# Patient Record
Sex: MALE | Race: WHITE | Employment: OTHER | URBAN - NONMETROPOLITAN AREA
[De-identification: names, ages, dates, MRNs, and addresses within clinical notes are randomized per-mention and may not be internally consistent; named-entity substitution may affect disease eponyms.]

---

## 2017-01-03 LAB
ANION GAP SERPL CALCULATED.3IONS-SCNC: 8 MMOL/L (ref 4–12)
BUN BLDV-MCNC: 29 MG/DL (ref 7–20)
CALCIUM SERPL-MCNC: 9.3 MG/DL (ref 8.8–10.5)
CHLORIDE BLD-SCNC: 101 MEQ/L (ref 101–111)
CO2: 30 MEQ/L (ref 21–32)
CREAT SERPL-MCNC: 1.56 MG/DL (ref 0.7–1.3)
CREATININE CLEARANCE: 43
CREATININE, RANDOM URINE: 35.9 MG/DL
GLUCOSE: 83 MG/DL (ref 70–110)
MICROALBUMIN UR-MCNC: < 2 MG/L
MICROALBUMIN/CREAT UR-RTO: < 5.6 MG/GM (ref 0–30)
POTASSIUM SERPL-SCNC: 3.7 MEQ/L (ref 3.6–5)
SODIUM BLD-SCNC: 139 MEQ/L (ref 135–145)

## 2017-01-10 ENCOUNTER — OFFICE VISIT (OUTPATIENT)
Dept: NEPHROLOGY | Age: 82
End: 2017-01-10

## 2017-01-10 VITALS
OXYGEN SATURATION: 93 % | DIASTOLIC BLOOD PRESSURE: 75 MMHG | BODY MASS INDEX: 23.81 KG/M2 | SYSTOLIC BLOOD PRESSURE: 160 MMHG | HEART RATE: 56 BPM | WEIGHT: 147.5 LBS

## 2017-01-10 DIAGNOSIS — N18.30 CHRONIC KIDNEY DISEASE, STAGE III (MODERATE) (HCC): Primary | ICD-10-CM

## 2017-01-10 LAB
BACTERIA URINE, POC: NORMAL
BILIRUBIN URINE: NORMAL MG/DL
BLOOD, URINE: NEGATIVE
CASTS URINE, POC: NORMAL
CLARITY: CLEAR
COLOR: YELLOW
CRYSTALS URINE, POC: NORMAL
EPI CELLS URINE, POC: NORMAL
GLUCOSE URINE: NEGATIVE
KETONES, URINE: NEGATIVE
LEUKOCYTE EST, POC: NEGATIVE
NITRITE, URINE: NEGATIVE
PH UA: 5 (ref 4.5–8)
PROTEIN UA: NEGATIVE
RBC URINE, POC: NORMAL
SPECIFIC GRAVITY UA: NORMAL (ref 1–1.03)
UROBILINOGEN, URINE: NORMAL
WBC URINE, POC: NORMAL
YEAST URINE, POC: NORMAL

## 2017-01-10 PROCEDURE — 99213 OFFICE O/P EST LOW 20 MIN: CPT | Performed by: INTERNAL MEDICINE

## 2017-01-10 PROCEDURE — 81002 URINALYSIS NONAUTO W/O SCOPE: CPT | Performed by: INTERNAL MEDICINE

## 2017-01-10 RX ORDER — LISINOPRIL 10 MG/1
5 TABLET ORAL DAILY
Qty: 30 TABLET | Refills: 3 | Status: ON HOLD | OUTPATIENT
Start: 2017-01-10 | End: 2017-12-02 | Stop reason: HOSPADM

## 2017-01-10 ASSESSMENT — ENCOUNTER SYMPTOMS
VOMITING: 0
NAUSEA: 0
COUGH: 0
BLOOD IN STOOL: 0
RESPIRATORY NEGATIVE: 1
GASTROINTESTINAL NEGATIVE: 1
BACK PAIN: 0
WHEEZING: 0
SHORTNESS OF BREATH: 0
CHEST TIGHTNESS: 0
ABDOMINAL DISTENTION: 0
FACIAL SWELLING: 0
DIARRHEA: 0
ABDOMINAL PAIN: 0
CONSTIPATION: 0

## 2017-03-07 ENCOUNTER — OFFICE VISIT (OUTPATIENT)
Dept: PULMONOLOGY | Age: 82
End: 2017-03-07

## 2017-03-07 VITALS
OXYGEN SATURATION: 96 % | TEMPERATURE: 98.4 F | HEART RATE: 64 BPM | SYSTOLIC BLOOD PRESSURE: 118 MMHG | BODY MASS INDEX: 23.46 KG/M2 | DIASTOLIC BLOOD PRESSURE: 64 MMHG | WEIGHT: 146 LBS | HEIGHT: 66 IN | RESPIRATION RATE: 16 BRPM

## 2017-03-07 DIAGNOSIS — R09.02 HYPOXEMIA: ICD-10-CM

## 2017-03-07 DIAGNOSIS — J43.2 CENTRILOBULAR EMPHYSEMA (HCC): Primary | ICD-10-CM

## 2017-03-07 PROCEDURE — 1123F ACP DISCUSS/DSCN MKR DOCD: CPT | Performed by: PHYSICIAN ASSISTANT

## 2017-03-07 PROCEDURE — G8484 FLU IMMUNIZE NO ADMIN: HCPCS | Performed by: PHYSICIAN ASSISTANT

## 2017-03-07 PROCEDURE — G8420 CALC BMI NORM PARAMETERS: HCPCS | Performed by: PHYSICIAN ASSISTANT

## 2017-03-07 PROCEDURE — 99213 OFFICE O/P EST LOW 20 MIN: CPT | Performed by: PHYSICIAN ASSISTANT

## 2017-03-07 PROCEDURE — G8926 SPIRO NO PERF OR DOC: HCPCS | Performed by: PHYSICIAN ASSISTANT

## 2017-03-07 PROCEDURE — 4040F PNEUMOC VAC/ADMIN/RCVD: CPT | Performed by: PHYSICIAN ASSISTANT

## 2017-03-07 PROCEDURE — 3023F SPIROM DOC REV: CPT | Performed by: PHYSICIAN ASSISTANT

## 2017-03-07 PROCEDURE — G8427 DOCREV CUR MEDS BY ELIG CLIN: HCPCS | Performed by: PHYSICIAN ASSISTANT

## 2017-03-07 PROCEDURE — 1036F TOBACCO NON-USER: CPT | Performed by: PHYSICIAN ASSISTANT

## 2017-03-07 ASSESSMENT — ENCOUNTER SYMPTOMS
WHEEZING: 0
HEARTBURN: 0
NAUSEA: 0
GASTROINTESTINAL NEGATIVE: 1
SORE THROAT: 0
COUGH: 0
HEMOPTYSIS: 0
EYES NEGATIVE: 1
VOMITING: 0
SPUTUM PRODUCTION: 0
BACK PAIN: 0
SHORTNESS OF BREATH: 0
RESPIRATORY NEGATIVE: 1

## 2017-04-11 ENCOUNTER — OFFICE VISIT (OUTPATIENT)
Dept: PULMONOLOGY | Age: 82
End: 2017-04-11

## 2017-04-11 VITALS
OXYGEN SATURATION: 96 % | DIASTOLIC BLOOD PRESSURE: 62 MMHG | HEIGHT: 66 IN | TEMPERATURE: 97.7 F | HEART RATE: 65 BPM | SYSTOLIC BLOOD PRESSURE: 120 MMHG | BODY MASS INDEX: 24.43 KG/M2 | WEIGHT: 152 LBS

## 2017-04-11 DIAGNOSIS — J43.2 CENTRILOBULAR EMPHYSEMA (HCC): Primary | ICD-10-CM

## 2017-04-11 PROCEDURE — 3023F SPIROM DOC REV: CPT | Performed by: PHYSICIAN ASSISTANT

## 2017-04-11 PROCEDURE — 4040F PNEUMOC VAC/ADMIN/RCVD: CPT | Performed by: PHYSICIAN ASSISTANT

## 2017-04-11 PROCEDURE — 1036F TOBACCO NON-USER: CPT | Performed by: PHYSICIAN ASSISTANT

## 2017-04-11 PROCEDURE — 99213 OFFICE O/P EST LOW 20 MIN: CPT | Performed by: PHYSICIAN ASSISTANT

## 2017-04-11 PROCEDURE — G8427 DOCREV CUR MEDS BY ELIG CLIN: HCPCS | Performed by: PHYSICIAN ASSISTANT

## 2017-04-11 PROCEDURE — G8926 SPIRO NO PERF OR DOC: HCPCS | Performed by: PHYSICIAN ASSISTANT

## 2017-04-11 PROCEDURE — 1123F ACP DISCUSS/DSCN MKR DOCD: CPT | Performed by: PHYSICIAN ASSISTANT

## 2017-04-11 PROCEDURE — G8420 CALC BMI NORM PARAMETERS: HCPCS | Performed by: PHYSICIAN ASSISTANT

## 2017-04-11 ASSESSMENT — ENCOUNTER SYMPTOMS
NAUSEA: 0
EYES NEGATIVE: 1
GASTROINTESTINAL NEGATIVE: 1
HEMOPTYSIS: 0
SORE THROAT: 0
VOMITING: 0
WHEEZING: 0
HEARTBURN: 0
COUGH: 0
SHORTNESS OF BREATH: 1
BACK PAIN: 0
SPUTUM PRODUCTION: 0

## 2017-05-11 ENCOUNTER — TELEPHONE (OUTPATIENT)
Dept: PULMONOLOGY | Age: 82
End: 2017-05-11

## 2017-07-10 ENCOUNTER — TELEPHONE (OUTPATIENT)
Dept: PULMONOLOGY | Age: 82
End: 2017-07-10

## 2017-10-11 ENCOUNTER — OFFICE VISIT (OUTPATIENT)
Dept: PULMONOLOGY | Age: 82
End: 2017-10-11
Payer: MEDICARE

## 2017-10-11 VITALS
BODY MASS INDEX: 24.43 KG/M2 | HEIGHT: 66 IN | HEART RATE: 59 BPM | DIASTOLIC BLOOD PRESSURE: 62 MMHG | SYSTOLIC BLOOD PRESSURE: 115 MMHG | WEIGHT: 152 LBS | OXYGEN SATURATION: 96 %

## 2017-10-11 DIAGNOSIS — J42 CHRONIC BRONCHITIS, UNSPECIFIED CHRONIC BRONCHITIS TYPE (HCC): ICD-10-CM

## 2017-10-11 DIAGNOSIS — J96.11 HYPOXEMIC RESPIRATORY FAILURE, CHRONIC (HCC): ICD-10-CM

## 2017-10-11 PROCEDURE — 1036F TOBACCO NON-USER: CPT | Performed by: PHYSICIAN ASSISTANT

## 2017-10-11 PROCEDURE — 3023F SPIROM DOC REV: CPT | Performed by: PHYSICIAN ASSISTANT

## 2017-10-11 PROCEDURE — 99213 OFFICE O/P EST LOW 20 MIN: CPT | Performed by: PHYSICIAN ASSISTANT

## 2017-10-11 PROCEDURE — G8484 FLU IMMUNIZE NO ADMIN: HCPCS | Performed by: PHYSICIAN ASSISTANT

## 2017-10-11 PROCEDURE — G8420 CALC BMI NORM PARAMETERS: HCPCS | Performed by: PHYSICIAN ASSISTANT

## 2017-10-11 PROCEDURE — G8427 DOCREV CUR MEDS BY ELIG CLIN: HCPCS | Performed by: PHYSICIAN ASSISTANT

## 2017-10-11 PROCEDURE — G8926 SPIRO NO PERF OR DOC: HCPCS | Performed by: PHYSICIAN ASSISTANT

## 2017-10-11 PROCEDURE — 1123F ACP DISCUSS/DSCN MKR DOCD: CPT | Performed by: PHYSICIAN ASSISTANT

## 2017-10-11 PROCEDURE — 4040F PNEUMOC VAC/ADMIN/RCVD: CPT | Performed by: PHYSICIAN ASSISTANT

## 2017-10-11 RX ORDER — ALBUTEROL SULFATE 90 UG/1
2 AEROSOL, METERED RESPIRATORY (INHALATION) 4 TIMES DAILY
Qty: 3 INHALER | Refills: 3 | Status: ON HOLD | OUTPATIENT
Start: 2017-10-11 | End: 2017-11-29 | Stop reason: SDUPTHER

## 2017-10-11 RX ORDER — PREDNISONE 10 MG/1
TABLET ORAL
Qty: 30 TABLET | Refills: 0 | Status: SHIPPED | OUTPATIENT
Start: 2017-10-11 | End: 2017-10-21

## 2017-10-11 RX ORDER — PREDNISONE 1 MG/1
5 TABLET ORAL DAILY
Qty: 90 TABLET | Refills: 3 | Status: SHIPPED | OUTPATIENT
Start: 2017-10-11 | End: 2017-11-28 | Stop reason: SDUPTHER

## 2017-10-11 ASSESSMENT — ENCOUNTER SYMPTOMS
SPUTUM PRODUCTION: 1
NAUSEA: 0
BACK PAIN: 0
HEARTBURN: 0
GASTROINTESTINAL NEGATIVE: 1
WHEEZING: 1
VOMITING: 0
SORE THROAT: 0
EYES NEGATIVE: 1
HEMOPTYSIS: 0
SHORTNESS OF BREATH: 1
COUGH: 1

## 2017-10-11 NOTE — PROGRESS NOTES
[facility]    Patient: Jaylon Plaza, 80 y.o.   : 1931  10/11/2017    Pt of Dr. Julio Gilbert   Patient presents with    COPD     Centrilobular Emphysema - 6 mth f/u with no testing    Medication Refill     Needs refills on Stiloto,  Pro-Air,  Prednisone, Prednisolone        HPI  Silver Rj is here for follow up for COPD. He has been on Stiolto for a few months but feels he did better on Advair and Spiriva but likes the respimat inhaler design. He has been doing pretty well. He did have a flare last month and required a pred taper. Otherwise he is on prednisone 5 mg a day with good benefit. He is SOB with activity but not worse. He uses 2 lnc at night. Progress History:   Since last visit any new medical issues? No  New ER or hospitlal visits? No  Any new or changes in medicines? No  Using inhalers? Yes Stiolto  Are they helpful?  Somewhat  Past Medical hx   PMH:  Past Medical History:   Diagnosis Date    AAA (abdominal aortic aneurysm) ([facility]     followed by Dr. Freddy Arceo COPD (chronic obstructive pulmonary disease) ([facility]     Hypercholesteremia     Hypertension     Prostate cancer ([facility] [facility]     with radiation in     SOB (shortness of breath)     Type II or unspecified type diabetes mellitus without mention of complication, not stated as uncontrolled      SURGICAL HISTORY:  Past Surgical History:   Procedure Laterality Date    ABDOMINAL AORTIC ANEURYSM REPAIR  14    ABDOMINAL AORTIC ANEURYSM REPAIR, ENDOVASCULAR Bilateral 2014    WITH LT FEMORAL ENDARTERECTOMY     COLONOSCOPY  unsure    INGUINAL HERNIA REPAIR Bilateral 2015    Dr Kris Chicas HISTORY:  Social History   Substance Use Topics    Smoking status: Former Smoker     Packs/day: 2.00     Years: 40.00     Types: Cigarettes     Quit date: 10/5/1989    Smokeless tobacco: Never Used    Alcohol use 0.6 oz/week     1 Cans of beer per week      Comment: now and then     ALLERGIES:  Allergies   Allergen Reactions    Sulfa Antibiotics Hives    Percocet [Oxycodone-Acetaminophen] Nausea And Vomiting     FAMILY HISTORY:  Family History   Problem Relation Age of Onset    Rheum Arthritis Mother       due to complications related to    Heart Disease Mother     Coronary Art Dis Father     Heart Disease Father     Coronary Art Dis Brother     Heart Disease Brother      CURRENT MEDICATIONS:  Current Outpatient Prescriptions   Medication Sig Dispense Refill    fluticasone-salmeterol (ADVAIR) 500-50 MCG/DOSE diskus inhaler Inhale 1 puff into the lungs every 12 hours 3 Inhaler 3    predniSONE (DELTASONE) 5 MG tablet Take 1 tablet by mouth daily 90 tablet 3    tiotropium (SPIRIVA RESPIMAT) 2.5 MCG/ACT AERS inhaler Inhale 2 puffs into the lungs daily 3 Inhaler 3    albuterol sulfate HFA (VENTOLIN HFA) 108 (90 Base) MCG/ACT inhaler Inhale 2 puffs into the lungs 4 times daily 3 Inhaler 3    predniSONE (DELTASONE) 10 MG tablet 4 tablets for 3 days, then 3 tablets for 3 days, then 2 tablets for 3 days, then 1 tablet for 3 days 30 tablet 0    prednisoLONE 5 MG TABS Take 5 mg by mouth daily      Pseudoephedrine-Guaifenesin (MUCINEX D PO) Take by mouth 2 times daily      Misc. Devices (ACAPELLA) MISC 1 Device by Does not apply route every 4 hours 1 each 0    lisinopril (PRINIVIL;ZESTRIL) 10 MG tablet Take 0.5 tablets by mouth daily 30 tablet 3    albuterol sulfate HFA (PROAIR HFA) 108 (90 BASE) MCG/ACT inhaler Inhale 2 puffs into the lungs every 6 hours as needed for Wheezing 3 Inhaler 3    bisoprolol-hydrochlorothiazide (ZIAC) 5-6.25 MG per tablet Take 1 tablet by mouth daily 30 tablet 3    Multiple Vitamins-Minerals (PRESERVISION AREDS 2) CAPS Take 1 capsule by mouth 2 times daily       pravastatin (PRAVACHOL) 80 MG tablet Take 80 mg by mouth nightly.       albuterol (PROVENTIL) (2.5 MG/3ML) 0.083% nebulizer solution Take 2.5 mg by nebulization 2 times daily as needed for Wheezing.  OXYGEN Inhale 2 L into the lungs nightly       glipiZIDE (GLUCOTROL) 5 MG tablet Take 5 mg by mouth daily.  gabapentin (NEURONTIN) 100 MG capsule Take 100 mg by mouth 2 times daily.  fish oil-omega-3 fatty acids 1000 MG capsule Take 1 g by mouth 2 times daily.  niacin (SLO-NIACIN) 500 MG tablet Take 500 mg by mouth nightly.  tamsulosin (FLOMAX) 0.4 MG capsule Take 0.4 mg by mouth daily. No current facility-administered medications for this visit. [place] Setting ROS   Review of Systems   Constitutional: Negative. Negative for chills, fever and weight loss. HENT: Negative. Negative for congestion and sore throat. Eyes: Negative. Respiratory: Positive for cough, sputum production, shortness of breath and wheezing. Negative for hemoptysis. Cardiovascular: Negative. Negative for chest pain and leg swelling. Gastrointestinal: Negative. Negative for heartburn, nausea and vomiting. Genitourinary: Negative. Musculoskeletal: Negative. Negative for back pain and myalgias. Skin: Negative. Neurological: Negative. Negative for dizziness, tremors, weakness and headaches. Psychiatric/Behavioral: Negative. All other systems reviewed and are negative. Physical exam   /62   Pulse 59   Ht 5' 6\" (1.676 m)   Wt 152 lb (68.9 kg)   SpO2 96% Comment: R/A at rest  BMI 24.53 kg/m²    Neck Circumference -   15  Mallampati - 3    Physical Exam   Constitutional: He is oriented to person, place, and time and well-developed, well-nourished, and in no distress. HENT:   Head: Normocephalic and atraumatic. Mouth/Throat: No oropharyngeal exudate. Eyes: Conjunctivae and EOM are normal. Pupils are equal, round, and reactive to light. Neck: Normal range of motion. Neck supple. Cardiovascular: Normal rate, regular rhythm and normal heart sounds. Exam reveals no friction rub. No murmur heard.   Pulmonary/Chest: Effort normal. No

## 2017-11-28 ENCOUNTER — CARE COORDINATOR VISIT (OUTPATIENT)
Dept: CASE MANAGEMENT | Age: 82
End: 2017-11-28

## 2017-11-28 ENCOUNTER — APPOINTMENT (OUTPATIENT)
Dept: GENERAL RADIOLOGY | Age: 82
DRG: 191 | End: 2017-11-28
Payer: MEDICARE

## 2017-11-28 ENCOUNTER — HOSPITAL ENCOUNTER (INPATIENT)
Age: 82
LOS: 4 days | Discharge: HOME HEALTH | DRG: 191 | End: 2017-12-02
Attending: INTERNAL MEDICINE | Admitting: INTERNAL MEDICINE
Payer: MEDICARE

## 2017-11-28 DIAGNOSIS — J44.1 COPD WITH ACUTE EXACERBATION (HCC): ICD-10-CM

## 2017-11-28 DIAGNOSIS — J44.1 COPD EXACERBATION (HCC): Primary | ICD-10-CM

## 2017-11-28 DIAGNOSIS — R09.02 HYPOXIA: ICD-10-CM

## 2017-11-28 DIAGNOSIS — J96.11 CHRONIC RESPIRATORY FAILURE WITH HYPOXIA (HCC): ICD-10-CM

## 2017-11-28 DIAGNOSIS — J43.2 CENTRILOBULAR EMPHYSEMA (HCC): ICD-10-CM

## 2017-11-28 PROBLEM — I10 HTN (HYPERTENSION), BENIGN: Status: ACTIVE | Noted: 2017-11-28

## 2017-11-28 PROBLEM — N18.30 STAGE 3 CHRONIC KIDNEY DISEASE (HCC): Status: ACTIVE | Noted: 2017-11-28

## 2017-11-28 LAB
ALBUMIN SERPL-MCNC: 4 G/DL (ref 3.5–5.1)
ALLEN TEST: POSITIVE
ALP BLD-CCNC: 58 U/L (ref 38–126)
ALT SERPL-CCNC: 15 U/L (ref 11–66)
ANION GAP SERPL CALCULATED.3IONS-SCNC: 13 MEQ/L (ref 8–16)
AST SERPL-CCNC: 20 U/L (ref 5–40)
BASE EXCESS (CALCULATED): -3.5 MMOL/L (ref -2.5–2.5)
BASOPHILS # BLD: 0.5 %
BASOPHILS ABSOLUTE: 0.1 THOU/MM3 (ref 0–0.1)
BILIRUB SERPL-MCNC: 0.3 MG/DL (ref 0.3–1.2)
BUN BLDV-MCNC: 30 MG/DL (ref 7–22)
CALCIUM SERPL-MCNC: 8.6 MG/DL (ref 8.5–10.5)
CHLORIDE BLD-SCNC: 99 MEQ/L (ref 98–111)
CO2: 23 MEQ/L (ref 23–33)
COLLECTED BY:: ABNORMAL
CREAT SERPL-MCNC: 1.4 MG/DL (ref 0.4–1.2)
DEVICE: ABNORMAL
EKG ATRIAL RATE: 79 BPM
EKG P AXIS: 81 DEGREES
EKG P-R INTERVAL: 192 MS
EKG Q-T INTERVAL: 396 MS
EKG QRS DURATION: 106 MS
EKG QTC CALCULATION (BAZETT): 454 MS
EKG R AXIS: 90 DEGREES
EKG T AXIS: 76 DEGREES
EKG VENTRICULAR RATE: 79 BPM
EOSINOPHIL # BLD: 3.9 %
EOSINOPHILS ABSOLUTE: 0.5 THOU/MM3 (ref 0–0.4)
FLU A ANTIGEN: NEGATIVE
FLU B ANTIGEN: NEGATIVE
GFR SERPL CREATININE-BSD FRML MDRD: 48 ML/MIN/1.73M2
GLUCOSE BLD-MCNC: 124 MG/DL (ref 70–108)
HCO3: 23 MMOL/L (ref 23–28)
HCT VFR BLD CALC: 46.9 % (ref 42–52)
HEMOGLOBIN: 15.6 GM/DL (ref 14–18)
IFIO2: 2
LYMPHOCYTES # BLD: 14.4 %
LYMPHOCYTES ABSOLUTE: 2 THOU/MM3 (ref 1–4.8)
MCH RBC QN AUTO: 30.4 PG (ref 27–31)
MCHC RBC AUTO-ENTMCNC: 33.2 GM/DL (ref 33–37)
MCV RBC AUTO: 91.8 FL (ref 80–94)
MONOCYTES # BLD: 7.4 %
MONOCYTES ABSOLUTE: 1 THOU/MM3 (ref 0.4–1.3)
NUCLEATED RED BLOOD CELLS: 0 /100 WBC
O2 SATURATION: 96 %
OSMOLALITY CALCULATION: 277.7 MOSMOL/KG (ref 275–300)
PCO2: 43 MMHG (ref 35–45)
PDW BLD-RTO: 13.7 % (ref 11.5–14.5)
PH BLOOD GAS: 7.33 (ref 7.35–7.45)
PLATELET # BLD: 202 THOU/MM3 (ref 130–400)
PMV BLD AUTO: 7.6 MCM (ref 7.4–10.4)
PO2: 85 MMHG (ref 71–104)
POTASSIUM SERPL-SCNC: 5.1 MEQ/L (ref 3.5–5.2)
RBC # BLD: 5.11 MILL/MM3 (ref 4.7–6.1)
SEG NEUTROPHILS: 73.8 %
SEGMENTED NEUTROPHILS ABSOLUTE COUNT: 10.1 THOU/MM3 (ref 1.8–7.7)
SODIUM BLD-SCNC: 135 MEQ/L (ref 135–145)
SOURCE, BLOOD GAS: ABNORMAL
TOTAL PROTEIN: 6.5 G/DL (ref 6.1–8)
TROPONIN T: < 0.01 NG/ML
WBC # BLD: 13.7 THOU/MM3 (ref 4.8–10.8)

## 2017-11-28 PROCEDURE — 84484 ASSAY OF TROPONIN QUANT: CPT

## 2017-11-28 PROCEDURE — 94660 CPAP INITIATION&MGMT: CPT

## 2017-11-28 PROCEDURE — 99285 EMERGENCY DEPT VISIT HI MDM: CPT

## 2017-11-28 PROCEDURE — 6360000002 HC RX W HCPCS: Performed by: PHYSICIAN ASSISTANT

## 2017-11-28 PROCEDURE — 93005 ELECTROCARDIOGRAM TRACING: CPT

## 2017-11-28 PROCEDURE — 87040 BLOOD CULTURE FOR BACTERIA: CPT

## 2017-11-28 PROCEDURE — 82803 BLOOD GASES ANY COMBINATION: CPT

## 2017-11-28 PROCEDURE — 6370000000 HC RX 637 (ALT 250 FOR IP): Performed by: INTERNAL MEDICINE

## 2017-11-28 PROCEDURE — 96374 THER/PROPH/DIAG INJ IV PUSH: CPT

## 2017-11-28 PROCEDURE — 87804 INFLUENZA ASSAY W/OPTIC: CPT

## 2017-11-28 PROCEDURE — 94640 AIRWAY INHALATION TREATMENT: CPT

## 2017-11-28 PROCEDURE — 36415 COLL VENOUS BLD VENIPUNCTURE: CPT

## 2017-11-28 PROCEDURE — 6360000002 HC RX W HCPCS: Performed by: INTERNAL MEDICINE

## 2017-11-28 PROCEDURE — 80053 COMPREHEN METABOLIC PANEL: CPT

## 2017-11-28 PROCEDURE — 6370000000 HC RX 637 (ALT 250 FOR IP): Performed by: PHYSICIAN ASSISTANT

## 2017-11-28 PROCEDURE — 94644 CONT INHLJ TX 1ST HOUR: CPT

## 2017-11-28 PROCEDURE — 36600 WITHDRAWAL OF ARTERIAL BLOOD: CPT

## 2017-11-28 PROCEDURE — 85025 COMPLETE CBC W/AUTO DIFF WBC: CPT

## 2017-11-28 PROCEDURE — 2580000003 HC RX 258: Performed by: INTERNAL MEDICINE

## 2017-11-28 PROCEDURE — 99223 1ST HOSP IP/OBS HIGH 75: CPT | Performed by: INTERNAL MEDICINE

## 2017-11-28 PROCEDURE — 2140000000 HC CCU INTERMEDIATE R&B

## 2017-11-28 PROCEDURE — 2700000000 HC OXYGEN THERAPY PER DAY

## 2017-11-28 PROCEDURE — 71010 XR CHEST PORTABLE: CPT

## 2017-11-28 RX ORDER — ACETAMINOPHEN 325 MG/1
650 TABLET ORAL EVERY 4 HOURS PRN
Status: DISCONTINUED | OUTPATIENT
Start: 2017-11-28 | End: 2017-12-02 | Stop reason: HOSPADM

## 2017-11-28 RX ORDER — IPRATROPIUM BROMIDE AND ALBUTEROL SULFATE 2.5; .5 MG/3ML; MG/3ML
1 SOLUTION RESPIRATORY (INHALATION) ONCE
Status: COMPLETED | OUTPATIENT
Start: 2017-11-28 | End: 2017-11-28

## 2017-11-28 RX ORDER — METHYLPREDNISOLONE SODIUM SUCCINATE 125 MG/2ML
125 INJECTION, POWDER, LYOPHILIZED, FOR SOLUTION INTRAMUSCULAR; INTRAVENOUS ONCE
Status: COMPLETED | OUTPATIENT
Start: 2017-11-28 | End: 2017-11-28

## 2017-11-28 RX ORDER — MORPHINE SULFATE 2 MG/ML
1 INJECTION, SOLUTION INTRAMUSCULAR; INTRAVENOUS EVERY 6 HOURS PRN
Status: DISCONTINUED | OUTPATIENT
Start: 2017-11-28 | End: 2017-12-02 | Stop reason: HOSPADM

## 2017-11-28 RX ORDER — IPRATROPIUM BROMIDE AND ALBUTEROL SULFATE 2.5; .5 MG/3ML; MG/3ML
1 SOLUTION RESPIRATORY (INHALATION)
Status: DISCONTINUED | OUTPATIENT
Start: 2017-11-28 | End: 2017-12-02 | Stop reason: HOSPADM

## 2017-11-28 RX ORDER — SODIUM CHLORIDE 0.9 % (FLUSH) 0.9 %
10 SYRINGE (ML) INJECTION PRN
Status: DISCONTINUED | OUTPATIENT
Start: 2017-11-28 | End: 2017-12-02 | Stop reason: HOSPADM

## 2017-11-28 RX ORDER — PRAVASTATIN SODIUM 80 MG/1
80 TABLET ORAL NIGHTLY
Status: DISCONTINUED | OUTPATIENT
Start: 2017-11-28 | End: 2017-12-02 | Stop reason: HOSPADM

## 2017-11-28 RX ORDER — MORPHINE SULFATE 2 MG/ML
1 INJECTION, SOLUTION INTRAMUSCULAR; INTRAVENOUS EVERY 6 HOURS
Status: DISCONTINUED | OUTPATIENT
Start: 2017-11-28 | End: 2017-11-28

## 2017-11-28 RX ORDER — ONDANSETRON 2 MG/ML
4 INJECTION INTRAMUSCULAR; INTRAVENOUS EVERY 6 HOURS PRN
Status: DISCONTINUED | OUTPATIENT
Start: 2017-11-28 | End: 2017-12-02 | Stop reason: HOSPADM

## 2017-11-28 RX ORDER — DOXYCYCLINE HYCLATE 100 MG
100 TABLET ORAL EVERY 12 HOURS
Status: DISCONTINUED | OUTPATIENT
Start: 2017-11-28 | End: 2017-12-02 | Stop reason: HOSPADM

## 2017-11-28 RX ORDER — METHYLPREDNISOLONE SODIUM SUCCINATE 40 MG/ML
40 INJECTION, POWDER, LYOPHILIZED, FOR SOLUTION INTRAMUSCULAR; INTRAVENOUS EVERY 8 HOURS
Status: DISCONTINUED | OUTPATIENT
Start: 2017-11-28 | End: 2017-11-29

## 2017-11-28 RX ORDER — GABAPENTIN 300 MG/1
300 CAPSULE ORAL 2 TIMES DAILY
Status: DISCONTINUED | OUTPATIENT
Start: 2017-11-28 | End: 2017-12-02 | Stop reason: HOSPADM

## 2017-11-28 RX ORDER — BISOPROLOL FUMARATE AND HYDROCHLOROTHIAZIDE 5; 6.25 MG/1; MG/1
1 TABLET ORAL DAILY
Status: DISCONTINUED | OUTPATIENT
Start: 2017-11-28 | End: 2017-11-28 | Stop reason: CLARIF

## 2017-11-28 RX ORDER — GLIPIZIDE 5 MG/1
5 TABLET ORAL DAILY
Status: DISCONTINUED | OUTPATIENT
Start: 2017-11-28 | End: 2017-12-02 | Stop reason: HOSPADM

## 2017-11-28 RX ORDER — MORPHINE SULFATE 2 MG/ML
1 INJECTION, SOLUTION INTRAMUSCULAR; INTRAVENOUS ONCE
Status: COMPLETED | OUTPATIENT
Start: 2017-11-28 | End: 2017-11-28

## 2017-11-28 RX ORDER — SODIUM CHLORIDE 0.9 % (FLUSH) 0.9 %
10 SYRINGE (ML) INJECTION EVERY 12 HOURS SCHEDULED
Status: DISCONTINUED | OUTPATIENT
Start: 2017-11-28 | End: 2017-12-02 | Stop reason: HOSPADM

## 2017-11-28 RX ORDER — TAMSULOSIN HYDROCHLORIDE 0.4 MG/1
0.4 CAPSULE ORAL DAILY
Status: DISCONTINUED | OUTPATIENT
Start: 2017-11-28 | End: 2017-12-02 | Stop reason: HOSPADM

## 2017-11-28 RX ORDER — LANOLIN ALCOHOL/MO/W.PET/CERES
500 CREAM (GRAM) TOPICAL NIGHTLY
Status: DISCONTINUED | OUTPATIENT
Start: 2017-11-28 | End: 2017-11-28 | Stop reason: RX

## 2017-11-28 RX ORDER — OMEGA-3/DHA/EPA/FISH OIL 300-1000MG
1 CAPSULE ORAL 2 TIMES DAILY
Status: DISCONTINUED | OUTPATIENT
Start: 2017-11-28 | End: 2017-11-28 | Stop reason: RX

## 2017-11-28 RX ADMIN — MORPHINE SULFATE 1 MG: 2 INJECTION, SOLUTION INTRAMUSCULAR; INTRAVENOUS at 18:23

## 2017-11-28 RX ADMIN — MORPHINE SULFATE 1 MG: 2 INJECTION, SOLUTION INTRAMUSCULAR; INTRAVENOUS at 22:54

## 2017-11-28 RX ADMIN — GLIPIZIDE 5 MG: 5 TABLET ORAL at 18:23

## 2017-11-28 RX ADMIN — METOPROLOL TARTRATE 25 MG: 25 TABLET ORAL at 22:54

## 2017-11-28 RX ADMIN — METHYLPREDNISOLONE SODIUM SUCCINATE 40 MG: 40 INJECTION, POWDER, FOR SOLUTION INTRAMUSCULAR; INTRAVENOUS at 14:12

## 2017-11-28 RX ADMIN — IPRATROPIUM BROMIDE AND ALBUTEROL SULFATE 1 AMPULE: .5; 3 SOLUTION RESPIRATORY (INHALATION) at 07:28

## 2017-11-28 RX ADMIN — METHYLPREDNISOLONE SODIUM SUCCINATE 40 MG: 40 INJECTION, POWDER, FOR SOLUTION INTRAMUSCULAR; INTRAVENOUS at 22:54

## 2017-11-28 RX ADMIN — DOXYCYCLINE HYCLATE 100 MG: 100 TABLET, COATED ORAL at 14:12

## 2017-11-28 RX ADMIN — TAMSULOSIN HYDROCHLORIDE 0.4 MG: 0.4 CAPSULE ORAL at 14:12

## 2017-11-28 RX ADMIN — Medication 10 ML: at 21:19

## 2017-11-28 RX ADMIN — IPRATROPIUM BROMIDE AND ALBUTEROL SULFATE 1 AMPULE: .5; 3 SOLUTION RESPIRATORY (INHALATION) at 20:19

## 2017-11-28 RX ADMIN — PRAVASTATIN SODIUM 80 MG: 80 TABLET ORAL at 21:19

## 2017-11-28 RX ADMIN — METHYLPREDNISOLONE SODIUM SUCCINATE 125 MG: 125 INJECTION, POWDER, FOR SOLUTION INTRAMUSCULAR; INTRAVENOUS at 07:32

## 2017-11-28 RX ADMIN — GABAPENTIN 300 MG: 300 CAPSULE ORAL at 21:19

## 2017-11-28 RX ADMIN — ALBUTEROL SULFATE 15 MG/HR: 2.5 SOLUTION RESPIRATORY (INHALATION) at 10:01

## 2017-11-28 RX ADMIN — IPRATROPIUM BROMIDE AND ALBUTEROL SULFATE 1 AMPULE: .5; 3 SOLUTION RESPIRATORY (INHALATION) at 15:52

## 2017-11-28 RX ADMIN — ALBUTEROL SULFATE 15 MG/HR: 2.5 SOLUTION RESPIRATORY (INHALATION) at 11:15

## 2017-11-28 RX ADMIN — ENOXAPARIN SODIUM 40 MG: 40 INJECTION SUBCUTANEOUS at 14:14

## 2017-11-28 ASSESSMENT — ENCOUNTER SYMPTOMS
COUGH: 1
FACIAL SWELLING: 0
NAUSEA: 0
RHINORRHEA: 1
EYE DISCHARGE: 0
STRIDOR: 0
DIARRHEA: 0
COLOR CHANGE: 0
EYE REDNESS: 0
VOMITING: 0
SHORTNESS OF BREATH: 1
PHOTOPHOBIA: 0
ABDOMINAL DISTENTION: 0

## 2017-11-28 ASSESSMENT — PAIN SCALES - GENERAL
PAINLEVEL_OUTOF10: 0

## 2017-11-28 NOTE — PROGRESS NOTES
PHARMACY NOTE  Rebekah Marley was ordered fish oil and niacin. Per the [address], this medication is non-formulary and not stocked by pharmacy. The medication can be reordered at discharge.      Shante Leary, PharmD, BCPS   11/28/2017  4:21 PM

## 2017-11-28 NOTE — ED NOTES
Pt states that feels is breathing easier. Pt [address] laid down slightly. Pt voices no c/o, states breathing easier, talking better. Pt voices no other c/jessika needs. Update given. Will continue to monitor.      Mo Escalera, SUSAN  11/28/17 5045

## 2017-11-28 NOTE — ED NOTES
2 hour breathing treatment started per RT>    IV changed to INT. Will continue to monitor. Update given.      Randa Lauren RN  11/28/17 8273

## 2017-11-28 NOTE — FLOWSHEET NOTE
11/28/17 1622   Provider Notification   Reason for Communication Review case   Provider Name Dr. Nancy Jennings   Provider Notification Physician   Method of Communication Secure Message   Response Other (Comment)   Notification Time [phone] messaged back stating to see orders and transfer patient.

## 2017-11-28 NOTE — ED PROVIDER NOTES
solution Take 2.5 mg by nebulization 2 times daily as needed for Wheezing. OXYGEN Inhale 2 L into the lungs nightly       glipiZIDE (GLUCOTROL) 5 MG tablet Take 5 mg by mouth daily. gabapentin (NEURONTIN) 100 MG capsule Take 300 mg by mouth 2 times daily       fish oil-omega-3 fatty acids 1000 MG capsule Take 1 g by mouth 2 times daily. niacin (SLO-NIACIN) 500 MG tablet Take 500 mg by mouth nightly. tamsulosin (FLOMAX) 0.4 MG capsule Take 0.4 mg by mouth daily. fluticasone-salmeterol (ADVAIR) 500-50 MCG/DOSE diskus inhaler Inhale 1 puff into the lungs every 12 hours  Qty: 3 Inhaler, Refills: 3      !! albuterol sulfate HFA (VENTOLIN HFA) 108 (90 Base) MCG/ACT inhaler Inhale 2 puffs into the lungs 4 times daily  Qty: 3 Inhaler, Refills: 3       !! - Potential duplicate medications found. Please discuss with provider. ALLERGIES     is allergic to sulfa antibiotics and percocet [oxycodone-acetaminophen]. FAMILY HISTORY     indicated that his mother is . He indicated that his father is . He indicated that his brother is . family history includes Coronary Art Dis in his brother and father; Heart Disease in his brother, father, and mother; Rheum Arthritis in his mother. SOCIAL HISTORY      reports that he quit smoking about 28 years ago. His smoking use included Cigarettes. He has a 80.00 pack-year smoking history. He has never used smokeless tobacco. He reports that he drinks about 0.6 oz of alcohol per week . He reports that he does not use drugs. PHYSICAL EXAM     INITIAL VITALS:  height is 5' 6\" (1.676 m) and weight is 155 lb (70.3 kg). His oral temperature is 98.7 °F (37.1 °C). His blood pressure is 149/87 (abnormal) and his pulse is 107. His respiration is 22 and oxygen saturation is 97%. Physical Exam   Constitutional: He is oriented to person, place, and time. Vital signs are normal. He appears well-developed and well-nourished. Non-toxic appearance. No distress. HENT:   Head: Normocephalic and atraumatic. Right Ear: Hearing normal.   Left Ear: Hearing normal.   Nose: Nose normal. No rhinorrhea. Mouth/Throat: Uvula is midline, oropharynx is clear and moist and mucous membranes are normal. No oropharyngeal exudate. Eyes: Conjunctivae, EOM and lids are normal. Pupils are equal, round, and reactive to light. No scleral icterus. Neck: Normal range of motion. Neck supple. No neck rigidity. No tracheal deviation present. Cardiovascular: Normal rate, regular rhythm and normal heart sounds. No murmur heard. Pulmonary/Chest: Effort normal. No stridor. Tachypnea noted. No respiratory distress. He has decreased breath sounds. He has wheezes (faint, expiratory). Retractions appreciated   Abdominal: Soft. He exhibits no distension. There is no tenderness. There is no rigidity and no guarding. Musculoskeletal: Normal range of motion. He exhibits no edema. No signs of DVT   Lymphadenopathy:     He has no cervical adenopathy. Neurological: He is alert and oriented to person, place, and time. He has normal strength. Gait normal. GCS eye subscore is 4. GCS verbal subscore is 5. GCS motor subscore is 6. No gross deficits observed   Skin: Skin is warm, dry and intact. No rash noted. He is not diaphoretic. No pallor. Psychiatric: He has a normal mood and affect. His speech is normal and behavior is normal. Thought content normal.   Nursing note and vitals reviewed. DIFFERENTIAL DIAGNOSIS:   Including but not limited to: COPD exacerbation, pneumonia, bronchitis, URI, viral syndrome    DIAGNOSTIC RESULTS     EKG: All EKG's are interpreted by the Emergency Department Physician who either signs or Co-signs this chart in the absence of a cardiologist.  Beth Sosa.  Rate: 79 bpm  ID interval: 192 ms  QRS duration: 106 ms  QTc: 454 ms  P-R-T axes: 81, 90, 76  Normal sinus rhythm  No STEMI    RADIOLOGY: non-plain film images(s) such as CT, Ultrasound and MRI are read by the radiologist.  Plain radiographic images are visualized and preliminarily interpreted by the emergency physician unless otherwise stated below. XR Chest Portable   Final Result      Stable radiographic appearance of the chest. No evidence of an acute process. **This report has been created using voice recognition software. It may contain minor errors which are inherent in voice recognition technology. **      Final report electronically signed by Dr. Vince Raya on 11/28/2017 8:05 AM          LABS:   Labs Reviewed   BLOOD GAS, ARTERIAL - Abnormal; Notable for the following:        Result Value    pH, Blood Gas 7.33 (*)     Base Excess (Calculated) -3.5 (*)     All other components within normal limits   CBC WITH AUTO DIFFERENTIAL - Abnormal; Notable for the following:     WBC 13.7 (*)     Segs Absolute 10.1 (*)     Eosinophils # 0.5 (*)     All other components within normal limits   COMPREHENSIVE METABOLIC PANEL - Abnormal; Notable for the following:     Glucose 124 (*)     CREATININE 1.4 (*)     BUN 30 (*)     All other components within normal limits   GLOMERULAR FILTRATION RATE, ESTIMATED - Abnormal; Notable for the following:     Est, Glom Filt Rate 48 (*)     All other components within normal limits   CULTURE BLOOD #1   CULTURE BLOOD #2   RAPID INFLUENZA A/B ANTIGENS   TROPONIN   ANION GAP   OSMOLALITY       EMERGENCY DEPARTMENT COURSE:   Vitals:    Vitals:    11/28/17 1001 11/28/17 1002 11/28/17 1115 11/28/17 1155   BP:  (!) 106/55  (!) 149/87   Pulse: 79   107   Resp: 24   22   Temp:       TempSrc:       SpO2: 95%  97% 97%   Weight:       Height:    5' 6\" (1.676 m)     7:59AM: After receiving 2 breathing treatments the patient is moving air a little better, but is still diminished. The patient presents to the ED today for evaluation of shortness of breath. The patient has a history of COPD.  He is tachypneac and retracting on exam, and has diminished breath sounds. The patient's vitals are within acceptable range. He was treated with two duoneb treatments and an albuterol treatment as well as solumedrol. The patient did have some relief but he is still significantly diminished. Chest Xray shows no acute findings. Lab work shows mild leukocytosis, creatinine is elevated but at baseline. The patient was last admitted to the hospital by Dr. Thompson, so I consulted Dr. Sathish Neves for admission, but he states the patient was actually admitted to the hospitalist service last time. I then consulted Dr. Minnie Lares, who graciously accepted the patient for admission. The patient and his family were amenable with this plan. All results were discussed with the patient and family as well as desire for admission and he is agreeable. He was admitted to the hospital in fair condition. CRITICAL CARE:   None    CONSULTS:  Dr. Sathish Neves, who states the patient was last admitted to the hospitalist and asked us to consult with the hospitalist on call today  Dr. Minnie Lares, who graciously accepts the patient for admission    PROCEDURES:  None    FINAL IMPRESSION      1. COPD exacerbation ([address]    2. Hypoxia          DISPOSITION/PLAN     1. COPD exacerbation ([address]    2. Hypoxia          (Please note that portions of this note were completed with a voice recognition program.  Efforts were made to edit the dictations but occasionally words are mis-transcribed.)    Scribe: Violetta Rendon 11/28/17 7:45 AM Scribing for and in the presence of Samara Lund PA-C. Signed by: Shahid Lewis, 11/28/17 12:35 PM    Provider:  I personally performed the services described in the documentation, reviewed and edited the documentation which was dictated to the scribe in my presence, and it accurately records my words and actions.     Samara Lund PA-C 11/28/17 12:35 PM    NICHOLAS Bell PA-C  11/28/17 [id number]

## 2017-11-28 NOTE — H&P
History & Physical    Patient:  Conchis Bradley  YOB: 1931  Date of Service: 11/28/2017  MRN: [id number]   Acct:  [de-identified]   Primary Care Physician: Monisha Mireles CNP    Chief Complaint:sob    History of Present Illness:   History obtained from chart review and the patient. Conchis Bradley is a 80 y.o. male who presents to the ED for evaluation of shortness of breath. The patient has a history of COPD  Per wife, this has been ongoing for the last couple of days. . She states he usually just wears it at night, but he is now wearing it throughout the day anytime he is sitting down. The patient just finished a medrol dose pack 2 days ago and is on 0.5mg every morning chronically. He has a cough and chronic runny nose, but no other cold symptoms. The patient denies chest pain, fever, dizziness, presyncope or hemoptysis. Patient was brought in by EMS. Pulse ox was in the upper 70s and DuoNeb was given in route. Pulse ox improved to mid 80s. Still tripoding, cant lay flat      Past Medical History:        Diagnosis Date    AAA (abdominal aortic aneurysm) ([facility] [address]     followed by Dr. Negron Lav COPD (chronic obstructive pulmonary disease) ([facility] [address]     Hypercholesteremia     Hypertension     Prostate cancer ([facility] [address]     with radiation in 2006    SOB (shortness of breath)     Type II or unspecified type diabetes mellitus without mention of complication, not stated as uncontrolled        Past Surgical History:        Procedure Laterality Date    ABDOMINAL AORTIC ANEURYSM REPAIR  8/21/14    ABDOMINAL AORTIC ANEURYSM REPAIR, ENDOVASCULAR Bilateral 08/21/2014    WITH LT FEMORAL ENDARTERECTOMY     COLONOSCOPY  unsure    INGUINAL HERNIA REPAIR Bilateral 07/28/2015    Dr Krystal Crawley Medications:   No current facility-administered medications on file prior to encounter.       Current Outpatient Prescriptions on File Prior to Encounter   Medication Sig Dispense Refill    tiotropium ([facility] RESPIMAT) 2.5 MCG/ACT AERS inhaler Inhale 2 puffs into the lungs daily 3 Inhaler 3    prednisoLONE 5 MG TABS Take 5 mg by mouth daily      Pseudoephedrine-Guaifenesin (MUCINEX D PO) Take by mouth 2 times daily      lisinopril (PRINIVIL;ZESTRIL) 10 MG tablet Take 0.5 tablets by mouth daily (Patient taking differently: Take 10 mg by mouth daily ) 30 tablet 3    albuterol sulfate HFA (PROAIR HFA) 108 (90 BASE) MCG/ACT inhaler Inhale 2 puffs into the lungs every 6 hours as needed for Wheezing 3 Inhaler 3    bisoprolol-hydrochlorothiazide (ZIAC) 5-6.25 MG per tablet Take 1 tablet by mouth daily 30 tablet 3    Multiple Vitamins-Minerals (PRESERVISION AREDS 2) CAPS Take 1 capsule by mouth 2 times daily       pravastatin (PRAVACHOL) 80 MG tablet Take 80 mg by mouth nightly.  albuterol (PROVENTIL) (2.5 MG/3ML) 0.083% nebulizer solution Take 2.5 mg by nebulization 2 times daily as needed for Wheezing.  OXYGEN Inhale 2 L into the lungs nightly       glipiZIDE (GLUCOTROL) 5 MG tablet Take 5 mg by mouth daily.  gabapentin (NEURONTIN) 100 MG capsule Take 300 mg by mouth 2 times daily       fish oil-omega-3 fatty acids 1000 MG capsule Take 1 g by mouth 2 times daily.  niacin (SLO-NIACIN) 500 MG tablet Take 500 mg by mouth nightly.  tamsulosin (FLOMAX) 0.4 MG capsule Take 0.4 mg by mouth daily.  fluticasone-salmeterol (ADVAIR) 500-50 MCG/DOSE diskus inhaler Inhale 1 puff into the lungs every 12 hours 3 Inhaler 3    albuterol sulfate HFA (VENTOLIN HFA) 108 (90 Base) MCG/ACT inhaler Inhale 2 puffs into the lungs 4 times daily 3 Inhaler 3       Allergies:  Sulfa antibiotics and Percocet [oxycodone-acetaminophen]    Social History:    reports that he quit smoking about 28 years ago. His smoking use included Cigarettes. He has a 80.00 pack-year smoking history. He has never used smokeless tobacco. He reports that he drinks about 0.6 oz of alcohol per week .  He reports that he does not use drugs. Family History:       Problem Relation Age of Onset    Rheum Arthritis Mother       due to complications related to    Heart Disease Mother     Coronary Art Dis Father     Heart Disease Father     Coronary Art Dis Brother     Heart Disease Brother        Review of systems: sob, chills  Constitutional: no fever, no night sweats, no fatigue  Head: no headache, no head injury, no migranes. Eye: no blurring of vision, no double vision. Ears: no hearing difficulty, no tinnitus  Mouth/throat: no ulceration, dental caries, dysphagia  Lungs: no cough, no shortness of breath, no wheeze  CVS: no palpitation, no chest pain, no shortness of breath  GI: no abdominal pain, no nausea , no vomiting, no constipation  JONY: no dysuria, frequency and urgency, no hematuria, no kidney stones  Musculoskeletal: no joint pain, swelling , stiffness  Endocrine: no polyuria, polydypsia, no cold or heat intolerence  Hematology: no anemia, no easy brusing or bleeding, no hx of clotting disorder  Dermatology: no skin rash, no eczema, no prurities,  Psychiatry: no depression, no anxiety,no panic attacks, no suicide ideation  Neurology: no syncope, no seizures, no numbness or tingling of hands, no numbness or tingling of feet, no paresis    10 point review of systems completed, all other than noted above are negative. Vitals:   Vitals:    17 1002   BP: (!) 106/55   Pulse:    Resp:    Temp:    SpO2:       BMI: Body mass index is 25.02 kg/m².                 Exam:  Physical Examination: General appearance - alert, in mod distress  Mental status - alert, oriented to person, place, and time  Neck - supple, no significant adenopathy, no JVD, or carotid bruits  Chest - diminished all over, wheezing  Heart - normal rate, regular rhythm, normal S1, S2, no murmurs, rubs, clicks or gallops  Abdomen - soft, nontender, nondistended, no masses or organomegaly  Neurological - alert, oriented, normal speech, no focal findings or movement disorder noted  Musculoskeletal - no joint tenderness, deformity or swelling  Extremities - peripheral pulses normal, no pedal edema, no clubbing or cyanosis  Skin - normal coloration and turgor, no rashes, no suspicious skin lesions noted      Review of Labs and Diagnostic Testing:    Recent Results (from the past 24 hour(s))   Blood gas, arterial    Collection Time: 11/28/17  7:25 AM   Result Value Ref Range    pH, Blood Gas 7.33 (L) 7.35 - 7.45    PCO2 43 35 - 45 mmhg    PO2 85 71 - 104 mmhg    HCO3 23 23 - 28 mmol/l    Base Excess (Calculated) -3.5 (L) -2.5 - 2.5 mmol/l    O2 Sat 96 %    IFIO2 2     DEVICE Cannula     Manuel Test Positive     Source: R Radial     COLLECTED BY: [id number]    CBC auto differential    Collection Time: 11/28/17  7:43 AM   Result Value Ref Range    WBC 13.7 (H) 4.8 - 10.8 thou/mm3    RBC 5.11 4.70 - 6.10 mill/mm3    Hemoglobin 15.6 14.0 - 18.0 gm/dl    Hematocrit 46.9 42.0 - 52.0 %    MCV 91.8 80.0 - 94.0 fL    MCH 30.4 27.0 - 31.0 pg    MCHC 33.2 33.0 - 37.0 gm/dl    RDW 13.7 11.5 - 14.5 %    Platelets 728 269 - 555 thou/mm3    MPV 7.6 7.4 - 10.4 mcm    Seg Neutrophils 73.8 %    Lymphocytes 14.4 %    Monocytes 7.4 %    Eosinophils 3.9 %    Basophils 0.5 %    nRBC 0 /100 wbc    Segs Absolute 10.1 (H) 1.8 - 7.7 thou/mm3    Lymphocytes # 2.0 1.0 - 4.8 thou/mm3    Monocytes # 1.0 0.4 - 1.3 thou/mm3    Eosinophils # 0.5 (H) 0.0 - 0.4 thou/mm3    Basophils # 0.1 0.0 - 0.1 thou/mm3   Comprehensive Metabolic Panel    Collection Time: 11/28/17  7:43 AM   Result Value Ref Range    Glucose 124 (H) 70 - 108 mg/dL    CREATININE 1.4 (H) 0.4 - 1.2 mg/dL    BUN 30 (H) 7 - 22 mg/dL    Sodium 135 135 - 145 meq/L    Potassium 5.1 3.5 - 5.2 meq/L    Chloride 99 98 - 111 meq/L    CO2 23 23 - 33 meq/L    Calcium 8.6 8.5 - 10.5 mg/dL    AST 20 5 - 40 U/L    Alkaline Phosphatase 58 38 - 126 U/L    Total Protein 6.5 6.1 - 8.0 g/dL    Alb 4.0 3.5 - 5.1 g/dL    Total Bilirubin 0.3 0.3 - 1.2 mg/dL    ALT 15 11 - 66 U/L   Troponin    Collection Time: 11/28/17  7:43 AM   Result Value Ref Range    Troponin T < 0.010 ng/ml   Anion Gap    Collection Time: 11/28/17  7:43 AM   Result Value Ref Range    Anion Gap 13.0 8.0 - 16.0 meq/L   Osmolality    Collection Time: 11/28/17  7:43 AM   Result Value Ref Range    Osmolality Calc 277.7 275.0 - 300 mOsmol/kg   Glomerular Filtration Rate, Estimated    Collection Time: 11/28/17  7:43 AM   Result Value Ref Range    Est, Glom Filt Rate 48 (A) ml/min/1.73m2       Radiology:     Xr Chest Portable    Result Date: 11/28/2017  PROCEDURE: XR CHEST PORTABLE CLINICAL INFORMATION: Shortness of breath, . COMPARISON: Radiographs of the chest dated September 13, 2016 TECHNIQUE: AP upright view of the chest. FINDINGS: The heart size is normal.  The mediastinum is not widened. There are no pulmonary infiltrates or effusions. The pulmonary vascularity is normal. No suspicious osseous lesions are present. Degenerative changes are again noted in the bilateral shoulders and thoracic spine. Stable radiographic appearance of the chest. No evidence of an acute process. **This report has been created using voice recognition software. It may contain minor errors which are inherent in voice recognition technology. ** Final report electronically signed by Dr. Komal Norwood on 11/28/2017 8:05 AM        EKG: rbbb    Assessment:    Principal Problem:    COPD with acute exacerbation ([facility] [address]  Active Problems:    Chronic respiratory failure ([facility] [address]    Stage 3 chronic kidney disease    HTN (hypertension), benign      Plan:  1.  Iv solumedrol, duoneb, doxycycline tabs, bipap prn, check flu swab, blood cultures, cons pulm  2. ckd -3 at baseline      DVT prophylaxis: [x] Lovenox                                 [] SCDs                                 [] SQ Heparin                                 [] Encourage ambulation, low risk for DVT, no chemical or mechanical prophylaxis necessary              []

## 2017-11-28 NOTE — CARE COORDINATION
ED Care Transition    2017    Patient Name: Conchis Bradley   : 1931  MRN: [id number]      DEVENDRA Score: 2  PCP: Monisha Mireles CNP   Specialist: yes - Sandra Mullins PA  Active ACC/CTC: no    Utilization Review:  ED visits:  2  Admissions: 1    Problem List:  Patient Active Problem List   Diagnosis    COPD (chronic obstructive pulmonary disease) ([facility] [address]    AAA (abdominal aortic aneurysm) ([facility] [address]    Bilateral inguinal hernia    S/P bilateral inguinal hernia repair    Hypoxemia    Atelectasis    COPD with acute exacerbation ([facility] [address]    ASHLI (acute kidney injury) ([facility] [address]    Chronic respiratory failure ([facility] [address]     Housing Review:  Current Housin-story house/ trailer  Who do you live with?   with family:  spouse       Are you an active caregiver in your home?   no    Medication Review:  Are you able to afford your meds? Yes  Do you take your medications as prescribed? Yes   Do you manage your medications? Yes    Social Support/Self Care:  Who helps you at home?  a good social support network  [facility] Support:  None       Durable Medical Equipment:  Do you have any DME? Yes    scooter   Patient Home Respiratory Equipment yes - Home oxygen 2L HS - [employer]      Summary:  Spoke with patient and spouse, introduced self/role. Educated on early symptom recognition and contacting pulmonary or PCP as soon as symptoms start. COPD zone management tool given. Verbalized understanding. Spouse stated they are both very independent. Denies needs at this time.        Follow up appointments:  Future Appointments  Date Time Provider Kathryn Sterling   2018 10:00 AM DO O[facility]   2018 10:15 AM Patrice Sadler PA-C Pulm Med [facility]       Review Due Health Maintenance:  Health Maintenance Due   Topic Date Due    DTaP/Tdap/Td vaccine (1 - Tdap) 1950    Zostavax vaccine  1991    Flu vaccine (1) 2017    Pneumococcal low/med risk (2 of 2 - PPSV23) 11/08/2017

## 2017-11-28 NOTE — ED TRIAGE NOTES
Pt states that progressively worsening of COPD for past week or so. Pt audible expiratory wheezes heard. EKG done. Pt with Jimmy Romero at bedside to evaluate pt. Pt orders received and carried out. Pt RT at bedside to draw ABG's. Pt to get breathing txs as well. IV started per squad. Family at bedside. Update to POC given. All verbalize understanding.

## 2017-11-28 NOTE — ED NOTES
Bed: 006A  Expected date: 11/28/17  Expected time: 6:58 AM  Means of arrival: [facility]  Comments:     [place] Form  11/28/17 6933

## 2017-11-28 NOTE — FLOWSHEET NOTE
11/28/17 1858   Provider Notification   Reason for Communication Review case   Provider Name [facility]   Provider Notification Nurse Practitioner   Method of Communication Secure Message   Response Waiting for response   Notification Time [phone]     Being consulted on patient for COPD exacerbation. Patient normally sees Federico PEMBERTON

## 2017-11-29 LAB
ANION GAP SERPL CALCULATED.3IONS-SCNC: 15 MEQ/L (ref 8–16)
BASOPHILS # BLD: 0.1 %
BASOPHILS ABSOLUTE: 0 THOU/MM3 (ref 0–0.1)
BUN BLDV-MCNC: 37 MG/DL (ref 7–22)
CALCIUM SERPL-MCNC: 9 MG/DL (ref 8.5–10.5)
CHLORIDE BLD-SCNC: 96 MEQ/L (ref 98–111)
CO2: 21 MEQ/L (ref 23–33)
CREAT SERPL-MCNC: 1.6 MG/DL (ref 0.4–1.2)
EOSINOPHIL # BLD: 0 %
EOSINOPHILS ABSOLUTE: 0 THOU/MM3 (ref 0–0.4)
GFR SERPL CREATININE-BSD FRML MDRD: 41 ML/MIN/1.73M2
GLUCOSE BLD-MCNC: 101 MG/DL (ref 70–108)
GLUCOSE BLD-MCNC: 118 MG/DL (ref 70–108)
GLUCOSE BLD-MCNC: 134 MG/DL (ref 70–108)
HCT VFR BLD CALC: 46.5 % (ref 42–52)
HEMOGLOBIN: 15.4 GM/DL (ref 14–18)
LACTIC ACID: 2.3 MMOL/L (ref 0.5–2.2)
LACTIC ACID: 2.9 MMOL/L (ref 0.5–2.2)
LYMPHOCYTES # BLD: 2.7 %
LYMPHOCYTES ABSOLUTE: 0.4 THOU/MM3 (ref 1–4.8)
MCH RBC QN AUTO: 30.4 PG (ref 27–31)
MCHC RBC AUTO-ENTMCNC: 33.1 GM/DL (ref 33–37)
MCV RBC AUTO: 91.9 FL (ref 80–94)
MONOCYTES # BLD: 1.5 %
MONOCYTES ABSOLUTE: 0.2 THOU/MM3 (ref 0.4–1.3)
NUCLEATED RED BLOOD CELLS: 0 /100 WBC
PDW BLD-RTO: 13.7 % (ref 11.5–14.5)
PLATELET # BLD: 206 THOU/MM3 (ref 130–400)
PMV BLD AUTO: 8.3 MCM (ref 7.4–10.4)
POTASSIUM SERPL-SCNC: 5.1 MEQ/L (ref 3.5–5.2)
POTASSIUM SERPL-SCNC: 5.9 MEQ/L (ref 3.5–5.2)
RBC # BLD: 5.06 MILL/MM3 (ref 4.7–6.1)
SEG NEUTROPHILS: 95.7 %
SEGMENTED NEUTROPHILS ABSOLUTE COUNT: 15.3 THOU/MM3 (ref 1.8–7.7)
SODIUM BLD-SCNC: 132 MEQ/L (ref 135–145)
WBC # BLD: 16 THOU/MM3 (ref 4.8–10.8)

## 2017-11-29 PROCEDURE — 6370000000 HC RX 637 (ALT 250 FOR IP): Performed by: INTERNAL MEDICINE

## 2017-11-29 PROCEDURE — 6360000002 HC RX W HCPCS: Performed by: INTERNAL MEDICINE

## 2017-11-29 PROCEDURE — 83605 ASSAY OF LACTIC ACID: CPT

## 2017-11-29 PROCEDURE — 2700000000 HC OXYGEN THERAPY PER DAY

## 2017-11-29 PROCEDURE — 80048 BASIC METABOLIC PNL TOTAL CA: CPT

## 2017-11-29 PROCEDURE — 2140000000 HC CCU INTERMEDIATE R&B

## 2017-11-29 PROCEDURE — 94640 AIRWAY INHALATION TREATMENT: CPT

## 2017-11-29 PROCEDURE — 84132 ASSAY OF SERUM POTASSIUM: CPT

## 2017-11-29 PROCEDURE — 6370000000 HC RX 637 (ALT 250 FOR IP): Performed by: NURSE PRACTITIONER

## 2017-11-29 PROCEDURE — 94669 MECHANICAL CHEST WALL OSCILL: CPT

## 2017-11-29 PROCEDURE — 99233 SBSQ HOSP IP/OBS HIGH 50: CPT | Performed by: INTERNAL MEDICINE

## 2017-11-29 PROCEDURE — 2580000003 HC RX 258: Performed by: INTERNAL MEDICINE

## 2017-11-29 PROCEDURE — 36415 COLL VENOUS BLD VENIPUNCTURE: CPT

## 2017-11-29 PROCEDURE — 99223 1ST HOSP IP/OBS HIGH 75: CPT | Performed by: INTERNAL MEDICINE

## 2017-11-29 PROCEDURE — 82948 REAGENT STRIP/BLOOD GLUCOSE: CPT

## 2017-11-29 PROCEDURE — 94660 CPAP INITIATION&MGMT: CPT

## 2017-11-29 PROCEDURE — 85025 COMPLETE CBC W/AUTO DIFF WBC: CPT

## 2017-11-29 PROCEDURE — 6360000002 HC RX W HCPCS: Performed by: NURSE PRACTITIONER

## 2017-11-29 RX ORDER — METHYLPREDNISOLONE SODIUM SUCCINATE 40 MG/ML
40 INJECTION, POWDER, LYOPHILIZED, FOR SOLUTION INTRAMUSCULAR; INTRAVENOUS EVERY 12 HOURS
Status: DISCONTINUED | OUTPATIENT
Start: 2017-11-29 | End: 2017-11-30

## 2017-11-29 RX ORDER — GUAIFENESIN 600 MG/1
600 TABLET, EXTENDED RELEASE ORAL 2 TIMES DAILY
Status: DISCONTINUED | OUTPATIENT
Start: 2017-11-29 | End: 2017-12-02 | Stop reason: HOSPADM

## 2017-11-29 RX ORDER — DEXTROSE MONOHYDRATE 50 MG/ML
100 INJECTION, SOLUTION INTRAVENOUS PRN
Status: DISCONTINUED | OUTPATIENT
Start: 2017-11-29 | End: 2017-12-02 | Stop reason: HOSPADM

## 2017-11-29 RX ORDER — SODIUM CHLORIDE 9 MG/ML
INJECTION, SOLUTION INTRAVENOUS CONTINUOUS
Status: DISCONTINUED | OUTPATIENT
Start: 2017-11-29 | End: 2017-12-01 | Stop reason: ALTCHOICE

## 2017-11-29 RX ORDER — DEXTROSE MONOHYDRATE 25 G/50ML
25 INJECTION, SOLUTION INTRAVENOUS PRN
Status: DISCONTINUED | OUTPATIENT
Start: 2017-11-29 | End: 2017-12-02 | Stop reason: HOSPADM

## 2017-11-29 RX ORDER — SODIUM POLYSTYRENE SULFONATE 15 G/60ML
30 SUSPENSION ORAL; RECTAL ONCE
Status: COMPLETED | OUTPATIENT
Start: 2017-11-29 | End: 2017-11-29

## 2017-11-29 RX ORDER — NICOTINE POLACRILEX 4 MG
15 LOZENGE BUCCAL PRN
Status: DISCONTINUED | OUTPATIENT
Start: 2017-11-29 | End: 2017-12-02 | Stop reason: HOSPADM

## 2017-11-29 RX ORDER — DEXTROSE MONOHYDRATE 25 G/50ML
12.5 INJECTION, SOLUTION INTRAVENOUS PRN
Status: DISCONTINUED | OUTPATIENT
Start: 2017-11-29 | End: 2017-12-02 | Stop reason: HOSPADM

## 2017-11-29 RX ADMIN — GABAPENTIN 300 MG: 300 CAPSULE ORAL at 09:00

## 2017-11-29 RX ADMIN — DOXYCYCLINE HYCLATE 100 MG: 100 TABLET, COATED ORAL at 17:00

## 2017-11-29 RX ADMIN — METOPROLOL TARTRATE 25 MG: 25 TABLET ORAL at 09:00

## 2017-11-29 RX ADMIN — IPRATROPIUM BROMIDE AND ALBUTEROL SULFATE 1 AMPULE: .5; 3 SOLUTION RESPIRATORY (INHALATION) at 17:50

## 2017-11-29 RX ADMIN — DOXYCYCLINE HYCLATE 100 MG: 100 TABLET, COATED ORAL at 04:42

## 2017-11-29 RX ADMIN — PRAVASTATIN SODIUM 80 MG: 80 TABLET ORAL at 21:41

## 2017-11-29 RX ADMIN — TAMSULOSIN HYDROCHLORIDE 0.4 MG: 0.4 CAPSULE ORAL at 09:00

## 2017-11-29 RX ADMIN — Medication 10 ML: at 21:42

## 2017-11-29 RX ADMIN — Medication 10 ML: at 09:00

## 2017-11-29 RX ADMIN — GLIPIZIDE 5 MG: 5 TABLET ORAL at 09:00

## 2017-11-29 RX ADMIN — IPRATROPIUM BROMIDE AND ALBUTEROL SULFATE 1 AMPULE: .5; 3 SOLUTION RESPIRATORY (INHALATION) at 13:56

## 2017-11-29 RX ADMIN — METHYLPREDNISOLONE SODIUM SUCCINATE 40 MG: 40 INJECTION, POWDER, FOR SOLUTION INTRAMUSCULAR; INTRAVENOUS at 05:31

## 2017-11-29 RX ADMIN — GUAIFENESIN 600 MG: 600 TABLET, EXTENDED RELEASE ORAL at 21:42

## 2017-11-29 RX ADMIN — MAGNESIUM HYDROXIDE 30 ML: 400 SUSPENSION ORAL at 11:35

## 2017-11-29 RX ADMIN — GABAPENTIN 300 MG: 300 CAPSULE ORAL at 21:41

## 2017-11-29 RX ADMIN — METOPROLOL TARTRATE 25 MG: 25 TABLET ORAL at 21:41

## 2017-11-29 RX ADMIN — Medication 2 PUFF: at 21:47

## 2017-11-29 RX ADMIN — IPRATROPIUM BROMIDE AND ALBUTEROL SULFATE 1 AMPULE: .5; 3 SOLUTION RESPIRATORY (INHALATION) at 04:02

## 2017-11-29 RX ADMIN — IPRATROPIUM BROMIDE AND ALBUTEROL SULFATE 1 AMPULE: .5; 3 SOLUTION RESPIRATORY (INHALATION) at 08:37

## 2017-11-29 RX ADMIN — SODIUM POLYSTYRENE SULFONATE 30 G: 15 SUSPENSION ORAL; RECTAL at 13:38

## 2017-11-29 RX ADMIN — IPRATROPIUM BROMIDE AND ALBUTEROL SULFATE 1 AMPULE: .5; 3 SOLUTION RESPIRATORY (INHALATION) at 21:39

## 2017-11-29 RX ADMIN — GUAIFENESIN 600 MG: 600 TABLET, EXTENDED RELEASE ORAL at 11:36

## 2017-11-29 RX ADMIN — METHYLPREDNISOLONE SODIUM SUCCINATE 40 MG: 40 INJECTION, POWDER, FOR SOLUTION INTRAMUSCULAR; INTRAVENOUS at 17:00

## 2017-11-29 RX ADMIN — IPRATROPIUM BROMIDE AND ALBUTEROL SULFATE 1 AMPULE: .5; 3 SOLUTION RESPIRATORY (INHALATION) at 11:56

## 2017-11-29 ASSESSMENT — PAIN SCALES - GENERAL
PAINLEVEL_OUTOF10: 0

## 2017-11-29 NOTE — FLOWSHEET NOTE
11/28/17 2050   Encounter Summary   Services provided to: Patient and family together   Referral/Consult From: [address]; Children   Continue Visiting Yes  (11/28)   Complexity of Encounter Moderate   Length of Encounter 15 minutes   Spiritual/Sikh   Type Spiritual support   Grief and Life Adjustment   Type Adjustment to illness   Assessment Approachable; Hopeful   Intervention Prayer;Nurtured hope   Outcome Expressed gratitude;Engaged in conversation   11/28/17-Patient was laying in his bed and his family was sitting in chairs around the room. His wife was sitting next to him in the bed, holding his hand. - Family explained that he needed to have the bipap device for the rest of the evening but they were just hanging around to keep him company a bit longer. His wife pointed out they celebrated their 58 anniversary yesterday and she can't wait to get him home so they can have a nicer event. - Patient will be having some sort of surgery they think but nothing has been set as of yet. They asked for prayers for health and healing.   - Continued support would be helpful.

## 2017-11-29 NOTE — CONSULTS
[facility]    Patient - Cesar Fay   MRN -  [id number]   Jaime # - [de-identified]   - 1931      Date of Admission -  2017  7:05 AM  Date of evaluation -  2017  Room - 3B--A   [facility] MD Angela Primary Care Physician - Jenae Nassar CNP   Chief Complaint   Shortness of Breath  Reason for Consult: Copd exacerbation  Active Hospital Problem List      C[address] Problems    Diagnosis Date Noted    Stage 3 chronic kidney disease [N18.3] 2017    HTN (hypertension), benign [I10] 2017    Chronic respiratory failure ([facility] [address] [J96.10] 2016    COPD with acute exacerbation ([facility] [address] [J44.1] 2016     HPI   Cesar Fay is a 80 y.o. male admitted for worsening shortness of breath. Has h/o severe COPD, used to follow with Dr Wilner Hernández MD.  Per wife and daughters, his SOB has been worsening for about 3 days. He uses O2 at night, is supposed to be using it during day as well but is non compliant. He just finished a medrol dose pack 2-3 days ago and is on 5 mg prednisone daily at home chronically. Has had a non productive cough and rhinorrhea. Denies fever, chills, malaise, weight loss, or hemoptysis. Pulse ox on admission was in the upper 70s on room air. ABG on nasal cannula pH 7.33, PCO2 43, PO2 85, HCO3 23, O2 sat 96%. He was placed on BiPAP through the night, which has been continued this morning.   Past Medical History         Diagnosis Date    AAA (abdominal aortic aneurysm) ([facility] [address]     followed by Dr. Randy Cevallos Chronic kidney disease     COPD (chronic obstructive pulmonary disease) ([facility] [address]     Hypercholesteremia     Hypertension     Other disorders of kidney and ureter in diseases classified elsewhere     Pneumonia     Prostate cancer ([facility] [address]     with radiation in     SOB (shortness of breath)     Type II or unspecified type diabetes mellitus without mention of complication, not stated as uncontrolled       Past Surgical History           Procedure Laterality Date    ABDOMEN SURGERY      ABDOMINAL AORTIC ANEURYSM REPAIR  14    ABDOMINAL AORTIC ANEURYSM REPAIR, ENDOVASCULAR Bilateral 2014    WITH LT FEMORAL ENDARTERECTOMY     COLONOSCOPY  unsure    INGUINAL HERNIA REPAIR Bilateral 2015    Dr Vicenta Valencia; Allergies    Sulfa antibiotics and Percocet [oxycodone-acetaminophen]  Social History     Social History     Social History    Marital status:      Spouse name: N/A    Number of children: N/A    Years of education: N/A     Occupational History    Not on file. Social History Main Topics    Smoking status: Former Smoker     Packs/day: 2.00     Years: 40.00     Types: Cigarettes     Quit date: 10/5/1989    Smokeless tobacco: Never Used    Alcohol use 0.6 oz/week     1 Cans of beer per week      Comment: now and then    Drug use: No    Sexual activity: Not on file     Other Topics Concern    Not on file     Social History Narrative    No narrative on file     Family History          Problem Relation Age of Onset    Rheum Arthritis Mother       due to complications related to    Heart Disease Mother     Coronary Art Dis Father     Heart Disease Father     Coronary Art Dis Brother     Heart Disease Brother      Sleep History    n/a  ROS    General/Constitutional: No recent loss of weight or appetite changes. No fever or chills. HENT: Negative. Eyes: Negative. Upper respiratory tract: No nasal stuffiness or post nasal drip. Lower respiratory tract/ lungs: No cough or sputum production. No hemoptysis. Cardiovascular: No palpitations, chest pain or edema. Gastrointestinal: No nausea or vomiting. Neurological: No focal neurological weakness. Extremities: No tenderness. Musculoskeletal: Chronic arthritis  Genitourinary: No complaints. Hematological: Negative. Denies easy buising  Skin: No itching.   Meds    Current ABG  Lab Results   Component Value Date    PH 7.33 11/28/2017    PO2 85 11/28/2017    PCO2 43 11/28/2017    HCO3 23 11/28/2017    O2SAT 96 11/28/2017     Lab Results   Component Value Date    IFIO2 2 11/28/2017     CBC  Recent Labs      11/28/17   0743  11/29/17   0438   WBC  13.7*  16.0*   RBC  5.11  5.06   HGB  15.6  15.4   HCT  46.9  46.5   MCV  91.8  91.9   MCH  30.4  30.4   MCHC  33.2  33.1   RDW  13.7  13.7   PLT  202  206   MPV  7.6  8.3      BMP  Recent Labs      11/28/17 0743  11/29/17 0438   NA  135  132*   K  5.1  5.9*   CL  99  96*   CO2  23  21*   BUN  30*  37*   CREATININE  1.4*  1.6*   GLUCOSE  124*  134*   CALCIUM  8.6  9.0     LFT  Recent Labs      11/28/17 0743   AST  20   ALT  15   BILITOT  0.3   ALKPHOS  58     TROP  Lab Results   Component Value Date    TROPONINT < 0.010 11/28/2017    TROPONINT < 0.010 09/13/2016    TROPONINT < 0.010 04/08/2016     BNP  Lab Results   Component Value Date    PROBNP 220.4 09/13/2016    PROBNP 329.7 04/08/2016     D-Dimer  No results found for: DDIMER  Lactic Acid  No results for input(s): LACTA in the last 72 hours. INR  No results for input(s): INR, PROTIME in the last 72 hours. PTT  No results for input(s): APTT in the last 72 hours. Glucose  No results for input(s): POCGLU in the last 72 hours. UA No results for input(s): SPECGRAV, PHUR, COLORU, CLARITYU, MUCUS, PROTEINU, BLOODU, RBCUA, WBCUA, BACTERIA, NITRU, GLUCOSEU, BILIRUBINUR, UROBILINOGEN, KETUA, LABCAST, LABCASTTY, AMORPHOS in the last 72 hours. Invalid input(s): CRYSTALS. PFTs             Echo    None in epic or care everywhere  Cultures    Procalcitonin  No results found for: PROCAL    BC x 2 (-) prelim  Flu A & B- negative   Radiology    CXR 11/28/2017  Stable radiographic appearance of the chest. No evidence of an acute process.     CT Scans- none     (See actual reports for details)    Assessment   Severe COPD with exacerbation  Chronic hypoxic respiratory failure on home

## 2017-11-30 ENCOUNTER — APPOINTMENT (OUTPATIENT)
Dept: ULTRASOUND IMAGING | Age: 82
DRG: 191 | End: 2017-11-30
Payer: MEDICARE

## 2017-11-30 LAB
ANION GAP SERPL CALCULATED.3IONS-SCNC: 16 MEQ/L (ref 8–16)
BUN BLDV-MCNC: 43 MG/DL (ref 7–22)
CALCIUM SERPL-MCNC: 9 MG/DL (ref 8.5–10.5)
CHLORIDE BLD-SCNC: 96 MEQ/L (ref 98–111)
CO2: 22 MEQ/L (ref 23–33)
CREAT SERPL-MCNC: 1.3 MG/DL (ref 0.4–1.2)
GFR SERPL CREATININE-BSD FRML MDRD: 52 ML/MIN/1.73M2
GLUCOSE BLD-MCNC: 101 MG/DL (ref 70–108)
GLUCOSE BLD-MCNC: 139 MG/DL (ref 70–108)
GLUCOSE BLD-MCNC: 70 MG/DL (ref 70–108)
GLUCOSE BLD-MCNC: 77 MG/DL (ref 70–108)
GLUCOSE BLD-MCNC: 92 MG/DL (ref 70–108)
LACTIC ACID: 1.7 MMOL/L (ref 0.5–2.2)
POTASSIUM SERPL-SCNC: 4.5 MEQ/L (ref 3.5–5.2)
POTASSIUM SERPL-SCNC: 4.9 MEQ/L (ref 3.5–5.2)
SODIUM BLD-SCNC: 134 MEQ/L (ref 135–145)

## 2017-11-30 PROCEDURE — 36415 COLL VENOUS BLD VENIPUNCTURE: CPT

## 2017-11-30 PROCEDURE — 94660 CPAP INITIATION&MGMT: CPT

## 2017-11-30 PROCEDURE — 6370000000 HC RX 637 (ALT 250 FOR IP): Performed by: INTERNAL MEDICINE

## 2017-11-30 PROCEDURE — 84132 ASSAY OF SERUM POTASSIUM: CPT

## 2017-11-30 PROCEDURE — 2700000000 HC OXYGEN THERAPY PER DAY

## 2017-11-30 PROCEDURE — 99232 SBSQ HOSP IP/OBS MODERATE 35: CPT | Performed by: INTERNAL MEDICINE

## 2017-11-30 PROCEDURE — 94640 AIRWAY INHALATION TREATMENT: CPT

## 2017-11-30 PROCEDURE — 82948 REAGENT STRIP/BLOOD GLUCOSE: CPT

## 2017-11-30 PROCEDURE — 2580000003 HC RX 258: Performed by: INTERNAL MEDICINE

## 2017-11-30 PROCEDURE — 76770 US EXAM ABDO BACK WALL COMP: CPT

## 2017-11-30 PROCEDURE — 2140000000 HC CCU INTERMEDIATE R&B

## 2017-11-30 PROCEDURE — 80048 BASIC METABOLIC PNL TOTAL CA: CPT

## 2017-11-30 PROCEDURE — 6370000000 HC RX 637 (ALT 250 FOR IP): Performed by: NURSE PRACTITIONER

## 2017-11-30 PROCEDURE — 6360000002 HC RX W HCPCS: Performed by: NURSE PRACTITIONER

## 2017-11-30 PROCEDURE — 83605 ASSAY OF LACTIC ACID: CPT

## 2017-11-30 RX ORDER — PREDNISONE 20 MG/1
40 TABLET ORAL DAILY
Status: DISCONTINUED | OUTPATIENT
Start: 2017-12-01 | End: 2017-12-02 | Stop reason: HOSPADM

## 2017-11-30 RX ORDER — SODIUM BICARBONATE 650 MG/1
650 TABLET ORAL 2 TIMES DAILY
Status: DISCONTINUED | OUTPATIENT
Start: 2017-11-30 | End: 2017-12-02 | Stop reason: HOSPADM

## 2017-11-30 RX ADMIN — GABAPENTIN 300 MG: 300 CAPSULE ORAL at 20:58

## 2017-11-30 RX ADMIN — GUAIFENESIN 600 MG: 600 TABLET, EXTENDED RELEASE ORAL at 09:18

## 2017-11-30 RX ADMIN — TAMSULOSIN HYDROCHLORIDE 0.4 MG: 0.4 CAPSULE ORAL at 09:18

## 2017-11-30 RX ADMIN — GABAPENTIN 300 MG: 300 CAPSULE ORAL at 09:18

## 2017-11-30 RX ADMIN — SODIUM BICARBONATE 650 MG: 650 TABLET ORAL at 13:21

## 2017-11-30 RX ADMIN — GUAIFENESIN 600 MG: 600 TABLET, EXTENDED RELEASE ORAL at 20:58

## 2017-11-30 RX ADMIN — Medication 10 ML: at 09:30

## 2017-11-30 RX ADMIN — IPRATROPIUM BROMIDE AND ALBUTEROL SULFATE 1 AMPULE: .5; 3 SOLUTION RESPIRATORY (INHALATION) at 21:39

## 2017-11-30 RX ADMIN — METOPROLOL TARTRATE 25 MG: 25 TABLET ORAL at 09:18

## 2017-11-30 RX ADMIN — Medication 10 ML: at 20:58

## 2017-11-30 RX ADMIN — METOPROLOL TARTRATE 25 MG: 25 TABLET ORAL at 20:58

## 2017-11-30 RX ADMIN — PRAVASTATIN SODIUM 80 MG: 80 TABLET ORAL at 20:58

## 2017-11-30 RX ADMIN — METHYLPREDNISOLONE SODIUM SUCCINATE 40 MG: 40 INJECTION, POWDER, FOR SOLUTION INTRAMUSCULAR; INTRAVENOUS at 04:27

## 2017-11-30 RX ADMIN — IPRATROPIUM BROMIDE AND ALBUTEROL SULFATE 1 AMPULE: .5; 3 SOLUTION RESPIRATORY (INHALATION) at 10:40

## 2017-11-30 RX ADMIN — IPRATROPIUM BROMIDE AND ALBUTEROL SULFATE 1 AMPULE: .5; 3 SOLUTION RESPIRATORY (INHALATION) at 13:49

## 2017-11-30 RX ADMIN — Medication 2 PUFF: at 10:50

## 2017-11-30 RX ADMIN — SODIUM BICARBONATE 650 MG: 650 TABLET ORAL at 20:59

## 2017-11-30 RX ADMIN — Medication 2 PUFF: at 21:43

## 2017-11-30 RX ADMIN — GLIPIZIDE 5 MG: 5 TABLET ORAL at 09:18

## 2017-11-30 RX ADMIN — IPRATROPIUM BROMIDE AND ALBUTEROL SULFATE 1 AMPULE: .5; 3 SOLUTION RESPIRATORY (INHALATION) at 17:56

## 2017-11-30 RX ADMIN — DOXYCYCLINE HYCLATE 100 MG: 100 TABLET, COATED ORAL at 04:26

## 2017-11-30 RX ADMIN — METHYLPREDNISOLONE SODIUM SUCCINATE 40 MG: 40 INJECTION, POWDER, FOR SOLUTION INTRAMUSCULAR; INTRAVENOUS at 17:00

## 2017-11-30 ASSESSMENT — PAIN SCALES - GENERAL
PAINLEVEL_OUTOF10: 0

## 2017-11-30 NOTE — CONSULTS
13.7  13.7   PLT  202  206   MPV  7.6  8.3      BMP  Recent Labs      11/28/17   0743  11/29/17   0438  11/29/17 2033  11/30/17   0030  11/30/17   0440   NA  135  132*   --    --   134*   K  5.1  5.9*  5.1  4.5  4.9   CL  99  96*   --    --   96*   CO2  23  21*   --    --   22*   BUN  30*  37*   --    --   43*   CREATININE  1.4*  1.6*   --    --   1.3*   GLUCOSE  124*  134*   --    --   92   CALCIUM  8.6  9.0   --    --   9.0     LFT  Recent Labs      11/28/17   0743   AST  20   ALT  15   BILITOT  0.3   ALKPHOS  58     TROP  Lab Results   Component Value Date    TROPONINT < 0.010 11/28/2017    TROPONINT < 0.010 09/13/2016    TROPONINT < 0.010 04/08/2016     BNP  Lab Results   Component Value Date    PROBNP 220.4 09/13/2016    PROBNP 329.7 04/08/2016     D-Dimer  No results found for: DDIMER  Lactic Acid  Recent Labs      11/29/17   1245  11/29/17 2033 11/30/17   1022   LACTA  2.9*  2.3*  1.7     INR  No results for input(s): INR, PROTIME in the last 72 hours. PTT  No results for input(s): APTT in the last 72 hours. Glucose  Recent Labs      11/30/17   0626  11/30/17   1052  11/30/17   1614   POCGLU  101  70  77     UA No results for input(s): SPECGRAV, PHUR, COLORU, CLARITYU, MUCUS, PROTEINU, BLOODU, RBCUA, WBCUA, BACTERIA, NITRU, GLUCOSEU, BILIRUBINUR, UROBILINOGEN, KETUA, LABCAST, LABCASTTY, AMORPHOS in the last 72 hours. Invalid input(s): CRYSTALS. PFTs             Echo    None in epic or care everywhere  Cultures    Procalcitonin  No results found for: PROCAL    BC x 2 (-) prelim  Flu A & B- negative   Radiology    CXR today--no new    11/28/2017  Stable radiographic appearance of the chest. No evidence of an acute process.     CT Scans- none     (See actual reports for details)    Assessment   Severe COPD with exacerbation--improving  Did not need PAP yesterday  Chronic hypoxic respiratory failure on home O2    Recommendations   Stop Solumedrol   Start Prednisone in am  Change BiPAP to prn

## 2017-11-30 NOTE — PROGRESS NOTES
11/29/17 1650   BP: (!) 145/65   Pulse: 75   Resp: 16   Temp: 97.4 °F (36.3 °C)   SpO2: 97%      BMI: Body mass index is 25.6 kg/m².                 Exam:  Physical Examination: General appearance - alert, in mod distress  Mental status - alert, oriented to person, place, and time  Neck - supple, no significant adenopathy, no JVD, or carotid bruits  Chest - diminished all over, wheezing  Heart - normal rate, regular rhythm, normal S1, S2, no murmurs, rubs, clicks or gallops  Abdomen - soft, nontender, nondistended, no masses or organomegaly  Neurological - alert, oriented, normal speech, no focal findings or movement disorder noted  Musculoskeletal - no joint tenderness, deformity or swelling  Extremities - peripheral pulses normal, no pedal edema, no clubbing or cyanosis  Skin - normal coloration and turgor, no rashes, no suspicious skin lesions noted      Review of Labs and Diagnostic Testing:    Recent Results (from the past 24 hour(s))   Basic metabolic panel    Collection Time: 11/29/17  4:38 AM   Result Value Ref Range    Sodium 132 (L) 135 - 145 meq/L    Potassium 5.9 (H) 3.5 - 5.2 meq/L    Chloride 96 (L) 98 - 111 meq/L    CO2 21 (L) 23 - 33 meq/L    Glucose 134 (H) 70 - 108 mg/dL    BUN 37 (H) 7 - 22 mg/dL    CREATININE 1.6 (H) 0.4 - 1.2 mg/dL    Calcium 9.0 8.5 - 10.5 mg/dL   CBC auto differential    Collection Time: 11/29/17  4:38 AM   Result Value Ref Range    WBC 16.0 (H) 4.8 - 10.8 thou/mm3    RBC 5.06 4.70 - 6.10 mill/mm3    Hemoglobin 15.4 14.0 - 18.0 gm/dl    Hematocrit 46.5 42.0 - 52.0 %    MCV 91.9 80.0 - 94.0 fL    MCH 30.4 27.0 - 31.0 pg    MCHC 33.1 33.0 - 37.0 gm/dl    RDW 13.7 11.5 - 14.5 %    Platelets 638 798 - 875 thou/mm3    MPV 8.3 7.4 - 10.4 mcm    Seg Neutrophils 95.7 %    Lymphocytes 2.7 %    Monocytes 1.5 %    Eosinophils 0.0 %    Basophils 0.1 %    nRBC 0 /100 wbc    Segs Absolute 15.3 (H) 1.8 - 7.7 thou/mm3    Lymphocytes # 0.4 (L) 1.0 - 4.8 thou/mm3    Monocytes # 0.2 (L) 0.4 - 1.3 thou/mm3    Eosinophils # 0.0 0.0 - 0.4 thou/mm3    Basophils # 0.0 0.0 - 0.1 thou/mm3   Anion Gap    Collection Time: 11/29/17  4:38 AM   Result Value Ref Range    Anion Gap 15.0 8.0 - 16.0 meq/L   Glomerular Filtration Rate, Estimated    Collection Time: 11/29/17  4:38 AM   Result Value Ref Range    Est, Glom Filt Rate 41 (A) ml/min/1.73m2   Lactic Acid, Plasma    Collection Time: 11/29/17 12:45 PM   Result Value Ref Range    Lactic Acid 2.9 (H) 0.5 - 2.2 mmol/L   POCT glucose    Collection Time: 11/29/17  4:22 PM   Result Value Ref Range    POC Glucose 101 70 - 108 mg/dl       Radiology:     Xr Chest Portable    Result Date: 11/28/2017  PROCEDURE: XR CHEST PORTABLE CLINICAL INFORMATION: Shortness of breath, . COMPARISON: Radiographs of the chest dated September 13, 2016 TECHNIQUE: AP upright view of the chest. FINDINGS: The heart size is normal.  The mediastinum is not widened. There are no pulmonary infiltrates or effusions. The pulmonary vascularity is normal. No suspicious osseous lesions are present. Degenerative changes are again noted in the bilateral shoulders and thoracic spine. Stable radiographic appearance of the chest. No evidence of an acute process. **This report has been created using voice recognition software. It may contain minor errors which are inherent in voice recognition technology. ** Final report electronically signed by Dr. Fernando Martinez on 11/28/2017 8:05 AM        EKG: rbbb    Assessment:    Principal Problem:    COPD with acute exacerbation ([address]  Active Problems:    Chronic respiratory failure ([address]    Stage 3 chronic kidney disease    HTN (hypertension), benign      Plan:  1. Continue Iv solumedrol, duoneb, doxycycline tabs, bipap prn, - pulm seeing  2. renay on ckd - 3- bmp am, check kidney u/s to r/o obstruction  3.  Hyperkalemia - add kayexalate insulin and dextrose- recheck k in 6 hours      DVT prophylaxis: [x] Lovenox                                 [] SCDs [] SQ Heparin                                 [] Encourage ambulation, low risk for DVT, no chemical or mechanical prophylaxis necessary              [] Already on Anticoagulation                Anticipated Disposition upon discharge: [] Home                                                                         [x] Home with Home Health                                                                         [] [facility]les                                                                         [] [address] [place] [address],[address]          Electronically signed by Kathi Velasco MD on 11/29/2017 at 7:15 PM

## 2017-12-01 LAB
GLUCOSE BLD-MCNC: 105 MG/DL (ref 70–108)
GLUCOSE BLD-MCNC: 110 MG/DL (ref 70–108)
GLUCOSE BLD-MCNC: 220 MG/DL (ref 70–108)
GLUCOSE BLD-MCNC: 75 MG/DL (ref 70–108)

## 2017-12-01 PROCEDURE — 97530 THERAPEUTIC ACTIVITIES: CPT

## 2017-12-01 PROCEDURE — 99233 SBSQ HOSP IP/OBS HIGH 50: CPT | Performed by: INTERNAL MEDICINE

## 2017-12-01 PROCEDURE — 99232 SBSQ HOSP IP/OBS MODERATE 35: CPT | Performed by: INTERNAL MEDICINE

## 2017-12-01 PROCEDURE — G8978 MOBILITY CURRENT STATUS: HCPCS

## 2017-12-01 PROCEDURE — 6370000000 HC RX 637 (ALT 250 FOR IP): Performed by: INTERNAL MEDICINE

## 2017-12-01 PROCEDURE — 94660 CPAP INITIATION&MGMT: CPT

## 2017-12-01 PROCEDURE — 2140000000 HC CCU INTERMEDIATE R&B

## 2017-12-01 PROCEDURE — 97110 THERAPEUTIC EXERCISES: CPT

## 2017-12-01 PROCEDURE — 82948 REAGENT STRIP/BLOOD GLUCOSE: CPT

## 2017-12-01 PROCEDURE — 6370000000 HC RX 637 (ALT 250 FOR IP): Performed by: NURSE PRACTITIONER

## 2017-12-01 PROCEDURE — 2700000000 HC OXYGEN THERAPY PER DAY

## 2017-12-01 PROCEDURE — G8987 SELF CARE CURRENT STATUS: HCPCS

## 2017-12-01 PROCEDURE — 2580000003 HC RX 258: Performed by: INTERNAL MEDICINE

## 2017-12-01 PROCEDURE — 94640 AIRWAY INHALATION TREATMENT: CPT

## 2017-12-01 PROCEDURE — 97162 PT EVAL MOD COMPLEX 30 MIN: CPT

## 2017-12-01 PROCEDURE — 97166 OT EVAL MOD COMPLEX 45 MIN: CPT

## 2017-12-01 PROCEDURE — G8979 MOBILITY GOAL STATUS: HCPCS

## 2017-12-01 PROCEDURE — G8988 SELF CARE GOAL STATUS: HCPCS

## 2017-12-01 RX ADMIN — GABAPENTIN 300 MG: 300 CAPSULE ORAL at 20:39

## 2017-12-01 RX ADMIN — GLIPIZIDE 5 MG: 5 TABLET ORAL at 09:23

## 2017-12-01 RX ADMIN — Medication 2 PUFF: at 20:43

## 2017-12-01 RX ADMIN — Medication 2 PUFF: at 08:29

## 2017-12-01 RX ADMIN — GUAIFENESIN 600 MG: 600 TABLET, EXTENDED RELEASE ORAL at 20:39

## 2017-12-01 RX ADMIN — PRAVASTATIN SODIUM 80 MG: 80 TABLET ORAL at 20:39

## 2017-12-01 RX ADMIN — IPRATROPIUM BROMIDE AND ALBUTEROL SULFATE 1 AMPULE: .5; 3 SOLUTION RESPIRATORY (INHALATION) at 13:00

## 2017-12-01 RX ADMIN — SODIUM BICARBONATE 650 MG: 650 TABLET ORAL at 09:23

## 2017-12-01 RX ADMIN — METOPROLOL TARTRATE 25 MG: 25 TABLET ORAL at 09:23

## 2017-12-01 RX ADMIN — PREDNISONE 40 MG: 20 TABLET ORAL at 09:23

## 2017-12-01 RX ADMIN — IPRATROPIUM BROMIDE AND ALBUTEROL SULFATE 1 AMPULE: .5; 3 SOLUTION RESPIRATORY (INHALATION) at 18:21

## 2017-12-01 RX ADMIN — Medication 10 ML: at 20:39

## 2017-12-01 RX ADMIN — GABAPENTIN 300 MG: 300 CAPSULE ORAL at 09:23

## 2017-12-01 RX ADMIN — METOPROLOL TARTRATE 25 MG: 25 TABLET ORAL at 20:39

## 2017-12-01 RX ADMIN — GUAIFENESIN 600 MG: 600 TABLET, EXTENDED RELEASE ORAL at 09:23

## 2017-12-01 RX ADMIN — TAMSULOSIN HYDROCHLORIDE 0.4 MG: 0.4 CAPSULE ORAL at 09:23

## 2017-12-01 RX ADMIN — SODIUM BICARBONATE 650 MG: 650 TABLET ORAL at 20:39

## 2017-12-01 RX ADMIN — Medication 10 ML: at 09:23

## 2017-12-01 RX ADMIN — DOXYCYCLINE HYCLATE 100 MG: 100 TABLET, COATED ORAL at 05:24

## 2017-12-01 RX ADMIN — IPRATROPIUM BROMIDE AND ALBUTEROL SULFATE 1 AMPULE: .5; 3 SOLUTION RESPIRATORY (INHALATION) at 08:28

## 2017-12-01 RX ADMIN — DOXYCYCLINE HYCLATE 100 MG: 100 TABLET, COATED ORAL at 18:30

## 2017-12-01 RX ADMIN — IPRATROPIUM BROMIDE AND ALBUTEROL SULFATE 1 AMPULE: .5; 3 SOLUTION RESPIRATORY (INHALATION) at 20:42

## 2017-12-01 ASSESSMENT — PAIN SCALES - GENERAL
PAINLEVEL_OUTOF10: 0

## 2017-12-01 NOTE — PROGRESS NOTES
[facility]    Patient - Jeronimo Wilson   MRN -  [id number]   [facility] # - [de-identified]   - 1931      Date of Admission -  2017  7:05 AM  Date of evaluation -  2017  Room - 3B--A   Vee Cowart MD Primary Care Physician - Jackelyn Iglesias CNP   Chief Complaint   Following for COPD exacerbation   Active Hospital Problem List      [address] Problems    Diagnosis Date Noted    Stage 3 chronic kidney disease [N18.3] 2017    HTN (hypertension), benign [I10] 2017    Chronic respiratory failure ([facility] [J96.10] 2016    COPD with acute exacerbation ([facility] [J44.1] 2016     HPI   Jeronimo Wilson is a 80 y.o. male admitted for worsening shortness of breath. Has h/o severe COPD, used to follow with Dr Carley Carrero MD.  Per wife and daughters, his SOB has been worsening for about 3 days. He uses O2 at night, is supposed to be using it during day as well but is non compliant. He just finished a medrol dose pack 2-3 days ago and is on 5 mg prednisone daily at home chronically. Has had a non productive cough and rhinorrhea. Denies fever, chills, malaise, weight loss, or hemoptysis. Pulse ox on admission was in the upper 70s on room air. ABG on nasal cannula pH 7.33, PCO2 43, PO2 85, HCO3 23, O2 sat 96%. He was placed on BiPAP through the night, which has been continued through the morning.   17- using BiPAP nightly, NC during day   Past 24 Hours   Wore BiPAP through the night with no issues  On 3L/min NC during day  sats drop with ambulation, had to increase to 4L/min after therapy  Afebrile  Lungs still wheezy  Meds    Current Medications    sodium bicarbonate  650 mg Oral BID    predniSONE  40 mg Oral Daily    mometasone-formoterol  2 puff Inhalation BID    guaiFENesin  600 mg Oral BID    insulin regular  10 Units Intravenous Once    insulin lispro  0-12 Units Subcutaneous TID WC    insulin

## 2017-12-01 NOTE — PROGRESS NOTES
[facility]  INPATIENT PHYSICAL THERAPY  EVALUATION  STRZ CCU-STEPDOWN 3B    Time In: 830  Time Out:   Timed Code Treatment Minutes: 8 Minutes  Minutes: 23          Date: 2017  Patient Name: Naomi Adam,  Gender:  male        MRN: [id number]  : 1931  (80 y.o.)      Referring Practitioner: Dr Kyle iDckey  Diagnosis: COPD with acute exacerbation  Additional Pertinent Hx: Pt admitted  with cough,SOB and low O2 sats. Past Medical History:   Diagnosis Date    AAA (abdominal aortic aneurysm) ([facility]     followed by Dr. Manish Lindsey Chronic kidney disease     COPD (chronic obstructive pulmonary disease) ([facility]     Hypercholesteremia     Hypertension     Other disorders of kidney and ureter in diseases classified elsewhere     Pneumonia     Prostate cancer ([facility]     with radiation in     SOB (shortness of breath)     Type II or unspecified type diabetes mellitus without mention of complication, not stated as uncontrolled      Past Surgical History:   Procedure Laterality Date    ABDOMEN SURGERY      ABDOMINAL AORTIC ANEURYSM REPAIR  14    ABDOMINAL AORTIC ANEURYSM REPAIR, ENDOVASCULAR Bilateral 2014    WITH LT FEMORAL ENDARTERECTOMY     COLONOSCOPY  unsure    INGUINAL HERNIA REPAIR Bilateral 2015    Dr Jose Hernandez       Restrictions/Precautions:  Restrictions/Precautions: Fall Risk                      Subjective:  Chart Reviewed: Yes  Patient assessed for rehabilitation services?: Yes  Family / Caregiver Present: No  Subjective: Pt up in chair, agreeable to PT  and ambulation as able with O2    General:  Overall Orientation Status: Within Functional Limits  Follows Commands: Within Functional Limits    Vision: Within Functional Limits    Hearing: Within functional limits         Pain:   .           Social/Functional History:    Lives With: Spouse  Type of Home: House  Home Layout: One level  Home Access: Stairs to enter with rails  Entrance Stairs - Number of Steps: 4 zara   Entrance Stairs - Rails: Both             ADL Assistance: Independent  Homemaking Assistance: Independent  Ambulation Assistance: Independent  Transfer Assistance: Independent    Active : Yes  Additional Comments: no AD prior to admission    Objective:       RLE AROM: WFL         LLE AROM : WFL          Strength RLE: Exception  Comment: 4-/5    Strength LLE: Exception  Comment: 4-/5          Balance  Sitting - Static: Good  Sitting - Dynamic: Good  Standing - Static: Fair, +  Standing - Dynamic: Fair    Supine to Sit: Stand by assistance (anticpated )  Scooting: Stand by assistance (in chair)    Transfers  Sit to Stand: Stand by assistance (from chair)  Stand to sit: Stand by assistance (cues on hand placement )       Ambulation 1  Surface: level tile  Device: No Device  Other Apparatus: O2  Assistance: Contact guard assistance, Stand by assistance  Quality of Gait: slow ,mild unsteadiness , cues to take deep breaths through nose,on 3L O2 NC, O2 sats monitored during ambualtion. O2 had to be increased on last half of gait  due to O2 sats of 86 . Distance: 80 feet     Exercises:  Comments: 10 reps marches, long arc quads, reclined hip abd/add, quad and glut sets to imporve strength for function          Activity Tolerance:  Activity Tolerance: Patient limited by endurance  Activity Tolerance: On 3L O2 at rest, requires more with gait  due to SOB and O2 sats drop    Treatment Initiated:see there ex ,pt ambulated 60 feet without AD CGA with mild unsteadiness,on 3LO2 which had to be increased due to O2 sats of 86 toward end of gait. Assessment: Body structures, Functions, Activity limitations: Decreased functional mobility , Decreased strength, Decreased endurance, Decreased balance  Assessment: Pt with generalized weakness and decreased endurance due to decreased respiratory status . This causes mild unsteadiness so requires PT for improving gait safety  Prognosis: Good    Clinical Presentation: Moderate - Evolving with Changing Characteristics: Pt with generalized weakness and decreased endurance due to decreased respiratory status . This causes mild unsteadiness so requires PT for improving gait safety    Decision Making: Moderate Complexitybased on patient assessment and decision making process of determining plan of care and establishing reasonable expectations for measurable functional outcomes    REQUIRES PT FOLLOW UP: Yes  Discharge Recommendations: Continue to assess pending progress, Patient would benefit from continued therapy after discharge    Patient Education:  Patient Education: POC    Equipment Recommendations:  Equipment Needed: No    Safety:  Type of devices: All fall risk precautions in place, Gait belt, Left in chair, Chair alarm in place, Call light within reach, Nurse notified  Restraints  Initially in place: No    Plan:  Times per week: 3-5 X GM  Times per day: Daily  Specific instructions for Next Treatment: ther ex, mobility, gait, steps, balance , endurance   Current Treatment Recommendations: Strengthening, Transfer Training, Endurance Training, Balance Training, Functional Mobility Training, Gait Training    Goals:  Patient goals : Go home   Short term goals  Time Frame for Short term goals: 2 weeks   Short term goal 1: supine to sit and return with Mod i to get in and out of bed   Short term goal 2: sit to stand with Mod I to get on and off various surfaces  Short term goal 3: ambulate with  feet with Mod I to walk safely in the home  Short term goal 4: ascend/descend 4 steps  with 1-2 HRs and S to enter home  Long term goals  Time Frame for Long term goals : NA due to short ELOS    Evaluation Complexity: Based on the findings of patient history, examination, clinical presentation, and decision making during this evaluation, the evaluation of Kristin Levin  is of medium complexity.     PT G-Codes  Functional Limitation: Mobility: Walking and moving around  Mobility: Walking and Moving Around Current Status (): At least 40 percent but less than 60 percent impaired, limited or restricted  Mobility: Walking and Moving Around Goal Status ():  At least 20 percent but less than 40 percent impaired, limited or restricted    AM-PAC Inpatient Mobility without Stair Climbing Raw Score : 16  AM-PAC Inpatient without Stair Climbing T-Scale Score : 45.54  Mobility Inpatient CMS 0-100% Score: 40.64  Mobility Inpatient without Stair CMS G-Code Modifier : CK

## 2017-12-01 NOTE — PROGRESS NOTES
Rate, Estimated    Collection Time: 11/30/17  4:40 AM   Result Value Ref Range    Est, Glom Filt Rate 52 (A) ml/min/1.73m2   POCT glucose    Collection Time: 11/30/17  6:26 AM   Result Value Ref Range    POC Glucose 101 70 - 108 mg/dl   Lactic Acid, Plasma    Collection Time: 11/30/17 10:22 AM   Result Value Ref Range    Lactic Acid 1.7 0.5 - 2.2 mmol/L   POCT glucose    Collection Time: 11/30/17 10:52 AM   Result Value Ref Range    POC Glucose 70 70 - 108 mg/dl   POCT glucose    Collection Time: 11/30/17  4:14 PM   Result Value Ref Range    POC Glucose 77 70 - 108 mg/dl       Radiology:     Xr Chest Portable    Result Date: 11/28/2017  PROCEDURE: XR CHEST PORTABLE CLINICAL INFORMATION: Shortness of breath, . COMPARISON: Radiographs of the chest dated September 13, 2016 TECHNIQUE: AP upright view of the chest. FINDINGS: The heart size is normal.  The mediastinum is not widened. There are no pulmonary infiltrates or effusions. The pulmonary vascularity is normal. No suspicious osseous lesions are present. Degenerative changes are again noted in the bilateral shoulders and thoracic spine. Stable radiographic appearance of the chest. No evidence of an acute process. **This report has been created using voice recognition software. It may contain minor errors which are inherent in voice recognition technology. ** Final report electronically signed by Dr. Elida Lucas on 11/28/2017 8:05 AM        EKG: rbbb    Assessment:    Principal Problem:    COPD with acute exacerbation ([facility]  Active Problems:    Chronic respiratory failure ([address]    Stage 3 chronic kidney disease    HTN (hypertension), benign      Plan:  1. Improving, predisone jules dulera, duoneb, home oxygen eeval  2. renay on ckd - 3- bmp am, check kidney u/s to r/o obstruction  3.  Hyperkalemia - add kayexalate insulin and dextrose- recheck k in 6 hours    Pt ot  Discharge plans jules        DVT prophylaxis: [x] Lovenox [] SCDs                                 [] SQ Heparin                                 [] Encourage ambulation, low risk for DVT, no chemical or mechanical prophylaxis necessary              [] Already on Anticoagulation                Anticipated Disposition upon discharge: [] Home                                                                         [x] Home with Home Health                                                                         [] Rob Styles                                                                         [] [address] [place] [address],[address]          Electronically signed by Elbridge Ormond, MD on 11/30/2017 at 7:29 PM

## 2017-12-01 NOTE — PLAN OF CARE
Problem: Discharge Planning:  Goal: Discharged to appropriate level of care  Discharged to appropriate level of care   Outcome: Ongoing  Patient plans home with wife and [facility]. See SW note for details.

## 2017-12-01 NOTE — PROGRESS NOTES
A home oxygen evaluation has been completed. [x]Patient is an inpatient. It is expected that the patient will be discharged within the next 48 hours. Qualified provider to write order for home prescription if patient qualifies. Social service/care managers will arrange for home oxygen. If patient is active, arrange for Home Medical supplier to assess for Oxygen Conserving Device per pulse oximetry. []Patient is an outpatient. Results will be faxed to the ordering provider. Qualified provider to write order for home prescription if patient qualifies and arranges for home oxygen. Patient was placed on room air for 10 minutes. SpO2 was 94 % on room air at rest. Patients SpO2 was above 88% and did not qualify for home oxygen. Patient was walked for 6 minutes. SpO2 was 86 % during walking. Patients SpO2 was below 89% and qualified for home oxygen. Oxygen was applied at 4 lpm via nasal cannula to maintain a SpO2 between 90-92% while walking. Actual SpO2 was 92-94 %.      However during his walk a coughing spell required 6 lpm (SPo2 fell to 86% while on 4 lpm nasal cannula during coughing spell) Pt stopped coughing and rested then completed walk on 4 lpm.

## 2017-12-01 NOTE — CARE COORDINATION
DISCHARGE BARRIERS  12/1/17, 1:13 PM    Reason for Referral: \"ECF ([facility]) vs home health\"  Mental Status: Alert and oriented  Decision Making: Makes own decisions with support from wife and daughters  Family/Social/Home Environment: Assessment completed with patient, wife and daughter-  Patient is from home with his wife. Wife assists with all cooking and housekeeping. Wife assists with personal care lately due to becoming short of breath. Patient is current with [facility] for home oxygen. Current Services: [facility] for Home oxygen  Current Equipment: shower chair, walker, wheelchair  Payment Source: Medicare and BCBS  Concerns or Barriers to Discharge: None  Collabrative List of ECF/HH were provided: Offered, provided list of [facility]    Teach Back Method used with patient, wife, and daughter regarding care plan and discharge planning  Patient and wife and daughter verbalize understanding of the plan of care and contribute to goal setting. Anticipated Needs/Discharge Plan: Patient is from home with wife. Spoke to patient, wife, and daughter who plan discharge home. Explained HH to patient and family. Patient is agreeable to [facility] through [facility]. Family states they do not believe patient needs skilled rehab at this time. Made Keyur Velasco RN aware. Left message with [facility] to make referral.    Electronically signed by ANTONIO Novak on 12/1/2017 at 1:13 PM       Patient is an anticipated weekend discharge home with [facility] for skilled nursing and aide services. Patient is agreeable to discharge plan. 12/1/17, 1:27 PM    Discharge plan discussed by  and . Discharge plan reviewed with patient/ family. Patient/ family verbalize understanding of discharge plan and are in agreement with plan. Understanding was demonstrated using the teach back method.        IMM Letter  IMM Letter date given[de-identified] 11/28/17  IMM Letter time given[de-identified] 5032

## 2017-12-01 NOTE — PROGRESS NOTES
MD Posada Subjective  Chart Reviewed: Yes (orders, progress notes)  Patient assessed for rehabilitation services?: Yes  Family / Caregiver Present: Yes    Subjective: cooperative    General:       Vision: Within Functional Limits    Hearing: Within functional limits         Pain:  Pain Assessment  Patient Currently in Pain: Yes       Social/Functional:  Lives With: Spouse  Type of Home: House  Home Layout: One level  Home Access: Stairs to enter with rails  Entrance Stairs - Number of Steps: 4 zara   Entrance Stairs - Rails: Both     Bathroom Shower/Tub: Tub/Shower unit, Shower chair with back  [employer]: Handicap height  Bathroom Equipment:  (grab bar on tub, close to toilet (can use from toilet))       ADL Assistance: [address] [place] [address] Avenue: Independent       Ambulation Assistance: Independent  Transfer Assistance: Independent    Active : Yes  Additional Comments: Pt reports his wife was assisting x 3 months d/t increasing SOB-for BADL (washing back). No IADLs PLOF. No AD PLOF. O2 PRN    Objective        Overall Cognitive Status: WNL         Sensation  Overall Sensation Status: WNL                           LUE AROM (degrees)  LUE AROM : WNL          RUE AROM (degrees)  RUE AROM : WNL       LUE Strength  Gross LUE Strength: WFL                RUE Strength  Gross RUE Strength: WFL      ADL  LE Dressing: Minimal assistance (able to cross BLE up to adjust slipper socks)          Transfers  Sit to stand: Contact guard assistance  Stand to sit: Contact guard assistance    Balance  Standing Balance: Contact guard assistance           Functional Mobility  Functional - Mobility Device: No device  Activity: Other (5ft)  Assist Level: Contact guard assistance            Activity Tolerance:  Activity Tolerance: Patient limited by fatigue (& SOB)    Treatment Initiated:  Pt ambulated additional 50ft while on 3L O2 (Pt was on 2.5L when therapist entered the room).  Pt's O2 saturation stayed above 90% during awareness of energy conservation & work simplification as evidenced by id 2-3 without vcs for increased tolerance of ADLs at home. Long term goals  Time Frame for Long term goals : No LTG set d/t short ELOS    Evaluation Complexity: Based on the findings of patient history, examination, clinical presentation, and decision making during this evaluation, this patient is of medium complexity. OT G-codes  Functional Limitation: Self care  Self Care Current Status (): At least 40 percent but less than 60 percent impaired, limited or restricted  Self Care Goal Status ():  At least 40 percent but less than 60 percent impaired, limited or restricted    AM-PAC Inpatient Daily Activity Raw Score: 18  AM-PAC Inpatient ADL T-Scale Score : 38.66  ADL Inpatient CMS 0-100% Score: 46.65  ADL Inpatient CMS G-Code Modifier : CK

## 2017-12-02 VITALS
RESPIRATION RATE: 16 BRPM | HEART RATE: 60 BPM | WEIGHT: 151.1 LBS | HEIGHT: 66 IN | BODY MASS INDEX: 24.28 KG/M2 | SYSTOLIC BLOOD PRESSURE: 147 MMHG | DIASTOLIC BLOOD PRESSURE: 67 MMHG | OXYGEN SATURATION: 97 % | TEMPERATURE: 97.9 F

## 2017-12-02 LAB
ANION GAP SERPL CALCULATED.3IONS-SCNC: 11 MEQ/L (ref 8–16)
BUN BLDV-MCNC: 40 MG/DL (ref 7–22)
CALCIUM SERPL-MCNC: 8.6 MG/DL (ref 8.5–10.5)
CHLORIDE BLD-SCNC: 99 MEQ/L (ref 98–111)
CO2: 25 MEQ/L (ref 23–33)
CREAT SERPL-MCNC: 1.4 MG/DL (ref 0.4–1.2)
GFR SERPL CREATININE-BSD FRML MDRD: 48 ML/MIN/1.73M2
GLUCOSE BLD-MCNC: 60 MG/DL (ref 70–108)
GLUCOSE BLD-MCNC: 94 MG/DL (ref 70–108)
GLUCOSE BLD-MCNC: 96 MG/DL (ref 70–108)
POTASSIUM SERPL-SCNC: 4.4 MEQ/L (ref 3.5–5.2)
SODIUM BLD-SCNC: 135 MEQ/L (ref 135–145)

## 2017-12-02 PROCEDURE — 2700000000 HC OXYGEN THERAPY PER DAY

## 2017-12-02 PROCEDURE — 6370000000 HC RX 637 (ALT 250 FOR IP): Performed by: INTERNAL MEDICINE

## 2017-12-02 PROCEDURE — 82948 REAGENT STRIP/BLOOD GLUCOSE: CPT

## 2017-12-02 PROCEDURE — 80048 BASIC METABOLIC PNL TOTAL CA: CPT

## 2017-12-02 PROCEDURE — 99232 SBSQ HOSP IP/OBS MODERATE 35: CPT | Performed by: INTERNAL MEDICINE

## 2017-12-02 PROCEDURE — 2580000003 HC RX 258: Performed by: INTERNAL MEDICINE

## 2017-12-02 PROCEDURE — 94640 AIRWAY INHALATION TREATMENT: CPT

## 2017-12-02 PROCEDURE — 99239 HOSP IP/OBS DSCHRG MGMT >30: CPT | Performed by: INTERNAL MEDICINE

## 2017-12-02 PROCEDURE — 36415 COLL VENOUS BLD VENIPUNCTURE: CPT

## 2017-12-02 PROCEDURE — 6370000000 HC RX 637 (ALT 250 FOR IP): Performed by: NURSE PRACTITIONER

## 2017-12-02 RX ORDER — PREDNISONE 20 MG/1
40 TABLET ORAL DAILY
Qty: 8 TABLET | Refills: 0 | Status: SHIPPED | OUTPATIENT
Start: 2017-12-03 | End: 2017-12-11

## 2017-12-02 RX ORDER — SODIUM BICARBONATE 650 MG/1
650 TABLET ORAL 2 TIMES DAILY
Qty: 30 TABLET | Refills: 1 | Status: SHIPPED | OUTPATIENT
Start: 2017-12-02 | End: 2018-03-12 | Stop reason: ALTCHOICE

## 2017-12-02 RX ORDER — GUAIFENESIN/DEXTROMETHORPHAN 100-10MG/5
5 SYRUP ORAL 3 TIMES DAILY PRN
Qty: 120 ML | Refills: 2 | Status: SHIPPED | OUTPATIENT
Start: 2017-12-02 | End: 2017-12-12

## 2017-12-02 RX ADMIN — Medication 10 ML: at 08:10

## 2017-12-02 RX ADMIN — GUAIFENESIN 600 MG: 600 TABLET, EXTENDED RELEASE ORAL at 08:10

## 2017-12-02 RX ADMIN — IPRATROPIUM BROMIDE AND ALBUTEROL SULFATE 1 AMPULE: .5; 3 SOLUTION RESPIRATORY (INHALATION) at 01:57

## 2017-12-02 RX ADMIN — GABAPENTIN 300 MG: 300 CAPSULE ORAL at 08:10

## 2017-12-02 RX ADMIN — IPRATROPIUM BROMIDE AND ALBUTEROL SULFATE 1 AMPULE: .5; 3 SOLUTION RESPIRATORY (INHALATION) at 07:28

## 2017-12-02 RX ADMIN — GLIPIZIDE 5 MG: 5 TABLET ORAL at 08:10

## 2017-12-02 RX ADMIN — METOPROLOL TARTRATE 25 MG: 25 TABLET ORAL at 08:10

## 2017-12-02 RX ADMIN — DOXYCYCLINE HYCLATE 100 MG: 100 TABLET, COATED ORAL at 04:09

## 2017-12-02 RX ADMIN — SODIUM BICARBONATE 650 MG: 650 TABLET ORAL at 08:09

## 2017-12-02 RX ADMIN — Medication 2 PUFF: at 07:31

## 2017-12-02 RX ADMIN — TAMSULOSIN HYDROCHLORIDE 0.4 MG: 0.4 CAPSULE ORAL at 08:09

## 2017-12-02 RX ADMIN — IPRATROPIUM BROMIDE AND ALBUTEROL SULFATE 1 AMPULE: .5; 3 SOLUTION RESPIRATORY (INHALATION) at 12:35

## 2017-12-02 RX ADMIN — PREDNISONE 40 MG: 20 TABLET ORAL at 08:10

## 2017-12-02 ASSESSMENT — PAIN SCALES - GENERAL: PAINLEVEL_OUTOF10: 0

## 2017-12-02 NOTE — PLAN OF CARE
Problem: RESPIRATORY  Goal: Lung sounds clear or within normal limits for patient  Outcome: Ongoing  Treatment will be continued as ordered. Goal: Normal spontaneous ventilation  Outcome: Ongoing  Patient tolerating 2 lpm nasal cannula well at this time.

## 2017-12-02 NOTE — PROGRESS NOTES
DME Order for Home Oxygen as OP    You must complete the order parameters below and add the medical necessity documentation for this DME in a separate note. Stationary Oxygen Concentrator at 4 lpm via Nasal Cannula    Stationary Prescribed at:  Non Continuous while walking and exercise    Portable Gaseous O2 System and contents at 4 lpm via Nasal Cannula    3-4hrs/day    Diagnosis: COPD  Length of need: 12 Months    A home oxygen evaluation has been completed.      Patient is an inpatient. It is expected that the patient will be discharged within the next 48 hours. Qualified provider to write order for home prescription if patient qualifies. Social service/care managers will arrange for home oxygen.  If patient is active, arrange for Home Medical supplier to assess for Oxygen Conserving Device per pulse oximetry. Patient is an outpatient. Results will be faxed to the ordering provider. Qualified provider to write order for home prescription if patient qualifies and arranges for home oxygen.     Patient was placed on room air for 10 minutes. SpO2 was 94 % on room air at rest. Patients SpO2 was above 88% and did not qualify for home oxygen. Patient was walked for 6 minutes. SpO2 was 86 % during walking. Patients SpO2 was below 89% and qualified for home oxygen. Oxygen was applied at 4 lpm via nasal cannula to maintain a SpO2 between 90-92% while walking.  Actual SpO2 was 92-94 %.      However during his walk a coughing spell required 6 lpm (SPo2 fell to 86% while on 4 lpm nasal cannula during coughing spell) Pt stopped coughing and rested then completed walk on 4 lpm.     PT IS MOBILE

## 2017-12-02 NOTE — PROGRESS NOTES
These medications were sent to [address] [place] [address] [address], [address]    Phone:  [phone]   · guaiFENesin-dextromethorphan 100-10 MG/5ML syrup  · metoprolol tartrate 25 MG tablet  · mometasone-formoterol 200-5 MCG/ACT inhaler  · predniSONE 20 MG tablet  · sodium bicarbonate 650 MG tablet     Information about where to get these medications is not yet available    Ask your nurse or doctor about these medications  · OXYGEN  · OXYGEN          Vitals:   Vitals:    12/02/17 1117   BP: (!) 147/67   Pulse: 60   Resp: 16   Temp: 97.9 °F (36.6 °C)   SpO2: 97%      BMI: Body mass index is 24.39 kg/m².                 Exam:  Physical Examination: General appearance - alert, in mod distress  Mental status - alert, oriented to person, place, and time  Neck - supple, no significant adenopathy, no JVD, or carotid bruits  Chest - diminished all over, wheezing  Heart - normal rate, regular rhythm, normal S1, S2, no murmurs, rubs, clicks or gallops  Abdomen - soft, nontender, nondistended, no masses or organomegaly  Neurological - alert, oriented, normal speech, no focal findings or movement disorder noted  Musculoskeletal - no joint tenderness, deformity or swelling  Extremities - peripheral pulses normal, no pedal edema, no clubbing or cyanosis  Skin - normal coloration and turgor, no rashes, no suspicious skin lesions noted      Review of Labs and Diagnostic Testing:    Recent Results (from the past 24 hour(s))   POCT glucose    Collection Time: 12/01/17  4:25 PM   Result Value Ref Range    POC Glucose 105 70 - 108 mg/dl   POCT Glucose    Collection Time: 12/01/17  7:56 PM   Result Value Ref Range    POC Glucose 220 (H) 70 - 108 mg/dl   Basic Metabolic Panel    Collection Time: 12/02/17  3:46 AM   Result Value Ref Range    Sodium 135 135 - 145 meq/L    Potassium 4.4 3.5 - 5.2 meq/L    Chloride 99 98 - 111 meq/L CO2 25 23 - 33 meq/L    Glucose 94 70 - 108 mg/dL    BUN 40 (H) 7 - 22 mg/dL    CREATININE 1.4 (H) 0.4 - 1.2 mg/dL    Calcium 8.6 8.5 - 10.5 mg/dL   Anion Gap    Collection Time: 12/02/17  3:46 AM   Result Value Ref Range    Anion Gap 11.0 8.0 - 16.0 meq/L   Glomerular Filtration Rate, Estimated    Collection Time: 12/02/17  3:46 AM   Result Value Ref Range    Est, Glom Filt Rate 48 (A) ml/min/1.73m2   POCT glucose    Collection Time: 12/02/17  6:14 AM   Result Value Ref Range    POC Glucose 96 70 - 108 mg/dl   POCT glucose    Collection Time: 12/02/17 11:15 AM   Result Value Ref Range    POC Glucose 60 (L) 70 - 108 mg/dl       Radiology:     Xr Chest Portable    Result Date: 11/28/2017  PROCEDURE: XR CHEST PORTABLE CLINICAL INFORMATION: Shortness of breath, . COMPARISON: Radiographs of the chest dated September 13, 2016 TECHNIQUE: AP upright view of the chest. FINDINGS: The heart size is normal.  The mediastinum is not widened. There are no pulmonary infiltrates or effusions. The pulmonary vascularity is normal. No suspicious osseous lesions are present. Degenerative changes are again noted in the bilateral shoulders and thoracic spine. Stable radiographic appearance of the chest. No evidence of an acute process. **This report has been created using voice recognition software. It may contain minor errors which are inherent in voice recognition technology. ** Final report electronically signed by Dr. Franklin Ramos on 11/28/2017 8:05 AM        EKG: rbbb    Assessment:    Principal Problem:    COPD with acute exacerbation ([address]  Active Problems:    Chronic respiratory failure ([address]    Stage 3 chronic kidney disease    HTN (hypertension), benign      Plan:  1. Improving, predisone taper, dulera, duoneb, home oxygen eeval-- will need 4 l at exertion, none at rest  2. renay on ckd - 3-  improving, bph hx  3.  Hyperkalemia - resolved    Pt ot  Discharge plans today  ckd - at baseline  See medication list above  Stable for discharge      discharge time:- 35 mins  pcp in one week    pulm in 3-4 weeks        Anticipated Disposition upon discharge: [] Home                                                                         [x] Home with [address]                                                                         [] [facility]                                                                         [] [address] [place] [address],[address]          Electronically signed by Amarjit Godwin MD on 12/2/2017 at 1:25 PM

## 2017-12-02 NOTE — PROGRESS NOTES
IV and cardiac monitor removed from patient. Order for PFT study faxed to central scheduling. Contacted [facility] home oxygen services to update them on patient's change in oxygen needs left message with on call services who said she would pass along the message to call back. AVS faxed to [facility]. Discharge paperwork reviewed with patient, wife and daughter via teach back method. They deny questions. Explained to patient the importance to wear 4L oxygen with ambulation and 6L with coughing spells. Patient assessed and denies needs. Attempted to call [address] oxygen again. Spoke with on call specialist and updated him on patient. Faxed paperwork to [address] [facility]. Also sending a copy of order home with patient. Specialist stated patient's concentrator goes up to 5L so he wouldn't need to change settings. He would be in touch with the patient. Patient with all belongings and brought down to discharge lobby via wheelchair and family to bring him home.

## 2017-12-02 NOTE — PLAN OF CARE
Problem: RESPIRATORY  Goal: Lung sounds clear or within normal limits for patient  Outcome: Ongoing  Improve aeration of lungs, decrease WOB.

## 2017-12-04 LAB
BLOOD CULTURE, ROUTINE: NORMAL
BLOOD CULTURE, ROUTINE: NORMAL

## 2017-12-12 ENCOUNTER — TELEPHONE (OUTPATIENT)
Dept: NEPHROLOGY | Age: 82
End: 2017-12-12

## 2017-12-12 DIAGNOSIS — N18.30 STAGE 3 CHRONIC KIDNEY DISEASE (HCC): Primary | ICD-10-CM

## 2017-12-12 NOTE — DISCHARGE SUMMARY
See last note
daily for 8 days  Ask about: Should I take this medication? Where to Get Your Medications      These medications were sent to [address] [place] [address]  [address], [address]    Phone:  [phone]   · guaiFENesin-dextromethorphan 100-10 MG/5ML syrup  · metoprolol tartrate 25 MG tablet  · mometasone-formoterol 200-5 MCG/ACT inhaler  · predniSONE 20 MG tablet  · sodium bicarbonate 650 MG tablet     Information about where to get these medications is not yet available    Ask your nurse or doctor about these medications  · OXYGEN  · OXYGEN          Vitals:   Vitals:    12/02/17 1117   BP: (!) 147/67   Pulse: 60   Resp: 16   Temp: 97.9 °F (36.6 °C)   SpO2: 97%      BMI: Body mass index is 24.39 kg/m². Exam:  Physical Examination: General appearance - alert, in mod distress  Mental status - alert, oriented to person, place, and time  Neck - supple, no significant adenopathy, no JVD, or carotid bruits  Chest - diminished all over, wheezing  Heart - normal rate, regular rhythm, normal S1, S2, no murmurs, rubs, clicks or gallops  Abdomen - soft, nontender, nondistended, no masses or organomegaly  Neurological - alert, oriented, normal speech, no focal findings or movement disorder noted  Musculoskeletal - no joint tenderness, deformity or swelling  Extremities - peripheral pulses normal, no pedal edema, no clubbing or cyanosis  Skin - normal coloration and turgor, no rashes, no suspicious skin lesions noted      Review of Labs and Diagnostic Testing:    No results found for this or any previous visit (from the past 24 hour(s)). Radiology:     Xr Chest Portable    Result Date: 11/28/2017  PROCEDURE: XR CHEST PORTABLE CLINICAL INFORMATION: Shortness of breath, .  COMPARISON: Radiographs of the chest dated September 13, 2016 TECHNIQUE: AP upright view of the chest. FINDINGS: The heart size is

## 2017-12-12 NOTE — TELEPHONE ENCOUNTER
Patients daughter Aby Cadena called. They were speaking with Dr. Ana Venegas to see if patient could stop the sodium bicarbonate due to it upsetting his stomach. Dr. Ana Venegas felt it would be best to ask you this question. Please advise, thank you.   Aby Clicktree phone # [phone]

## 2017-12-13 ENCOUNTER — TELEPHONE (OUTPATIENT)
Dept: PULMONOLOGY | Age: 82
End: 2017-12-13

## 2017-12-14 NOTE — TELEPHONE ENCOUNTER
Left message for Mara Apple notifying her of your directions below. Asked her to call the office back to let Torrie know what his plan will be.

## 2018-01-10 ENCOUNTER — OFFICE VISIT (OUTPATIENT)
Dept: PULMONOLOGY | Age: 83
End: 2018-01-10
Payer: MEDICARE

## 2018-01-10 VITALS
TEMPERATURE: 97.6 F | BODY MASS INDEX: 24.65 KG/M2 | DIASTOLIC BLOOD PRESSURE: 64 MMHG | SYSTOLIC BLOOD PRESSURE: 110 MMHG | HEART RATE: 67 BPM | HEIGHT: 66 IN | WEIGHT: 153.4 LBS | OXYGEN SATURATION: 96 %

## 2018-01-10 DIAGNOSIS — J96.11 HYPOXEMIC RESPIRATORY FAILURE, CHRONIC (HCC): ICD-10-CM

## 2018-01-10 DIAGNOSIS — J44.9 CHRONIC OBSTRUCTIVE PULMONARY DISEASE, UNSPECIFIED COPD TYPE (HCC): ICD-10-CM

## 2018-01-10 DIAGNOSIS — J43.2 CENTRILOBULAR EMPHYSEMA (HCC): Primary | ICD-10-CM

## 2018-01-10 PROCEDURE — 1123F ACP DISCUSS/DSCN MKR DOCD: CPT | Performed by: NURSE PRACTITIONER

## 2018-01-10 PROCEDURE — G8926 SPIRO NO PERF OR DOC: HCPCS | Performed by: NURSE PRACTITIONER

## 2018-01-10 PROCEDURE — G8427 DOCREV CUR MEDS BY ELIG CLIN: HCPCS | Performed by: NURSE PRACTITIONER

## 2018-01-10 PROCEDURE — G8420 CALC BMI NORM PARAMETERS: HCPCS | Performed by: NURSE PRACTITIONER

## 2018-01-10 PROCEDURE — G8484 FLU IMMUNIZE NO ADMIN: HCPCS | Performed by: NURSE PRACTITIONER

## 2018-01-10 PROCEDURE — 94618 PULMONARY STRESS TESTING: CPT | Performed by: NURSE PRACTITIONER

## 2018-01-10 PROCEDURE — 4040F PNEUMOC VAC/ADMIN/RCVD: CPT | Performed by: NURSE PRACTITIONER

## 2018-01-10 PROCEDURE — 1036F TOBACCO NON-USER: CPT | Performed by: NURSE PRACTITIONER

## 2018-01-10 PROCEDURE — 99214 OFFICE O/P EST MOD 30 MIN: CPT | Performed by: NURSE PRACTITIONER

## 2018-01-10 PROCEDURE — 3023F SPIROM DOC REV: CPT | Performed by: NURSE PRACTITIONER

## 2018-01-10 RX ORDER — PREDNISONE 1 MG/1
5 TABLET ORAL DAILY
Qty: 30 TABLET | Refills: 11 | Status: SHIPPED | OUTPATIENT
Start: 2018-01-10 | End: 2018-12-05 | Stop reason: SDUPTHER

## 2018-01-10 ASSESSMENT — COPD QUESTIONNAIRES: COPD: 1

## 2018-01-10 ASSESSMENT — ENCOUNTER SYMPTOMS
DIARRHEA: 0
VOMITING: 0
SHORTNESS OF BREATH: 1
DOUBLE VISION: 0
COUGH: 1
HEMOPTYSIS: 0
RHINORRHEA: 1
BLURRED VISION: 0
SPUTUM PRODUCTION: 1
NAUSEA: 0
WHEEZING: 0
CHEST TIGHTNESS: 0

## 2018-01-10 NOTE — PROGRESS NOTES
[facility]    Patient: Wing Hernández, 80 y.o.   : 1931  1/10/2018    Pt of Dr. Magen Sauer   Patient presents with    COPD     4 wk f/u hosp f/u from COPD exacerbation N 15 M3, 4L while walking and sleeping, states his breathing is okay when he is sitting, he does get SOB while walking, needs a face to face for O2 concentrator     Medication Refill     proair, spiriva respimat, and dulera or advair 500, prednisone 5mg and tapering dose(14day),         COPD   He complains of cough, shortness of breath and sputum production. There is no chest tightness, hemoptysis or wheezing. This is a chronic problem. The current episode started more than 1 year ago. The problem occurs daily. The problem has been unchanged. Cough characteristics: white and frothy. Associated symptoms include dyspnea on exertion and rhinorrhea. Pertinent negatives include no chest pain, fever or weight loss. His symptoms are aggravated by change in weather, strenuous activity and climbing stairs. His symptoms are alleviated by beta-agonist and rest. He reports significant improvement on treatment. Risk factors for lung disease include smoking/tobacco exposure. His past medical history is significant for COPD and pneumonia. Cynthia Gutiérrez is here for follow up for COPD. Was treated for COPD exacerbation at [facility]. Seen by Aurea Winston CNP in hospital. Patient reports his breathing is back to baseline but that his oxygen was increased prior to being discharged from the hospital.    Progress History:   Since last visit any new medical issues? No  New ER or hospital visits? Yes COPD exacerbation  Any new or changes in medicines? Yes advair changed to Shayy Figueroa in the hospital wants to go back on the advair  Using inhalers? Yes spiriva respimat, dulera, albuterol. Are they helpful? Yes   Flu vaccine? Yes   Pneumonia vaccine?  Yes   Past Medical hx   PMH:  Past Medical History:   Diagnosis supplimentation was started with 2LPM via nasal cannula and titrated to 6 LPM via nasal cannula. At the end of the test Torsten Serrano's oxygen saturation remained at 95% on 6 LPM with exertion. He is mobile in the home and requires oxygen as outlined above. Nasal Oxygen order:  6 lpm to be used with:  Walking Yes   Sleep Yes Continuous No    DME Medical Necessity Documentation    Mitchell Lanza was seen in the office on 1/10/2018 for the diagnosis COPD. I am prescribing oxygen because the diagnosis and testing requires the patient to have oxygen in the home. his condition will improve or be benefited by oxygen use. The patient is able to perform good mobility in a home setting and therefore does require the use of a portable oxygen system. Assessment     1. Centrilobular emphysema (HCC)  tiotropium (SPIRIVA RESPIMAT) 2.5 MCG/ACT AERS inhaler    fluticasone-salmeterol (ADVAIR DISKUS) 500-50 MCG/DOSE diskus inhaler    albuterol sulfate (PROAIR RESPICLICK) 173 (90 Base) MCG/ACT aerosol powder inhalation    6 MIN WALK TEST    predniSONE (DELTASONE) 5 MG tablet    DME Order for Home Oxygen as OP    Pulse oximetry, overnight    CANCELED: DME Order for Home Oxygen as OP   2.  Hypoxemic respiratory failure, chronic (HCC)  tiotropium (SPIRIVA RESPIMAT) 2.5 MCG/ACT AERS inhaler    fluticasone-salmeterol (ADVAIR DISKUS) 500-50 MCG/DOSE diskus inhaler    albuterol sulfate (PROAIR RESPICLICK) 305 (90 Base) MCG/ACT aerosol powder inhalation    6 MIN WALK TEST    predniSONE (DELTASONE) 5 MG tablet    DME Order for Home Oxygen as OP    Pulse oximetry, overnight    CANCELED: DME Order for Home Oxygen as OP         Plan   -Completed 6 minute walk increased O2 requirement to 6 LPM with exertion  -Patient had supply of advair  Will restart on 500-50 mcg 1 puff Q12 hrs  -Spiriva respimat 2.5 mcg 2 puff Daily  -Changed albuterol to respi-click patient did not like Proair MDI  -Refilled prednisone 5 mg Daily  -Will have patient follow back in 3 months to reevaluate medication changes  -Order placed for overnight pulse oximetry until that time Balbina Hawk is advised to use 6 LPM overnight      Will see Erika Bruno back in: 3 months     Mauricio Torres CNP  1/10/2018

## 2018-03-06 LAB
ANION GAP SERPL CALCULATED.3IONS-SCNC: 10 MMOL/L (ref 4–12)
BUN BLDV-MCNC: 25 MG/DL (ref 7–20)
CALCIUM SERPL-MCNC: 9.2 MG/DL (ref 8.8–10.5)
CHLORIDE BLD-SCNC: 102 MEQ/L (ref 101–111)
CO2: 23 MEQ/L (ref 21–32)
CREAT SERPL-MCNC: 1.68 MG/DL (ref 0.7–1.3)
CREATININE CLEARANCE: 39
GLUCOSE: 97 MG/DL (ref 70–110)
POTASSIUM SERPL-SCNC: 4.4 MEQ/L (ref 3.6–5)
SODIUM BLD-SCNC: 135 MEQ/L (ref 135–145)

## 2018-03-12 ENCOUNTER — OFFICE VISIT (OUTPATIENT)
Dept: NEPHROLOGY | Age: 83
End: 2018-03-12
Payer: MEDICARE

## 2018-03-12 VITALS
BODY MASS INDEX: 25.44 KG/M2 | DIASTOLIC BLOOD PRESSURE: 62 MMHG | SYSTOLIC BLOOD PRESSURE: 138 MMHG | HEART RATE: 61 BPM | OXYGEN SATURATION: 94 % | WEIGHT: 157.6 LBS

## 2018-03-12 DIAGNOSIS — N18.30 STAGE 3 CHRONIC KIDNEY DISEASE (HCC): Primary | ICD-10-CM

## 2018-03-12 LAB
BACTERIA URINE, POC: NORMAL
BILIRUBIN URINE: NORMAL MG/DL
BLOOD, URINE: NEGATIVE
CASTS URINE, POC: NORMAL
CLARITY: CLEAR
COLOR: NORMAL
CRYSTALS URINE, POC: NORMAL
EPI CELLS URINE, POC: NORMAL
GLUCOSE URINE: NEGATIVE
KETONES, URINE: NEGATIVE
LEUKOCYTE EST, POC: NEGATIVE
NITRITE, URINE: NEGATIVE
PH UA: 6 (ref 4.5–8)
PROTEIN UA: NEGATIVE
RBC URINE, POC: NORMAL
SPECIFIC GRAVITY UA: NORMAL (ref 1–1.03)
UROBILINOGEN, URINE: NORMAL
WBC URINE, POC: NORMAL
YEAST URINE, POC: NORMAL

## 2018-03-12 PROCEDURE — 1123F ACP DISCUSS/DSCN MKR DOCD: CPT | Performed by: INTERNAL MEDICINE

## 2018-03-12 PROCEDURE — 1036F TOBACCO NON-USER: CPT | Performed by: INTERNAL MEDICINE

## 2018-03-12 PROCEDURE — 99213 OFFICE O/P EST LOW 20 MIN: CPT | Performed by: INTERNAL MEDICINE

## 2018-03-12 PROCEDURE — G8484 FLU IMMUNIZE NO ADMIN: HCPCS | Performed by: INTERNAL MEDICINE

## 2018-03-12 PROCEDURE — G8427 DOCREV CUR MEDS BY ELIG CLIN: HCPCS | Performed by: INTERNAL MEDICINE

## 2018-03-12 PROCEDURE — 4040F PNEUMOC VAC/ADMIN/RCVD: CPT | Performed by: INTERNAL MEDICINE

## 2018-03-12 PROCEDURE — 81002 URINALYSIS NONAUTO W/O SCOPE: CPT | Performed by: INTERNAL MEDICINE

## 2018-03-12 PROCEDURE — G8419 CALC BMI OUT NRM PARAM NOF/U: HCPCS | Performed by: INTERNAL MEDICINE

## 2018-03-12 RX ORDER — LISINOPRIL 10 MG/1
10 TABLET ORAL DAILY
COMMUNITY
End: 2021-05-17

## 2018-03-12 RX ORDER — BISOPROLOL FUMARATE AND HYDROCHLOROTHIAZIDE 5; 6.25 MG/1; MG/1
1 TABLET ORAL DAILY
COMMUNITY
Start: 2018-02-27 | End: 2020-09-30 | Stop reason: ALTCHOICE

## 2018-03-12 RX ORDER — LISINOPRIL 5 MG/1
5 TABLET ORAL DAILY
COMMUNITY
End: 2018-03-12 | Stop reason: CLARIF

## 2018-03-12 ASSESSMENT — ENCOUNTER SYMPTOMS
CHEST TIGHTNESS: 0
SHORTNESS OF BREATH: 0
DIARRHEA: 0
CONSTIPATION: 0
BACK PAIN: 0
COUGH: 0
VOMITING: 0
RESPIRATORY NEGATIVE: 1
FACIAL SWELLING: 0
ABDOMINAL PAIN: 0
NAUSEA: 0
ABDOMINAL DISTENTION: 0
WHEEZING: 0
BLOOD IN STOOL: 0
GASTROINTESTINAL NEGATIVE: 1

## 2018-03-13 ENCOUNTER — HOSPITAL ENCOUNTER (OUTPATIENT)
Dept: RESPIRATORY THERAPY | Age: 83
Discharge: HOME | End: 2018-03-13
Payer: MEDICARE

## 2018-03-13 PROCEDURE — 94762 N-INVAS EAR/PLS OXIMTRY CONT: CPT

## 2018-03-16 ENCOUNTER — TELEPHONE (OUTPATIENT)
Dept: PULMONOLOGY | Age: 83
End: 2018-03-16

## 2018-04-06 ENCOUNTER — OFFICE VISIT (OUTPATIENT)
Dept: PULMONOLOGY | Age: 83
End: 2018-04-06
Payer: MEDICARE

## 2018-04-06 VITALS
OXYGEN SATURATION: 97 % | SYSTOLIC BLOOD PRESSURE: 134 MMHG | TEMPERATURE: 97.8 F | WEIGHT: 154.8 LBS | HEART RATE: 63 BPM | DIASTOLIC BLOOD PRESSURE: 76 MMHG | BODY MASS INDEX: 24.88 KG/M2 | HEIGHT: 66 IN

## 2018-04-06 DIAGNOSIS — J42 CHRONIC BRONCHITIS, UNSPECIFIED CHRONIC BRONCHITIS TYPE (HCC): ICD-10-CM

## 2018-04-06 DIAGNOSIS — J96.11 HYPOXEMIC RESPIRATORY FAILURE, CHRONIC (HCC): ICD-10-CM

## 2018-04-06 DIAGNOSIS — J43.2 CENTRILOBULAR EMPHYSEMA (HCC): Primary | ICD-10-CM

## 2018-04-06 PROCEDURE — 3023F SPIROM DOC REV: CPT | Performed by: NURSE PRACTITIONER

## 2018-04-06 PROCEDURE — 4040F PNEUMOC VAC/ADMIN/RCVD: CPT | Performed by: NURSE PRACTITIONER

## 2018-04-06 PROCEDURE — 1123F ACP DISCUSS/DSCN MKR DOCD: CPT | Performed by: NURSE PRACTITIONER

## 2018-04-06 PROCEDURE — 1036F TOBACCO NON-USER: CPT | Performed by: NURSE PRACTITIONER

## 2018-04-06 PROCEDURE — G8427 DOCREV CUR MEDS BY ELIG CLIN: HCPCS | Performed by: NURSE PRACTITIONER

## 2018-04-06 PROCEDURE — 99214 OFFICE O/P EST MOD 30 MIN: CPT | Performed by: NURSE PRACTITIONER

## 2018-04-06 PROCEDURE — G8926 SPIRO NO PERF OR DOC: HCPCS | Performed by: NURSE PRACTITIONER

## 2018-04-06 PROCEDURE — G8420 CALC BMI NORM PARAMETERS: HCPCS | Performed by: NURSE PRACTITIONER

## 2018-04-06 ASSESSMENT — ENCOUNTER SYMPTOMS
NAUSEA: 0
COUGH: 0
HEMOPTYSIS: 0
SHORTNESS OF BREATH: 1
VOMITING: 0
SPUTUM PRODUCTION: 0
WHEEZING: 0
DIARRHEA: 0

## 2018-04-06 ASSESSMENT — COPD QUESTIONNAIRES: COPD: 1

## 2018-05-08 ENCOUNTER — TELEPHONE (OUTPATIENT)
Dept: PULMONOLOGY | Age: 83
End: 2018-05-08

## 2018-07-03 ENCOUNTER — TELEPHONE (OUTPATIENT)
Dept: PULMONOLOGY | Age: 83
End: 2018-07-03

## 2018-07-03 DIAGNOSIS — J43.2 CENTRILOBULAR EMPHYSEMA (HCC): Primary | ICD-10-CM

## 2018-07-05 RX ORDER — ALBUTEROL SULFATE 90 UG/1
2 AEROSOL, METERED RESPIRATORY (INHALATION) EVERY 6 HOURS PRN
Qty: 3 INHALER | Refills: 3 | Status: SHIPPED | OUTPATIENT
Start: 2018-07-05 | End: 2018-12-05 | Stop reason: ALTCHOICE

## 2018-12-05 ENCOUNTER — OFFICE VISIT (OUTPATIENT)
Dept: PULMONOLOGY | Age: 83
End: 2018-12-05
Payer: MEDICARE

## 2018-12-05 VITALS
WEIGHT: 153.2 LBS | OXYGEN SATURATION: 94 % | DIASTOLIC BLOOD PRESSURE: 62 MMHG | BODY MASS INDEX: 24.62 KG/M2 | HEART RATE: 65 BPM | HEIGHT: 66 IN | SYSTOLIC BLOOD PRESSURE: 132 MMHG | TEMPERATURE: 97.5 F

## 2018-12-05 DIAGNOSIS — J42 CHRONIC BRONCHITIS, UNSPECIFIED CHRONIC BRONCHITIS TYPE (HCC): ICD-10-CM

## 2018-12-05 DIAGNOSIS — J43.2 CENTRILOBULAR EMPHYSEMA (HCC): Primary | ICD-10-CM

## 2018-12-05 DIAGNOSIS — J96.11 HYPOXEMIC RESPIRATORY FAILURE, CHRONIC (HCC): ICD-10-CM

## 2018-12-05 PROCEDURE — G8420 CALC BMI NORM PARAMETERS: HCPCS | Performed by: NURSE PRACTITIONER

## 2018-12-05 PROCEDURE — G8926 SPIRO NO PERF OR DOC: HCPCS | Performed by: NURSE PRACTITIONER

## 2018-12-05 PROCEDURE — 3023F SPIROM DOC REV: CPT | Performed by: NURSE PRACTITIONER

## 2018-12-05 PROCEDURE — G8484 FLU IMMUNIZE NO ADMIN: HCPCS | Performed by: NURSE PRACTITIONER

## 2018-12-05 PROCEDURE — 4040F PNEUMOC VAC/ADMIN/RCVD: CPT | Performed by: NURSE PRACTITIONER

## 2018-12-05 PROCEDURE — 1123F ACP DISCUSS/DSCN MKR DOCD: CPT | Performed by: NURSE PRACTITIONER

## 2018-12-05 PROCEDURE — G8427 DOCREV CUR MEDS BY ELIG CLIN: HCPCS | Performed by: NURSE PRACTITIONER

## 2018-12-05 PROCEDURE — 1101F PT FALLS ASSESS-DOCD LE1/YR: CPT | Performed by: NURSE PRACTITIONER

## 2018-12-05 PROCEDURE — 1036F TOBACCO NON-USER: CPT | Performed by: NURSE PRACTITIONER

## 2018-12-05 PROCEDURE — 99214 OFFICE O/P EST MOD 30 MIN: CPT | Performed by: NURSE PRACTITIONER

## 2018-12-05 RX ORDER — ALBUTEROL SULFATE 90 UG/1
2 AEROSOL, METERED RESPIRATORY (INHALATION) EVERY 6 HOURS PRN
Qty: 3 INHALER | Refills: 3 | Status: SHIPPED | OUTPATIENT
Start: 2018-12-05 | End: 2019-11-21 | Stop reason: SDUPTHER

## 2018-12-05 RX ORDER — PREDNISONE 1 MG/1
5 TABLET ORAL DAILY
Qty: 30 TABLET | Refills: 11 | Status: SHIPPED | OUTPATIENT
Start: 2018-12-05 | End: 2019-11-19 | Stop reason: SDUPTHER

## 2018-12-05 RX ORDER — GLIPIZIDE 5 MG/1
TABLET, FILM COATED, EXTENDED RELEASE ORAL
COMMUNITY
Start: 2018-10-01 | End: 2019-03-11 | Stop reason: SDUPTHER

## 2018-12-05 ASSESSMENT — COPD QUESTIONNAIRES: COPD: 1

## 2018-12-05 ASSESSMENT — ENCOUNTER SYMPTOMS
WHEEZING: 0
HEMOPTYSIS: 0
DIARRHEA: 0
SHORTNESS OF BREATH: 1
NAUSEA: 0
SPUTUM PRODUCTION: 1
CHEST TIGHTNESS: 0
BACK PAIN: 0
EYES NEGATIVE: 1
COUGH: 1
STRIDOR: 0

## 2018-12-05 NOTE — PROGRESS NOTES
hx   PMH:  Past Medical History:   Diagnosis Date    AAA (abdominal aortic aneurysm) ([facility]     followed by Dr. Sawyer Gill Chronic kidney disease     COPD (chronic obstructive pulmonary disease) ([facility]     Hypercholesteremia     Hypertension     Other disorders of kidney and ureter in diseases classified elsewhere     Pneumonia     Prostate cancer ([facility]     with radiation in     SOB (shortness of breath)     Type II or unspecified type diabetes mellitus without mention of complication, not stated as uncontrolled      SURGICAL HISTORY:  Past Surgical History:   Procedure Laterality Date    ABDOMEN SURGERY      ABDOMINAL AORTIC ANEURYSM REPAIR  14    ABDOMINAL AORTIC ANEURYSM REPAIR, ENDOVASCULAR Bilateral 2014    WITH LT FEMORAL ENDARTERECTOMY     COLONOSCOPY  unsure    INGUINAL HERNIA REPAIR Bilateral 2015    Dr Niecy Jamison HISTORY:  Social History   Substance Use Topics    Smoking status: Former Smoker     Packs/day: 2.00     Years: 40.00     Types: Cigarettes     Quit date: 10/5/1989    Smokeless tobacco: Never Used    Alcohol use 0.6 oz/week     1 Cans of beer per week      Comment: now and then     ALLERGIES:  Allergies   Allergen Reactions    Sulfa Antibiotics Hives    Percocet [Oxycodone-Acetaminophen] Nausea And Vomiting     FAMILY HISTORY:  Family History   Problem Relation Age of Onset    Rheum Arthritis Mother          due to complications related to    Heart Disease Mother     Coronary Art Dis Father     Heart Disease Father     Coronary Art Dis Brother     Heart Disease Brother      CURRENT MEDICATIONS:  Current Outpatient Prescriptions   Medication Sig Dispense Refill    glipiZIDE (GLUCOTROL XL) 5 MG extended release tablet       fluticasone-salmeterol (ADVAIR DISKUS) 500-50 MCG/DOSE diskus inhaler Inhale 1 puff into the lungs every 12 hours 180 each 3    tiotropium (SPIRIVA RESPIMAT) 2.5 MCG/ACT AERS inhaler Inhale 2 puffs into the lungs daily 3 Centrilobular emphysema (HCC)  fluticasone-salmeterol (ADVAIR DISKUS) 500-50 MCG/DOSE diskus inhaler    tiotropium (SPIRIVA RESPIMAT) 2.5 MCG/ACT AERS inhaler    albuterol sulfate HFA (PROAIR HFA) 108 (90 Base) MCG/ACT inhaler   2. Hypoxemic respiratory failure, chronic (HCC)  fluticasone-salmeterol (ADVAIR DISKUS) 500-50 MCG/DOSE diskus inhaler    tiotropium (SPIRIVA RESPIMAT) 2.5 MCG/ACT AERS inhaler    albuterol sulfate HFA (PROAIR HFA) 108 (90 Base) MCG/ACT inhaler   3. Chronic bronchitis, unspecified chronic bronchitis type ([address]           Plan   -Advised patient to continue using supplemental O2 at 6 LPM with exertion for chronic respiratory failure due to COPD. Patient is able to perform good mobility in the home and therefore does require portable O2. Will need for lifetime. Reviewed physiological changes that can happen within the body during hypoxemia patient and wife verbalized understanding.  -Will provide paper refills for spiriva advair and proair per patient request patient submits through [facility]   -Discussed with patient regarding questions about long term oral glucocorticoid Per GOLD guidelines long term use of prednisone is associated with numerous side effects without documented benefit in respiratory function-outlined possible side effects including thinning skin and ecchymosis, increased risk of cataracts and glaucoma, fluid retention, increased risk of A-fib, and HTN, increased risk of gastritis, ulcer formation, depletion of bone mineral density, uncontrolled blood glucose, and mood imbalance. Patient verbalized understanding and agreed to assume risk.     -Advised patient to call office with any changes, questions, or concerns regarding respiratory status    Will see Claudia James in: 12 months with no test    Karen Nick CNP  12/5/2018

## 2019-01-16 ENCOUNTER — TELEPHONE (OUTPATIENT)
Dept: PULMONOLOGY | Age: 84
End: 2019-01-16

## 2019-01-17 ENCOUNTER — TELEPHONE (OUTPATIENT)
Dept: PULMONOLOGY | Age: 84
End: 2019-01-17

## 2019-01-17 ENCOUNTER — OFFICE VISIT (OUTPATIENT)
Dept: PULMONOLOGY | Age: 84
End: 2019-01-17
Payer: MEDICARE

## 2019-01-17 VITALS
TEMPERATURE: 97.8 F | DIASTOLIC BLOOD PRESSURE: 68 MMHG | HEIGHT: 66 IN | WEIGHT: 155 LBS | OXYGEN SATURATION: 96 % | SYSTOLIC BLOOD PRESSURE: 120 MMHG | HEART RATE: 59 BPM | BODY MASS INDEX: 24.91 KG/M2

## 2019-01-17 DIAGNOSIS — J96.11 HYPOXEMIC RESPIRATORY FAILURE, CHRONIC (HCC): ICD-10-CM

## 2019-01-17 DIAGNOSIS — J44.1 COPD EXACERBATION (HCC): Primary | ICD-10-CM

## 2019-01-17 DIAGNOSIS — J43.2 CENTRILOBULAR EMPHYSEMA (HCC): ICD-10-CM

## 2019-01-17 PROCEDURE — 1036F TOBACCO NON-USER: CPT | Performed by: NURSE PRACTITIONER

## 2019-01-17 PROCEDURE — G8484 FLU IMMUNIZE NO ADMIN: HCPCS | Performed by: NURSE PRACTITIONER

## 2019-01-17 PROCEDURE — G8427 DOCREV CUR MEDS BY ELIG CLIN: HCPCS | Performed by: NURSE PRACTITIONER

## 2019-01-17 PROCEDURE — 1123F ACP DISCUSS/DSCN MKR DOCD: CPT | Performed by: NURSE PRACTITIONER

## 2019-01-17 PROCEDURE — G8926 SPIRO NO PERF OR DOC: HCPCS | Performed by: NURSE PRACTITIONER

## 2019-01-17 PROCEDURE — 1101F PT FALLS ASSESS-DOCD LE1/YR: CPT | Performed by: NURSE PRACTITIONER

## 2019-01-17 PROCEDURE — 4040F PNEUMOC VAC/ADMIN/RCVD: CPT | Performed by: NURSE PRACTITIONER

## 2019-01-17 PROCEDURE — 3023F SPIROM DOC REV: CPT | Performed by: NURSE PRACTITIONER

## 2019-01-17 PROCEDURE — 99214 OFFICE O/P EST MOD 30 MIN: CPT | Performed by: NURSE PRACTITIONER

## 2019-01-17 PROCEDURE — G8419 CALC BMI OUT NRM PARAM NOF/U: HCPCS | Performed by: NURSE PRACTITIONER

## 2019-01-17 RX ORDER — LEVOFLOXACIN 500 MG/1
500 TABLET, FILM COATED ORAL DAILY
Qty: 5 TABLET | Refills: 0 | Status: SHIPPED | OUTPATIENT
Start: 2019-01-17 | End: 2019-01-17 | Stop reason: ALTCHOICE

## 2019-01-17 RX ORDER — AZITHROMYCIN 500 MG/1
500 TABLET, FILM COATED ORAL DAILY
Qty: 3 TABLET | Refills: 0 | Status: SHIPPED | OUTPATIENT
Start: 2019-01-17 | End: 2019-01-20

## 2019-01-17 RX ORDER — PREDNISONE 20 MG/1
40 TABLET ORAL DAILY
Qty: 10 TABLET | Refills: 0 | Status: SHIPPED | OUTPATIENT
Start: 2019-01-17 | End: 2019-01-22

## 2019-01-17 ASSESSMENT — ENCOUNTER SYMPTOMS
STRIDOR: 0
DIARRHEA: 0
SHORTNESS OF BREATH: 0
WHEEZING: 1
VOMITING: 0
NAUSEA: 0
COUGH: 1
CHEST TIGHTNESS: 0

## 2019-01-22 ENCOUNTER — TELEPHONE (OUTPATIENT)
Dept: PULMONOLOGY | Age: 84
End: 2019-01-22

## 2019-03-04 DIAGNOSIS — N18.30 CKD (CHRONIC KIDNEY DISEASE), STAGE III (HCC): Primary | ICD-10-CM

## 2019-03-07 ENCOUNTER — HOSPITAL ENCOUNTER (OUTPATIENT)
Age: 84
Discharge: HOME | End: 2019-03-07
Payer: MEDICARE

## 2019-03-07 LAB
ANION GAP SERPL CALCULATED.3IONS-SCNC: 13 MEQ/L (ref 8–16)
BUN BLDV-MCNC: 24 MG/DL (ref 7–22)
CALCIUM SERPL-MCNC: 9.5 MG/DL (ref 8.5–10.5)
CHLORIDE BLD-SCNC: 97 MEQ/L (ref 98–111)
CO2: 27 MEQ/L (ref 23–33)
CREAT SERPL-MCNC: 1.6 MG/DL (ref 0.4–1.2)
GFR SERPL CREATININE-BSD FRML MDRD: 41 ML/MIN/1.73M2
GLUCOSE BLD-MCNC: 80 MG/DL (ref 70–108)
POTASSIUM SERPL-SCNC: 4.9 MEQ/L (ref 3.5–5.2)
SODIUM BLD-SCNC: 137 MEQ/L (ref 135–145)

## 2019-03-07 PROCEDURE — 80048 BASIC METABOLIC PNL TOTAL CA: CPT

## 2019-03-07 PROCEDURE — 36415 COLL VENOUS BLD VENIPUNCTURE: CPT

## 2019-03-11 ENCOUNTER — OFFICE VISIT (OUTPATIENT)
Dept: NEPHROLOGY | Age: 84
End: 2019-03-11
Payer: MEDICARE

## 2019-03-11 VITALS
WEIGHT: 155.6 LBS | SYSTOLIC BLOOD PRESSURE: 122 MMHG | HEART RATE: 68 BPM | HEIGHT: 66 IN | OXYGEN SATURATION: 96 % | BODY MASS INDEX: 25.01 KG/M2 | DIASTOLIC BLOOD PRESSURE: 64 MMHG

## 2019-03-11 DIAGNOSIS — I10 ESSENTIAL HYPERTENSION: ICD-10-CM

## 2019-03-11 DIAGNOSIS — N18.30 CKD (CHRONIC KIDNEY DISEASE), STAGE III (HCC): Primary | ICD-10-CM

## 2019-03-11 PROCEDURE — G8427 DOCREV CUR MEDS BY ELIG CLIN: HCPCS | Performed by: INTERNAL MEDICINE

## 2019-03-11 PROCEDURE — G8484 FLU IMMUNIZE NO ADMIN: HCPCS | Performed by: INTERNAL MEDICINE

## 2019-03-11 PROCEDURE — 1036F TOBACCO NON-USER: CPT | Performed by: INTERNAL MEDICINE

## 2019-03-11 PROCEDURE — G8419 CALC BMI OUT NRM PARAM NOF/U: HCPCS | Performed by: INTERNAL MEDICINE

## 2019-03-11 PROCEDURE — 99213 OFFICE O/P EST LOW 20 MIN: CPT | Performed by: INTERNAL MEDICINE

## 2019-03-11 PROCEDURE — 4040F PNEUMOC VAC/ADMIN/RCVD: CPT | Performed by: INTERNAL MEDICINE

## 2019-03-11 PROCEDURE — 1101F PT FALLS ASSESS-DOCD LE1/YR: CPT | Performed by: INTERNAL MEDICINE

## 2019-03-11 PROCEDURE — 1123F ACP DISCUSS/DSCN MKR DOCD: CPT | Performed by: INTERNAL MEDICINE

## 2019-03-11 RX ORDER — GABAPENTIN 300 MG/1
300 CAPSULE ORAL DAILY
COMMUNITY
Start: 2018-12-14 | End: 2021-06-02

## 2019-11-19 ENCOUNTER — OFFICE VISIT (OUTPATIENT)
Dept: PULMONOLOGY | Age: 84
End: 2019-11-19
Payer: MEDICARE

## 2019-11-19 VITALS
DIASTOLIC BLOOD PRESSURE: 62 MMHG | BODY MASS INDEX: 24.75 KG/M2 | OXYGEN SATURATION: 94 % | HEART RATE: 56 BPM | TEMPERATURE: 98.7 F | WEIGHT: 154 LBS | SYSTOLIC BLOOD PRESSURE: 152 MMHG | HEIGHT: 66 IN

## 2019-11-19 DIAGNOSIS — J96.11 HYPOXEMIC RESPIRATORY FAILURE, CHRONIC (HCC): ICD-10-CM

## 2019-11-19 DIAGNOSIS — J42 CHRONIC BRONCHITIS, UNSPECIFIED CHRONIC BRONCHITIS TYPE (HCC): ICD-10-CM

## 2019-11-19 DIAGNOSIS — J43.2 CENTRILOBULAR EMPHYSEMA (HCC): Primary | ICD-10-CM

## 2019-11-19 PROCEDURE — 99214 OFFICE O/P EST MOD 30 MIN: CPT | Performed by: NURSE PRACTITIONER

## 2019-11-19 PROCEDURE — 1036F TOBACCO NON-USER: CPT | Performed by: NURSE PRACTITIONER

## 2019-11-19 PROCEDURE — G8420 CALC BMI NORM PARAMETERS: HCPCS | Performed by: NURSE PRACTITIONER

## 2019-11-19 PROCEDURE — 4040F PNEUMOC VAC/ADMIN/RCVD: CPT | Performed by: NURSE PRACTITIONER

## 2019-11-19 PROCEDURE — G8484 FLU IMMUNIZE NO ADMIN: HCPCS | Performed by: NURSE PRACTITIONER

## 2019-11-19 PROCEDURE — 3023F SPIROM DOC REV: CPT | Performed by: NURSE PRACTITIONER

## 2019-11-19 PROCEDURE — G8926 SPIRO NO PERF OR DOC: HCPCS | Performed by: NURSE PRACTITIONER

## 2019-11-19 PROCEDURE — 1123F ACP DISCUSS/DSCN MKR DOCD: CPT | Performed by: NURSE PRACTITIONER

## 2019-11-19 PROCEDURE — G8427 DOCREV CUR MEDS BY ELIG CLIN: HCPCS | Performed by: NURSE PRACTITIONER

## 2019-11-19 RX ORDER — FORMOTEROL FUMARATE 20 UG/2ML
20 SOLUTION RESPIRATORY (INHALATION) EVERY 12 HOURS
Qty: 360 ML | Refills: 3 | Status: SHIPPED | OUTPATIENT
Start: 2019-11-19 | End: 2019-11-25 | Stop reason: SDUPTHER

## 2019-11-19 RX ORDER — NEBULIZER ACCESSORIES
EACH MISCELLANEOUS
Qty: 1 EACH | Refills: 0 | Status: SHIPPED | OUTPATIENT
Start: 2019-11-19 | End: 2021-01-01

## 2019-11-19 RX ORDER — BUDESONIDE 0.5 MG/2ML
1 INHALANT ORAL 2 TIMES DAILY
Qty: 360 ML | Refills: 3 | Status: SHIPPED | OUTPATIENT
Start: 2019-11-19 | End: 2019-11-25 | Stop reason: SDUPTHER

## 2019-11-19 RX ORDER — PREDNISONE 1 MG/1
5 TABLET ORAL DAILY
Qty: 90 TABLET | Refills: 3 | Status: SHIPPED | OUTPATIENT
Start: 2019-11-19 | End: 2020-10-23 | Stop reason: SDUPTHER

## 2019-11-19 ASSESSMENT — ENCOUNTER SYMPTOMS
SHORTNESS OF BREATH: 1
VOMITING: 0
SPUTUM PRODUCTION: 0
COUGH: 0
STRIDOR: 0
CHEST TIGHTNESS: 0
WHEEZING: 1
DIARRHEA: 0
NAUSEA: 0
FREQUENT THROAT CLEARING: 1
HEMOPTYSIS: 0

## 2019-11-19 ASSESSMENT — COPD QUESTIONNAIRES: COPD: 1

## 2019-11-20 DIAGNOSIS — J96.11 HYPOXEMIC RESPIRATORY FAILURE, CHRONIC (HCC): ICD-10-CM

## 2019-11-20 DIAGNOSIS — J43.2 CENTRILOBULAR EMPHYSEMA (HCC): ICD-10-CM

## 2019-11-21 RX ORDER — ALBUTEROL SULFATE 90 UG/1
2 AEROSOL, METERED RESPIRATORY (INHALATION) EVERY 6 HOURS PRN
Qty: 3 INHALER | Refills: 3 | Status: SHIPPED | OUTPATIENT
Start: 2019-11-21 | End: 2020-10-23 | Stop reason: SDUPTHER

## 2019-11-25 ENCOUNTER — TELEPHONE (OUTPATIENT)
Dept: PULMONOLOGY | Age: 84
End: 2019-11-25

## 2019-11-25 DIAGNOSIS — J43.2 CENTRILOBULAR EMPHYSEMA (HCC): ICD-10-CM

## 2019-11-25 DIAGNOSIS — J42 CHRONIC BRONCHITIS, UNSPECIFIED CHRONIC BRONCHITIS TYPE (HCC): ICD-10-CM

## 2019-11-25 RX ORDER — ALBUTEROL SULFATE 2.5 MG/3ML
2.5 SOLUTION RESPIRATORY (INHALATION) EVERY 6 HOURS PRN
Qty: 360 ML | Refills: 3 | Status: SHIPPED | OUTPATIENT
Start: 2019-11-25 | End: 2021-07-07 | Stop reason: SDUPTHER

## 2019-11-25 RX ORDER — BUDESONIDE 0.5 MG/2ML
1 INHALANT ORAL 2 TIMES DAILY
Qty: 360 ML | Refills: 3 | Status: SHIPPED | OUTPATIENT
Start: 2019-11-25 | End: 2020-10-12

## 2019-11-25 RX ORDER — FORMOTEROL FUMARATE 20 UG/2ML
20 SOLUTION RESPIRATORY (INHALATION) EVERY 12 HOURS
Qty: 360 ML | Refills: 3 | Status: SHIPPED | OUTPATIENT
Start: 2019-11-25 | End: 2020-10-12

## 2019-11-26 ENCOUNTER — TELEPHONE (OUTPATIENT)
Dept: PULMONOLOGY | Age: 84
End: 2019-11-26

## 2019-12-05 ENCOUNTER — TELEPHONE (OUTPATIENT)
Dept: PULMONOLOGY | Age: 84
End: 2019-12-05

## 2019-12-05 DIAGNOSIS — J43.2 CENTRILOBULAR EMPHYSEMA (HCC): Primary | ICD-10-CM

## 2020-03-02 ENCOUNTER — TELEPHONE (OUTPATIENT)
Dept: PULMONOLOGY | Age: 85
End: 2020-03-02

## 2020-03-02 NOTE — TELEPHONE ENCOUNTER
Stevie's wife phoned on his behalf asking if you would be able to re-write Incruse Ellipta  on behalf of Nathaly Moreno so that he can receive 90 days at a time for 1 year? Please advise.

## 2020-03-02 NOTE — TELEPHONE ENCOUNTER
Per reschedule note [place] Corine is doing well after medication changes and cancelled March appointment and rescheduled for June. Called and confirmed with his spouse Mamta Levine information. She reported patient has been doing very well after the changes were madeOrder placed for incruse with 90 day supply and refill x3.

## 2020-05-28 ENCOUNTER — NURSE ONLY (OUTPATIENT)
Dept: LAB | Age: 85
End: 2020-05-28

## 2020-05-28 LAB
ANION GAP SERPL CALCULATED.3IONS-SCNC: 14 MEQ/L (ref 8–16)
BUN BLDV-MCNC: 24 MG/DL (ref 7–22)
CALCIUM SERPL-MCNC: 9.2 MG/DL (ref 8.5–10.5)
CHLORIDE BLD-SCNC: 100 MEQ/L (ref 98–111)
CO2: 23 MEQ/L (ref 23–33)
CREAT SERPL-MCNC: 1.6 MG/DL (ref 0.4–1.2)
CREATININE, URINE: 46.7 MG/DL
GFR SERPL CREATININE-BSD FRML MDRD: 41 ML/MIN/1.73M2
GLUCOSE BLD-MCNC: 78 MG/DL (ref 70–108)
MICROALBUMIN UR-MCNC: < 1.2 MG/DL
MICROALBUMIN/CREAT UR-RTO: 26 MG/G (ref 0–30)
POTASSIUM SERPL-SCNC: 4.5 MEQ/L (ref 3.5–5.2)
SODIUM BLD-SCNC: 137 MEQ/L (ref 135–145)

## 2020-06-02 ENCOUNTER — VIRTUAL VISIT (OUTPATIENT)
Dept: NEPHROLOGY | Age: 85
End: 2020-06-02
Payer: MEDICARE

## 2020-06-02 VITALS — DIASTOLIC BLOOD PRESSURE: 52 MMHG | BODY MASS INDEX: 24.86 KG/M2 | SYSTOLIC BLOOD PRESSURE: 116 MMHG | WEIGHT: 154 LBS

## 2020-06-02 PROCEDURE — 99213 OFFICE O/P EST LOW 20 MIN: CPT | Performed by: INTERNAL MEDICINE

## 2020-06-02 PROCEDURE — 1036F TOBACCO NON-USER: CPT | Performed by: INTERNAL MEDICINE

## 2020-06-02 PROCEDURE — G8427 DOCREV CUR MEDS BY ELIG CLIN: HCPCS | Performed by: INTERNAL MEDICINE

## 2020-06-02 PROCEDURE — 4040F PNEUMOC VAC/ADMIN/RCVD: CPT | Performed by: INTERNAL MEDICINE

## 2020-06-02 PROCEDURE — G8420 CALC BMI NORM PARAMETERS: HCPCS | Performed by: INTERNAL MEDICINE

## 2020-06-02 PROCEDURE — 1123F ACP DISCUSS/DSCN MKR DOCD: CPT | Performed by: INTERNAL MEDICINE

## 2020-06-02 NOTE — PROGRESS NOTES
Ander Nick, APRN - CNP   gabapentin (NEURONTIN) 300 MG capsule Take 300 mg by mouth daily. Yes Historical Provider, MD   bisoprolol-hydrochlorothiazide (ZIAC) 5-6.25 MG per tablet Take 1 tablet by mouth daily  Yes Historical Provider, MD   lisinopril (PRINIVIL;ZESTRIL) 10 MG tablet Take 5 mg by mouth daily Yes Historical Provider, MD   Multiple Vitamins-Minerals (PRESERVISION AREDS 2) CAPS Take 1 capsule by mouth 2 times daily  Yes Historical Provider, MD   pravastatin (PRAVACHOL) 80 MG tablet Take 80 mg by mouth nightly. Yes Historical Provider, MD   glipiZIDE (GLUCOTROL) 5 MG tablet Take 5 mg by mouth daily. Yes Historical Provider, MD   gabapentin (NEURONTIN) 100 MG capsule Take 100 mg by mouth daily  Yes Historical Provider, MD   fish oil-omega-3 fatty acids 1000 MG capsule Take 1 g by mouth 2 times daily. Yes Historical Provider, MD   niacin (SLO-NIACIN) 500 MG tablet Take 500 mg by mouth nightly. Yes Historical Provider, MD   tamsulosin (FLOMAX) 0.4 MG capsule Take 0.4 mg by mouth daily. Yes Historical Provider, MD       Social History     Tobacco Use    Smoking status: Former Smoker     Packs/day: 2.00     Years: 40.00     Pack years: 80.00     Types: Cigarettes     Last attempt to quit: 10/5/1989     Years since quittin.6    Smokeless tobacco: Never Used   Substance Use Topics    Alcohol use:  Yes     Alcohol/week: 1.0 standard drinks     Types: 1 Cans of beer per week     Comment: now and then    Drug use: No        Allergies   Allergen Reactions    Sulfa Antibiotics Hives    Percocet [Oxycodone-Acetaminophen] Nausea And Vomiting       PHYSICAL EXAMINATION:  [ INSTRUCTIONS:  \"[x]\" Indicates a positive item  \"[]\" Indicates a negative item  -- DELETE ALL ITEMS NOT EXAMINED]  Vital Signs: (As obtained by patient/caregiver or practitioner observation)    Blood pressure-116/52  Heart rate- 56   Respiratory rate- 12   Temperature-  Pulse oximetry-     Constitutional: [x] Appears well-developed and well-nourished [] No apparent distress      [] Abnormal-   Mental status  [x] Alert and awake  [x] Oriented to person/place/time []Able to follow commands      Eyes:  EOM    [x]  Normal  [] Abnormal-  Sclera  []  Normal  [] Abnormal -         Discharge [x]  None visible  [] Abnormal -    HENT:   [x] Normocephalic, atraumatic. [] Abnormal   [] Mouth/Throat: Mucous membranes are moist.     External Ears [x] Normal  [] Abnormal-     Neck: [x] No visualized mass     Pulmonary/Chest: [x] Respiratory effort normal.  [x] No visualized signs of difficulty breathing or respiratory distress        [] Abnormal-      Musculoskeletal:   [] Normal gait with no signs of ataxia         [x] Normal range of motion of neck        [] Abnormal-       Neurological:        [x] No Facial Asymmetry (Cranial nerve 7 motor function) (limited exam to video visit)          [] No gaze palsy        [] Abnormal-         Skin:        [] No significant exanthematous lesions or discoloration noted on facial skin         [] Abnormal-            Psychiatric:       [x] Normal Affect [] No Hallucinations        [] Abnormal-     Other pertinent observable physical exam findings-     ASSESSMENT/PLAN:    1.  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 Goals of care include slowing rate of progression by controlling blood pressure and blood glucoses and by avoiding nephrotoxins such as NSAIDs and IV contrast.  2. HTN - controlled with Ziac and Lisinopril  3. Hx prostate Ca s/p RT  4. BPH - Flomax  5. DM with neuropathy  6. Hyperlipidemia      Return in about 1 year (around 6/2/2021). Sanjuanita Jean-Baptiste is a 80 y.o. male being evaluated by a Virtual Visit (video visit) encounter to address concerns as mentioned above. A caregiver was present when appropriate.  Due to this being a TeleHealth encounter (During [facility] public health emergency), evaluation of the following organ systems was limited: Allergy;

## 2020-06-03 ENCOUNTER — VIRTUAL VISIT (OUTPATIENT)
Dept: PULMONOLOGY | Age: 85
End: 2020-06-03
Payer: MEDICARE

## 2020-06-03 PROCEDURE — 1036F TOBACCO NON-USER: CPT | Performed by: NURSE PRACTITIONER

## 2020-06-03 PROCEDURE — G8420 CALC BMI NORM PARAMETERS: HCPCS | Performed by: NURSE PRACTITIONER

## 2020-06-03 PROCEDURE — 99214 OFFICE O/P EST MOD 30 MIN: CPT | Performed by: NURSE PRACTITIONER

## 2020-06-03 PROCEDURE — 4040F PNEUMOC VAC/ADMIN/RCVD: CPT | Performed by: NURSE PRACTITIONER

## 2020-06-03 PROCEDURE — 3023F SPIROM DOC REV: CPT | Performed by: NURSE PRACTITIONER

## 2020-06-03 PROCEDURE — G8926 SPIRO NO PERF OR DOC: HCPCS | Performed by: NURSE PRACTITIONER

## 2020-06-03 PROCEDURE — G8428 CUR MEDS NOT DOCUMENT: HCPCS | Performed by: NURSE PRACTITIONER

## 2020-06-03 PROCEDURE — 1123F ACP DISCUSS/DSCN MKR DOCD: CPT | Performed by: NURSE PRACTITIONER

## 2020-06-03 NOTE — PROGRESS NOTES
commands. Normal range of motion of neck. Neurological: Patient is awake, alert, oriented to person,place and time. No Facial Asymmetry (Cranial nerve 7 motor function). No gaze palsy. Psychiatric: Patient is in normal mood. Patient behavior is normal. No hallucinations. Skin: No significant exanthematous lesions or discoloration noted on facial skin        Testing:   Lung Nodule Screening     [] Qualifies    [x] Does not qualify   [] Declined    [] Completed   Age 80   The USPSTF recommends annual screening for lung cancer with low-dose computed tomography (LDCT) in adults aged 54 to [de-identified] years who have a 30 pack-year smoking history and currently smoke or have quit within the past 15 years. Screening should be discontinued once a person has not smoked for 15 years or develops a health problem that substantially limits life expectancy or the ability or willingness to have curative lung surgery. Assessment:      Diagnosis Orders   1. Centrilobular emphysema ([address]     2. Hypoxemic respiratory failure, chronic (HCC)         Plan:   -Continue on current regimen with incruse, perforomist and pulmicort nebs no refills needed at this time  -Discussed with patient regarding questions about long term oral glucocorticoid Per GOLD guidelines long term use of prednisone is associated with numerous side effects without documented benefit in respiratory function-outlined possible side effects including thinning skin and ecchymosis, increased risk of cataracts and glaucoma, fluid retention, increased risk of A-fib, and HTN, increased risk of gastritis, ulcer formation, depletion of bone mineral density, uncontrolled blood glucose, and mood imbalance.  Verbalized understanding and agreed to assume risks  -Continue supplemental O2 6 LPM with exertion  -Overall patient feels well and is tolerating therapy   -Will have patient follow-up in 12 months with no test        9}  An  electronic signature was used to authenticate this note.    Total time spent on this encounter : 25 Minutes. The time was discontiguous. --Humberto Nick, DANTE - CNP on 6/3/2020 at 2:56 PM    Pursuant to the emergency declaration under the [address] [place] [address], [facility] waiver authority and the [employer] Act, this Virtual  Visit was conducted, with patient's consent, to reduce the patient's risk of exposure to COVID-19 and provide continuity of care for an established patient. Services were provided through a video synchronous discussion virtually to substitute for in-person clinic visit.

## 2020-08-26 ENCOUNTER — TELEPHONE (OUTPATIENT)
Dept: NEPHROLOGY | Age: 85
End: 2020-08-26

## 2020-09-24 ENCOUNTER — NURSE ONLY (OUTPATIENT)
Dept: LAB | Age: 85
End: 2020-09-24

## 2020-09-24 LAB
ANION GAP SERPL CALCULATED.3IONS-SCNC: 9 MEQ/L (ref 8–16)
BUN BLDV-MCNC: 21 MG/DL (ref 7–22)
CALCIUM SERPL-MCNC: 8.8 MG/DL (ref 8.5–10.5)
CHLORIDE BLD-SCNC: 99 MEQ/L (ref 98–111)
CO2: 27 MEQ/L (ref 23–33)
CREAT SERPL-MCNC: 1.5 MG/DL (ref 0.4–1.2)
CREATININE, URINE: 46.8 MG/DL
GFR SERPL CREATININE-BSD FRML MDRD: 44 ML/MIN/1.73M2
GLUCOSE BLD-MCNC: 92 MG/DL (ref 70–108)
MICROALBUMIN UR-MCNC: < 1.2 MG/DL
MICROALBUMIN/CREAT UR-RTO: 26 MG/G (ref 0–30)
POTASSIUM SERPL-SCNC: 4.4 MEQ/L (ref 3.5–5.2)
SODIUM BLD-SCNC: 135 MEQ/L (ref 135–145)

## 2020-09-30 ENCOUNTER — OFFICE VISIT (OUTPATIENT)
Dept: NEPHROLOGY | Age: 85
End: 2020-09-30
Payer: MEDICARE

## 2020-09-30 VITALS
WEIGHT: 146 LBS | HEART RATE: 55 BPM | SYSTOLIC BLOOD PRESSURE: 144 MMHG | BODY MASS INDEX: 23.57 KG/M2 | DIASTOLIC BLOOD PRESSURE: 69 MMHG | OXYGEN SATURATION: 98 % | TEMPERATURE: 97.2 F

## 2020-09-30 PROBLEM — R60.9 SWELLING: Status: ACTIVE | Noted: 2020-09-30

## 2020-09-30 PROBLEM — R06.09 DYSPNEA ON EXERTION: Status: ACTIVE | Noted: 2020-09-30

## 2020-09-30 PROCEDURE — G8420 CALC BMI NORM PARAMETERS: HCPCS | Performed by: INTERNAL MEDICINE

## 2020-09-30 PROCEDURE — 4040F PNEUMOC VAC/ADMIN/RCVD: CPT | Performed by: INTERNAL MEDICINE

## 2020-09-30 PROCEDURE — 1036F TOBACCO NON-USER: CPT | Performed by: INTERNAL MEDICINE

## 2020-09-30 PROCEDURE — 99214 OFFICE O/P EST MOD 30 MIN: CPT | Performed by: INTERNAL MEDICINE

## 2020-09-30 PROCEDURE — G8427 DOCREV CUR MEDS BY ELIG CLIN: HCPCS | Performed by: INTERNAL MEDICINE

## 2020-09-30 PROCEDURE — 1123F ACP DISCUSS/DSCN MKR DOCD: CPT | Performed by: INTERNAL MEDICINE

## 2020-09-30 RX ORDER — FUROSEMIDE 40 MG/1
40 TABLET ORAL DAILY
COMMUNITY
End: 2021-01-18 | Stop reason: SDUPTHER

## 2020-09-30 RX ORDER — TAMSULOSIN HYDROCHLORIDE 0.4 MG/1
0.4 CAPSULE ORAL 2 TIMES DAILY
Qty: 180 CAPSULE | Refills: 2 | Status: SHIPPED | OUTPATIENT
Start: 2020-09-30 | End: 2021-06-01 | Stop reason: SDUPTHER

## 2020-09-30 NOTE — PROGRESS NOTES
[facility]    [facility], [address]   [place], [address]  [phone]  Progress Note  9/30/2020 10:20 AM      Pt Name:    Kamron Ellington  YOB: 1931  Primary Care Physician:  DANTE Colvin - GABRIEL     Chief Complaint:   Chief Complaint   Patient presents with    Follow-up     CKD III        History of Present Illness: This is a follow-up visit for CKD 3. Patient's family requesting an appointment sooner due to LE edema. He is accompanied by his wife and daughter today. Patient has chronic RLE edema which has worsened past few months and has B/L edema. Had to change his shoes b/c shoes weren't fitting. He has chronic BOWER from COPD but his breathing has worsened as well. He denies any chest pain. Hs wife cooks and does not use salt but he uses table salt. He elevates his legs when he remembers. Has issues with urinary retention. He sees Mike. On flomax 0.4 mg once daily. Comorbidities include COPD on home O2 -follows with Dr. 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 Pertinent items are noted in HPI.          Past History:  Past Medical History:   Diagnosis Date    AAA (abdominal aortic aneurysm) ([facility] [address]     followed by Dr. Calixto Abrams Chronic kidney disease     COPD (chronic obstructive pulmonary disease) ([facility] [address]     Hypercholesteremia     Hypertension     Other disorders of kidney and ureter in diseases classified elsewhere     Pneumonia     Prostate cancer ([address]     with radiation in 2006    SOB (shortness of breath)     Type II or unspecified type diabetes mellitus without mention of complication, not stated as uncontrolled      Past Surgical History:   Procedure Laterality Date    ABDOMEN SURGERY      ABDOMINAL AORTIC ANEURYSM REPAIR  8/21/14    ABDOMINAL AORTIC ANEURYSM REPAIR, ENDOVASCULAR Bilateral 08/21/2014    WITH LT FEMORAL ENDARTERECTOMY     COLONOSCOPY  unsure    INGUINAL HERNIA REPAIR Bilateral 07/28/2015    Dr Marlo Winston:  BP (!) 144/69 (Site: Left Upper Arm, Position: Sitting, Cuff Size: Large Adult)   Pulse 55   Temp 97.2 °F (36.2 °C) (Temporal)   Wt 146 lb (66.2 kg)   SpO2 98%   BMI 23.57 kg/m²   Wt Readings from Last 3 Encounters:   09/30/20 146 lb (66.2 kg)   06/02/20 154 lb (69.9 kg)   11/19/19 154 lb (69.9 kg)     Body mass index is 23.57 kg/m². General Appearance: alert and cooperative with exam, appears comfortable, no distress  HEENT: EOMI, moist oral mucus membranes  Neck: No jugular venous distention,  Lungs: diminished breath sounds diffusely no audible rales or wheezing  Heart: S1, S2 heard, no rub  GI: soft, non-tender, no guarding  Extremities:1+ Le edema right >left  Skin: warm, dry  Neurologic: no tremor, no asterixis, no focal neurologic deficits     Medications:  Current Outpatient Medications   Medication Sig Dispense Refill    Umeclidinium Bromide (INCRUSE ELLIPTA) 62.5 MCG/INH AEPB Inhale 1 puff into the lungs daily 90 each 3    albuterol (PROVENTIL) (2.5 MG/3ML) 0.083% nebulizer solution Take 3 mLs by nebulization every 6 hours as needed for Wheezing 360 mL 3    budesonide (PULMICORT) 0.5 MG/2ML nebulizer suspension Take 2 mLs by nebulization 2 times daily 360 mL 3    formoterol (PERFOROMIST) 20 MCG/2ML nebulizer solution Take 2 mLs by nebulization every 12 hours 360 mL 3    albuterol sulfate HFA (PROAIR HFA) 108 (90 Base) MCG/ACT inhaler Inhale 2 puffs into the lungs every 6 hours as needed for Wheezing or Shortness of Breath 3 Inhaler 3    predniSONE (DELTASONE) 5 MG tablet Take 1 tablet by mouth daily Prednisone 5mg po daily to take after food. 90 tablet 3    Nebulizer MISC Please provide new nebulizer machine to use with Albuterol 2.5mg via nebs Q6h prn, perforomist 2 times a day and pulmicort 2 times per day.  1 each 0    Respiratory Therapy Supplies (NEBULIZER AIR TUBE/PLUGS) MISC Please provide nebulizer kit and supplies. 1 each 0    gabapentin (NEURONTIN) 300 MG capsule Take 300 mg by mouth daily.  bisoprolol-hydrochlorothiazide (ZIAC) 5-6.25 MG per tablet Take 1 tablet by mouth daily       lisinopril (PRINIVIL;ZESTRIL) 10 MG tablet Take 5 mg by mouth daily      Multiple Vitamins-Minerals (PRESERVISION AREDS 2) CAPS Take 1 capsule by mouth 2 times daily       pravastatin (PRAVACHOL) 80 MG tablet Take 80 mg by mouth nightly.  glipiZIDE (GLUCOTROL) 5 MG tablet Take 5 mg by mouth daily.  gabapentin (NEURONTIN) 100 MG capsule Take 100 mg by mouth daily       fish oil-omega-3 fatty acids 1000 MG capsule Take 1 g by mouth 2 times daily.  niacin (SLO-NIACIN) 500 MG tablet Take 500 mg by mouth nightly.  tamsulosin (FLOMAX) 0.4 MG capsule Take 0.4 mg by mouth daily. No current facility-administered medications for this visit.          Laboratory & Diagnostics:  CBC:   Lab Results   Component Value Date    WBC 16.0 (H) 11/29/2017    HGB 15.4 11/29/2017    HCT 46.5 11/29/2017    MCV 91.9 11/29/2017     11/29/2017     BMP:    Lab Results   Component Value Date     09/24/2020     05/28/2020     03/07/2019    K 4.4 09/24/2020    K 4.5 05/28/2020    K 4.9 03/07/2019    CL 99 09/24/2020     05/28/2020    CL 97 (L) 03/07/2019    CO2 27 09/24/2020    CO2 23 05/28/2020    CO2 27 03/07/2019    BUN 21 09/24/2020    BUN 24 (H) 05/28/2020    BUN 24 (H) 03/07/2019    CREATININE 1.5 (H) 09/24/2020    CREATININE 1.6 (H) 05/28/2020    CREATININE 1.6 (H) 03/07/2019    GLUCOSE 92 09/24/2020    GLUCOSE 78 05/28/2020    GLUCOSE 80 03/07/2019      Hepatic:   Lab Results   Component Value Date    AST 20 11/28/2017    AST 16 04/08/2016    ALT 15 11/28/2017    ALT 13 04/08/2016    BILITOT 0.3 11/28/2017    BILITOT 0.4 04/08/2016    ALKPHOS 58 11/28/2017    ALKPHOS 69 04/08/2016     BNP: No results found for: BNP  Lipids: No results found for: CHOL, HDL  INR:   Lab Results   Component Value Date    INR 1.06 04/08/2016    INR 0.97 08/19/2014     URINE: No results found for: Patrick Villalpando  Lab Results   Component Value Date    NITRU Negative 03/12/2018    COLORU Light Yellow 03/12/2018    PHUR 6.0 03/12/2018    LABCAST NONE SEEN 08/23/2014    LABCAST NONE SEEN 08/23/2014    WBCUA 0 08/23/2014    RBCUA 0 08/23/2014    YEAST NONE SEEN 08/23/2014    BACTERIA NONE 08/23/2014    CLARITYU Clear 03/12/2018    SPECGRAV 1.015 08/23/2014    LEUKOCYTESUR negative 03/12/2018    LEUKOCYTESUR NEGATIVE 08/23/2014    UROBILINOGEN 0.2 08/23/2014    BILIRUBINUR NEGATIVE 08/23/2014    BLOODU Negative 03/12/2018    GLUCOSEU negative 03/12/2018    KETUA Negative 03/12/2018      Microalbumen/Creatinine ratio:  No components found for: RUCREAT        Impression/Plan:   1. 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 Goals of care include slowing rate of progression by controlling blood pressure and blood glucoses and by avoiding nephrotoxins such as NSAIDs and IV contrast.  2. Edema: will check Duplex US to rule out DVT as well as 2D echo. Does not appear to be renal related, renal function is stable and no significant proteinuria. He is on ziac 5-6.25 mg daily will discontinue and change to lasix 40 mg daily. Advised to check Bps at home with the change. If duplex US is normal then advised compression stockings. Also discussed low salt diet and elevating his legs. 3. HTN: stable  4. Hx prostate Ca s/p RT. + urinary retention: increase flomax to 0.4 mg BID, f/u with urology  5. DM with neuropathy  6. Hyperlipidemia          Bloodwork and medications were reviewed and plan of care discussed with the patient. Return to clinic at next scheduled appointment or sooner if the need arises.       Everett Ramos DO  [facility]

## 2020-10-07 ENCOUNTER — HOSPITAL ENCOUNTER (OUTPATIENT)
Dept: NON INVASIVE DIAGNOSTICS | Age: 85
Discharge: HOME | End: 2020-10-07
Payer: MEDICARE

## 2020-10-07 ENCOUNTER — HOSPITAL ENCOUNTER (OUTPATIENT)
Dept: INTERVENTIONAL RADIOLOGY/VASCULAR | Age: 85
Discharge: HOME | End: 2020-10-07
Payer: MEDICARE

## 2020-10-07 LAB
LV EF: 48 %
LVEF MODALITY: NORMAL

## 2020-10-07 PROCEDURE — 93306 TTE W/DOPPLER COMPLETE: CPT

## 2020-10-07 PROCEDURE — 93970 EXTREMITY STUDY: CPT

## 2020-10-08 ENCOUNTER — TELEPHONE (OUTPATIENT)
Dept: NEPHROLOGY | Age: 85
End: 2020-10-08

## 2020-10-08 NOTE — TELEPHONE ENCOUNTER
----- Message from Jai Edward DO sent at 10/7/2020  4:53 PM EDT -----  I spoke with patient's wife about echo findings.   I would like for him to see cardiology, referral was placed to Dr. Gerard Aj

## 2020-10-19 ENCOUNTER — TELEPHONE (OUTPATIENT)
Dept: PULMONOLOGY | Age: 85
End: 2020-10-19

## 2020-10-19 NOTE — TELEPHONE ENCOUNTER
Called and spoke with Patient and wife regarding previous message patient reports he has been having issues with more coughing and SOB particularly with laying down at night has had to increase use of albuterol using 5-6 times per day. Expresses concern about having to go to the hospital.     Denies fevers or chills, has mild productive cough, using acapella 2-3 times per day bringing up clear frothy sputum. Reports good compliance with inhaled meds. Admits to using supplemental O2 4 LPM with exertion instead of prescribed 6 LPM. States SpO2 has been remaining >90%. Has had some swelling in BLE is taking lasix. Denies any wheezing, purulent sputum, or known sick contacts. Will send patient for CT of chest and have office staff schedule for appointment this week.

## 2020-10-19 NOTE — TELEPHONE ENCOUNTER
Munir Lange, patient's daughter called and states patient needs steroids, having an exacerbation. Patient has seen the Nephrologist lately and was told lungs sounded clear/no fluid (last week). Patient's daughter states they were told last time this happened to call you to keep patient out of ER. Callback number is [phone]. Please advise.

## 2020-10-21 ENCOUNTER — HOSPITAL ENCOUNTER (OUTPATIENT)
Dept: CT IMAGING | Age: 85
Discharge: HOME | End: 2020-10-21
Payer: MEDICARE

## 2020-10-21 PROCEDURE — 71250 CT THORAX DX C-: CPT

## 2020-10-23 ENCOUNTER — NURSE ONLY (OUTPATIENT)
Dept: LAB | Age: 85
End: 2020-10-23

## 2020-10-23 ENCOUNTER — OFFICE VISIT (OUTPATIENT)
Dept: PULMONOLOGY | Age: 85
End: 2020-10-23
Payer: MEDICARE

## 2020-10-23 VITALS
BODY MASS INDEX: 22.92 KG/M2 | OXYGEN SATURATION: 97 % | SYSTOLIC BLOOD PRESSURE: 140 MMHG | WEIGHT: 142.6 LBS | DIASTOLIC BLOOD PRESSURE: 62 MMHG | TEMPERATURE: 97.3 F | HEART RATE: 73 BPM | HEIGHT: 66 IN

## 2020-10-23 LAB
ANION GAP SERPL CALCULATED.3IONS-SCNC: 12 MEQ/L (ref 8–16)
BUN BLDV-MCNC: 26 MG/DL (ref 7–22)
CALCIUM SERPL-MCNC: 9.2 MG/DL (ref 8.5–10.5)
CHLORIDE BLD-SCNC: 93 MEQ/L (ref 98–111)
CO2: 28 MEQ/L (ref 23–33)
CREAT SERPL-MCNC: 1.7 MG/DL (ref 0.4–1.2)
ERYTHROCYTE [DISTWIDTH] IN BLOOD BY AUTOMATED COUNT: 11.9 % (ref 11.5–14.5)
ERYTHROCYTE [DISTWIDTH] IN BLOOD BY AUTOMATED COUNT: 40.4 FL (ref 35–45)
GFR SERPL CREATININE-BSD FRML MDRD: 38 ML/MIN/1.73M2
GLUCOSE BLD-MCNC: 83 MG/DL (ref 70–108)
HCT VFR BLD CALC: 44.2 % (ref 42–52)
HEMOGLOBIN: 14.7 GM/DL (ref 14–18)
MCH RBC QN AUTO: 30.8 PG (ref 26–33)
MCHC RBC AUTO-ENTMCNC: 33.3 GM/DL (ref 32.2–35.5)
MCV RBC AUTO: 92.5 FL (ref 80–94)
PLATELET # BLD: 255 THOU/MM3 (ref 130–400)
PMV BLD AUTO: 9.2 FL (ref 9.4–12.4)
POTASSIUM SERPL-SCNC: 4.1 MEQ/L (ref 3.5–5.2)
PTH INTACT: 72.1 PG/ML (ref 15–65)
RBC # BLD: 4.78 MILL/MM3 (ref 4.7–6.1)
SODIUM BLD-SCNC: 133 MEQ/L (ref 135–145)
VITAMIN D 25-HYDROXY: 17 NG/ML (ref 30–100)
WBC # BLD: 11.7 THOU/MM3 (ref 4.8–10.8)

## 2020-10-23 PROCEDURE — G8427 DOCREV CUR MEDS BY ELIG CLIN: HCPCS | Performed by: NURSE PRACTITIONER

## 2020-10-23 PROCEDURE — 1123F ACP DISCUSS/DSCN MKR DOCD: CPT | Performed by: NURSE PRACTITIONER

## 2020-10-23 PROCEDURE — 1036F TOBACCO NON-USER: CPT | Performed by: NURSE PRACTITIONER

## 2020-10-23 PROCEDURE — G8926 SPIRO NO PERF OR DOC: HCPCS | Performed by: NURSE PRACTITIONER

## 2020-10-23 PROCEDURE — 4040F PNEUMOC VAC/ADMIN/RCVD: CPT | Performed by: NURSE PRACTITIONER

## 2020-10-23 PROCEDURE — 99214 OFFICE O/P EST MOD 30 MIN: CPT | Performed by: NURSE PRACTITIONER

## 2020-10-23 PROCEDURE — 3023F SPIROM DOC REV: CPT | Performed by: NURSE PRACTITIONER

## 2020-10-23 PROCEDURE — G8420 CALC BMI NORM PARAMETERS: HCPCS | Performed by: NURSE PRACTITIONER

## 2020-10-23 PROCEDURE — G8484 FLU IMMUNIZE NO ADMIN: HCPCS | Performed by: NURSE PRACTITIONER

## 2020-10-23 RX ORDER — PREDNISONE 1 MG/1
5 TABLET ORAL DAILY
Qty: 90 TABLET | Refills: 3 | Status: SHIPPED | OUTPATIENT
Start: 2020-10-23 | End: 2021-01-01 | Stop reason: SDUPTHER

## 2020-10-23 RX ORDER — ALBUTEROL SULFATE 90 UG/1
2 AEROSOL, METERED RESPIRATORY (INHALATION) EVERY 6 HOURS PRN
Qty: 3 INHALER | Refills: 3 | Status: SHIPPED | OUTPATIENT
Start: 2020-10-23 | End: 2020-10-23

## 2020-10-23 RX ORDER — ALBUTEROL SULFATE 90 UG/1
2 AEROSOL, METERED RESPIRATORY (INHALATION) EVERY 6 HOURS PRN
Qty: 3 INHALER | Refills: 3 | Status: SHIPPED | OUTPATIENT
Start: 2020-10-23 | End: 2020-11-09 | Stop reason: CLARIF

## 2020-10-23 RX ORDER — PREDNISONE 1 MG/1
5 TABLET ORAL DAILY
Qty: 90 TABLET | Refills: 3 | Status: SHIPPED | OUTPATIENT
Start: 2020-10-23 | End: 2020-10-23

## 2020-10-23 RX ORDER — UMECLIDINIUM 62.5 UG/1
1 AEROSOL, POWDER ORAL DAILY
Qty: 90 EACH | Refills: 3 | Status: SHIPPED | OUTPATIENT
Start: 2020-10-23 | End: 2021-01-01 | Stop reason: SDUPTHER

## 2020-10-23 ASSESSMENT — ENCOUNTER SYMPTOMS
STRIDOR: 0
SHORTNESS OF BREATH: 1
NAUSEA: 0
CHEST TIGHTNESS: 0
COUGH: 1
DIARRHEA: 0
VOMITING: 0
WHEEZING: 0

## 2020-10-23 ASSESSMENT — COPD QUESTIONNAIRES: COPD: 1

## 2020-10-23 NOTE — PROGRESS NOTES
 Rheum Arthritis Mother          due to complications related to    Heart Disease Mother     Coronary Art Dis Father     Heart Disease Father     Coronary Art Dis Brother     Heart Disease Brother      CURRENT MEDICATIONS:  Current Outpatient Medications   Medication Sig Dispense Refill    Umeclidinium Bromide (INCRUSE ELLIPTA) 62.5 MCG/INH AEPB Inhale 1 puff into the lungs daily 90 each 3    predniSONE (DELTASONE) 5 MG tablet Take 1 tablet by mouth daily Prednisone 5mg po daily to take after food. 90 tablet 3    albuterol sulfate HFA (PROAIR HFA) 108 (90 Base) MCG/ACT inhaler Inhale 2 puffs into the lungs every 6 hours as needed for Wheezing or Shortness of Breath 3 Inhaler 3    formoterol (PERFOROMIST) 20 MCG/2ML nebulizer solution Take 2 mLs by nebulization every 12 hours 120 mL 11    budesonide (PULMICORT) 0.5 MG/2ML nebulizer suspension USE 1 VIAL  IN  NEBULIZER TWICE  DAILY - rinse mouth after treatment 60 ampule 11    tamsulosin (FLOMAX) 0.4 MG capsule Take 1 capsule by mouth 2 times daily 180 capsule 2    furosemide (LASIX) 40 MG tablet Take 40 mg by mouth daily      albuterol (PROVENTIL) (2.5 MG/3ML) 0.083% nebulizer solution Take 3 mLs by nebulization every 6 hours as needed for Wheezing 360 mL 3    Nebulizer MISC Please provide new nebulizer machine to use with Albuterol 2.5mg via nebs Q6h prn, perforomist 2 times a day and pulmicort 2 times per day. 1 each 0    Respiratory Therapy Supplies (NEBULIZER AIR TUBE/PLUGS) MISC Please provide nebulizer kit and supplies. 1 each 0    gabapentin (NEURONTIN) 300 MG capsule Take 300 mg by mouth daily.  lisinopril (PRINIVIL;ZESTRIL) 10 MG tablet Take 5 mg by mouth daily      Multiple Vitamins-Minerals (PRESERVISION AREDS 2) CAPS Take 1 capsule by mouth 2 times daily       pravastatin (PRAVACHOL) 80 MG tablet Take 80 mg by mouth nightly.  glipiZIDE (GLUCOTROL) 5 MG tablet Take 5 mg by mouth daily.       gabapentin (NEURONTIN) 100 MG capsule Take 100 mg by mouth daily       fish oil-omega-3 fatty acids 1000 MG capsule Take 1 g by mouth 2 times daily.  niacin (SLO-NIACIN) 500 MG tablet Take 500 mg by mouth nightly. No current facility-administered medications for this visit. Kim KOHLI   Review of Systems   Constitutional: Negative for chills, fever and unexpected weight change. Respiratory: Positive for cough and shortness of breath. Negative for chest tightness, wheezing and stridor. Cardiovascular: Positive for leg swelling. Negative for chest pain. Gastrointestinal: Negative for diarrhea, nausea and vomiting. Genitourinary: Negative for dysuria. Physical exam   BP (!) 140/62 (Site: Left Upper Arm, Position: Sitting, Cuff Size: Medium Adult)   Pulse 73   Temp 97.3 °F (36.3 °C)   Ht 5' 6\" (1.676 m)   Wt 142 lb 9.6 oz (64.7 kg)   SpO2 97% Comment: on  4 liters o2  BMI 23.02 kg/m²    Wt Readings from Last 3 Encounters:   10/23/20 142 lb 9.6 oz (64.7 kg)   09/30/20 146 lb (66.2 kg)   06/02/20 154 lb (69.9 kg)       Physical Exam  Vitals signs and nursing note reviewed. Constitutional:       General: He is not in acute distress. Appearance: He is well-developed. Comments: In wheelchair, well groomed elderly and chronically ill thinly built   HENT:      Head: Normocephalic and atraumatic. Neck:      Musculoskeletal: Neck supple. Trachea: No tracheal deviation. Cardiovascular:      Rate and Rhythm: Normal rate and regular rhythm. Heart sounds: Normal heart sounds. No murmur. Pulmonary:      Effort: Pulmonary effort is normal. No respiratory distress. Breath sounds: Normal breath sounds. No stridor. No wheezing or rales. Comments: Diminished throughout  Chest:      Chest wall: No tenderness. Abdominal:      General: Bowel sounds are normal. There is no distension. Palpations: Abdomen is soft. Musculoskeletal:      Right lower leg: Edema present.    Skin:     General: Skin is warm and dry. Capillary Refill: Capillary refill takes less than 2 seconds. Neurological:      Mental Status: He is alert and oriented to person, place, and time. Psychiatric:         Behavior: Behavior normal.         Thought Content: Thought content normal.          Results   Lung Nodule Screening     [] Qualifies    [x] Does not qualify   [] Declined    [] Completed  Age 80   The USPSTF recommends annual screening for lung cancer with low-dose computed tomography (LDCT) in adults aged 54 to [de-identified] years who have a 30 pack-year smoking history and currently smoke or have quit within the past 15 years. Screening should be discontinued once a person has not smoked for 15 years or develops a health problem that substantially limits life expectancy or the ability or willingness to have curative lung surgery. CT CHEST WO CONTRAST   10/21/2020   FINDINGS:  Upper abdomen: Suggestion of a thin rim of fluid along the lateral aspect of liver. This could be artifactual but could also represent a small subcapsular effusion or ascites. An aortic stent graft is present. Benign-appearing exophytic cyst superior pole left kidney. Relatively large amount of fluid in the stomach. This could related to recently consumed fluid cannot exclude gastritis. Mediastinum and gumaro: Tiny focus of pericardial thickening. No abnormally enlarged hilar or mediastinal lymph nodes are seen. Bones: Demineralization of the bones diffusely, consistent with osteoporosis. No evidence for acute fracture or bone destruction. There is calcification of the anterior spinal ligament over several contiguous levels, consistent with DISH. There appears be misregistration of the glenohumeral and sternal segments on the sagittal reconstructed images, Raj related to motion artifact. 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 Mild atelectasis/pneumonia/fibrosis is present in both central lung fields anteriorly.  No

## 2020-10-26 ENCOUNTER — TELEPHONE (OUTPATIENT)
Dept: NEPHROLOGY | Age: 85
End: 2020-10-26

## 2020-10-26 RX ORDER — MELATONIN
5000 DAILY
Qty: 90 TABLET | Refills: 3 | Status: CANCELLED | OUTPATIENT
Start: 2020-10-26

## 2020-10-26 NOTE — TELEPHONE ENCOUNTER
Vitamin D is low. Start Vitamin D3 5000 units daily. Creatinine 1.7, up slightly from the lasix but nothing concerning, still within his baseline. Is the swelling any better with lasix?

## 2020-11-03 ENCOUNTER — OFFICE VISIT (OUTPATIENT)
Dept: CARDIOLOGY CLINIC | Age: 85
End: 2020-11-03
Payer: MEDICARE

## 2020-11-03 VITALS
SYSTOLIC BLOOD PRESSURE: 117 MMHG | DIASTOLIC BLOOD PRESSURE: 60 MMHG | BODY MASS INDEX: 23.02 KG/M2 | HEART RATE: 70 BPM | HEIGHT: 66 IN

## 2020-11-03 PROBLEM — I10 HTN (HYPERTENSION), BENIGN: Status: RESOLVED | Noted: 2017-11-28 | Resolved: 2020-11-03

## 2020-11-03 PROCEDURE — 99204 OFFICE O/P NEW MOD 45 MIN: CPT | Performed by: NUCLEAR MEDICINE

## 2020-11-03 PROCEDURE — G8484 FLU IMMUNIZE NO ADMIN: HCPCS | Performed by: NUCLEAR MEDICINE

## 2020-11-03 PROCEDURE — G8427 DOCREV CUR MEDS BY ELIG CLIN: HCPCS | Performed by: NUCLEAR MEDICINE

## 2020-11-03 PROCEDURE — 93000 ELECTROCARDIOGRAM COMPLETE: CPT | Performed by: NUCLEAR MEDICINE

## 2020-11-03 PROCEDURE — 4040F PNEUMOC VAC/ADMIN/RCVD: CPT | Performed by: NUCLEAR MEDICINE

## 2020-11-03 PROCEDURE — 1123F ACP DISCUSS/DSCN MKR DOCD: CPT | Performed by: NUCLEAR MEDICINE

## 2020-11-03 PROCEDURE — 1036F TOBACCO NON-USER: CPT | Performed by: NUCLEAR MEDICINE

## 2020-11-03 PROCEDURE — G8420 CALC BMI NORM PARAMETERS: HCPCS | Performed by: NUCLEAR MEDICINE

## 2020-11-03 RX ORDER — METOPROLOL SUCCINATE 25 MG/1
25 TABLET, EXTENDED RELEASE ORAL DAILY
Qty: 90 TABLET | Refills: 3 | Status: SHIPPED | OUTPATIENT
Start: 2020-11-03 | End: 2021-05-03

## 2020-11-03 RX ORDER — METOPROLOL SUCCINATE 25 MG/1
25 TABLET, EXTENDED RELEASE ORAL DAILY
COMMUNITY
End: 2020-11-03 | Stop reason: SDUPTHER

## 2020-11-03 ASSESSMENT — ENCOUNTER SYMPTOMS
NAUSEA: 0
ABDOMINAL PAIN: 0
CONSTIPATION: 0
SHORTNESS OF BREATH: 1
DIARRHEA: 0
BLOOD IN STOOL: 0
COLOR CHANGE: 0
BACK PAIN: 1
ABDOMINAL DISTENTION: 0
RECTAL PAIN: 0
FACIAL SWELLING: 0
ANAL BLEEDING: 0
PHOTOPHOBIA: 0
VOMITING: 0
CHEST TIGHTNESS: 0

## 2020-11-03 NOTE — PROGRESS NOTES
[facility],[address]  [address] [place] [address]  Dept: [phone]  Dept Fax: [phone]  Loc: [phone]    Visit Date: 11/3/2020    Dana Chinchilla is a 80 y.o. male who presents todayfor:  Chief Complaint   Patient presents with    New Patient    Establish Cardiologist    Check-Up    Congestive Heart Failure    Hypertension    Diabetes    Shortness of Breath     Here for the first time  Had an echo by renal   Was abnormal   Lower EF 86%  diastolic dysfunction   Was having CHF symptoms  Edema  Dyspnea on exertion   Limited patient  Does have COPD on home O2  Seems significant   Does have smoking history of CAD  Used to smoke  No known CAD before  He thinks the dyspnea is getting worse  Does have Dm for many years   Does have HTN and hyperlipidemia  No cath before  No current smoking   Family history of CAD     HPI:  HPI  Past Medical History:   Diagnosis Date    AAA (abdominal aortic aneurysm) ([facility] [address]     followed by Dr. Antwon Sainz Chronic kidney disease     COPD (chronic obstructive pulmonary disease) ([facility] [address] [facility]     Hypercholesteremia     Hypertension     Other disorders of kidney and ureter in diseases classified elsewhere     Pneumonia     Prostate cancer ([facility] [address] [facility]     with radiation in     SOB (shortness of breath)     Type II or unspecified type diabetes mellitus without mention of complication, not stated as uncontrolled       Past Surgical History:   Procedure Laterality Date    ABDOMEN SURGERY      ABDOMINAL AORTIC ANEURYSM REPAIR  14    ABDOMINAL AORTIC ANEURYSM REPAIR, ENDOVASCULAR Bilateral 2014    WITH LT FEMORAL ENDARTERECTOMY     COLONOSCOPY  unsure    INGUINAL HERNIA REPAIR Bilateral 2015    Dr Amanda Acuña     Family History   Problem Relation Age of Onset    Rheum Arthritis Mother          due to complications related to    Heart Disease Mother     Coronary Art Dis Father     Heart Disease Father  Coronary Art Dis Brother     Heart Disease Brother      Social History     Tobacco Use    Smoking status: Former Smoker     Packs/day: 2.00     Years: 40.00     Pack years: 80.00     Types: Cigarettes     Last attempt to quit: 10/5/1989     Years since quittin.1    Smokeless tobacco: Never Used   Substance Use Topics    Alcohol use: Yes     Alcohol/week: 1.0 standard drinks     Types: 1 Cans of beer per week     Comment: now and then      Current Outpatient Medications   Medication Sig Dispense Refill    vitamin D-3 (CHOLECALCIFEROL) 125 MCG (5000 UT) TABS Take 1 tablet by mouth daily 90 tablet 1    Umeclidinium Bromide (INCRUSE ELLIPTA) 62.5 MCG/INH AEPB Inhale 1 puff into the lungs daily 90 each 3    predniSONE (DELTASONE) 5 MG tablet Take 1 tablet by mouth daily Prednisone 5mg po daily to take after food. 90 tablet 3    albuterol sulfate HFA (PROAIR HFA) 108 (90 Base) MCG/ACT inhaler Inhale 2 puffs into the lungs every 6 hours as needed for Wheezing or Shortness of Breath 3 Inhaler 3    formoterol (PERFOROMIST) 20 MCG/2ML nebulizer solution Take 2 mLs by nebulization every 12 hours 120 mL 11    budesonide (PULMICORT) 0.5 MG/2ML nebulizer suspension USE 1 VIAL  IN  NEBULIZER TWICE  DAILY - rinse mouth after treatment 60 ampule 11    tamsulosin (FLOMAX) 0.4 MG capsule Take 1 capsule by mouth 2 times daily 180 capsule 2    furosemide (LASIX) 40 MG tablet Take 40 mg by mouth daily      albuterol (PROVENTIL) (2.5 MG/3ML) 0.083% nebulizer solution Take 3 mLs by nebulization every 6 hours as needed for Wheezing 360 mL 3    Nebulizer MISC Please provide new nebulizer machine to use with Albuterol 2.5mg via nebs Q6h prn, perforomist 2 times a day and pulmicort 2 times per day. 1 each 0    Respiratory Therapy Supplies (NEBULIZER AIR TUBE/PLUGS) MISC Please provide nebulizer kit and supplies. 1 each 0    gabapentin (NEURONTIN) 300 MG capsule Take 300 mg by mouth daily.       lisinopril (PRINIVIL;ZESTRIL) 10 MG tablet Take 5 mg by mouth daily      Multiple Vitamins-Minerals (PRESERVISION AREDS 2) CAPS Take 1 capsule by mouth 2 times daily       pravastatin (PRAVACHOL) 80 MG tablet Take 80 mg by mouth nightly.  glipiZIDE (GLUCOTROL) 5 MG tablet Take 5 mg by mouth daily.  gabapentin (NEURONTIN) 100 MG capsule Take 100 mg by mouth daily       fish oil-omega-3 fatty acids 1000 MG capsule Take 1 g by mouth 2 times daily.  niacin (SLO-NIACIN) 500 MG tablet Take 500 mg by mouth nightly. No current facility-administered medications for this visit. Allergies   Allergen Reactions    Daliresp [Roflumilast] Other (See Comments)     Anorexia    Sulfa Antibiotics Hives    Percocet [Oxycodone-Acetaminophen] Nausea And Vomiting     Health Maintenance   Topic Date Due    DTaP/Tdap/Td vaccine (1 - Tdap) 04/06/1950    Shingles Vaccine (1 of 2) 04/06/1981    Lipid screen  05/11/2013    PSA counseling  10/05/2013    Pneumococcal 65+ years Vaccine (2 of 2 - PPSV23) 11/08/2017    Annual Wellness Visit (AWV)  06/20/2019    Flu vaccine (1) 09/01/2020    Potassium monitoring  10/23/2021    Creatinine monitoring  10/23/2021    Hepatitis A vaccine  Aged Out    Hepatitis B vaccine  Aged Out    Hib vaccine  Aged Out    Meningococcal (ACWY) vaccine  Aged Out       Subjective:  Review of Systems   Constitutional: Positive for fatigue. Negative for chills and diaphoresis. HENT: Negative for facial swelling and nosebleeds. Eyes: Negative for photophobia. Respiratory: Positive for shortness of breath. Negative for chest tightness. Cardiovascular: Negative for chest pain, palpitations and leg swelling. Gastrointestinal: Negative for abdominal distention, abdominal pain, anal bleeding, blood in stool, constipation, diarrhea, nausea, rectal pain and vomiting. Endocrine: Negative for polyphagia. Genitourinary: Negative for dysuria, frequency and testicular pain. Musculoskeletal: Positive for arthralgias and back pain. Negative for gait problem, joint swelling, myalgias, neck pain and neck stiffness. Skin: Negative for color change, pallor, rash and wound. Allergic/Immunologic: Negative for food allergies. Neurological: Negative for dizziness, syncope and light-headedness. Psychiatric/Behavioral: Negative for confusion, decreased concentration, dysphoric mood, hallucinations and self-injury. The patient is not nervous/anxious and is not hyperactive. Objective:  Physical Exam  HENT:      Head: Normocephalic. Right Ear: Tympanic membrane normal.      Nose: Nose normal.      Mouth/Throat:      Mouth: Mucous membranes are moist.   Eyes:      Extraocular Movements: Extraocular movements intact. Pupils: Pupils are equal, round, and reactive to light. Neck:      Musculoskeletal: Normal range of motion. Cardiovascular:      Rate and Rhythm: Normal rate and regular rhythm. Heart sounds: Murmur present. No friction rub. No gallop. Pulmonary:      Effort: No respiratory distress. Breath sounds: No stridor. No wheezing, rhonchi or rales. Chest:      Chest wall: No tenderness. Abdominal:      General: There is no distension. Palpations: There is no mass. Tenderness: There is no abdominal tenderness. There is no right CVA tenderness, left CVA tenderness, guarding or rebound. Hernia: No hernia is present. Musculoskeletal:         General: No swelling, tenderness, deformity or signs of injury. Right lower leg: No edema. Left lower leg: No edema. Skin:     Coloration: Skin is not jaundiced or pale. Findings: No bruising, erythema, lesion or rash. Neurological:      Mental Status: He is alert. Cranial Nerves: No cranial nerve deficit. Sensory: No sensory deficit. Motor: No weakness.       Coordination: Coordination normal.      Gait: Gait normal.      Deep Tendon Reflexes: Reflexes normal. Psychiatric:         Mood and Affect: Mood normal.         Thought Content: Thought content normal.       /60   Pulse 70   Ht 5' 6\" (1.676 m)   BMI 23.02 kg/m²     Assessment:      Diagnosis Orders   1. Essential hypertension  EKG 12 Lead   2. Dyspnea on exertion  EKG 12 Lead   3. Congestive heart failure, unspecified HF chronicity, unspecified heart failure type ([address]  EKG 12 Lead   4. Abnormal echocardiogram     likely CAD  Mild LV dysfunction   Higher risk for CAD  Low EF  Combined systolic and diastolic dysfunction   Abnormal ECG     Plan:  No follow-ups on file. Discussed options  Invasive vs medical RX  He prefers medical RX  Higher cath risk   He understands the situation   Adding low dose beta blockers  Monitor the lytes  Monitor the HR   Continue risk factor modification and medical management  Thank you for allowing me to participate in the care of your patient. Please don't hesitate to contact me regarding any further issues related to the patient care    Orders Placed:  Orders Placed This Encounter   Procedures    EKG 12 Lead     Order Specific Question:   Reason for Exam?     Answer: Other       Medications Prescribed:  No orders of the defined types were placed in this encounter. Discussed use, benefit, and side effects of prescribed medications. All patient questions answered. Pt voicedunderstanding. Instructed to continue current medications, diet and exercise. Continue risk factor modification and medical management. Patient agreed with treatment plan. Follow up as directed.     Electronically signedby Lorene Maya MD on 11/3/2020 at 9:33 AM

## 2020-11-09 ENCOUNTER — TELEPHONE (OUTPATIENT)
Dept: PULMONOLOGY | Age: 85
End: 2020-11-09

## 2020-11-09 RX ORDER — ALBUTEROL SULFATE 90 UG/1
2 AEROSOL, METERED RESPIRATORY (INHALATION) EVERY 6 HOURS PRN
Qty: 3 INHALER | Refills: 3 | Status: SHIPPED | OUTPATIENT
Start: 2020-11-09 | End: 2021-01-01 | Stop reason: SDUPTHER

## 2020-11-09 NOTE — TELEPHONE ENCOUNTER
Brandy ortiz will send order for ventolin and advise family to notify office with any other issues.  [facility]

## 2020-11-09 NOTE — TELEPHONE ENCOUNTER
Stevie's wife phoned in explaining that his insurance is now denying coverage for his Proair. Insurance will cover Albuterol Sulfate HFA, Ventolin HFA. Please advise.

## 2020-11-13 NOTE — TELEPHONE ENCOUNTER
Patient only wants Ventolin does not want ProAir. See attached correspondence from [address]. I did a PA on Ventolin and it was approved. Patient notified.

## 2020-11-30 ENCOUNTER — TELEPHONE (OUTPATIENT)
Dept: NEPHROLOGY | Age: 85
End: 2020-11-30

## 2020-11-30 NOTE — TELEPHONE ENCOUNTER
Cristin Meredith called asking if it is ok for patient to take lexapro 10 mg daily.     Please advise

## 2021-01-01 ENCOUNTER — TELEPHONE (OUTPATIENT)
Dept: PULMONOLOGY | Age: 86
End: 2021-01-01

## 2021-01-01 ENCOUNTER — OFFICE VISIT (OUTPATIENT)
Dept: PULMONOLOGY | Age: 86
End: 2021-01-01
Payer: MEDICARE

## 2021-01-01 ENCOUNTER — HOSPITAL ENCOUNTER (OUTPATIENT)
Age: 86
Setting detail: OBSERVATION
LOS: 1 days | Discharge: HOME HEALTH | End: 2021-07-28
Attending: INTERNAL MEDICINE | Admitting: INTERNAL MEDICINE
Payer: MEDICARE

## 2021-01-01 ENCOUNTER — HOSPITAL ENCOUNTER (OUTPATIENT)
Dept: NON INVASIVE DIAGNOSTICS | Age: 86
Discharge: HOME | End: 2021-08-06
Payer: MEDICARE

## 2021-01-01 ENCOUNTER — APPOINTMENT (OUTPATIENT)
Dept: CT IMAGING | Age: 86
End: 2021-01-01
Payer: MEDICARE

## 2021-01-01 ENCOUNTER — APPOINTMENT (OUTPATIENT)
Dept: GENERAL RADIOLOGY | Age: 86
End: 2021-01-01
Payer: MEDICARE

## 2021-01-01 ENCOUNTER — TELEPHONE (OUTPATIENT)
Dept: CARDIOLOGY CLINIC | Age: 86
End: 2021-01-01

## 2021-01-01 ENCOUNTER — OFFICE VISIT (OUTPATIENT)
Dept: CARDIOLOGY CLINIC | Age: 86
End: 2021-01-01
Payer: MEDICARE

## 2021-01-01 VITALS
WEIGHT: 134 LBS | OXYGEN SATURATION: 97 % | HEIGHT: 66 IN | SYSTOLIC BLOOD PRESSURE: 165 MMHG | BODY MASS INDEX: 21.53 KG/M2 | HEART RATE: 55 BPM | DIASTOLIC BLOOD PRESSURE: 73 MMHG | TEMPERATURE: 98.3 F | RESPIRATION RATE: 16 BRPM

## 2021-01-01 VITALS
OXYGEN SATURATION: 95 % | HEIGHT: 66 IN | BODY MASS INDEX: 21.05 KG/M2 | HEART RATE: 60 BPM | DIASTOLIC BLOOD PRESSURE: 62 MMHG | SYSTOLIC BLOOD PRESSURE: 124 MMHG | WEIGHT: 131 LBS

## 2021-01-01 VITALS
SYSTOLIC BLOOD PRESSURE: 162 MMHG | HEART RATE: 64 BPM | WEIGHT: 127 LBS | BODY MASS INDEX: 20.5 KG/M2 | DIASTOLIC BLOOD PRESSURE: 78 MMHG

## 2021-01-01 VITALS
OXYGEN SATURATION: 99 % | TEMPERATURE: 98.3 F | HEIGHT: 66 IN | BODY MASS INDEX: 20.41 KG/M2 | WEIGHT: 127 LBS | SYSTOLIC BLOOD PRESSURE: 130 MMHG | DIASTOLIC BLOOD PRESSURE: 70 MMHG | HEART RATE: 55 BPM

## 2021-01-01 DIAGNOSIS — J96.11 HYPOXEMIC RESPIRATORY FAILURE, CHRONIC (HCC): ICD-10-CM

## 2021-01-01 DIAGNOSIS — J43.2 CENTRILOBULAR EMPHYSEMA (HCC): Primary | ICD-10-CM

## 2021-01-01 DIAGNOSIS — J44.9 STAGE 3 SEVERE COPD BY GOLD CLASSIFICATION (HCC): Primary | ICD-10-CM

## 2021-01-01 DIAGNOSIS — E78.01 FAMILIAL HYPERCHOLESTEROLEMIA: ICD-10-CM

## 2021-01-01 DIAGNOSIS — R41.82 ALTERED MENTAL STATUS, UNSPECIFIED ALTERED MENTAL STATUS TYPE: ICD-10-CM

## 2021-01-01 DIAGNOSIS — R53.1 GENERALIZED WEAKNESS: Primary | ICD-10-CM

## 2021-01-01 DIAGNOSIS — R60.9 SWELLING: ICD-10-CM

## 2021-01-01 DIAGNOSIS — J43.2 CENTRILOBULAR EMPHYSEMA (HCC): ICD-10-CM

## 2021-01-01 DIAGNOSIS — J47.9 BRONCHIECTASIS WITHOUT COMPLICATION (HCC): ICD-10-CM

## 2021-01-01 DIAGNOSIS — R55 SYNCOPE, UNSPECIFIED SYNCOPE TYPE: ICD-10-CM

## 2021-01-01 DIAGNOSIS — R06.09 DYSPNEA ON EXERTION: ICD-10-CM

## 2021-01-01 DIAGNOSIS — J44.9 STAGE 3 SEVERE COPD BY GOLD CLASSIFICATION (HCC): ICD-10-CM

## 2021-01-01 DIAGNOSIS — I10 PRIMARY HYPERTENSION: Primary | ICD-10-CM

## 2021-01-01 DIAGNOSIS — N18.31 STAGE 3A CHRONIC KIDNEY DISEASE (HCC): ICD-10-CM

## 2021-01-01 DIAGNOSIS — J44.1 COPD EXACERBATION (HCC): Primary | ICD-10-CM

## 2021-01-01 DIAGNOSIS — J96.11 CHRONIC RESPIRATORY FAILURE WITH HYPOXIA (HCC): ICD-10-CM

## 2021-01-01 DIAGNOSIS — R77.8 ELEVATED TROPONIN: ICD-10-CM

## 2021-01-01 DIAGNOSIS — N18.9 CHRONIC KIDNEY DISEASE, UNSPECIFIED CKD STAGE: ICD-10-CM

## 2021-01-01 LAB
ACQUISITION DURATION: NORMAL S
ALBUMIN SERPL-MCNC: 3.8 G/DL (ref 3.5–5.1)
ALP BLD-CCNC: 74 U/L (ref 38–126)
ALT SERPL-CCNC: 10 U/L (ref 11–66)
ANION GAP SERPL CALCULATED.3IONS-SCNC: 11 MEQ/L (ref 8–16)
AST SERPL-CCNC: 14 U/L (ref 5–40)
AVERAGE HEART RATE: 58 BPM
BASOPHILS # BLD: 0.5 %
BASOPHILS ABSOLUTE: 0.1 THOU/MM3 (ref 0–0.1)
BILIRUB SERPL-MCNC: 0.3 MG/DL (ref 0.3–1.2)
BILIRUBIN URINE: NEGATIVE
BLOOD, URINE: NEGATIVE
BUN BLDV-MCNC: 25 MG/DL (ref 7–22)
CALCIUM SERPL-MCNC: 9 MG/DL (ref 8.5–10.5)
CHARACTER, URINE: CLEAR
CHLORIDE BLD-SCNC: 103 MEQ/L (ref 98–111)
CO2: 24 MEQ/L (ref 23–33)
COLOR: YELLOW
CREAT SERPL-MCNC: 1.5 MG/DL (ref 0.4–1.2)
EKG ATRIAL RATE: 52 BPM
EKG ATRIAL RATE: 54 BPM
EKG P AXIS: 75 DEGREES
EKG P AXIS: 81 DEGREES
EKG P-R INTERVAL: 198 MS
EKG P-R INTERVAL: 202 MS
EKG Q-T INTERVAL: 452 MS
EKG Q-T INTERVAL: 472 MS
EKG QRS DURATION: 114 MS
EKG QRS DURATION: 114 MS
EKG QTC CALCULATION (BAZETT): 428 MS
EKG QTC CALCULATION (BAZETT): 438 MS
EKG R AXIS: -120 DEGREES
EKG R AXIS: -90 DEGREES
EKG T AXIS: 85 DEGREES
EKG T AXIS: 91 DEGREES
EKG VENTRICULAR RATE: 52 BPM
EKG VENTRICULAR RATE: 54 BPM
EOSINOPHIL # BLD: 1.1 %
EOSINOPHILS ABSOLUTE: 0.1 THOU/MM3 (ref 0–0.4)
ERYTHROCYTE [DISTWIDTH] IN BLOOD BY AUTOMATED COUNT: 12.4 % (ref 11.5–14.5)
ERYTHROCYTE [DISTWIDTH] IN BLOOD BY AUTOMATED COUNT: 12.5 % (ref 11.5–14.5)
ERYTHROCYTE [DISTWIDTH] IN BLOOD BY AUTOMATED COUNT: 42.5 FL (ref 35–45)
ERYTHROCYTE [DISTWIDTH] IN BLOOD BY AUTOMATED COUNT: 43 FL (ref 35–45)
GFR SERPL CREATININE-BSD FRML MDRD: 44 ML/MIN/1.73M2
GLUCOSE BLD-MCNC: 81 MG/DL (ref 70–108)
GLUCOSE BLD-MCNC: 86 MG/DL (ref 70–108)
GLUCOSE URINE: NEGATIVE MG/DL
HCT VFR BLD CALC: 40.9 % (ref 42–52)
HCT VFR BLD CALC: 41.1 % (ref 42–52)
HEMOGLOBIN: 12.7 GM/DL (ref 14–18)
HEMOGLOBIN: 12.8 GM/DL (ref 14–18)
HOOKUP DATE: NORMAL
HOOKUP TIME: NORMAL
IMMATURE GRANS (ABS): 0.13 THOU/MM3 (ref 0–0.07)
IMMATURE GRANULOCYTES: 1 %
KETONES, URINE: NEGATIVE
LACTIC ACID, SEPSIS: 0.9 MMOL/L (ref 0.5–1.9)
LEUKOCYTE ESTERASE, URINE: NEGATIVE
LYMPHOCYTES # BLD: 5.3 %
LYMPHOCYTES ABSOLUTE: 0.7 THOU/MM3 (ref 1–4.8)
MAX HEART RATE TIME/DATE: NORMAL
MAX HEART RATE: 77 BPM
MCH RBC QN AUTO: 29.1 PG (ref 26–33)
MCH RBC QN AUTO: 29.1 PG (ref 26–33)
MCHC RBC AUTO-ENTMCNC: 30.9 GM/DL (ref 32.2–35.5)
MCHC RBC AUTO-ENTMCNC: 31.3 GM/DL (ref 32.2–35.5)
MCV RBC AUTO: 93 FL (ref 80–94)
MCV RBC AUTO: 94.1 FL (ref 80–94)
MIN HEART RATE TIME/DATE: NORMAL
MIN HEART RATE: 51 BPM
MONOCYTES # BLD: 5.8 %
MONOCYTES ABSOLUTE: 0.8 THOU/MM3 (ref 0.4–1.3)
NITRITE, URINE: NEGATIVE
NUCLEATED RED BLOOD CELLS: 0 /100 WBC
NUMBER OF QRS COMPLEXES: NORMAL
NUMBER OF SUPRAVENTRICULAR COUPLETS: 2
NUMBER OF SUPRAVENTRICULAR ECTOPICS: 808
NUMBER OF SUPRAVENTRICULAR ISOLATED BEATS: 760
NUMBER OF VENTRICULAR BIGEMINAL CYCLES: 0
NUMBER OF VENTRICULAR COUPLETS: 0
NUMBER OF VENTRICULAR ECTOPICS: 1
OSMOLALITY CALCULATION: 279.1 MOSMOL/KG (ref 275–300)
PH UA: 6 (ref 5–9)
PLATELET # BLD: 202 THOU/MM3 (ref 130–400)
PLATELET # BLD: 215 THOU/MM3 (ref 130–400)
PMV BLD AUTO: 8.8 FL (ref 9.4–12.4)
PMV BLD AUTO: 9 FL (ref 9.4–12.4)
POTASSIUM SERPL-SCNC: 4.4 MEQ/L (ref 3.5–5.2)
PROCALCITONIN: 0.08 NG/ML (ref 0.01–0.09)
PROTEIN UA: NEGATIVE
RBC # BLD: 4.37 MILL/MM3 (ref 4.7–6.1)
RBC # BLD: 4.4 MILL/MM3 (ref 4.7–6.1)
SEG NEUTROPHILS: 86.3 %
SEGMENTED NEUTROPHILS ABSOLUTE COUNT: 11.3 THOU/MM3 (ref 1.8–7.7)
SODIUM BLD-SCNC: 138 MEQ/L (ref 135–145)
SPECIFIC GRAVITY, URINE: 1.02 (ref 1–1.03)
TOTAL PROTEIN: 5.7 G/DL (ref 6.1–8)
TROPONIN T: 0.02 NG/ML
UROBILINOGEN, URINE: 0.2 EU/DL (ref 0–1)
WBC # BLD: 11.3 THOU/MM3 (ref 4.8–10.8)
WBC # BLD: 13.1 THOU/MM3 (ref 4.8–10.8)

## 2021-01-01 PROCEDURE — G0378 HOSPITAL OBSERVATION PER HR: HCPCS

## 2021-01-01 PROCEDURE — 1036F TOBACCO NON-USER: CPT | Performed by: NUCLEAR MEDICINE

## 2021-01-01 PROCEDURE — G8420 CALC BMI NORM PARAMETERS: HCPCS | Performed by: NURSE PRACTITIONER

## 2021-01-01 PROCEDURE — 4040F PNEUMOC VAC/ADMIN/RCVD: CPT | Performed by: NURSE PRACTITIONER

## 2021-01-01 PROCEDURE — 97116 GAIT TRAINING THERAPY: CPT

## 2021-01-01 PROCEDURE — 3023F SPIROM DOC REV: CPT | Performed by: NURSE PRACTITIONER

## 2021-01-01 PROCEDURE — 99220 PR INITIAL OBSERVATION CARE/DAY 70 MINUTES: CPT | Performed by: INTERNAL MEDICINE

## 2021-01-01 PROCEDURE — 99213 OFFICE O/P EST LOW 20 MIN: CPT | Performed by: NUCLEAR MEDICINE

## 2021-01-01 PROCEDURE — G8420 CALC BMI NORM PARAMETERS: HCPCS | Performed by: NUCLEAR MEDICINE

## 2021-01-01 PROCEDURE — 1123F ACP DISCUSS/DSCN MKR DOCD: CPT

## 2021-01-01 PROCEDURE — 1036F TOBACCO NON-USER: CPT | Performed by: NURSE PRACTITIONER

## 2021-01-01 PROCEDURE — G8484 FLU IMMUNIZE NO ADMIN: HCPCS | Performed by: NUCLEAR MEDICINE

## 2021-01-01 PROCEDURE — 93005 ELECTROCARDIOGRAM TRACING: CPT | Performed by: PHYSICIAN ASSISTANT

## 2021-01-01 PROCEDURE — 6360000002 HC RX W HCPCS

## 2021-01-01 PROCEDURE — 3023F SPIROM DOC REV: CPT

## 2021-01-01 PROCEDURE — 97530 THERAPEUTIC ACTIVITIES: CPT

## 2021-01-01 PROCEDURE — 99214 OFFICE O/P EST MOD 30 MIN: CPT

## 2021-01-01 PROCEDURE — G8420 CALC BMI NORM PARAMETERS: HCPCS

## 2021-01-01 PROCEDURE — 4040F PNEUMOC VAC/ADMIN/RCVD: CPT

## 2021-01-01 PROCEDURE — 1123F ACP DISCUSS/DSCN MKR DOCD: CPT | Performed by: NURSE PRACTITIONER

## 2021-01-01 PROCEDURE — G8484 FLU IMMUNIZE NO ADMIN: HCPCS | Performed by: NURSE PRACTITIONER

## 2021-01-01 PROCEDURE — 85027 COMPLETE CBC AUTOMATED: CPT

## 2021-01-01 PROCEDURE — 93226 XTRNL ECG REC<48 HR SCAN A/R: CPT

## 2021-01-01 PROCEDURE — 1123F ACP DISCUSS/DSCN MKR DOCD: CPT | Performed by: NUCLEAR MEDICINE

## 2021-01-01 PROCEDURE — G8926 SPIRO NO PERF OR DOC: HCPCS | Performed by: NURSE PRACTITIONER

## 2021-01-01 PROCEDURE — 84145 PROCALCITONIN (PCT): CPT

## 2021-01-01 PROCEDURE — G8427 DOCREV CUR MEDS BY ELIG CLIN: HCPCS | Performed by: NUCLEAR MEDICINE

## 2021-01-01 PROCEDURE — 97110 THERAPEUTIC EXERCISES: CPT

## 2021-01-01 PROCEDURE — 93005 ELECTROCARDIOGRAM TRACING: CPT | Performed by: STUDENT IN AN ORGANIZED HEALTH CARE EDUCATION/TRAINING PROGRAM

## 2021-01-01 PROCEDURE — 93225 XTRNL ECG REC<48 HRS REC: CPT

## 2021-01-01 PROCEDURE — 36415 COLL VENOUS BLD VENIPUNCTURE: CPT

## 2021-01-01 PROCEDURE — G8427 DOCREV CUR MEDS BY ELIG CLIN: HCPCS | Performed by: NURSE PRACTITIONER

## 2021-01-01 PROCEDURE — 2580000003 HC RX 258

## 2021-01-01 PROCEDURE — 81003 URINALYSIS AUTO W/O SCOPE: CPT

## 2021-01-01 PROCEDURE — 1036F TOBACCO NON-USER: CPT

## 2021-01-01 PROCEDURE — 84484 ASSAY OF TROPONIN QUANT: CPT

## 2021-01-01 PROCEDURE — G8427 DOCREV CUR MEDS BY ELIG CLIN: HCPCS

## 2021-01-01 PROCEDURE — 97162 PT EVAL MOD COMPLEX 30 MIN: CPT

## 2021-01-01 PROCEDURE — 83605 ASSAY OF LACTIC ACID: CPT

## 2021-01-01 PROCEDURE — 94640 AIRWAY INHALATION TREATMENT: CPT

## 2021-01-01 PROCEDURE — 99285 EMERGENCY DEPT VISIT HI MDM: CPT

## 2021-01-01 PROCEDURE — 99239 HOSP IP/OBS DSCHRG MGMT >30: CPT | Performed by: INTERNAL MEDICINE

## 2021-01-01 PROCEDURE — 6370000000 HC RX 637 (ALT 250 FOR IP)

## 2021-01-01 PROCEDURE — 70450 CT HEAD/BRAIN W/O DYE: CPT

## 2021-01-01 PROCEDURE — 99214 OFFICE O/P EST MOD 30 MIN: CPT | Performed by: NURSE PRACTITIONER

## 2021-01-01 PROCEDURE — 85025 COMPLETE CBC W/AUTO DIFF WBC: CPT

## 2021-01-01 PROCEDURE — 80053 COMPREHEN METABOLIC PANEL: CPT

## 2021-01-01 PROCEDURE — 82948 REAGENT STRIP/BLOOD GLUCOSE: CPT

## 2021-01-01 PROCEDURE — 71045 X-RAY EXAM CHEST 1 VIEW: CPT

## 2021-01-01 PROCEDURE — G8926 SPIRO NO PERF OR DOC: HCPCS

## 2021-01-01 PROCEDURE — 97166 OT EVAL MOD COMPLEX 45 MIN: CPT

## 2021-01-01 PROCEDURE — 4040F PNEUMOC VAC/ADMIN/RCVD: CPT | Performed by: NUCLEAR MEDICINE

## 2021-01-01 RX ORDER — BUDESONIDE 0.5 MG/2ML
1 INHALANT ORAL 2 TIMES DAILY
Status: DISCONTINUED | OUTPATIENT
Start: 2021-01-01 | End: 2021-01-01 | Stop reason: HOSPADM

## 2021-01-01 RX ORDER — ALBUTEROL SULFATE 2.5 MG/3ML
2.5 SOLUTION RESPIRATORY (INHALATION) EVERY 6 HOURS PRN
Status: DISCONTINUED | OUTPATIENT
Start: 2021-01-01 | End: 2021-01-01 | Stop reason: HOSPADM

## 2021-01-01 RX ORDER — PREDNISONE 10 MG/1
10 TABLET ORAL DAILY
Qty: 18 TABLET | Refills: 0 | Status: SHIPPED | OUTPATIENT
Start: 2021-01-01 | End: 2021-01-01

## 2021-01-01 RX ORDER — ACETAMINOPHEN 500 MG
500 TABLET ORAL EVERY MORNING
Status: ON HOLD | COMMUNITY
End: 2022-01-01

## 2021-01-01 RX ORDER — METOPROLOL SUCCINATE 25 MG/1
25 TABLET, EXTENDED RELEASE ORAL DAILY
Qty: 90 TABLET | Refills: 3 | Status: SHIPPED | OUTPATIENT
Start: 2021-01-01

## 2021-01-01 RX ORDER — GUAIFENESIN 600 MG/1
600 TABLET, EXTENDED RELEASE ORAL 2 TIMES DAILY
Status: DISCONTINUED | OUTPATIENT
Start: 2021-01-01 | End: 2021-01-01 | Stop reason: HOSPADM

## 2021-01-01 RX ORDER — SODIUM CHLORIDE 9 MG/ML
INJECTION, SOLUTION INTRAVENOUS CONTINUOUS
Status: DISCONTINUED | OUTPATIENT
Start: 2021-01-01 | End: 2021-01-01

## 2021-01-01 RX ORDER — POLYETHYLENE GLYCOL 3350 17 G/17G
17 POWDER, FOR SOLUTION ORAL DAILY PRN
Status: DISCONTINUED | OUTPATIENT
Start: 2021-01-01 | End: 2021-01-01 | Stop reason: HOSPADM

## 2021-01-01 RX ORDER — FORMOTEROL FUMARATE 20 UG/2ML
20 SOLUTION RESPIRATORY (INHALATION) EVERY 12 HOURS
Status: DISCONTINUED | OUTPATIENT
Start: 2021-01-01 | End: 2021-01-01 | Stop reason: HOSPADM

## 2021-01-01 RX ORDER — LIDOCAINE 4 G/G
1 PATCH TOPICAL DAILY
Status: DISCONTINUED | OUTPATIENT
Start: 2021-01-01 | End: 2021-01-01 | Stop reason: HOSPADM

## 2021-01-01 RX ORDER — AZITHROMYCIN 500 MG/1
500 TABLET, FILM COATED ORAL DAILY
Qty: 3 TABLET | Refills: 0 | Status: SHIPPED | OUTPATIENT
Start: 2021-01-01 | End: 2021-01-01

## 2021-01-01 RX ORDER — GABAPENTIN 100 MG/1
100 CAPSULE ORAL 2 TIMES DAILY
Status: DISCONTINUED | OUTPATIENT
Start: 2021-01-01 | End: 2021-01-01 | Stop reason: HOSPADM

## 2021-01-01 RX ORDER — M-VIT,TX,IRON,MINS/CALC/FOLIC 27MG-0.4MG
TABLET ORAL 2 TIMES DAILY
Status: DISCONTINUED | OUTPATIENT
Start: 2021-01-01 | End: 2021-01-01 | Stop reason: HOSPADM

## 2021-01-01 RX ORDER — PRAVASTATIN SODIUM 80 MG/1
80 TABLET ORAL NIGHTLY
Status: DISCONTINUED | OUTPATIENT
Start: 2021-01-01 | End: 2021-01-01 | Stop reason: HOSPADM

## 2021-01-01 RX ORDER — PREDNISONE 1 MG/1
5 TABLET ORAL DAILY
Status: DISCONTINUED | OUTPATIENT
Start: 2021-01-01 | End: 2021-01-01 | Stop reason: HOSPADM

## 2021-01-01 RX ORDER — GUAIFENESIN 400 MG/1
TABLET ORAL
Status: ON HOLD | COMMUNITY
End: 2021-01-01 | Stop reason: HOSPADM

## 2021-01-01 RX ORDER — NEBULIZER ACCESSORIES
EACH MISCELLANEOUS
Qty: 1 EACH | Refills: 0 | Status: SHIPPED | OUTPATIENT
Start: 2021-01-01 | End: 2021-01-01

## 2021-01-01 RX ORDER — ACETAMINOPHEN 500 MG
500 TABLET ORAL EVERY MORNING
Status: DISCONTINUED | OUTPATIENT
Start: 2021-01-01 | End: 2021-01-01 | Stop reason: SDUPTHER

## 2021-01-01 RX ORDER — POLYVINYL ALCOHOL 14 MG/ML
1 SOLUTION/ DROPS OPHTHALMIC PRN
COMMUNITY
End: 2022-01-01

## 2021-01-01 RX ORDER — MAGNESIUM HYDROXIDE/ALUMINUM HYDROXICE/SIMETHICONE 120; 1200; 1200 MG/30ML; MG/30ML; MG/30ML
15 SUSPENSION ORAL EVERY 4 HOURS PRN
Status: DISCONTINUED | OUTPATIENT
Start: 2021-01-01 | End: 2021-01-01 | Stop reason: HOSPADM

## 2021-01-01 RX ORDER — ASPIRIN 81 MG/1
81 TABLET, CHEWABLE ORAL DAILY
Qty: 30 TABLET | Refills: 0 | Status: SHIPPED | OUTPATIENT
Start: 2021-01-01

## 2021-01-01 RX ORDER — TAMSULOSIN HYDROCHLORIDE 0.4 MG/1
0.4 CAPSULE ORAL DAILY
Status: DISCONTINUED | OUTPATIENT
Start: 2021-01-01 | End: 2021-01-01 | Stop reason: HOSPADM

## 2021-01-01 RX ORDER — ACETAMINOPHEN 650 MG/1
650 SUPPOSITORY RECTAL EVERY 6 HOURS PRN
Status: DISCONTINUED | OUTPATIENT
Start: 2021-01-01 | End: 2021-01-01 | Stop reason: HOSPADM

## 2021-01-01 RX ORDER — OMEGA-3-ACID ETHYL ESTERS 1 G/1
1 CAPSULE, LIQUID FILLED ORAL 2 TIMES DAILY
Status: DISCONTINUED | OUTPATIENT
Start: 2021-01-01 | End: 2021-01-01 | Stop reason: HOSPADM

## 2021-01-01 RX ORDER — BACITRACIN, NEOMYCIN, POLYMYXIN B 400; 3.5; 5 [USP'U]/G; MG/G; [USP'U]/G
OINTMENT TOPICAL
Status: DISPENSED
Start: 2021-01-01 | End: 2021-01-01

## 2021-01-01 RX ORDER — ONDANSETRON 2 MG/ML
4 INJECTION INTRAMUSCULAR; INTRAVENOUS EVERY 6 HOURS PRN
Status: DISCONTINUED | OUTPATIENT
Start: 2021-01-01 | End: 2021-01-01 | Stop reason: HOSPADM

## 2021-01-01 RX ORDER — DOCUSATE SODIUM 100 MG/1
100 CAPSULE, LIQUID FILLED ORAL 2 TIMES DAILY PRN
Status: DISCONTINUED | OUTPATIENT
Start: 2021-01-01 | End: 2021-01-01 | Stop reason: HOSPADM

## 2021-01-01 RX ORDER — ALBUTEROL SULFATE 90 UG/1
2 AEROSOL, METERED RESPIRATORY (INHALATION) EVERY 6 HOURS PRN
Qty: 1 EACH | Refills: 11 | Status: SHIPPED | OUTPATIENT
Start: 2021-01-01 | End: 2021-01-01 | Stop reason: SDUPTHER

## 2021-01-01 RX ORDER — METOPROLOL SUCCINATE 25 MG/1
25 TABLET, EXTENDED RELEASE ORAL DAILY
Status: DISCONTINUED | OUTPATIENT
Start: 2021-01-01 | End: 2021-01-01 | Stop reason: HOSPADM

## 2021-01-01 RX ORDER — BUDESONIDE 0.5 MG/2ML
1 INHALANT ORAL 2 TIMES DAILY
Qty: 60 AMPULE | Refills: 3 | Status: SHIPPED | OUTPATIENT
Start: 2021-01-01 | End: 2021-01-01

## 2021-01-01 RX ORDER — GUAIFENESIN 600 MG/1
600 TABLET, EXTENDED RELEASE ORAL 2 TIMES DAILY
Qty: 60 TABLET | Refills: 0 | Status: SHIPPED | OUTPATIENT
Start: 2021-01-01 | End: 2021-01-01

## 2021-01-01 RX ORDER — PREDNISONE 1 MG/1
5 TABLET ORAL DAILY
Qty: 90 TABLET | Refills: 3 | Status: SHIPPED | OUTPATIENT
Start: 2021-01-01 | End: 2022-09-21

## 2021-01-01 RX ORDER — LIDOCAINE 50 MG/G
1 PATCH TOPICAL DAILY
Status: ON HOLD | COMMUNITY
End: 2022-01-01

## 2021-01-01 RX ORDER — ALBUTEROL SULFATE 90 UG/1
2 AEROSOL, METERED RESPIRATORY (INHALATION) EVERY 6 HOURS PRN
Qty: 3 EACH | Refills: 4 | Status: SHIPPED | OUTPATIENT
Start: 2021-01-01 | End: 2022-09-29

## 2021-01-01 RX ORDER — ONDANSETRON 4 MG/1
4 TABLET, ORALLY DISINTEGRATING ORAL EVERY 8 HOURS PRN
Status: DISCONTINUED | OUTPATIENT
Start: 2021-01-01 | End: 2021-01-01 | Stop reason: HOSPADM

## 2021-01-01 RX ORDER — MAGNESIUM HYDROXIDE/ALUMINUM HYDROXICE/SIMETHICONE 120; 1200; 1200 MG/30ML; MG/30ML; MG/30ML
15 SUSPENSION ORAL EVERY 4 HOURS PRN
COMMUNITY
End: 2022-01-01

## 2021-01-01 RX ORDER — NEBULIZER ACCESSORIES
EACH MISCELLANEOUS
Qty: 1 EACH | Refills: 0 | Status: SHIPPED | OUTPATIENT
Start: 2021-01-01

## 2021-01-01 RX ORDER — SODIUM CHLORIDE 9 MG/ML
25 INJECTION, SOLUTION INTRAVENOUS PRN
Status: DISCONTINUED | OUTPATIENT
Start: 2021-01-01 | End: 2021-01-01 | Stop reason: HOSPADM

## 2021-01-01 RX ORDER — ALBUTEROL SULFATE 90 UG/1
2 AEROSOL, METERED RESPIRATORY (INHALATION) EVERY 6 HOURS PRN
Status: DISCONTINUED | OUTPATIENT
Start: 2021-01-01 | End: 2021-01-01 | Stop reason: HOSPADM

## 2021-01-01 RX ORDER — SODIUM CHLORIDE 0.9 % (FLUSH) 0.9 %
10 SYRINGE (ML) INJECTION PRN
Status: DISCONTINUED | OUTPATIENT
Start: 2021-01-01 | End: 2021-01-01 | Stop reason: HOSPADM

## 2021-01-01 RX ORDER — TAMSULOSIN HYDROCHLORIDE 0.4 MG/1
0.4 CAPSULE ORAL DAILY
Qty: 90 CAPSULE | Refills: 3 | Status: SHIPPED | OUTPATIENT
Start: 2021-01-01

## 2021-01-01 RX ORDER — VITAMIN B COMPLEX
5000 TABLET ORAL DAILY
Status: DISCONTINUED | OUTPATIENT
Start: 2021-01-01 | End: 2021-01-01 | Stop reason: HOSPADM

## 2021-01-01 RX ORDER — POLYETHYLENE GLYCOL 3350 17 G/17G
17 POWDER, FOR SOLUTION ORAL DAILY PRN
Qty: 30 EACH | Refills: 0 | Status: SHIPPED | OUTPATIENT
Start: 2021-01-01 | End: 2021-01-01

## 2021-01-01 RX ORDER — SODIUM CHLORIDE 0.9 % (FLUSH) 0.9 %
5-40 SYRINGE (ML) INJECTION EVERY 12 HOURS SCHEDULED
Status: DISCONTINUED | OUTPATIENT
Start: 2021-01-01 | End: 2021-01-01 | Stop reason: HOSPADM

## 2021-01-01 RX ORDER — ACETAMINOPHEN 325 MG/1
650 TABLET ORAL EVERY 6 HOURS PRN
Status: DISCONTINUED | OUTPATIENT
Start: 2021-01-01 | End: 2021-01-01 | Stop reason: HOSPADM

## 2021-01-01 RX ORDER — UMECLIDINIUM 62.5 UG/1
1 AEROSOL, POWDER ORAL DAILY
Qty: 90 EACH | Refills: 3 | Status: SHIPPED | OUTPATIENT
Start: 2021-01-01 | End: 2021-01-01

## 2021-01-01 RX ORDER — POLYVINYL ALCOHOL 14 MG/ML
1 SOLUTION/ DROPS OPHTHALMIC PRN
Status: DISCONTINUED | OUTPATIENT
Start: 2021-01-01 | End: 2021-01-01 | Stop reason: HOSPADM

## 2021-01-01 RX ORDER — DOCUSATE SODIUM 100 MG/1
100 CAPSULE, LIQUID FILLED ORAL 2 TIMES DAILY PRN
COMMUNITY

## 2021-01-01 RX ADMIN — METOPROLOL SUCCINATE 25 MG: 25 TABLET, FILM COATED, EXTENDED RELEASE ORAL at 09:44

## 2021-01-01 RX ADMIN — SODIUM CHLORIDE: 9 INJECTION, SOLUTION INTRAVENOUS at 20:15

## 2021-01-01 RX ADMIN — PRAVASTATIN SODIUM 80 MG: 80 TABLET ORAL at 21:30

## 2021-01-01 RX ADMIN — OMEGA-3-ACID ETHYL ESTERS 1 G: 900 CAPSULE, LIQUID FILLED ORAL at 09:44

## 2021-01-01 RX ADMIN — BUDESONIDE 1000 MCG: 0.5 INHALANT RESPIRATORY (INHALATION) at 07:58

## 2021-01-01 RX ADMIN — PREDNISONE 5 MG: 5 TABLET ORAL at 09:44

## 2021-01-01 RX ADMIN — FORMOTEROL FUMARATE DIHYDRATE 20 MCG: 20 SOLUTION RESPIRATORY (INHALATION) at 07:55

## 2021-01-01 RX ADMIN — OMEGA-3-ACID ETHYL ESTERS 1 G: 900 CAPSULE, LIQUID FILLED ORAL at 21:30

## 2021-01-01 RX ADMIN — MULTIPLE VITAMINS W/ MINERALS TAB: TAB at 21:30

## 2021-01-01 RX ADMIN — GUAIFENESIN 600 MG: 600 TABLET, EXTENDED RELEASE ORAL at 09:44

## 2021-01-01 RX ADMIN — GUAIFENESIN 600 MG: 600 TABLET, EXTENDED RELEASE ORAL at 21:30

## 2021-01-01 RX ADMIN — GABAPENTIN 100 MG: 100 CAPSULE ORAL at 21:30

## 2021-01-01 RX ADMIN — VITAMIN D, TAB 1000IU (100/BT) 5000 UNITS: 25 TAB at 09:44

## 2021-01-01 RX ADMIN — SODIUM CHLORIDE, PRESERVATIVE FREE 10 ML: 5 INJECTION INTRAVENOUS at 22:45

## 2021-01-01 RX ADMIN — GABAPENTIN 100 MG: 100 CAPSULE ORAL at 09:44

## 2021-01-01 RX ADMIN — TAMSULOSIN HYDROCHLORIDE 0.4 MG: 0.4 CAPSULE ORAL at 09:44

## 2021-01-01 RX ADMIN — MULTIPLE VITAMINS W/ MINERALS TAB: TAB at 09:44

## 2021-01-01 ASSESSMENT — ENCOUNTER SYMPTOMS
COUGH: 0
SHORTNESS OF BREATH: 1
SPUTUM PRODUCTION: 0
NAUSEA: 0
RHINORRHEA: 1
WHEEZING: 1
NAUSEA: 0
SINUS PRESSURE: 0
CHEST TIGHTNESS: 0
SORE THROAT: 0
DIARRHEA: 0
BACK PAIN: 0
SHORTNESS OF BREATH: 0
VOMITING: 0
WHEEZING: 0
RHINORRHEA: 0
SORE THROAT: 0
CHEST TIGHTNESS: 0
COUGH: 0
EYE ITCHING: 0
CONSTIPATION: 0
ABDOMINAL PAIN: 0
SHORTNESS OF BREATH: 0
DIARRHEA: 0
COUGH: 1
STRIDOR: 0
SINUS PAIN: 0
VOMITING: 0
NAUSEA: 0
DIARRHEA: 0
SPUTUM PRODUCTION: 0

## 2021-01-01 ASSESSMENT — PAIN SCALES - GENERAL
PAINLEVEL_OUTOF10: 0

## 2021-01-01 ASSESSMENT — COPD QUESTIONNAIRES
COPD: 1
COPD: 1

## 2021-01-18 ENCOUNTER — TELEPHONE (OUTPATIENT)
Dept: CARDIOLOGY CLINIC | Age: 86
End: 2021-01-18

## 2021-01-18 ENCOUNTER — TELEPHONE (OUTPATIENT)
Dept: PULMONOLOGY | Age: 86
End: 2021-01-18

## 2021-01-18 RX ORDER — FUROSEMIDE 40 MG/1
40 TABLET ORAL DAILY
Qty: 90 TABLET | Refills: 2 | Status: SHIPPED | OUTPATIENT
Start: 2021-01-18 | End: 2021-03-30

## 2021-01-18 NOTE — TELEPHONE ENCOUNTER
Likely not neuropathy. Could be medication related but I would want him to get it checked out either with PCP or urgent care.

## 2021-01-18 NOTE — TELEPHONE ENCOUNTER
Pt has been started on new medication a few weeks ago and has been itching all over. Pt daughter called in asking if she can give him Benadryl and if the Ventolin could be giving him a reaction. They are also wondering if he can still get the covid vaccine with all this going.  They are also calling nephrology

## 2021-01-18 NOTE — TELEPHONE ENCOUNTER
Has had an awful itch both arms, shoulders, back and on top of his thighs. Been going on for approx. one week and getting worse. Has rash and raise in different spots of the body. Wanted to know if this has to with the neuropathy or his new medications? New medications are Lasix, Metoprol, Lexapro and Ventolin for Nebulizer. Script pending.     Please advise

## 2021-01-18 NOTE — TELEPHONE ENCOUNTER
Patient's daughter called in stating he has been having progressively itching and then has had a rash and today rash turned into a hive. Patient recently started on Metoprolol and Lasix 2 months ago. Patient has an allergy to Sulfa. What can patient do for the itching? Patient does have a history of COPD? Discussed with Dr. Arlette Horne. Verbal Order-Hold lasix for a few days and let us know. Patient can take OTC Zyrtec without decongestant if needed for itching. Patient's daughter verbalized understanding. Dr. Arlette Horne aware he is already on a daily steroid. They will update us in a few days. Please agree.

## 2021-01-18 NOTE — TELEPHONE ENCOUNTER
1. Advise patient Kobi Sole they need to follow-up with provider who ordered the new medication so they can better inform on next steps. 2. Patient has been on Ventolin for over 2 months unlikely to cause reaction after this time period would have likely occurred more recently he has also used this in the past with no reaction. 3. Recommend resolving his current issue before taking vaccine so he can properly monitor for possible side effects from vaccine.

## 2021-01-19 NOTE — TELEPHONE ENCOUNTER
I spoke to pt wife Zara about the following things. I advised them to follow up with nephrology doctor. Pt is using the Incruse daily and using the Ventolin almost every hour because he is so short of breath. She is wondering if there is something better for him to use? She is also interested in using [facility] for the Proair since pt was using that before and they can get a better deal at some pharmacies for it. They do believe that it was the Lasix the pt was taking had some sulfa in it and he is very allergic to sulfa. Please advise.

## 2021-01-19 NOTE — TELEPHONE ENCOUNTER
Called and spoke with patient and wife through discussion find multiple reasons for increased SOB using partial doses of nebulized medication wife states stops treatments early and pours left over down sink, only using 1 puff of albuterol instead of 2 not using spacer. Explained to patient and wife importance of compliance with medication to maintain control of symptoms reviewed dosing and advised to call office if SOB worsens.

## 2021-01-27 RX ORDER — HYDROCHLOROTHIAZIDE 25 MG/1
25 TABLET ORAL EVERY MORNING
Qty: 30 TABLET | Refills: 0 | Status: SHIPPED | OUTPATIENT
Start: 2021-01-27 | End: 2021-03-30 | Stop reason: SDUPTHER

## 2021-01-27 NOTE — TELEPHONE ENCOUNTER
Patient is still slightly itching and rash and hives are clearing up since stopping lasix. Do you think it is wise to start a new medication and receive the vaccination? The vaccination was cancelled when his allergic reaction started. It has not been rescheduled at this time. Please advise.      30 day script sent to [facility], then the 90 day will go to [facility]

## 2021-01-27 NOTE — TELEPHONE ENCOUNTER
Spoke with Roni Singh. She is going to try to get him rescheduled for vaccination. Will schedule, if it takes a couple of weeks to get him in, then they will start the new medication. Will monitor edema and let us know with any concerns. Also was given the number her at [facility] for the vaccination clinics.

## 2021-01-27 NOTE — TELEPHONE ENCOUNTER
Curtistine Siemens called back and she stated after stopping the lasix the hives are gone and the itching is almost gone. ? Marika Mayers has left a message with patient's nephrologist to get another water pill.

## 2021-02-25 ENCOUNTER — NURSE ONLY (OUTPATIENT)
Dept: LAB | Age: 86
End: 2021-02-25

## 2021-02-25 LAB
ALT SERPL-CCNC: 14 U/L (ref 11–66)
AST SERPL-CCNC: 19 U/L (ref 5–40)
BILIRUB SERPL-MCNC: 0.2 MG/DL (ref 0.3–1.2)
BILIRUBIN DIRECT: < 0.2 MG/DL (ref 0–0.3)

## 2021-03-29 ENCOUNTER — TELEPHONE (OUTPATIENT)
Dept: NEPHROLOGY | Age: 86
End: 2021-03-29

## 2021-03-29 ENCOUNTER — NURSE ONLY (OUTPATIENT)
Dept: LAB | Age: 86
End: 2021-03-29

## 2021-03-29 DIAGNOSIS — N18.32 STAGE 3B CHRONIC KIDNEY DISEASE (HCC): ICD-10-CM

## 2021-03-29 DIAGNOSIS — N18.32 STAGE 3B CHRONIC KIDNEY DISEASE (HCC): Primary | ICD-10-CM

## 2021-03-29 LAB
ANION GAP SERPL CALCULATED.3IONS-SCNC: 10 MEQ/L (ref 8–16)
BUN BLDV-MCNC: 26 MG/DL (ref 7–22)
CALCIUM SERPL-MCNC: 8.8 MG/DL (ref 8.5–10.5)
CHLORIDE BLD-SCNC: 94 MEQ/L (ref 98–111)
CO2: 29 MEQ/L (ref 23–33)
CREAT SERPL-MCNC: 1.6 MG/DL (ref 0.4–1.2)
GFR SERPL CREATININE-BSD FRML MDRD: 41 ML/MIN/1.73M2
GLUCOSE BLD-MCNC: 103 MG/DL (ref 70–108)
POTASSIUM SERPL-SCNC: 4.1 MEQ/L (ref 3.5–5.2)
SODIUM BLD-SCNC: 133 MEQ/L (ref 135–145)

## 2021-03-29 NOTE — TELEPHONE ENCOUNTER
Called Norman Fay and let her know they want a 30 day supply sent to [facility] and then [facility] long [facility]

## 2021-03-30 RX ORDER — HYDROCHLOROTHIAZIDE 25 MG/1
25 TABLET ORAL EVERY MORNING
Qty: 90 TABLET | Refills: 1 | Status: SHIPPED | OUTPATIENT
Start: 2021-03-30 | End: 2021-05-03

## 2021-03-30 RX ORDER — HYDROCHLOROTHIAZIDE 25 MG/1
25 TABLET ORAL EVERY MORNING
Qty: 30 TABLET | Refills: 0 | Status: SHIPPED | OUTPATIENT
Start: 2021-03-30 | End: 2021-03-30 | Stop reason: SDUPTHER

## 2021-05-03 ENCOUNTER — OFFICE VISIT (OUTPATIENT)
Dept: CARDIOLOGY CLINIC | Age: 86
End: 2021-05-03
Payer: MEDICARE

## 2021-05-03 VITALS
BODY MASS INDEX: 23.02 KG/M2 | HEART RATE: 77 BPM | DIASTOLIC BLOOD PRESSURE: 56 MMHG | SYSTOLIC BLOOD PRESSURE: 121 MMHG | HEIGHT: 66 IN

## 2021-05-03 DIAGNOSIS — J44.9 CHRONIC OBSTRUCTIVE PULMONARY DISEASE, UNSPECIFIED COPD TYPE (HCC): Primary | ICD-10-CM

## 2021-05-03 DIAGNOSIS — I42.0 DILATED CARDIOMYOPATHY (HCC): ICD-10-CM

## 2021-05-03 DIAGNOSIS — I10 ESSENTIAL HYPERTENSION: ICD-10-CM

## 2021-05-03 PROCEDURE — 3023F SPIROM DOC REV: CPT | Performed by: NUCLEAR MEDICINE

## 2021-05-03 PROCEDURE — 99214 OFFICE O/P EST MOD 30 MIN: CPT | Performed by: NUCLEAR MEDICINE

## 2021-05-03 PROCEDURE — G8926 SPIRO NO PERF OR DOC: HCPCS | Performed by: NUCLEAR MEDICINE

## 2021-05-03 PROCEDURE — 1123F ACP DISCUSS/DSCN MKR DOCD: CPT | Performed by: NUCLEAR MEDICINE

## 2021-05-03 PROCEDURE — 4040F PNEUMOC VAC/ADMIN/RCVD: CPT | Performed by: NUCLEAR MEDICINE

## 2021-05-03 PROCEDURE — G8420 CALC BMI NORM PARAMETERS: HCPCS | Performed by: NUCLEAR MEDICINE

## 2021-05-03 PROCEDURE — G8427 DOCREV CUR MEDS BY ELIG CLIN: HCPCS | Performed by: NUCLEAR MEDICINE

## 2021-05-03 PROCEDURE — 1036F TOBACCO NON-USER: CPT | Performed by: NUCLEAR MEDICINE

## 2021-05-03 NOTE — PROGRESS NOTES
[address] [facility] [address] [address]  [address] [place] [address]  Dept: [phone]  Dept Fax: [phone]  Loc: [phone]    Visit Date: 5/3/2021    Baron Arnold is a 80 y.o. male who presents todayfor:  Chief Complaint   Patient presents with    6 Month Follow-Up    Hypertension    Cardiomyopathy    Shortness of Breath     Dealing with a rash   Stopped his meds including lasix, metoprolol and lexpro   He is back to HCTZ  Still dealing with itching  Mainly back   Cardiac wise he is fairly stable   No chest pain   No changes in breathing  Off of diuretics  Some leg edema       HPI:  HPI  Past Medical History:   Diagnosis Date    AAA (abdominal aortic aneurysm) ([facility] [address]     followed by Dr. Shahriar Hansen Chronic kidney disease     COPD (chronic obstructive pulmonary disease) ([facility] [address] [facility]     Hypercholesteremia     Hypertension     Other disorders of kidney and ureter in diseases classified elsewhere     Pneumonia     Prostate cancer ([facility] [address]     with radiation in     SOB (shortness of breath)     Type II or unspecified type diabetes mellitus without mention of complication, not stated as uncontrolled       Past Surgical History:   Procedure Laterality Date    ABDOMEN SURGERY      ABDOMINAL AORTIC ANEURYSM REPAIR  14    ABDOMINAL AORTIC ANEURYSM REPAIR, ENDOVASCULAR Bilateral 2014    WITH LT FEMORAL ENDARTERECTOMY     COLONOSCOPY  unsure    INGUINAL HERNIA REPAIR Bilateral 2015    Dr Mattie Denise     Family History   Problem Relation Age of Onset    Rheum Arthritis Mother          due to complications related to    Heart Disease Mother     Coronary Art Dis Father     Heart Disease Father     Coronary Art Dis Brother     Heart Disease Brother      Social History     Tobacco Use    Smoking status: Former Smoker     Packs/day: 2.00     Years: 40.00     Pack years: 80.00     Types: Cigarettes     Quit date: 10/5/1989     Years since quittin.5    Smokeless tobacco: Never Used   Substance Use Topics    Alcohol use: Yes     Alcohol/week: 1.0 standard drinks     Types: 1 Cans of beer per week     Comment: now and then      Current Outpatient Medications   Medication Sig Dispense Refill    albuterol sulfate HFA (VENTOLIN HFA) 108 (90 Base) MCG/ACT inhaler Inhale 2 puffs into the lungs every 6 hours as needed for Wheezing or Shortness of Breath 3 Inhaler 3    Umeclidinium Bromide (INCRUSE ELLIPTA) 62.5 MCG/INH AEPB Inhale 1 puff into the lungs daily 90 each 3    predniSONE (DELTASONE) 5 MG tablet Take 1 tablet by mouth daily Prednisone 5mg po daily to take after food. 90 tablet 3    formoterol (PERFOROMIST) 20 MCG/2ML nebulizer solution Take 2 mLs by nebulization every 12 hours 120 mL 11    budesonide (PULMICORT) 0.5 MG/2ML nebulizer suspension USE 1 VIAL  IN  NEBULIZER TWICE  DAILY - rinse mouth after treatment 60 ampule 11    tamsulosin (FLOMAX) 0.4 MG capsule Take 1 capsule by mouth 2 times daily 180 capsule 2    Nebulizer MISC Please provide new nebulizer machine to use with Albuterol 2.5mg via nebs Q6h prn, perforomist 2 times a day and pulmicort 2 times per day. 1 each 0    Respiratory Therapy Supplies (NEBULIZER AIR TUBE/PLUGS) MISC Please provide nebulizer kit and supplies. 1 each 0    gabapentin (NEURONTIN) 300 MG capsule Take 300 mg by mouth daily.  lisinopril (PRINIVIL;ZESTRIL) 10 MG tablet Take 10 mg by mouth daily       Multiple Vitamins-Minerals (PRESERVISION AREDS 2) CAPS Take 1 capsule by mouth 2 times daily       pravastatin (PRAVACHOL) 80 MG tablet Take 80 mg by mouth nightly.  glipiZIDE (GLUCOTROL) 5 MG tablet Take 5 mg by mouth daily.  gabapentin (NEURONTIN) 100 MG capsule Take 100 mg by mouth daily       fish oil-omega-3 fatty acids 1000 MG capsule Take 1 g by mouth 2 times daily.  niacin (SLO-NIACIN) 500 MG tablet Take 500 mg by mouth nightly.         vitamin D-3 (CHOLECALCIFEROL) 125 MCG COPD type ([facility] [address]     2. Essential hypertension     3. Dilated cardiomyopathy ([facility]     as above  ?/ allergies   Vs other issues     Plan:  No follow-ups on file. As above  Refer to dermatology or allergist  Consider stopping lisinopril   Go back to metoprolol   I am not sure which medication is causing the problem   Continue risk factor modification and medical management  Thank you for allowing me to participate in the care of your patient. Please don't hesitate to contact me regarding any further issues related to the patient care    Orders Placed:  No orders of the defined types were placed in this encounter. Medications Prescribed:  No orders of the defined types were placed in this encounter. Discussed use, benefit, and side effects of prescribed medications. All patient questions answered. Pt voicedunderstanding. Instructed to continue current medications, diet and exercise. Continue risk factor modification and medical management. Patient agreed with treatment plan. Follow up as directed.     Electronically signedby Rudy Benitez MD on 5/3/2021 at 9:58 AM

## 2021-05-17 ENCOUNTER — TELEPHONE (OUTPATIENT)
Dept: CARDIOLOGY CLINIC | Age: 86
End: 2021-05-17

## 2021-05-17 DIAGNOSIS — R00.1 BRADYCARDIA: Primary | ICD-10-CM

## 2021-05-17 RX ORDER — METOPROLOL SUCCINATE 25 MG/1
25 TABLET, EXTENDED RELEASE ORAL DAILY
COMMUNITY
End: 2021-01-01 | Stop reason: SDUPTHER

## 2021-05-17 NOTE — TELEPHONE ENCOUNTER
Pt daughter called back stating pt bp today was 143/65 hr 48  154/59  Hr 49  150/60 hr 47. She had her mom take them due to his pulse rate not coming thru on his fingers sometimes. So she wanted to give you updated info.

## 2021-05-17 NOTE — TELEPHONE ENCOUNTER
Pt went to see PCP today and they told him to fu with Dr. Lilly Velazquez due to low HR of 40    Pt was started back on Toprol by Dr. Lilly Velazquez   Please advise

## 2021-05-18 ENCOUNTER — TELEPHONE (OUTPATIENT)
Dept: NEPHROLOGY | Age: 86
End: 2021-05-18

## 2021-05-18 DIAGNOSIS — N18.32 STAGE 3B CHRONIC KIDNEY DISEASE (HCC): Primary | ICD-10-CM

## 2021-05-18 NOTE — TELEPHONE ENCOUNTER
Spoke with  Daughter Juanito Keller at [phone], she is aware of holter, please call her to schedule this. Order given to scheduling.

## 2021-05-18 NOTE — TELEPHONE ENCOUNTER
Daughter called in regards to pt's appt on 6/01/21. Pt had vitamin D, PTH in October and BMP in March. Pt will go to [facility] on [address] to have done if you want any. Do you want pt to get repeat labs? Which ones?

## 2021-05-19 ENCOUNTER — HOSPITAL ENCOUNTER (OUTPATIENT)
Dept: NON INVASIVE DIAGNOSTICS | Age: 86
Discharge: HOME | End: 2021-05-19
Payer: MEDICARE

## 2021-05-19 DIAGNOSIS — R00.1 BRADYCARDIA: ICD-10-CM

## 2021-05-19 PROCEDURE — 93225 XTRNL ECG REC<48 HRS REC: CPT

## 2021-05-19 PROCEDURE — 93226 XTRNL ECG REC<48 HR SCAN A/R: CPT

## 2021-05-24 LAB
ACQUISITION DURATION: NORMAL S
AVERAGE HEART RATE: 50 BPM
HOOKUP DATE: NORMAL
HOOKUP TIME: NORMAL
MAX HEART RATE TIME/DATE: NORMAL
MAX HEART RATE: 75 BPM
MIN HEART RATE TIME/DATE: NORMAL
MIN HEART RATE: 43 BPM
NUMBER OF QRS COMPLEXES: NORMAL
NUMBER OF SUPRAVENTRICULAR COUPLETS: 0
NUMBER OF SUPRAVENTRICULAR ECTOPICS: 581
NUMBER OF SUPRAVENTRICULAR ISOLATED BEATS: 93
NUMBER OF VENTRICULAR BIGEMINAL CYCLES: 0
NUMBER OF VENTRICULAR COUPLETS: 0
NUMBER OF VENTRICULAR ECTOPICS: 0

## 2021-05-28 ENCOUNTER — NURSE ONLY (OUTPATIENT)
Dept: LAB | Age: 86
End: 2021-05-28

## 2021-05-28 DIAGNOSIS — N18.30 CHRONIC KIDNEY DISEASE, STAGE III (MODERATE) (HCC): ICD-10-CM

## 2021-05-28 DIAGNOSIS — N18.32 STAGE 3B CHRONIC KIDNEY DISEASE (HCC): ICD-10-CM

## 2021-05-28 LAB
ANION GAP SERPL CALCULATED.3IONS-SCNC: 10 MEQ/L (ref 8–16)
BUN BLDV-MCNC: 22 MG/DL (ref 7–22)
CALCIUM SERPL-MCNC: 9.2 MG/DL (ref 8.5–10.5)
CHLORIDE BLD-SCNC: 103 MEQ/L (ref 98–111)
CO2: 28 MEQ/L (ref 23–33)
CREAT SERPL-MCNC: 1.4 MG/DL (ref 0.4–1.2)
CREATININE, URINE: 51.6 MG/DL
GFR SERPL CREATININE-BSD FRML MDRD: 48 ML/MIN/1.73M2
GLUCOSE BLD-MCNC: 97 MG/DL (ref 70–108)
MICROALBUMIN UR-MCNC: < 1.2 MG/DL
MICROALBUMIN/CREAT UR-RTO: 23 MG/G (ref 0–30)
POTASSIUM SERPL-SCNC: 4.3 MEQ/L (ref 3.5–5.2)
SODIUM BLD-SCNC: 141 MEQ/L (ref 135–145)
VITAMIN D 25-HYDROXY: 37 NG/ML (ref 30–100)

## 2021-06-01 ENCOUNTER — OFFICE VISIT (OUTPATIENT)
Dept: NEPHROLOGY | Age: 86
End: 2021-06-01
Payer: MEDICARE

## 2021-06-01 VITALS
OXYGEN SATURATION: 95 % | HEART RATE: 59 BPM | HEIGHT: 66 IN | WEIGHT: 133 LBS | SYSTOLIC BLOOD PRESSURE: 145 MMHG | DIASTOLIC BLOOD PRESSURE: 74 MMHG | BODY MASS INDEX: 21.38 KG/M2 | TEMPERATURE: 97.8 F

## 2021-06-01 DIAGNOSIS — N18.31 STAGE 3A CHRONIC KIDNEY DISEASE (HCC): ICD-10-CM

## 2021-06-01 DIAGNOSIS — R60.9 SWELLING: ICD-10-CM

## 2021-06-01 DIAGNOSIS — R06.09 DYSPNEA ON EXERTION: ICD-10-CM

## 2021-06-01 PROCEDURE — 1123F ACP DISCUSS/DSCN MKR DOCD: CPT | Performed by: INTERNAL MEDICINE

## 2021-06-01 PROCEDURE — 1036F TOBACCO NON-USER: CPT | Performed by: INTERNAL MEDICINE

## 2021-06-01 PROCEDURE — 99213 OFFICE O/P EST LOW 20 MIN: CPT | Performed by: INTERNAL MEDICINE

## 2021-06-01 PROCEDURE — 4040F PNEUMOC VAC/ADMIN/RCVD: CPT | Performed by: INTERNAL MEDICINE

## 2021-06-01 PROCEDURE — G8427 DOCREV CUR MEDS BY ELIG CLIN: HCPCS | Performed by: INTERNAL MEDICINE

## 2021-06-01 PROCEDURE — G8420 CALC BMI NORM PARAMETERS: HCPCS | Performed by: INTERNAL MEDICINE

## 2021-06-01 RX ORDER — TAMSULOSIN HYDROCHLORIDE 0.4 MG/1
0.4 CAPSULE ORAL DAILY
Qty: 180 CAPSULE | Refills: 2
Start: 2021-06-01 | End: 2021-01-01 | Stop reason: SDUPTHER

## 2021-06-01 NOTE — PROGRESS NOTES
[facility]    [facility], [address]   [place], [facility]  [phone]  Progress Note  6/1/2021 1:00 PM      Pt Name:    Mookie Miner  YOB: 1931  Primary Care Physician:  Saundra Chandler, APRN - CNP     Chief Complaint:   Chief Complaint   Patient presents with    Follow-up     CKD III        History of Present Illness: This is a follow-up visit for CKD 3. Since last visit patient patient had allergic reaction to lasix. Rash is gone but still having a lot of itching. Liver enzymes drawn per PCP were ok. Stopped thiazide as well and lisinopril. Off Niacin. Has some leg edema. Chronic BOWER. 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 Pertinent items are noted in HPI.          Past History:  Past Medical History:   Diagnosis Date    AAA (abdominal aortic aneurysm) ([facility] [address]     followed by Dr. Pam Borden Chronic kidney disease     COPD (chronic obstructive pulmonary disease) ([facility] [address]     Hypercholesteremia     Hypertension     Other disorders of kidney and ureter in diseases classified elsewhere     Pneumonia     Prostate cancer ([facility] [address]     with radiation in 2006    SOB (shortness of breath)     Type II or unspecified type diabetes mellitus without mention of complication, not stated as uncontrolled      Past Surgical History:   Procedure Laterality Date    ABDOMEN SURGERY      ABDOMINAL AORTIC ANEURYSM REPAIR  8/21/14    ABDOMINAL AORTIC ANEURYSM REPAIR, ENDOVASCULAR Bilateral 08/21/2014    WITH LT FEMORAL ENDARTERECTOMY     COLONOSCOPY  unsure    INGUINAL HERNIA REPAIR Bilateral 07/28/2015    Dr Chun Sensing        VITALS:  BP (!) 145/74 (Site: Right Upper Arm, Position: Sitting, Cuff Size: Large Adult)   Pulse 59   Temp 97.8 °F (36.6 °C) (Temporal)   Ht 5' 6\" (1.676 m)   Wt 133 lb (60.3 kg)   SpO2 95%   BMI 21.47 kg/m² Wt Readings from Last 3 Encounters:   06/01/21 133 lb (60.3 kg)   10/23/20 142 lb 9.6 oz (64.7 kg)   09/30/20 146 lb (66.2 kg)     Body mass index is 21.47 kg/m². General Appearance: alert and cooperative with exam, appears comfortable, no distress  HEENT: EOMI, moist oral mucus membranes  Neck: No jugular venous distention,  Lungs: diminished breath sounds diffusely no audible rales or wheezing  Heart: S1, S2 heard, no rub  GI: soft, non-tender, no guarding  Extremities:trace LE edema noted B/L  Skin: warm, dry  Neurologic: no tremor, no asterixis, no focal neurologic deficits     Medications:  Current Outpatient Medications   Medication Sig Dispense Refill    tamsulosin (FLOMAX) 0.4 MG capsule Take 1 capsule by mouth daily 180 capsule 2    metoprolol succinate (TOPROL XL) 25 MG extended release tablet Take 25 mg by mouth daily      albuterol sulfate HFA (VENTOLIN HFA) 108 (90 Base) MCG/ACT inhaler Inhale 2 puffs into the lungs every 6 hours as needed for Wheezing or Shortness of Breath 3 Inhaler 3    Umeclidinium Bromide (INCRUSE ELLIPTA) 62.5 MCG/INH AEPB Inhale 1 puff into the lungs daily 90 each 3    predniSONE (DELTASONE) 5 MG tablet Take 1 tablet by mouth daily Prednisone 5mg po daily to take after food. 90 tablet 3    formoterol (PERFOROMIST) 20 MCG/2ML nebulizer solution Take 2 mLs by nebulization every 12 hours 120 mL 11    budesonide (PULMICORT) 0.5 MG/2ML nebulizer suspension USE 1 VIAL  IN  NEBULIZER TWICE  DAILY - rinse mouth after treatment 60 ampule 11    Nebulizer MISC Please provide new nebulizer machine to use with Albuterol 2.5mg via nebs Q6h prn, perforomist 2 times a day and pulmicort 2 times per day. 1 each 0    Respiratory Therapy Supplies (NEBULIZER AIR TUBE/PLUGS) MISC Please provide nebulizer kit and supplies.  1 each 0    Multiple Vitamins-Minerals (PRESERVISION AREDS 2) CAPS Take 1 capsule by mouth 2 times daily       pravastatin (PRAVACHOL) 80 MG tablet Take 80 mg by mouth nightly.  glipiZIDE (GLUCOTROL) 5 MG tablet Take 5 mg by mouth daily.  gabapentin (NEURONTIN) 100 MG capsule Take 100 mg by mouth daily       fish oil-omega-3 fatty acids 1000 MG capsule Take 1 g by mouth 2 times daily.  niacin (SLO-NIACIN) 500 MG tablet Take 500 mg by mouth nightly.  vitamin D-3 (CHOLECALCIFEROL) 125 MCG (5000 UT) TABS Take 1 tablet by mouth daily 90 tablet 1    albuterol (PROVENTIL) (2.5 MG/3ML) 0.083% nebulizer solution Take 3 mLs by nebulization every 6 hours as needed for Wheezing 360 mL 3    gabapentin (NEURONTIN) 300 MG capsule Take 300 mg by mouth daily. No current facility-administered medications for this visit.         Laboratory & Diagnostics:  CBC:   Lab Results   Component Value Date    WBC 11.7 (H) 10/23/2020    HGB 14.7 10/23/2020    HCT 44.2 10/23/2020    MCV 92.5 10/23/2020     10/23/2020     BMP:    Lab Results   Component Value Date     05/28/2021     (L) 03/29/2021     (L) 10/23/2020    K 4.3 05/28/2021    K 4.1 03/29/2021    K 4.1 10/23/2020     05/28/2021    CL 94 (L) 03/29/2021    CL 93 (L) 10/23/2020    CO2 28 05/28/2021    CO2 29 03/29/2021    CO2 28 10/23/2020    BUN 22 05/28/2021    BUN 26 (H) 03/29/2021    BUN 26 (H) 10/23/2020    CREATININE 1.4 (H) 05/28/2021    CREATININE 1.6 (H) 03/29/2021    CREATININE 1.7 (H) 10/23/2020    GLUCOSE 97 05/28/2021    GLUCOSE 103 03/29/2021    GLUCOSE 83 10/23/2020      Hepatic:   Lab Results   Component Value Date    AST 19 02/25/2021    AST 18 01/13/2021    AST 20 11/28/2017    ALT 14 02/25/2021    ALT 14 01/13/2021    ALT 15 11/28/2017    BILITOT 0.2 (L) 02/25/2021    BILITOT 0.3 11/28/2017    BILITOT 0.4 04/08/2016    ALKPHOS 58 11/28/2017    ALKPHOS 69 04/08/2016     BNP: No results found for: BNP  Lipids:   Lab Results   Component Value Date    HDL 46 01/13/2021     INR:   Lab Results   Component Value Date    INR 1.06 04/08/2016    INR 0.97 08/19/2014     URINE: No results found for: Levobetsey Fairy  Lab Results   Component Value Date    NITRU Negative 03/12/2018    COLORU Light Yellow 03/12/2018    PHUR 6.0 03/12/2018    LABCAST NONE SEEN 08/23/2014    LABCAST NONE SEEN 08/23/2014    WBCUA 0 08/23/2014    RBCUA 0 08/23/2014    YEAST NONE SEEN 08/23/2014    BACTERIA NONE 08/23/2014    CLARITYU Clear 03/12/2018    SPECGRAV 1.015 08/23/2014    LEUKOCYTESUR negative 03/12/2018    LEUKOCYTESUR NEGATIVE 08/23/2014    UROBILINOGEN 0.2 08/23/2014    BILIRUBINUR NEGATIVE 08/23/2014    BLOODU Negative 03/12/2018    GLUCOSEU negative 03/12/2018    KETUA Negative 03/12/2018      Microalbumen/Creatinine ratio:  No components found for: RUCREAT        Impression/Plan:   1. 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 Goals of care include slowing rate of progression by controlling blood pressure and blood glucoses and by avoiding nephrotoxins such as NSAIDs and IV contrast.  2. Systolic CHF: had allergy to lasix. Discussed low sodium diet/compression stockings. Could try ethacrynic acid if swelling worsens  3. HTN: stable  4. Hx prostate Ca s/p RT. + urinary retention:on flomax will reduce to daily due to rash/itching  5. DM with neuropathy  6. Hyperlipidemia          Bloodwork and medications were reviewed and plan of care discussed with the patient. Return to clinic in 1 year or sooner if the need arises.       Amalia King,   [facility]

## 2021-06-02 ENCOUNTER — APPOINTMENT (OUTPATIENT)
Dept: CT IMAGING | Age: 86
DRG: 189 | End: 2021-06-02
Payer: MEDICARE

## 2021-06-02 ENCOUNTER — APPOINTMENT (OUTPATIENT)
Dept: GENERAL RADIOLOGY | Age: 86
DRG: 189 | End: 2021-06-02
Payer: MEDICARE

## 2021-06-02 ENCOUNTER — HOSPITAL ENCOUNTER (INPATIENT)
Age: 86
LOS: 7 days | Discharge: NURSING, REHAB OR LONG-TERM CARE | DRG: 189 | End: 2021-06-09
Attending: EMERGENCY MEDICINE | Admitting: OPHTHALMOLOGY
Payer: MEDICARE

## 2021-06-02 DIAGNOSIS — J96.01 ACUTE RESPIRATORY FAILURE WITH HYPOXIA (HCC): Primary | ICD-10-CM

## 2021-06-02 LAB
ANION GAP SERPL CALCULATED.3IONS-SCNC: 10 MEQ/L (ref 8–16)
BASOPHILS # BLD: 0.2 %
BASOPHILS ABSOLUTE: 0 THOU/MM3 (ref 0–0.1)
BUN BLDV-MCNC: 25 MG/DL (ref 7–22)
CALCIUM SERPL-MCNC: 9.5 MG/DL (ref 8.5–10.5)
CHLORIDE BLD-SCNC: 103 MEQ/L (ref 98–111)
CO2: 27 MEQ/L (ref 23–33)
CREAT SERPL-MCNC: 1.6 MG/DL (ref 0.4–1.2)
EKG ATRIAL RATE: 82 BPM
EKG P AXIS: 83 DEGREES
EKG P-R INTERVAL: 198 MS
EKG Q-T INTERVAL: 386 MS
EKG QRS DURATION: 110 MS
EKG QTC CALCULATION (BAZETT): 450 MS
EKG R AXIS: -89 DEGREES
EKG T AXIS: 79 DEGREES
EKG VENTRICULAR RATE: 82 BPM
EOSINOPHIL # BLD: 0.8 %
EOSINOPHILS ABSOLUTE: 0.1 THOU/MM3 (ref 0–0.4)
ERYTHROCYTE [DISTWIDTH] IN BLOOD BY AUTOMATED COUNT: 12.7 % (ref 11.5–14.5)
ERYTHROCYTE [DISTWIDTH] IN BLOOD BY AUTOMATED COUNT: 43 FL (ref 35–45)
GFR SERPL CREATININE-BSD FRML MDRD: 41 ML/MIN/1.73M2
GLUCOSE BLD-MCNC: 127 MG/DL (ref 70–108)
GLUCOSE BLD-MCNC: 132 MG/DL (ref 70–108)
HCT VFR BLD CALC: 45.9 % (ref 42–52)
HEMOGLOBIN: 14.7 GM/DL (ref 14–18)
IMMATURE GRANS (ABS): 0.19 THOU/MM3 (ref 0–0.07)
IMMATURE GRANULOCYTES: 1.1 %
LYMPHOCYTES # BLD: 6.5 %
LYMPHOCYTES ABSOLUTE: 1.1 THOU/MM3 (ref 1–4.8)
MCH RBC QN AUTO: 29.8 PG (ref 26–33)
MCHC RBC AUTO-ENTMCNC: 32 GM/DL (ref 32.2–35.5)
MCV RBC AUTO: 92.9 FL (ref 80–94)
MONOCYTES # BLD: 5.8 %
MONOCYTES ABSOLUTE: 1 THOU/MM3 (ref 0.4–1.3)
MRSA SCREEN RT-PCR: NEGATIVE
NUCLEATED RED BLOOD CELLS: 0 /100 WBC
OSMOLALITY CALCULATION: 285.7 MOSMOL/KG (ref 275–300)
PLATELET # BLD: 222 THOU/MM3 (ref 130–400)
PMV BLD AUTO: 9.1 FL (ref 9.4–12.4)
POTASSIUM REFLEX MAGNESIUM: 4.9 MEQ/L (ref 3.5–5.2)
PRO-BNP: 730.8 PG/ML (ref 0–1800)
RBC # BLD: 4.94 MILL/MM3 (ref 4.7–6.1)
SARS-COV-2, NAAT: NOT DETECTED
SEG NEUTROPHILS: 85.6 %
SEGMENTED NEUTROPHILS ABSOLUTE COUNT: 14.8 THOU/MM3 (ref 1.8–7.7)
SODIUM BLD-SCNC: 140 MEQ/L (ref 135–145)
TROPONIN T: 0.01 NG/ML
TROPONIN T: 0.02 NG/ML
TROPONIN T: 0.02 NG/ML
VANCOMYCIN RESISTANT ENTEROCOCCUS: NEGATIVE
WBC # BLD: 17.3 THOU/MM3 (ref 4.8–10.8)

## 2021-06-02 PROCEDURE — 96374 THER/PROPH/DIAG INJ IV PUSH: CPT

## 2021-06-02 PROCEDURE — 99285 EMERGENCY DEPT VISIT HI MDM: CPT

## 2021-06-02 PROCEDURE — 82948 REAGENT STRIP/BLOOD GLUCOSE: CPT

## 2021-06-02 PROCEDURE — 2580000003 HC RX 258: Performed by: NURSE PRACTITIONER

## 2021-06-02 PROCEDURE — 87635 SARS-COV-2 COVID-19 AMP PRB: CPT

## 2021-06-02 PROCEDURE — 6360000002 HC RX W HCPCS: Performed by: NURSE PRACTITIONER

## 2021-06-02 PROCEDURE — 6370000000 HC RX 637 (ALT 250 FOR IP): Performed by: EMERGENCY MEDICINE

## 2021-06-02 PROCEDURE — 2060000000 HC ICU INTERMEDIATE R&B

## 2021-06-02 PROCEDURE — 93005 ELECTROCARDIOGRAM TRACING: CPT | Performed by: EMERGENCY MEDICINE

## 2021-06-02 PROCEDURE — 6370000000 HC RX 637 (ALT 250 FOR IP): Performed by: NURSE PRACTITIONER

## 2021-06-02 PROCEDURE — 83880 ASSAY OF NATRIURETIC PEPTIDE: CPT

## 2021-06-02 PROCEDURE — 99223 1ST HOSP IP/OBS HIGH 75: CPT | Performed by: NURSE PRACTITIONER

## 2021-06-02 PROCEDURE — 94640 AIRWAY INHALATION TREATMENT: CPT

## 2021-06-02 PROCEDURE — 85025 COMPLETE CBC W/AUTO DIFF WBC: CPT

## 2021-06-02 PROCEDURE — 94760 N-INVAS EAR/PLS OXIMETRY 1: CPT

## 2021-06-02 PROCEDURE — 36415 COLL VENOUS BLD VENIPUNCTURE: CPT

## 2021-06-02 PROCEDURE — 72131 CT LUMBAR SPINE W/O DYE: CPT

## 2021-06-02 PROCEDURE — 87081 CULTURE SCREEN ONLY: CPT

## 2021-06-02 PROCEDURE — 2700000000 HC OXYGEN THERAPY PER DAY

## 2021-06-02 PROCEDURE — 71250 CT THORAX DX C-: CPT

## 2021-06-02 PROCEDURE — 87641 MR-STAPH DNA AMP PROBE: CPT

## 2021-06-02 PROCEDURE — 71045 X-RAY EXAM CHEST 1 VIEW: CPT

## 2021-06-02 PROCEDURE — 87500 VANOMYCIN DNA AMP PROBE: CPT

## 2021-06-02 PROCEDURE — 80048 BASIC METABOLIC PNL TOTAL CA: CPT

## 2021-06-02 PROCEDURE — 84484 ASSAY OF TROPONIN QUANT: CPT

## 2021-06-02 PROCEDURE — 76376 3D RENDER W/INTRP POSTPROCES: CPT

## 2021-06-02 PROCEDURE — 6360000002 HC RX W HCPCS: Performed by: EMERGENCY MEDICINE

## 2021-06-02 RX ORDER — ALBUTEROL SULFATE 2.5 MG/3ML
2.5 SOLUTION RESPIRATORY (INHALATION)
Status: DISCONTINUED | OUTPATIENT
Start: 2021-06-02 | End: 2021-06-09 | Stop reason: HOSPADM

## 2021-06-02 RX ORDER — ACETAMINOPHEN 325 MG/1
650 TABLET ORAL EVERY 6 HOURS PRN
Status: DISCONTINUED | OUTPATIENT
Start: 2021-06-02 | End: 2021-06-03

## 2021-06-02 RX ORDER — PREDNISONE 10 MG/1
5 TABLET ORAL DAILY
Status: DISCONTINUED | OUTPATIENT
Start: 2021-06-02 | End: 2021-06-04

## 2021-06-02 RX ORDER — GUAIFENESIN 400 MG/1
400 TABLET ORAL DAILY
Status: ON HOLD | COMMUNITY
End: 2021-06-09 | Stop reason: DRUGHIGH

## 2021-06-02 RX ORDER — LANOLIN ALCOHOL/MO/W.PET/CERES
500 CREAM (GRAM) TOPICAL NIGHTLY
Status: DISCONTINUED | OUTPATIENT
Start: 2021-06-02 | End: 2021-06-09 | Stop reason: HOSPADM

## 2021-06-02 RX ORDER — ARFORMOTEROL TARTRATE 15 UG/2ML
15 SOLUTION RESPIRATORY (INHALATION) 2 TIMES DAILY
Status: DISCONTINUED | OUTPATIENT
Start: 2021-06-02 | End: 2021-06-09 | Stop reason: HOSPADM

## 2021-06-02 RX ORDER — ACETAMINOPHEN 650 MG/1
650 SUPPOSITORY RECTAL EVERY 6 HOURS PRN
Status: DISCONTINUED | OUTPATIENT
Start: 2021-06-02 | End: 2021-06-03

## 2021-06-02 RX ORDER — IPRATROPIUM BROMIDE AND ALBUTEROL SULFATE 2.5; .5 MG/3ML; MG/3ML
1 SOLUTION RESPIRATORY (INHALATION)
Status: DISCONTINUED | OUTPATIENT
Start: 2021-06-02 | End: 2021-06-02

## 2021-06-02 RX ORDER — LORAZEPAM 2 MG/ML
0.5 INJECTION INTRAMUSCULAR ONCE
Status: COMPLETED | OUTPATIENT
Start: 2021-06-02 | End: 2021-06-02

## 2021-06-02 RX ORDER — SODIUM CHLORIDE 0.9 % (FLUSH) 0.9 %
5-40 SYRINGE (ML) INJECTION PRN
Status: DISCONTINUED | OUTPATIENT
Start: 2021-06-02 | End: 2021-06-09 | Stop reason: HOSPADM

## 2021-06-02 RX ORDER — TAMSULOSIN HYDROCHLORIDE 0.4 MG/1
0.4 CAPSULE ORAL DAILY
Status: DISCONTINUED | OUTPATIENT
Start: 2021-06-02 | End: 2021-06-09 | Stop reason: HOSPADM

## 2021-06-02 RX ORDER — METOPROLOL SUCCINATE 25 MG/1
25 TABLET, EXTENDED RELEASE ORAL DAILY
Status: DISCONTINUED | OUTPATIENT
Start: 2021-06-03 | End: 2021-06-09 | Stop reason: HOSPADM

## 2021-06-02 RX ORDER — ONDANSETRON 4 MG/1
4 TABLET, ORALLY DISINTEGRATING ORAL EVERY 8 HOURS PRN
Status: DISCONTINUED | OUTPATIENT
Start: 2021-06-02 | End: 2021-06-09 | Stop reason: HOSPADM

## 2021-06-02 RX ORDER — ALBUTEROL SULFATE 90 UG/1
2 AEROSOL, METERED RESPIRATORY (INHALATION) EVERY 6 HOURS PRN
Status: DISCONTINUED | OUTPATIENT
Start: 2021-06-02 | End: 2021-06-09 | Stop reason: HOSPADM

## 2021-06-02 RX ORDER — GUAIFENESIN 100 MG/5ML
400 SOLUTION ORAL DAILY
Status: DISCONTINUED | OUTPATIENT
Start: 2021-06-02 | End: 2021-06-09 | Stop reason: HOSPADM

## 2021-06-02 RX ORDER — PRAVASTATIN SODIUM 80 MG/1
80 TABLET ORAL NIGHTLY
Status: DISCONTINUED | OUTPATIENT
Start: 2021-06-02 | End: 2021-06-09 | Stop reason: HOSPADM

## 2021-06-02 RX ORDER — LIDOCAINE 4 G/G
3 PATCH TOPICAL DAILY
Status: DISCONTINUED | OUTPATIENT
Start: 2021-06-02 | End: 2021-06-09 | Stop reason: HOSPADM

## 2021-06-02 RX ORDER — SODIUM CHLORIDE 9 MG/ML
25 INJECTION, SOLUTION INTRAVENOUS PRN
Status: DISCONTINUED | OUTPATIENT
Start: 2021-06-02 | End: 2021-06-09 | Stop reason: HOSPADM

## 2021-06-02 RX ORDER — GABAPENTIN 100 MG/1
100 CAPSULE ORAL 2 TIMES DAILY
Status: DISCONTINUED | OUTPATIENT
Start: 2021-06-02 | End: 2021-06-09 | Stop reason: HOSPADM

## 2021-06-02 RX ORDER — BUDESONIDE 0.5 MG/2ML
0.5 INHALANT ORAL 2 TIMES DAILY
Status: DISCONTINUED | OUTPATIENT
Start: 2021-06-02 | End: 2021-06-09 | Stop reason: HOSPADM

## 2021-06-02 RX ORDER — IPRATROPIUM BROMIDE AND ALBUTEROL SULFATE 2.5; .5 MG/3ML; MG/3ML
2 SOLUTION RESPIRATORY (INHALATION) ONCE
Status: COMPLETED | OUTPATIENT
Start: 2021-06-02 | End: 2021-06-02

## 2021-06-02 RX ORDER — VITAMIN B COMPLEX
5000 TABLET ORAL DAILY
Status: DISCONTINUED | OUTPATIENT
Start: 2021-06-02 | End: 2021-06-09 | Stop reason: HOSPADM

## 2021-06-02 RX ORDER — ONDANSETRON 2 MG/ML
4 INJECTION INTRAMUSCULAR; INTRAVENOUS EVERY 6 HOURS PRN
Status: DISCONTINUED | OUTPATIENT
Start: 2021-06-02 | End: 2021-06-09 | Stop reason: HOSPADM

## 2021-06-02 RX ORDER — SODIUM CHLORIDE 0.9 % (FLUSH) 0.9 %
5-40 SYRINGE (ML) INJECTION EVERY 12 HOURS SCHEDULED
Status: DISCONTINUED | OUTPATIENT
Start: 2021-06-02 | End: 2021-06-09 | Stop reason: HOSPADM

## 2021-06-02 RX ORDER — GLIPIZIDE 5 MG/1
5 TABLET ORAL DAILY
Status: DISCONTINUED | OUTPATIENT
Start: 2021-06-02 | End: 2021-06-09 | Stop reason: HOSPADM

## 2021-06-02 RX ORDER — POLYETHYLENE GLYCOL 3350 17 G/17G
17 POWDER, FOR SOLUTION ORAL DAILY PRN
Status: DISCONTINUED | OUTPATIENT
Start: 2021-06-02 | End: 2021-06-09 | Stop reason: HOSPADM

## 2021-06-02 RX ADMIN — ALBUTEROL SULFATE 2.5 MG: 2.5 SOLUTION RESPIRATORY (INHALATION) at 19:36

## 2021-06-02 RX ADMIN — TAMSULOSIN HYDROCHLORIDE 0.4 MG: 0.4 CAPSULE ORAL at 21:08

## 2021-06-02 RX ADMIN — ACETAMINOPHEN 650 MG: 325 TABLET ORAL at 18:20

## 2021-06-02 RX ADMIN — SODIUM CHLORIDE, PRESERVATIVE FREE 10 ML: 5 INJECTION INTRAVENOUS at 21:07

## 2021-06-02 RX ADMIN — Medication 500 MG: at 21:09

## 2021-06-02 RX ADMIN — GUAIFENESIN 400 MG: 200 SOLUTION ORAL at 21:06

## 2021-06-02 RX ADMIN — IPRATROPIUM BROMIDE AND ALBUTEROL SULFATE 1 AMPULE: .5; 3 SOLUTION RESPIRATORY (INHALATION) at 10:11

## 2021-06-02 RX ADMIN — PREDNISONE 5 MG: 10 TABLET ORAL at 21:08

## 2021-06-02 RX ADMIN — GABAPENTIN 100 MG: 100 CAPSULE ORAL at 21:07

## 2021-06-02 RX ADMIN — GLIPIZIDE 5 MG: 5 TABLET ORAL at 21:08

## 2021-06-02 RX ADMIN — PRAVASTATIN SODIUM 80 MG: 80 TABLET ORAL at 21:07

## 2021-06-02 RX ADMIN — IPRATROPIUM BROMIDE AND ALBUTEROL SULFATE 2 AMPULE: .5; 3 SOLUTION RESPIRATORY (INHALATION) at 13:07

## 2021-06-02 RX ADMIN — ARFORMOTEROL TARTRATE 15 MCG: 15 SOLUTION RESPIRATORY (INHALATION) at 19:36

## 2021-06-02 RX ADMIN — LORAZEPAM 0.5 MG: 2 INJECTION INTRAMUSCULAR; INTRAVENOUS at 11:54

## 2021-06-02 RX ADMIN — BUDESONIDE 500 MCG: 0.5 INHALANT RESPIRATORY (INHALATION) at 19:36

## 2021-06-02 ASSESSMENT — PAIN SCALES - GENERAL
PAINLEVEL_OUTOF10: 3
PAINLEVEL_OUTOF10: 0
PAINLEVEL_OUTOF10: 5

## 2021-06-02 ASSESSMENT — PAIN DESCRIPTION - ORIENTATION: ORIENTATION: LOWER

## 2021-06-02 ASSESSMENT — PAIN DESCRIPTION - LOCATION: LOCATION: BACK

## 2021-06-02 ASSESSMENT — PAIN DESCRIPTION - PAIN TYPE: TYPE: ACUTE PAIN

## 2021-06-02 ASSESSMENT — PAIN DESCRIPTION - DESCRIPTORS: DESCRIPTORS: ACHING

## 2021-06-02 NOTE — ED TRIAGE NOTES
Pt presents to ED via EMS c/c fall this morning and sob. Pt hs hx of COPD and wears 6L/NC at home. Upon arrival pt on 6L/NC, accessory muscle and retractions noted. Per EMS pt received Albuterol tx en route. Pt c/o \"can't breathe\". Pt placed on 15L/NRB, RT notified. Pt denies chest pain. Pt uses walker to ambulate. Pt was ambulating and fell into the wall leaving an indentation in the wall from his elbow. Left Elbow noted to have skin tear. Family states pt has not had his morning Duoneb treatment this morning.

## 2021-06-02 NOTE — ED NOTES
ED to inpatient nurses report    Chief Complaint   Patient presents with    Shortness of Breath    Fall      Present to ED from home  LOC: alert and orientated to name, place, date  Vital signs   Vitals:    06/02/21 1330 06/02/21 1338 06/02/21 1428 06/02/21 1530   BP: (!) 185/72  (!) 162/56 (!) 157/65   Pulse: 76  79 72   Resp: 28  23 21   Temp:       TempSrc:       SpO2: 100% 100% 97% 98%   Weight:          Oxygen Baseline 6L/NC    Current needs required Heated HiFlo N/C Bipap/Cpap No  LDAs:   Peripheral IV 06/02/21 Right Forearm (Active)   Site Assessment Dry;Clean; Intact 06/02/21 1429   Line Status Normal saline locked 06/02/21 1429   Dressing Status Clean;Dry; Intact 06/02/21 1429   Dressing Intervention New 06/02/21 1154     Mobility: Requires assistance * 1  Pending ED orders: none  Present condition: stable      Electronically signed by Lukas Orr RN on 6/2/2021 at 4:42 PM     Jean Claude Nicolas RN  06/02/21 2182

## 2021-06-02 NOTE — ED NOTES
Bed: 011A  Expected date:   Expected time:   Means of arrival: [facility]  Comments:     Camilo Mandel RN  06/02/21 0907

## 2021-06-02 NOTE — ACP (ADVANCE CARE PLANNING)
Advance Care Planning     Advance Care Planning Activator (Inpatient)  Conversation Note      Date of ACP Conversation: 6/2/2021     Conversation Conducted with: Patient with Decision Making Capacity    ACP Activator: Alex Betts:     Current Designated Health Care Decision Maker: Today we documented Decision Maker(s). The patient will provide ACP documents. Care Preferences    Ventilation: \"If you were in your present state of health and suddenly became very ill and were unable to breathe on your own, what would your preference be about the use of a ventilator (breathing machine) if it were available to you? \"      Would the patient desire the use of ventilator (breathing machine)?: yes    \"If your health worsens and it becomes clear that your chance of recovery is unlikely, what would your preference be about the use of a ventilator (breathing machine) if it were available to you? \"     Would the patient desire the use of ventilator (breathing machine)?: Yes      Resuscitation  \"CPR works best to restart the heart when there is a sudden event, like a heart attack, in someone who is otherwise healthy. Unfortunately, CPR does not typically restart the heart for people who have serious health conditions or who are very sick. \"    \"In the event your heart stopped as a result of an underlying serious health condition, would you want attempts to be made to restart your heart (answer \"yes\" for attempt to resuscitate) or would you prefer a natural death (answer \"no\" for do not attempt to resuscitate)? \" yes       [x] Yes   [] No   Educated Patient / Vickii Putt regarding differences between Advance Directives and portable DNR orders.     Length of ACP Conversation in minutes:  43    Conversation Outcomes:  [x] ACP discussion completed  [] Existing advance directive reviewed with patient; no changes to patient's previously recorded wishes  [] New Advance Directive completed  [] Portable Do Not Rescitate prepared for Provider review and signature  [] POLST/POST/MOLST/MOST prepared for Provider review and signature      Follow-up plan:    [] Schedule follow-up conversation to continue planning  [x] Referred individual to Provider for additional questions/concerns   [] Advised patient/agent/surrogate to review completed ACP document and update if needed with changes in condition, patient preferences or care setting    [x] This note routed to one or more involved healthcare providers     - Rashmi Logan suffered a fall earlier today and is now having extreme difficulty breathing. He was using high-flow O2 during the visit. His wife and daughter were here with him. As we discussed long term ventilation and CPR, he stated being agreeable to both because \"I still wanna live if I can. \"   - Discussed his AD and the daughter shared there is one at home and she will have her  bring it in later today. When asked about their understanding of long term care choices, Stevie's wife admitted, \"they have never discussed this so she was happy to hear him tell her what he wanted. \"   - Continued conversation regarding ACP care choices will be requested from his PCP to follow-up to help address the effects of COPD and his care choices. - If the AD is not filed while the patient is in the ED, Spiritual Care will be asked to follow-up.

## 2021-06-02 NOTE — ED NOTES
**This is a Medical/PA/APRN Student Note and is charted for educational purposes. The non-physician staff attested note is not to be used for billing purposes, chart documentation or to guide patient care. Please see the supervising physician/PA/APRN modifications/attestation for treatment plan/chart documentation/suggestions. This note has been reviewed and feedback has been provided to the student. **      MEDICAL STUDENT NOTE    Chief Complaint   Patient presents with    Shortness of Breath    Fall     History obtained from the patient. DENYS Santa is a 80 y.o. male, c/o SOB and back pain after fall. Patient experienced ground level fall getting out of bed and to his walker this morning. He fell backwards and struck his left elbow against the wall. Denies loss of consciousness, lightheadedness, or striking his head. His wife notes that she heard him fall from another room and immediately heard him cry out for help. He was able to ambulate with his walker to the medical team that responded. Of note patient had another fall at home 1 week earlier with abrasion to left arm. Currently complains of 6/10 nonradiating lower back pain with tenderness to palpation of lumbar region. He requires 6L of O2 by nasal cannula at home for COPD. Upon transfer to the hospital he was noted to be short of breath and his oxygen requirement was adjusted to 15 L O2 nonrebreather. Significant improvement noted after Duoneb treatment. Family notes his difficulty breathing is increased from his baseline at home.            Review of Systems        Past Medical History:   Diagnosis Date    AAA [facility]abdominal aortic aneurysm) ([facility] [address]     followed by Dr. Mary Sears Chronic kidney disease     COPD (chronic obstructive pulmonary disease) ([facility] [address]     Hypercholesteremia     Hypertension     Other disorders of kidney and ureter in diseases classified elsewhere     Pneumonia     Prostate cancer ([facility] [address]     with radiation in 2006    SOB (shortness of breath)     Type II or unspecified type diabetes mellitus without mention of complication, not stated as uncontrolled          Past Surgical History:   Procedure Laterality Date    ABDOMEN SURGERY      ABDOMINAL AORTIC ANEURYSM REPAIR  8/21/14    ABDOMINAL AORTIC ANEURYSM REPAIR, ENDOVASCULAR Bilateral 08/21/2014    WITH LT FEMORAL ENDARTERECTOMY     COLONOSCOPY  unsure    INGUINAL HERNIA REPAIR Bilateral 07/28/2015    Dr Vladimir Mcguire         Current Facility-Administered Medications:     ipratropium-albuterol (DUONEB) nebulizer solution 1 ampule, 1 ampule, Inhalation, Q4H WA, Naman Jaycee, DO, 1 ampule at 06/02/21 1011    Current Outpatient Medications:     tamsulosin (FLOMAX) 0.4 MG capsule, Take 1 capsule by mouth daily, Disp: 180 capsule, Rfl: 2    metoprolol succinate (TOPROL XL) 25 MG extended release tablet, Take 25 mg by mouth daily, Disp: , Rfl:     albuterol sulfate HFA (VENTOLIN HFA) 108 (90 Base) MCG/ACT inhaler, Inhale 2 puffs into the lungs every 6 hours as needed for Wheezing or Shortness of Breath, Disp: 3 Inhaler, Rfl: 3    vitamin D-3 (CHOLECALCIFEROL) 125 MCG (5000 UT) TABS, Take 1 tablet by mouth daily, Disp: 90 tablet, Rfl: 1    Umeclidinium Bromide (INCRUSE ELLIPTA) 62.5 MCG/INH AEPB, Inhale 1 puff into the lungs daily, Disp: 90 each, Rfl: 3    predniSONE (DELTASONE) 5 MG tablet, Take 1 tablet by mouth daily Prednisone 5mg po daily to take after food. , Disp: 90 tablet, Rfl: 3    formoterol (PERFOROMIST) 20 MCG/2ML nebulizer solution, Take 2 mLs by nebulization every 12 hours, Disp: 120 mL, Rfl: 11    budesonide (PULMICORT) 0.5 MG/2ML nebulizer suspension, USE 1 VIAL  IN  NEBULIZER TWICE  DAILY - rinse mouth after treatment, Disp: 60 ampule, Rfl: 11    albuterol (PROVENTIL) (2.5 MG/3ML) 0.083% nebulizer solution, Take 3 mLs by nebulization every 6 hours as needed for Wheezing, Disp: 360 mL, Rfl: 3    Nebulizer MISC, Please provide new nebulizer machine to use with Albuterol 2.5mg via nebs Q6h prn, perforomist 2 times a day and pulmicort 2 times per day., Disp: 1 each, Rfl: 0    Respiratory Therapy Supplies (NEBULIZER AIR TUBE/PLUGS) MISC, Please provide nebulizer kit and supplies. , Disp: 1 each, Rfl: 0    Multiple Vitamins-Minerals (PRESERVISION AREDS 2) CAPS, Take 1 capsule by mouth 2 times daily , Disp: , Rfl:     pravastatin (PRAVACHOL) 80 MG tablet, Take 80 mg by mouth nightly., Disp: , Rfl:     glipiZIDE (GLUCOTROL) 5 MG tablet, Take 5 mg by mouth daily. , Disp: , Rfl:     gabapentin (NEURONTIN) 100 MG capsule, Take 100 mg by mouth 2 times daily. , Disp: , Rfl:     fish oil-omega-3 fatty acids 1000 MG capsule, Take 1 g by mouth 2 times daily. , Disp: , Rfl:     niacin (SLO-NIACIN) 500 MG tablet, Take 500 mg by mouth nightly.  , Disp: , Rfl:     Social History     Social History Narrative    Not on file     Social History     Tobacco Use    Smoking status: Former Smoker     Packs/day: 2.00     Years: 40.00     Pack years: 80.00     Types: Cigarettes     Quit date: 10/5/1989     Years since quittin.6    Smokeless tobacco: Never Used   Substance Use Topics    Alcohol use: Yes     Alcohol/week: 1.0 standard drinks     Types: 1 Cans of beer per week     Comment: now and then    Drug use: No     Allergies   Allergen Reactions    Daliresp [Roflumilast] Other (See Comments)     Anorexia    Sulfa Antibiotics Hives    Percocet [Oxycodone-Acetaminophen] Nausea And Vomiting       Family History   Problem Relation Age of Onset    Rheum Arthritis Mother          due to complications related to    Heart Disease Mother     Coronary Art Dis Father     Heart Disease Father     Coronary Art Dis Brother     Heart Disease Brother            Previous records reviewed: Met with his nephrologist yesterday and is scheduled to meet with his pulmonologist tomorrow.       Vitals:    21 1025   BP: (!) 140/71   Pulse: 71   Resp: 21   Temp:    SpO2: 99% Physical Exam  Patient alert and oriented. Heart sounds S1 and S2 heard with regular rate and rhythm. Mild respiratory distress with coarse breath sounds bilaterally. Spinal tenderness noted on palpation of lumbar area. No paraspinal tenderness. No abdominal tenderness, bowel sounds present. Abrasion present on L arm and forearm. Muscle strength 5 bilateral upper and lower extremities. MDM  Initial Assessment: Patient in mild respiratory distress, and in lower back pain     Plan:Continue nasal cannula O2 as needed, possible CT abdomen pelvis. Case and management plan discussed with Dr. Varsha Sepulveda. Labs Reviewed   BASIC METABOLIC PANEL W/ REFLEX TO MG FOR LOW K - Abnormal; Notable for the following components:       Result Value    Glucose 132 (*)     BUN 25 (*)     CREATININE 1.6 (*)     All other components within normal limits   CBC WITH AUTO DIFFERENTIAL - Abnormal; Notable for the following components:    WBC 17.3 (*)     MCHC 32.0 (*)     MPV 9.1 (*)     Segs Absolute 14.8 (*)     Immature Grans (Abs) 0.19 (*)     All other components within normal limits   TROPONIN - Abnormal; Notable for the following components:    Troponin T 0.013 (*)     All other components within normal limits   GLOMERULAR FILTRATION RATE, ESTIMATED - Abnormal; Notable for the following components:    Est, Glom Filt Rate 41 (*)     All other components within normal limits   BRAIN NATRIURETIC PEPTIDE   ANION GAP   OSMOLALITY       Medications   ipratropium-albuterol (DUONEB) nebulizer solution 1 ampule (1 ampule Inhalation Given 6/2/21 1011)       XR CHEST PORTABLE   Final Result   1. There are increased opacities at the left perihilar region including a focal parenchymal bandlike opacity, which may represent atelectasis versus scarring versus pneumonia. Recommend short-term follow-up chest x-ray to document stability or    resolution. **This report has been created using voice recognition software.   It may contain minor errors which are inherent in voice recognition technology. **      Final report electronically signed by Dr. Marisa Wagoner on 6/2/2021 10:39 AM          Final diagnoses:   None       New Prescriptions    No medications on file           Condition: condition: poor    Disposition: to be determined    Electronically signed by Jaylan Hardy on 6/2/2021 at 11:08 AM       **This is a Medical/PA/APRN Student Note and is charted for educational purposes. The non-physician staff attested note is not to be used for billing purposes, chart documentation or to guide patient care. Please see the supervising physician/PA/APRN modifications/attestation for treatment plan/chart documentation/suggestions. This note has been reviewed and feedback has been provided to the student.  Latoya Conde [facility]  06/02/21 4717

## 2021-06-02 NOTE — PROGRESS NOTES
Patient arrived to 4K26 at this time. Patient remains on high flow oxygen. Telemetry placed on patient, with continuous pulse oximetry. Patient remains intermittently confused. Skin assessment completed, dressing placed on large skin tear to left upper arm. Patient's wife and daughter updated on plan of care. Patient in bed, call light in reach with bed alarm on.

## 2021-06-02 NOTE — PROGRESS NOTES
Pharmacy Renal Adjustment    Miryam Cardoza is a 80 y.o. male. Pharmacy renally adjust the following medications per P&T approved policy: Lovenox    Recent Labs     06/02/21  1020   BUN 25*   CREATININE 1.6*     Estimated Creatinine Clearance: 26 mL/min (A) (based on SCr of 1.6 mg/dL (H)).   Calculated CrCl:    Height:   Ht Readings from Last 1 Encounters:   06/02/21 5' 6\" (1.676 m)     Weight:  Wt Readings from Last 1 Encounters:   06/02/21 132 lb 11.2 oz (60.2 kg)     CKD stage: 4             Plan: Adjustments based on renal function:          Decrease Lovenox to 30 mg SQ daily

## 2021-06-02 NOTE — H&P
History & Physical        Patient:  Rhonda Kraus  YOB: 1931    MRN: [id number]     Acct: [de-identified]    PCP: DANTE Hanson CNP    Date of Admission: 6/2/2021    Date of Service: Pt seen/examined on 06/02/21  and Admitted to Inpatient with expected LOS greater than two midnights due to medical therapy. ASSESSMENT/PLAN:    1. Acute on chronic hypoxic respiratory failure--on high flow with 70% FiO2 and 60 L currently satting 97%; ED staff notes decreased work of breathing as well; his baseline is 6 L oxygen around-the-clock~try to wean high flow down to his 6 L  2. Fall at home--denies hitting head, denies loss of consciousness  3. Probable fracture of the anterior enthesophytes V28-F5--ml will add Lidoderm patches along with heat and Tylenol; consult pain management  4. Minimal troponin elevation--we will trend  5. Possible acute COPD exacerbation--no wheezing heard, on chronic steroids; resume his home inhalers; add Ventolin as needed; COVID-19 negative  6. Leukocytosis, chronic--has had an elevation in his white count since November 2017 as he is on chronic steroids  7. CKD stage III  8. Diabetes mellitus type 2, uncontrolled--Glucotrol, add Humalog sliding scale  9. Essential hypertension, controlled--on beta-blocker ; monitor  10. CAD--on beta-blocker, statin  11. History prostate cancer  12.  History AAA      Chief Complaint: Shortness of breath      History Of Present Illness:    80 y.o. male who presented to [address] [place] [address] with shortness of breath; patient has a past medical history of diabetes, COPD, prostate cancer along with CAD; he is on 6 L of oxygen around-the-clock patient was in his normal state of health and he subsequently fell this morning; denies hitting his head and denies loss of consciousness; his wife heard a thud and the patient immediately called his wife's name; family was unable to get him up so EMS was called; the patient is on 6 L of oxygen at Medications Prior to Admission:      Prior to Admission medications    Medication Sig Start Date End Date Taking? Authorizing Provider   guaiFENesin 400 MG tablet Take 1,200 mg by mouth daily   Yes Historical Provider, MD   OXYGEN Inhale 6 L into the lungs continuous   Yes Historical Provider, MD   tamsulosin (FLOMAX) 0.4 MG capsule Take 1 capsule by mouth daily 6/1/21  Yes Junnie Hashimoto, DO   metoprolol succinate (TOPROL XL) 25 MG extended release tablet Take 25 mg by mouth daily   Yes Historical Provider, MD   albuterol sulfate HFA (VENTOLIN HFA) 108 (90 Base) MCG/ACT inhaler Inhale 2 puffs into the lungs every 6 hours as needed for Wheezing or Shortness of Breath 11/9/20 11/9/21 Yes DANTE Reyes CNP   vitamin D-3 (CHOLECALCIFEROL) 125 MCG (5000 UT) TABS Take 1 tablet by mouth daily 10/26/20 6/2/21 Yes Barb Winn,    Umeclidinium Bromide (INCRUSE ELLIPTA) 62.5 MCG/INH AEPB Inhale 1 puff into the lungs daily 10/23/20 10/23/21 Yes DANTE Reyes CNP   predniSONE (DELTASONE) 5 MG tablet Take 1 tablet by mouth daily Prednisone 5mg po daily to take after food. 10/23/20 10/23/21 Yes DANTE Reyes CNP   formoterol (PERFOROMIST) 20 MCG/2ML nebulizer solution Take 2 mLs by nebulization every 12 hours 10/12/20  Yes DANTE Reyes CNP   budesonide (PULMICORT) 0.5 MG/2ML nebulizer suspension USE 1 VIAL  IN  NEBULIZER TWICE  DAILY - rinse mouth after treatment 10/12/20  Yes Pinky Klinefelter T Neumeier, APRN - CNP   Nebulizer MISC Please provide new nebulizer machine to use with Albuterol 2.5mg via nebs Q6h prn, perforomist 2 times a day and pulmicort 2 times per day. 11/19/19  Yes DANTE Hooks CNP   Respiratory Therapy Supplies (NEBULIZER AIR TUBE/PLUGS) MISC Please provide nebulizer kit and supplies.  11/19/19  Yes Colin Nick, APRN - CNP   Multiple Vitamins-Minerals (PRESERVISION AREDS 2) CAPS Take 1 capsule by mouth 2 times daily    Yes Historical Provider, MD pravastatin (PRAVACHOL) 80 MG tablet Take 80 mg by mouth nightly. Yes Historical Provider, MD   glipiZIDE (GLUCOTROL) 5 MG tablet Take 5 mg by mouth daily. Yes Historical Provider, MD   gabapentin (NEURONTIN) 100 MG capsule Take 100 mg by mouth 2 times daily. Yes Historical Provider, MD   fish oil-omega-3 fatty acids 1000 MG capsule Take 1 g by mouth 2 times daily. Yes Historical Provider, MD   niacin (SLO-NIACIN) 500 MG tablet Take 500 mg by mouth nightly. Yes Historical Provider, MD   albuterol (PROVENTIL) (2.5 MG/3ML) 0.083% nebulizer solution Take 3 mLs by nebulization every 6 hours as needed for Wheezing 19  DANTE Rose - CNP       Allergies:  Daliresp [roflumilast], Sulfa antibiotics, and Percocet [oxycodone-acetaminophen]    Social History:   reports that he quit smoking about 31 years ago. His smoking use included cigarettes. He has a 80.00 pack-year smoking history. He has never used smokeless tobacco. He reports current alcohol use of about 1.0 standard drinks of alcohol per week. He reports that he does not use drugs.     Family History:      Positive as follows:        Problem Relation Age of Onset    Rheum Arthritis Mother          due to complications related to    Heart Disease Mother     Coronary Art Dis Father     Heart Disease Father     Coronary Art Dis Brother     Heart Disease Brother        REVIEW OF SYSTEMS:     Constitutional: ROS: negative for - chills or fever  Head: no headache, no head injury, no migraine   Eyes ROS: denies blurred/double vision  Ears ROS: no hearing difficulty, no tinnitus  Mouth and Throat ROS: no ulceration, dysphagia, dental caries  Psychological ROS: no depression, no anxiety, no panic attacks, denies suicide/homicide ideation  Endocrine ROS: denies polyuria, polydypsia, no heat or cold intolerance  Respiratory ROS: positive for - cough and shortness of breath  Cardiovascular ROS: no chest pain or dyspnea on exertion  Gastrointestinal ROS: no abdominal pain, change in bowel habits, or black or bloody stools  Genito-Urinary ROS: denies dysuria, frequency, urgency; denies hematuria  Musculoskeletal ROS: positive for - pain in lower back  Neurological ROS: no syncope, no seizures, no numbness or tingling of hands, no numbness or tingling of feet, no paresis  Dermatology: no skin rash, no eczema  Endocrine: no polyuria, polydypsia, no heat/cold intolerance  Hematology: denies bruising easily, denies bleeding problems, denies clotting disorders    PHYSICAL EXAM:    BP (!) 162/56   Pulse 79   Temp 97.3 °F (36.3 °C) (Oral)   Resp 23   Wt 133 lb (60.3 kg)   SpO2 97%   BMI 21.47 kg/m²     General appearance:  No apparent distress, appears stated age and cooperative. Chronically ill-appearing  HEENT:  Normal cephalic, atraumatic without obvious deformity. Pupils equal, round, and reactive to light. Conjunctivae/corneas clear. Neck: Supple, with full range of motion. No jugular venous distention. Trachea midline. Respiratory:  Normal respiratory effort. Profoundly diminished to auscultation, bilaterally without Rales/Wheezes/Rhonchi. Cardiovascular:  Regular rate and rhythm with normal S1/S2 without murmurs, rubs or gallops. Abdomen: Soft, non-tender, non-distended with normal bowel sounds. Musculoskeletal:  No clubbing, cyanosis or edema bilaterally. Full range of motion without deformity. Wound noted to the left upper arm; he has point tenderness to his low back along the spine  Skin: Skin color, texture, turgor normal.    Neurologic:  Neurovascularly intact without any focal sensory/motor deficits.  Cranial nerves: II-XII intact, grossly non-focal.  Psychiatric:  Alert and oriented x 4, thought content appropriate  Capillary Refill: Brisk,< 3 seconds   Peripheral Pulses: +2 palpable, equal bilaterally       Labs:     Recent Labs     06/02/21  1020   WBC 17.3*   HGB 14.7   HCT 45.9        Recent Labs inherent in voice recognition technology. ** Final report electronically signed by Dr. Astrid Stewart on 6/2/2021 1:15 PM    CT LUMBAR SPINE WO CONTRAST    Result Date: 6/2/2021  PROCEDURE: CT LUMBAR SPINE WO CONTRAST CLINICAL INFORMATION: LOWER BACK PAIN, BACK PAIN . COMPARISON: No prior study. TECHNIQUE: 2-D multiplanar noncontrast images of the lumbar spine All CT scans at this facility use dose modulation, iterative reconstruction, and/or weight-based dosing when appropriate to reduce radiation dose to as low as reasonably achievable. FINDINGS: There is no acute fracture or acute bony malalignment with the exception of fractures of the enthesophytes involving T12-L1. There are no fractures of the vertebral bodies. L1-L2 No foraminal or central canal encroachment. L2-L3 facet degenerative changes but no foraminal or central encroachment. L3-L4 No central or foraminal encroachment. L5-S1 mild concentric bulging disc and for foraminal narrowing at the left. Bifurcated endoluminal stent graft is noted. Mild atrophy of the right kidney. Motion artifact severely limits evaluation of the adjacent soft tissues. There is a high density cyst upper pole left kidney this is increased in size from the prior exam dated 8/1/2016 now measuring 23 mm previously 17 posterior containing a hemorrhagic cyst     1. No acute fracture or acute bony malalignment other than the probable fracture of the anterior enthesophytes at T12-L1 2. Possible hemorrhagic cyst left kidney. **This report has been created using voice recognition software. It may contain minor errors which are inherent in voice recognition technology. ** Final report electronically signed by Dr. Astrid Stewart on 6/2/2021 1:31 PM    XR CHEST PORTABLE    Result Date: 6/2/2021  PROCEDURE: XR CHEST PORTABLE CLINICAL INFORMATION: sob COMPARISON: 11/28/2017 TECHNIQUE:  AP mobile chest single view  FINDINGS: The heart size is normal. There are increased opacities at the left perihilar region including a focal parenchymal bandlike opacity, which may represent atelectasis versus scarring versus pneumonia. Recommend short-term follow-up chest x-ray to document stability or resolution. No pleural effusion or pneumothorax is seen. No acute osseous findings are demonstrated. 1. There are increased opacities at the left perihilar region including a focal parenchymal bandlike opacity, which may represent atelectasis versus scarring versus pneumonia. Recommend short-term follow-up chest x-ray to document stability or resolution. **This report has been created using voice recognition software. It may contain minor errors which are inherent in voice recognition technology. ** Final report electronically signed by Dr. Jd Bernal on 6/2/2021 10:39 AM    CT THORACIC RECONSTRUCTION WO POST PROCESS    Result Date: 6/2/2021  PROCEDURE: CT THORACIC RECONSTRUCTION WO POST PROCESS CLINICAL INFORMATION: Trauma . COMPARISON: No prior study. TECHNIQUE: 2-D multiplanar reconstructions of the thoracic spine All CT scans at this facility use dose modulation, iterative reconstruction, and/or weight-based dosing when appropriate to reduce radiation dose to as low as reasonably achievable. FINDINGS: There is probable fracture of the enthesophytes at the anterior margin of T12-L1. There is no fracture of the vertebral body. There are bridging enthesophytes throughout the thoracic spine there are calcified disks at multiple levels. There is marked central canal stenosis at C5-6 due to marginal osteophytes There is no central canal encroachment at the thoracic level although what appears to be a congenitally small canal. There is no foraminal encroachment. 1. Probable fracture of the the anterior enthesophytes at T12-L1 2. Central canal stenosis which is moderate to severe at C5-C6 due to posterior marginal osteophytes **This report has been created using voice recognition software.   It may contain minor errors which are inherent in voice recognition technology. ** Final report electronically signed by Dr. Sondra Resendez on 6/2/2021 1:25 PM    EKG:  I have reviewed the EKG with the following interpretation: Sinus rhythm heart rate 82 with inverted T waves in V2, V3    Thank you DANTE Swanson CNP for the opportunity to be involved in this patient's care.     Electronically signed by DANTE Payne CNP on 6/2/2021 at 2:49 PM

## 2021-06-03 LAB
ANION GAP SERPL CALCULATED.3IONS-SCNC: 11 MEQ/L (ref 8–16)
BASE EXCESS (CALCULATED): 0.3 MMOL/L (ref -2.5–2.5)
BUN BLDV-MCNC: 26 MG/DL (ref 7–22)
CALCIUM SERPL-MCNC: 8.8 MG/DL (ref 8.5–10.5)
CHLORIDE BLD-SCNC: 103 MEQ/L (ref 98–111)
CO2: 25 MEQ/L (ref 23–33)
COLLECTED BY:: ABNORMAL
CREAT SERPL-MCNC: 1.5 MG/DL (ref 0.4–1.2)
DEVICE: ABNORMAL
ERYTHROCYTE [DISTWIDTH] IN BLOOD BY AUTOMATED COUNT: 12.7 % (ref 11.5–14.5)
ERYTHROCYTE [DISTWIDTH] IN BLOOD BY AUTOMATED COUNT: 43 FL (ref 35–45)
GFR SERPL CREATININE-BSD FRML MDRD: 44 ML/MIN/1.73M2
GLUCOSE BLD-MCNC: 93 MG/DL (ref 70–108)
HCO3: 24 MMOL/L (ref 23–28)
HCT VFR BLD CALC: 40.6 % (ref 42–52)
HEMOGLOBIN: 13.1 GM/DL (ref 14–18)
IFIO2: 40
MCH RBC QN AUTO: 29.8 PG (ref 26–33)
MCHC RBC AUTO-ENTMCNC: 32.3 GM/DL (ref 32.2–35.5)
MCV RBC AUTO: 92.3 FL (ref 80–94)
O2 SATURATION: 85 %
PCO2: 36 MMHG (ref 35–45)
PH BLOOD GAS: 7.43 (ref 7.35–7.45)
PLATELET # BLD: 169 THOU/MM3 (ref 130–400)
PMV BLD AUTO: 9.4 FL (ref 9.4–12.4)
PO2: 49 MMHG (ref 71–104)
POTASSIUM REFLEX MAGNESIUM: 4.7 MEQ/L (ref 3.5–5.2)
RBC # BLD: 4.4 MILL/MM3 (ref 4.7–6.1)
SODIUM BLD-SCNC: 139 MEQ/L (ref 135–145)
SOURCE, BLOOD GAS: ABNORMAL
WBC # BLD: 13.3 THOU/MM3 (ref 4.8–10.8)

## 2021-06-03 PROCEDURE — 36415 COLL VENOUS BLD VENIPUNCTURE: CPT

## 2021-06-03 PROCEDURE — 99222 1ST HOSP IP/OBS MODERATE 55: CPT | Performed by: NURSE PRACTITIONER

## 2021-06-03 PROCEDURE — 94640 AIRWAY INHALATION TREATMENT: CPT

## 2021-06-03 PROCEDURE — 6360000002 HC RX W HCPCS: Performed by: NURSE PRACTITIONER

## 2021-06-03 PROCEDURE — 85027 COMPLETE CBC AUTOMATED: CPT

## 2021-06-03 PROCEDURE — 94761 N-INVAS EAR/PLS OXIMETRY MLT: CPT

## 2021-06-03 PROCEDURE — 6370000000 HC RX 637 (ALT 250 FOR IP): Performed by: NURSE PRACTITIONER

## 2021-06-03 PROCEDURE — 36600 WITHDRAWAL OF ARTERIAL BLOOD: CPT

## 2021-06-03 PROCEDURE — 82803 BLOOD GASES ANY COMBINATION: CPT

## 2021-06-03 PROCEDURE — 2060000000 HC ICU INTERMEDIATE R&B

## 2021-06-03 PROCEDURE — 2580000003 HC RX 258: Performed by: NURSE PRACTITIONER

## 2021-06-03 PROCEDURE — 2700000000 HC OXYGEN THERAPY PER DAY

## 2021-06-03 PROCEDURE — 97530 THERAPEUTIC ACTIVITIES: CPT

## 2021-06-03 PROCEDURE — 80048 BASIC METABOLIC PNL TOTAL CA: CPT

## 2021-06-03 PROCEDURE — 97162 PT EVAL MOD COMPLEX 30 MIN: CPT

## 2021-06-03 PROCEDURE — 97116 GAIT TRAINING THERAPY: CPT

## 2021-06-03 PROCEDURE — 99233 SBSQ HOSP IP/OBS HIGH 50: CPT | Performed by: INTERNAL MEDICINE

## 2021-06-03 RX ORDER — ACETAMINOPHEN 325 MG/1
650 TABLET ORAL EVERY 4 HOURS PRN
Status: DISCONTINUED | OUTPATIENT
Start: 2021-06-03 | End: 2021-06-09 | Stop reason: HOSPADM

## 2021-06-03 RX ADMIN — PREDNISONE 5 MG: 10 TABLET ORAL at 09:28

## 2021-06-03 RX ADMIN — SODIUM CHLORIDE, PRESERVATIVE FREE 10 ML: 5 INJECTION INTRAVENOUS at 09:28

## 2021-06-03 RX ADMIN — GABAPENTIN 100 MG: 100 CAPSULE ORAL at 20:37

## 2021-06-03 RX ADMIN — BUDESONIDE 500 MCG: 0.5 INHALANT RESPIRATORY (INHALATION) at 08:07

## 2021-06-03 RX ADMIN — ACETAMINOPHEN 650 MG: 325 TABLET ORAL at 09:28

## 2021-06-03 RX ADMIN — ARFORMOTEROL TARTRATE 15 MCG: 15 SOLUTION RESPIRATORY (INHALATION) at 17:30

## 2021-06-03 RX ADMIN — Medication 500 MG: at 20:37

## 2021-06-03 RX ADMIN — ACETAMINOPHEN 650 MG: 325 TABLET ORAL at 02:28

## 2021-06-03 RX ADMIN — METOPROLOL SUCCINATE 25 MG: 25 TABLET, FILM COATED, EXTENDED RELEASE ORAL at 09:28

## 2021-06-03 RX ADMIN — ACETAMINOPHEN 650 MG: 325 TABLET ORAL at 23:53

## 2021-06-03 RX ADMIN — SODIUM CHLORIDE, PRESERVATIVE FREE 10 ML: 5 INJECTION INTRAVENOUS at 20:37

## 2021-06-03 RX ADMIN — PRAVASTATIN SODIUM 80 MG: 80 TABLET ORAL at 20:37

## 2021-06-03 RX ADMIN — TAMSULOSIN HYDROCHLORIDE 0.4 MG: 0.4 CAPSULE ORAL at 09:28

## 2021-06-03 RX ADMIN — GUAIFENESIN 400 MG: 200 SOLUTION ORAL at 09:27

## 2021-06-03 RX ADMIN — ALBUTEROL SULFATE 2.5 MG: 2.5 SOLUTION RESPIRATORY (INHALATION) at 20:35

## 2021-06-03 RX ADMIN — ENOXAPARIN SODIUM 30 MG: 30 INJECTION SUBCUTANEOUS at 09:29

## 2021-06-03 RX ADMIN — TIOTROPIUM BROMIDE INHALATION SPRAY 2 PUFF: 3.12 SPRAY, METERED RESPIRATORY (INHALATION) at 08:10

## 2021-06-03 RX ADMIN — ARFORMOTEROL TARTRATE 15 MCG: 15 SOLUTION RESPIRATORY (INHALATION) at 08:07

## 2021-06-03 RX ADMIN — ALBUTEROL SULFATE 2.5 MG: 2.5 SOLUTION RESPIRATORY (INHALATION) at 08:09

## 2021-06-03 RX ADMIN — BUDESONIDE 500 MCG: 0.5 INHALANT RESPIRATORY (INHALATION) at 17:31

## 2021-06-03 RX ADMIN — GABAPENTIN 100 MG: 100 CAPSULE ORAL at 09:28

## 2021-06-03 RX ADMIN — ALBUTEROL SULFATE 2.5 MG: 2.5 SOLUTION RESPIRATORY (INHALATION) at 17:24

## 2021-06-03 RX ADMIN — ACETAMINOPHEN 650 MG: 325 TABLET ORAL at 16:47

## 2021-06-03 RX ADMIN — Medication 5000 UNITS: at 09:28

## 2021-06-03 RX ADMIN — ALBUTEROL SULFATE 2.5 MG: 2.5 SOLUTION RESPIRATORY (INHALATION) at 13:08

## 2021-06-03 RX ADMIN — GLIPIZIDE 5 MG: 5 TABLET ORAL at 09:28

## 2021-06-03 ASSESSMENT — PAIN SCALES - GENERAL
PAINLEVEL_OUTOF10: 0
PAINLEVEL_OUTOF10: 5
PAINLEVEL_OUTOF10: 0
PAINLEVEL_OUTOF10: 5
PAINLEVEL_OUTOF10: 0
PAINLEVEL_OUTOF10: 0
PAINLEVEL_OUTOF10: 5
PAINLEVEL_OUTOF10: 7

## 2021-06-03 ASSESSMENT — PAIN DESCRIPTION - ORIENTATION
ORIENTATION: LOWER
ORIENTATION: LOWER

## 2021-06-03 ASSESSMENT — PAIN DESCRIPTION - DESCRIPTORS
DESCRIPTORS: ACHING
DESCRIPTORS: ACHING

## 2021-06-03 ASSESSMENT — PAIN DESCRIPTION - PAIN TYPE
TYPE: ACUTE PAIN
TYPE: ACUTE PAIN

## 2021-06-03 ASSESSMENT — PAIN DESCRIPTION - LOCATION
LOCATION: BACK
LOCATION: BACK

## 2021-06-03 NOTE — CONSULTS
Pain Consult Note      Admitting Physician: Arthur Pierce MD    Primary Care Provider: DANTE Escalona CNP     Reason for Consult:  Pain management consult requested by Madisyn Diez CNP for low back pain following fall. History of Present Illness:  Rodolfo Aragon is a 80 y.o. male admitted to [address] [place] [address] on 6/2/2021. This patient with a medical history significant for COPD with shortness of breath on chronic oxygen therapy, Type II diabetes, HTN, CKD, CAD, and an abdominal aortic aneurism who presented to [address] [place] [address] following a fall with a chief complaint of low back pain and increased shortness of breath. He reports that yesterday morning while getting out of bed and using the walker after a few steps he fell to his left side and lost his balance hitting his back on the wall and hitting a lamp and the walker falling on him. He denies any loss of consciousness but did develop pain in his low back. He is on 6 liters of oxygen at home but on arrival to the hospital he had increased shortness of breath and decraesed oxygen saturations and has been placed oh high flow oxygen. He has complaints of pain in his low back that is mainly increased with position changes and turning in bed and is minimal at rest and relieved with walking. Currently he has Lidoderm patches and prn tylenol which has helped keep his pain under control. We discussed that this is the regimen that we will continue and will try to avoid any opioid pain medications d/t his respiratory status. Work up was completed and he had a Lumbar and Thoracic CT scan which showed no acute compression fracture, a probable fracture of the anterior enthesophytes at T12-L1. The patient was seen by the pain team today and at the time of our evaluation the patient complains of pain in his low back localized and slightly to the right side. This pain started after his fall.  His pain is described as a sharp, burning pain with movement and turning. He currently denies any pain while laying in bed or at rest but states that his pain increases up to 5-8/10 with turning, position changes, and when sitting up, and coughing. His pain is relieved with rest, Lidoderm patches, and prn tylenol, along with walking. Patients daughter and wife present at bedside during today's evaluation. Past Medical History:        Diagnosis Date    AAA [facility]abdominal [facility])     followed by Dr. Carranza Brothers CAD (coronary artery disease)     Chronic kidney disease     COPD [facility] obstructive [facility] disease) [facility]     Hypercholesteremia     Hypertension     Other disorders of kidney and ureter in diseases classified elsewhere     Pneumonia     Prostate cancer ([facility]     with radiation in 2006    SOB (shortness of breath)     Type II or unspecified type diabetes mellitus without mention of complication, not stated as uncontrolled        Past Surgical History:        Procedure Laterality Date    ABDOMEN SURGERY      ABDOMINAL AORTIC ANEURYSM REPAIR  8/21/14    ABDOMINAL AORTIC ANEURYSM REPAIR, ENDOVASCULAR Bilateral 08/21/2014    WITH LT FEMORAL ENDARTERECTOMY     CARDIAC SURGERY      COLONOSCOPY  unsure    INGUINAL HERNIA REPAIR Bilateral 07/28/2015    Dr Lovely Duke VASCULAR SURGERY         Allergies:     Allergies   Allergen Reactions    Daliresp [Roflumilast] Other (See Comments)     Anorexia    Sulfa Antibiotics Hives    Percocet [Oxycodone-Acetaminophen] Nausea And Vomiting        Current Medications:   Current Facility-Administered Medications: acetaminophen (TYLENOL) tablet 650 mg, 650 mg, Oral, Q4H PRN **OR** [DISCONTINUED] acetaminophen (TYLENOL) suppository 650 mg, 650 mg, Rectal, Q6H PRN  albuterol sulfate  (90 Base) MCG/ACT inhaler 2 puff, 2 puff, Inhalation, Q6H PRN  budesonide (PULMICORT) nebulizer suspension 500 mcg, 0.5 mg, Nebulization, BID  Arformoterol Tartrate (BROVANA) nebulizer solution 15 mcg, 15 mcg, Nebulization, BID  gabapentin (NEURONTIN) capsule 100 mg, 100 mg, Oral, BID  glipiZIDE (GLUCOTROL) tablet 5 mg, 5 mg, Oral, Daily  guaiFENesin (ROBITUSSIN) 100 MG/5ML oral solution 400 mg, 400 mg, Oral, Daily  metoprolol succinate (TOPROL XL) extended release tablet 25 mg, 25 mg, Oral, Daily  niacin extended release capsule 500 mg, 500 mg, Oral, Nightly  pravastatin (PRAVACHOL) tablet 80 mg, 80 mg, Oral, Nightly  predniSONE (DELTASONE) tablet 5 mg, 5 mg, Oral, Daily  tamsulosin (FLOMAX) capsule 0.4 mg, 0.4 mg, Oral, Daily  tiotropium (SPIRIVA RESPIMAT) 2.5 MCG/ACT inhaler 2 puff, 2 puff, Inhalation, Daily  Vitamin D (CHOLECALCIFEROL) tablet 5,000 Units, 5,000 Units, Oral, Daily  sodium chloride flush 0.9 % injection 5-40 mL, 5-40 mL, Intravenous, 2 times per day  sodium chloride flush 0.9 % injection 5-40 mL, 5-40 mL, Intravenous, PRN  0.9 % sodium chloride infusion, 25 mL, Intravenous, PRN  ondansetron (ZOFRAN-ODT) disintegrating tablet 4 mg, 4 mg, Oral, Q8H PRN **OR** ondansetron (ZOFRAN) injection 4 mg, 4 mg, Intravenous, Q6H PRN  polyethylene glycol (GLYCOLAX) packet 17 g, 17 g, Oral, Daily PRN  enoxaparin (LOVENOX) injection 30 mg, 30 mg, Subcutaneous, Daily  albuterol (PROVENTIL) nebulizer solution 2.5 mg, 2.5 mg, Nebulization, Q4H WA  lidocaine 4 % external patch 3 patch, 3 patch, Transdermal, Daily    Social History:  Social History     Socioeconomic History    Marital status:      Spouse name: Zara    Number of children: Not on file    Years of education: Not on file    Highest education level: Not on file   Occupational History    Not on file   Tobacco Use    Smoking status: Former Smoker     Packs/day: 2.00     Years: 40.00     Pack years: 80.00     Types: Cigarettes     Quit date: 10/5/1989     Years since quittin.6    Smokeless tobacco: Never Used   Substance and Sexual Activity    Alcohol use:  Yes     Alcohol/week: 1.0 standard drinks     Types: 1 Cans of beer per week Comment: now and then    Drug use: No    Sexual activity: Not on file   Other Topics Concern    Not on file   Social History Narrative    Not on file     Social Determinants of Health     Financial Resource Strain:     Difficulty of Paying Living Expenses:    Food Insecurity:     Worried About Running Out of Food in the Last Year:     [address] in the Last Year:    Transportation Needs:     Lack of Transportation (Medical):      Lack of Transportation (Non-Medical):    Physical Activity:     Days of Exercise per Week:     Minutes of Exercise per Session:    Stress:     Feeling of Stress :    Social Connections:     Frequency of Communication with Friends and Family:     Frequency of Social Gatherings with Friends and Family:     Attends Christian Services:     Active Member of Clubs or Organizations:     Attends Club or Organization Meetings:     Marital Status:    Intimate Partner Violence:     Fear of Current or Ex-Partner:     Emotionally Abused:     Physically Abused:     Sexually Abused:        Family History:       Problem Relation Age of Onset    Rheum Arthritis Mother          due to complications related to    Heart Disease Mother     Coronary Art Dis Father     Heart Disease Father     Coronary Art Dis Brother     Heart Disease Brother        Review of Systems:  CONSTITUTIONAL:  positive for  fatigue  EYES:  negative  HEENT: high flow oxygen nasally   RESPIRATORY:  positive for  cough with sputum, dyspnea, wheezing and on high flow oxygen, shortness of breath at rest and increased with any exertion   CARDIOVASCULAR:  negative  GASTROINTESTINAL:  positive for constipation  GENITOURINARY: CKD  SKIN:  Left arm abrasions and skin tear, left knee scabs   HEMATOLOGIC/LYMPHATIC:  negative  MUSCULOSKELETAL:  positive for  myalgias, arthralgias and low back pain   NEUROLOGICAL:  positive for gait problems and weakness  BEHAVIOR/PSYCH:  negative  System review otherwise negative      Physical Exam:  /61   Pulse 62   Temp 98.5 °F (36.9 °C) (Oral)   Resp 18   Ht 5' 6\" (1.676 m)   Wt 135 lb 11.2 oz (61.6 kg)   SpO2 97%   BMI 21.90 kg/m²      awake  Orientation:   person, place, time  Mood: within normal limits  Affect: calm and quiet  General appearance: Patient is well nourished, well developed, well groomed and in no acute distress, appearing stated age    Memory:   normal,   Attention/Concentration: normal  Language:  normal    Cranial Nerves:  cranial nerves II-XII are grossly intact  ROM:  Normal ROM all 4 extremities   Motor Exam:  Motor exam is 5 out of 5 all extremities with the exception of bilateral lower extremities 4/5   Tone:  normal  Muscle bulk: within normal limits      Heart: normal rate, regular rhythm, normal S1, S2, no murmurs, rubs, clicks or gallops  Lungs: Diminished, coarse, scattered wheezes and rhonchi on high flow oxygen   Abdomen: soft, non-tender, non-distended, normal bowel sounds, no masses or organomegaly    Skin: left upper extremity abrasion and skin tear covered, left knee scab  Peripheral vascular: Pulses: Normal upper and lower extremity pulses; Edema: no      Diagnostics:  Recent Results (from the past 24 hour(s))   MRSA by PCR    Collection Time: 06/02/21  5:45 PM   Result Value Ref Range    MRSA SCREEN RT-PCR NEGATIVE    VRE Screen by PCR    Collection Time: 06/02/21  5:45 PM    Specimen: Rectal Swab   Result Value Ref Range    Vancomycin Resistant Enterococcus NEGATIVE    Troponin    Collection Time: 06/02/21  5:59 PM   Result Value Ref Range    Troponin T 0.024 (A) ng/ml   POCT Glucose    Collection Time: 06/02/21  9:11 PM   Result Value Ref Range    POC Glucose 127 (H) 70 - 108 mg/dl   Troponin    Collection Time: 06/02/21 10:53 PM   Result Value Ref Range    Troponin T 0.020 (A) ng/ml   Basic Metabolic Panel w/ Reflex to MG    Collection Time: 06/03/21  6:21 AM   Result Value Ref Range    Sodium 139 135 - 145 meq/L    Potassium reflex Magnesium 4.7 3.5 - 5.2 meq/L    Chloride 103 98 - 111 meq/L    CO2 25 23 - 33 meq/L    Glucose 93 70 - 108 mg/dL    BUN 26 (H) 7 - 22 mg/dL    CREATININE 1.5 (H) 0.4 - 1.2 mg/dL    Calcium 8.8 8.5 - 10.5 mg/dL   CBC    Collection Time: 06/03/21  6:21 AM   Result Value Ref Range    WBC 13.3 (H) 4.8 - 10.8 thou/mm3    RBC 4.40 (L) 4.70 - 6.10 mill/mm3    Hemoglobin 13.1 (L) 14.0 - 18.0 gm/dl    Hematocrit 40.6 (L) 42.0 - 52.0 %    MCV 92.3 80.0 - 94.0 fL    MCH 29.8 26.0 - 33.0 pg    MCHC 32.3 32.2 - 35.5 gm/dl    RDW-CV 12.7 11.5 - 14.5 %    RDW-SD 43.0 35.0 - 45.0 fL    Platelets 519 403 - 392 thou/mm3    MPV 9.4 9.4 - 12.4 fL   Anion Gap    Collection Time: 06/03/21  6:21 AM   Result Value Ref Range    Anion Gap 11.0 8.0 - 16.0 meq/L   Glomerular Filtration Rate, Estimated    Collection Time: 06/03/21  6:21 AM   Result Value Ref Range    Est, Glom Filt Rate 44 (A) ml/min/1.73m2   Blood Gas, Arterial    Collection Time: 06/03/21  9:57 AM   Result Value Ref Range    pH, Blood Gas 7.43 7.35 - 7.45    PCO2 36 35 - 45 mmhg    PO2 49 (L) 71 - 104 mmhg    HCO3 24 23 - 28 mmol/l    Base Excess (Calculated) 0.3 -2.5 - 2.5 mmol/l    O2 Sat 85 %    IFIO2 40     DEVICE [facility]     Source: [facility]     COLLECTED BY: [id number]      Lumbar CT scan:   FINDINGS: There is no acute fracture or acute bony malalignment with the exception of fractures of the enthesophytes involving T12-L1. There are no fractures of the vertebral bodies. L1-L2   No foraminal or central canal encroachment. L2-L3 facet degenerative changes but no foraminal or central encroachment. L3-L4   No central or foraminal encroachment. L5-S1 mild concentric bulging disc and for foraminal narrowing at the left. Bifurcated endoluminal stent graft is noted. Mild atrophy of the right kidney. Motion artifact severely limits evaluation of the adjacent soft tissues.    There is a high density cyst upper pole left kidney this is increased in size from the prior exam dated 8/1/2016 now measuring 23 mm previously 17 posterior containing a hemorrhagic cyst       Impression:       1. No acute fracture or acute bony malalignment other than the probable fracture of the anterior enthesophytes at T12-L1   2. Possible hemorrhagic cyst left kidney. Thoracic CT scan:   FINDINGS: There is probable fracture of the enthesophytes at the anterior margin of T12-L1. There is no fracture of the vertebral body. There are bridging enthesophytes throughout the thoracic spine there are calcified disks at multiple levels. There is   marked central canal stenosis at C5-6 due to marginal osteophytes   There is no central canal encroachment at the thoracic level although what appears to be a congenitally small canal. There is no foraminal encroachment.       Impression:       1. Probable fracture of the the anterior enthesophytes at T12-L1   2. Central canal stenosis which is moderate to severe at C5-C6 due to posterior marginal osteophytes        Impression:  1. Acute low back pain following fall   2. Probable fracture of the anterior enthesophytes at T12-L1  3. Chronic hypoxic respiratory failure  4. COPD exacerbation on high flow oxygen  5. CKD  6. Debility     Met with today's pain team as above. Discussed patient symptoms, reviewed medications and possible drug interactions, medication side effect profiles, previous medications and their efficacies, medical concerns regarding each pain management option (ie blood pressure, GI concerns, etc). Also reviewed labs (including creatinine clearance and LFT's regarding medication metabolism). Also reviewed all work-up studies including radiologic and other consultants. OARRS report was obtained and reviewed. Recommendations:  1. Increased frequency of tylenol to 650 mg every 4 hours as needed for pain   2. Continue Lidoderm patches, 3 patches daily  3.  Will try to leave medications as is as he is not a candidate for opioid therapy d/t his respiratory status. 4. Continue PT and OT   5. Will continue to follow while in the hospital and discussed would FU outpatient if needed after discharge if pain persists for nerve blocks for interventional pain procedures. I spent approximately 55 minutes evaluating this patient addressing medications, and completing documentation. It was my pleasure to evaluate Johan Magana today. Please call with questions.     Jesus Alberto Weinberg, APRN - CNP

## 2021-06-03 NOTE — CARE COORDINATION
DISCHARGE/PLANNING EVALUATION  6/3/21, 2:33 PM EDT    Reason for Referral: HH  Mental Status: Spoke with wife and daughter  Decision Making: Patient makes own decisions and has family support  Family/Social/Home Environment: Spoke with patient's spouse and daughter they landon that home is one floor with 3 steps inside. They stated that they have a tub shower combo with a bench and that spouse has been helping patient wash up. Current Services including food security, transportation and housekeeping: family or spouse  Current Equipment: walker, transport chair  Payment Source: [facility] Medicare  Concerns or Barriers to Discharge:  Unsure if patient will be ambulatory enough for home with spouse. Post acute provider list with quality measures, geographic area and applicable managed care information provided. Questions regarding selection process answered: Prefers [facility]    Teach Back Method used with  regarding care plan and options for discharge. Spouse and daughter verbalize understanding of the plan of care and contribute to goal setting. Patient goals, treatment preferences and discharge plan: Spoke with wife and daughter and they are currently planning home with wife and [facility]. They are concerned about if this plan will be enough. Wife and daughter that already started looking into private duty services for additional services at home. Will watch for PT/OT recommendation.      Electronically signed by ANTONIO Allen on 6/3/2021 at 2:33 PM

## 2021-06-03 NOTE — PLAN OF CARE
Problem: Falls - Risk of:  Goal: Will remain free from falls  Description: Will remain free from falls  Outcome: Met This Shift  Goal: Absence of physical injury  Description: Absence of physical injury  Outcome: Met This Shift     Problem: Injury - Risk of, Physical Injury:  Goal: Will remain free from falls  Description: Will remain free from falls  Outcome: Met This Shift  Goal: Absence of physical injury  Description: Absence of physical injury  Outcome: Met This Shift     Problem: Skin Integrity:  Goal: Will show no infection signs and symptoms  Description: Will show no infection signs and symptoms  Outcome: Ongoing     Problem: Skin Integrity:  Goal: Absence of new skin breakdown  Description: Absence of new skin breakdown  Outcome: Ongoing     Problem: Confusion - Acute:  Goal: Absence of continued neurological deterioration signs and symptoms  Description: Absence of continued neurological deterioration signs and symptoms  Outcome: Ongoing     Problem: Confusion - Acute:  Goal: Mental status will be restored to baseline  Description: Mental status will be restored to baseline  Outcome: Ongoing     Problem: Impaired respiratory status  Goal: Clear lung sounds  6/3/2021 1813 by Chantell Leggett RN  Outcome: Ongoing     Problem: Pain:  Goal: Pain level will decrease  Description: Pain level will decrease  Outcome: Ongoing     Problem: Pain:  Goal: Control of acute pain  Description: Control of acute pain  Outcome: Ongoing

## 2021-06-03 NOTE — PROGRESS NOTES
Hospitalist Progress Note    Patient:  Stephanie Carpio      Unit/Bed:4K-26/026-A    YOB: 1931    MRN: [id number]       Acct: [de-identified]     PCP: DANTE Patel CNP    Date of Admission: 6/2/2021    Assessment/Plan:  Acute on chronic hypoxic respiratory failure (baseline 6L NC): Unclear etiology. Currently on HFNC with FiO2 40%. ABG: pH 7.43, PCO2 36, PO2 49, HCO3 24.  Keep SPO2 > 90%. Wean O2 as tolerated. Mechanical fall: Denies loss of consciousness or hitting his head. CT thoracic demonstrates probable fracture of the anterior enthesophytes at T12-L1. Lidoderm, heat and atenolol initiated. Pain management consulted. Elevated troponin: Slight elevation in troponin 0.013 -> 0.024 -> 0.020. EKG demonstrates ST downsloping and T wave inversion V1-V3. Cardiology consulted 6/3. Hx COPD: Not in exacerbation. Albuterol, Pulmicort, Spiriva, Brovana, prednisone resumed. CKD stage III: Creatinine at baseline. Avoid nephrotoxic agents. NIDDM 2: Controlled. A1c 5.3 on 01/2021. Glipizide resumed. Primary hypertension: Controlled. Metoprolol resumed. Hyperlipidemia: Pravastatin resumed. BPH: Tamsulosin resumed. Hx prostate cancer: Noted. Expected discharge date:  06/05/2021    Disposition:    [x] Home       [] TCU       [] Rehab       [] Psych       [] SNF       [] [place]       [] Other-    Chief Complaint: Shortness of breath    Hospital Course:   Ron Simons is a 81 yo male with a history of COPD (baseline O2 of 6L), DM 2, HTN, HLD, BPH, Hx of prostate cancer who presents to [facility] for evaluation of shortness of breath. Patient reports a fall the morning of admission. He denies hitting his head and denies loss of consciousness. Patient's wife attended to patient's after hearing a loud noise. His wife states that he was completely alert after his fall. Family was unable to get him up so EMS was called.   Patient developed labored breathing with use of accessory muscles while ambulating to the ambulance. Patient was given a breathing treatment. Upon arrival, patient continued to be in respiratory distress and was placed on high flow nasal cannula with great improvement of his O2 saturations and work of breathing. Patient complained of lower back pain after his fall. Patient states that his pain is worse with movement. In the ED, CT chest showed parenchymal scarring of the left lobe. CT of thoracic spine demonstrated probable fracture of the anterior enthesophytes at T12-L1; also possible hemorrhagic cyst of left kidney. Patient was admitted to [facility] for further care and evaluation. Subjective (past 24 hours):   Patient seen and examined. Patient's wife and daughter were at bedside. Patient reports shortness of breath with exertion. Would like to work with PT/OT. ROS (12 point review of systems completed. Pertinent positives noted. Otherwise ROS is negative).     Medications:  Reviewed    Infusion Medications    sodium chloride       Scheduled Medications    budesonide  0.5 mg Nebulization BID    Arformoterol Tartrate  15 mcg Nebulization BID    gabapentin  100 mg Oral BID    glipiZIDE  5 mg Oral Daily    guaiFENesin  400 mg Oral Daily    metoprolol succinate  25 mg Oral Daily    niacin  500 mg Oral Nightly    pravastatin  80 mg Oral Nightly    predniSONE  5 mg Oral Daily    tamsulosin  0.4 mg Oral Daily    tiotropium  2 puff Inhalation Daily    Vitamin D  5,000 Units Oral Daily    sodium chloride flush  5-40 mL Intravenous 2 times per day    enoxaparin  30 mg Subcutaneous Daily    albuterol  2.5 mg Nebulization Q4H WA    lidocaine  3 patch Transdermal Daily     PRN Meds: acetaminophen **OR** [DISCONTINUED] acetaminophen, albuterol sulfate HFA, sodium chloride flush, sodium chloride, ondansetron **OR** ondansetron, polyethylene glycol      Intake/Output Summary (Last 24 hours) at 6/3/2021 1922  Last data filed at 6/3/2021 1834  Gross per 24 hour   Intake 680 ml   Output 1450 ml   Net -770 ml       Diet:  ADULT DIET; Regular; 4 carb choices (60 gm/meal)    Exam:  BP (!) 132/55   Pulse 60   Temp 98.6 °F (37 °C) (Oral)   Resp 18   Ht 5' 6\" (1.676 m)   Wt 135 lb 11.2 oz (61.6 kg)   SpO2 97%   BMI 21.90 kg/m²     General appearance: No apparent distress, appears stated age and cooperative. HEENT: Pupils equal, round, and reactive to light. Conjunctivae/corneas clear. Neck: Supple, with full range of motion. No jugular venous distention. Trachea midline. Respiratory:  Normal respiratory effort. Decreased breath sounds. Cardiovascular: Regular rate and rhythm with normal S1/S2 without murmurs, rubs or gallops. Abdomen: Soft, non-tender, non-distended with normal bowel sounds. Musculoskeletal: passive and active ROM x 4 extremities. Skin: Skin color, texture, turgor normal.  No rashes or lesions. Neurologic:  Neurovascularly intact without any focal sensory/motor deficits. Psychiatric: Alert and oriented, thought content appropriate, normal insight  Capillary Refill: Brisk,< 3 seconds   Peripheral Pulses: +2 palpable, equal bilaterally       Labs:   Recent Labs     06/02/21  1020 06/03/21  0621   WBC 17.3* 13.3*   HGB 14.7 13.1*   HCT 45.9 40.6*    169     Recent Labs     06/02/21  1020 06/03/21  0621    139   K 4.9 4.7    103   CO2 27 25   BUN 25* 26*   CREATININE 1.6* 1.5*   CALCIUM 9.5 8.8     No results for input(s): AST, ALT, BILIDIR, BILITOT, ALKPHOS in the last 72 hours. No results for input(s): INR in the last 72 hours. No results for input(s): Len Pippins in the last 72 hours.     Microbiology:      Urinalysis:      Lab Results   Component Value Date    NITRU Negative 03/12/2018    WBCUA 0 08/23/2014    BACTERIA NONE 08/23/2014    RBCUA 0 08/23/2014    BLOODU Negative 03/12/2018    SPECGRAV 1.015 08/23/2014    GLUCOSEU negative 03/12/2018       Radiology:  CT CHEST WO CONTRAST   Final Result   Parenchymal scarring left lung. No acute posttraumatic process identified. **This report has been created using voice recognition software. It may contain minor errors which are inherent in voice recognition technology. **      Final report electronically signed by Dr. Anastacio Morelos on 6/2/2021 1:15 PM      CT THORACIC RECONSTRUCTION WO POST PROCESS   Final Result   1. Probable fracture of the the anterior enthesophytes at T12-L1   2. Central canal stenosis which is moderate to severe at C5-C6 due to posterior marginal osteophytes            **This report has been created using voice recognition software. It may contain minor errors which are inherent in voice recognition technology. **      Final report electronically signed by Dr. Anastacio Morelos on 6/2/2021 1:25 PM      CT LUMBAR SPINE WO CONTRAST   Final Result   1. No acute fracture or acute bony malalignment other than the probable fracture of the anterior enthesophytes at T12-L1   2. Possible hemorrhagic cyst left kidney. **This report has been created using voice recognition software. It may contain minor errors which are inherent in voice recognition technology. **      Final report electronically signed by Dr. Anastacio Morelos on 6/2/2021 1:31 PM      XR CHEST PORTABLE   Final Result   1. There are increased opacities at the left perihilar region including a focal parenchymal bandlike opacity, which may represent atelectasis versus scarring versus pneumonia. Recommend short-term follow-up chest x-ray to document stability or    resolution. **This report has been created using voice recognition software. It may contain minor errors which are inherent in voice recognition technology. **      Final report electronically signed by Dr. Federico Pineda on 6/2/2021 10:39 AM          DVT prophylaxis: [x] Lovenox                                 [] SCDs                                 [] SQ Heparin                                 []

## 2021-06-03 NOTE — PROGRESS NOTES
[address] [facility] PHYSICAL THERAPY  EVALUATION  [facility] ICU STEPDOWN TELEMETRY 4K - 4K-26/026-A    Time In: 7592  Time Out: 1432  Timed Code Treatment Minutes: 30 Minutes  Minutes: 45          Date: 6/3/2021  Patient Name: Chalino Contreras,  Gender:  male        MRN: [id number]  : 1931  (80 y.o.)      Referring Practitioner: Dominique Gerardo MD  Diagnosis: acute hypoxemic respiratory failure  Additional Pertinent Hx: Per HPI: \"80 y.o. male who presented to [facility] with shortness of breath; patient has a past medical history of diabetes, COPD, prostate cancer along with CAD; he is on 6 L of oxygen around-the-clock patient was in his normal state of health and he subsequently fell this morning; denies hitting his head and denies loss of consciousness; his wife heard a thud and the patient immediately called his wife's name; family was unable to get him up so EMS was called; the patient is on 6 L of oxygen at baseline however he was ambulated out to the EMS cot and then family noticed extreme difficulty in breathing; he was placed on oxygen and given a breathing treatment; upon arrival here he was still in distress so he was placed on high flow nasal cannula with great improvement of his sats and decrease work of breathing; he complains of pain to his low back; his pain is worse with movement; patient is alert and oriented, family states at home he uses Acapella; both wife and daughter at the bedside and feel he looks much better now.  In the emergency department, he had a CT chest without contrast that showed parenchymal scarring the left lobe; he had a CT of the T and L-spine that showed no acute fracture or bony malalignment other than the probable fracture of the anterior enthesophytes at T12-L1; also possible hemorrhagic cyst left kidney; patient is currently on high flow oxygen so the goal is to wean him down to 6 L baseline, he was going to be admitted to stepdown for further care and hospital    OBJECTIVE:  Range of Motion:  Bilateral Lower Extremity: WFL    Strength:  Bilateral Lower Extremity: Impaired - functional weakness with STS transfer, >3/5     Balance:  Static Sitting Balance:  Stand By Assistance  Dynamic Sitting Balance: Stand By Assistance  Static Standing Balance: Minimal Assistance  Dynamic Standing Balance: Minimal Assistance    Bed Mobility:  Rolling to Left: Maximum Assistance, X 1, with verbal cues , with increased time for completion, Max A due to hesitancy to initiate   Rolling to Right: Minimal Assistance, X 1, from L side to bacak   Supine to Sit: Minimal Assistance, X 1, with head of bed raised, with verbal cues , with increased time for completion  Sit to Supine: Minimal Assistance, X 1, with head of bed raised, with verbal cues , with increased time for completion   Scooting: Stand By Assistance, with verbal cues , with increased time for completion   *HOB raised to 15 deg. Pt could not tolerate bed flat due to high oxygen demands  *pt required several cues for transfer technique--encouraged log roll, several cues to push through arms to come to seated position    Transfers:  Sit to Stand: Minimal Assistance, Moderate Assistance, X 1, with increased time for completion, cues for hand placement, with verbal cues  Stand to Sit:Minimal Assistance, X 1, with increased time for completion, cues for hand placement, with verbal cues   *some STS attempts Min, some Mod--bed ht at 90 deg flexion at knees  *STS x 7 reps throughout session--repeated for emphasis on appropriate technique and for strengthening    Ambulation:  Minimal Assistance, X 1, with cues for safety, with verbal cues , with increased time for completion  Distance: 7'  Surface: Level Tile  Device:Rolling Walker  Gait Deviations: Forward Flexed Posture, Slow Luli, Decreased Step Length Bilaterally, Decreased Gait Speed, Unsteady Gait, Decreased Terminal Knee Extension and poor roll-through of foot.  Pt not transferring weight from heel to forefoot for normal push-off    Exercise:  Patient was guided in 1 set(s) 10 reps of exercise to both lower extremities. Seated marches and Long arc quads. Exercises were completed for increased independence with functional mobility. Functional Outcome Measures: Completed  AM-PAC Inpatient Mobility Raw Score : 13  AM-PAC Inpatient T-Scale Score : 36.74    ASSESSMENT:  Activity Tolerance:  Patient tolerance of  treatment: fair. Treatment Initiated: Treatment and education initiated within context of evaluation. Evaluation time included review of current medical information, gathering information related to past medical, social and functional history, completion of standardized testing, formal and informal observation of tasks, assessment of data and development of plan of care and goals. Treatment time included skilled education and facilitation of tasks to increase safety and independence with functional mobility for improved independence and quality of life. Assessment: Body structures, Functions, Activity limitations: Decreased functional mobility , Increased pain, Decreased endurance, Decreased safe awareness, Decreased strength, Decreased cognition, Decreased balance, Decreased posture  Assessment: Pt is below PLOF by way of transfers and ambulation. He demonstrates decreased safety awareness, decreased insight into deficits, and decreased functional strength and endurance. Pt is motivated to work with therapy and will benefit from continued physical therapy to return to PLOF. Prognosis: Fair    REQUIRES PT FOLLOW UP: Yes    Discharge Recommendations:  Discharge Recommendations: Continue to assess pending progress, Subacute/Skilled Nursing Facility--pt is not safe to return home at this time. He is at a high fall risk and requires up to Moderate Assistance.      Patient Education:  PT Education: Goals, PT Role, Plan of Care, Home Exercise Program, Transfer Training, Energy Conservation, Injury Prevention, Functional Mobility Training, Family Education, General Safety, Gait Training    Equipment Recommendations:  Equipment Needed: No    Plan:  Times per week: 5x GM  Times per day: Daily  Current Treatment Recommendations: Strengthening, Transfer Training, Functional Mobility Training, Balance Training, Endurance Training, Gait Training, Stair training, Patient/Caregiver Education & Training, Safety Education & Training, Home Exercise Program    Goals:  Patient goals : improve walking and breathing  Short term goals  Time Frame for Short term goals: by hospital discharge  Short term goal 1: Supine to/from sit with CGA x 1 for ease of transfers at discharge site. Short term goal 2: Sit to/from stand with CGA x 1 for ease of transfers at discharge site. Short term goal 3: Ambulate 36' with RW and CGA x 1 for home distance ambulation. Short term goal 4: AScend/descend 3 steps with no HRs with Min A x 1 for safe entry into home. Long term goals  Time Frame for Long term goals : N/A due to short ELOS. Following session, patient left in safe position with all fall risk precautions in place.     Eh Ramos, PT, DPT

## 2021-06-03 NOTE — PLAN OF CARE
Problem: Falls - Risk of:  Goal: Will remain free from falls  Description: Will remain free from falls  Outcome: Ongoing  Note: No falls this shift. Call light and possessions within reach, bed alarm on. Goal: Absence of physical injury  Description: Absence of physical injury  Outcome: Ongoing     Problem: Skin Integrity:  Goal: Will show no infection signs and symptoms  Description: Will show no infection signs and symptoms  Outcome: Ongoing  Goal: Absence of new skin breakdown  Description: Absence of new skin breakdown  Outcome: Ongoing  Note: No new skin breakdown this shift. Patient turned every 2 hours. Problem: Confusion - Acute:  Goal: Absence of continued neurological deterioration signs and symptoms  Description: Absence of continued neurological deterioration signs and symptoms  Outcome: Ongoing  Goal: Mental status will be restored to baseline  Description: Mental status will be restored to baseline  Outcome: Ongoing  Note: Pt alert and oriented x4 this shift, with intermittent confusion. Problem: Discharge Planning:  Goal: Ability to perform activities of daily living will improve  Description: Ability to perform activities of daily living will improve  Outcome: Ongoing  Goal: Participates in care planning  Description: Participates in care planning  Outcome: Ongoing  Note: Pts family participates in discharge planning. Problem: Injury - Risk of, Physical Injury:  Goal: Will remain free from falls  Description: Will remain free from falls  Outcome: Ongoing  Note: No falls this shift. Call light and possessions within reach, bed alarm on. Goal: Absence of physical injury  Description: Absence of physical injury  Outcome: Ongoing     Problem: Mood - Altered:  Goal: Mood stable  Description: Mood stable  Outcome: Ongoing  Note: Patient calm and cooperative this shift.   Goal: Absence of abusive behavior  Description: Absence of abusive behavior  Outcome: Ongoing  Goal: Verbalizations of feeling emotionally comfortable while being cared for will increase  Description: Verbalizations of feeling emotionally comfortable while being cared for will increase  Outcome: Ongoing     Problem: Psychomotor Activity - Altered:  Goal: Absence of psychomotor disturbance signs and symptoms  Description: Absence of psychomotor disturbance signs and symptoms  Outcome: Ongoing     Problem: Sensory Perception - Impaired:  Goal: Demonstrations of improved sensory functioning will increase  Description: Demonstrations of improved sensory functioning will increase  Outcome: Ongoing  Goal: Decrease in sensory misperception frequency  Description: Decrease in sensory misperception frequency  Outcome: Ongoing  Goal: Able to refrain from responding to false sensory perceptions  Description: Able to refrain from responding to false sensory perceptions  Outcome: Ongoing  Goal: Demonstrates accurate environmental perceptions  Description: Demonstrates accurate environmental perceptions  Outcome: Ongoing  Goal: Able to distinguish between reality-based and nonreality-based thinking  Description: Able to distinguish between reality-based and nonreality-based thinking  Outcome: Ongoing  Goal: Able to interrupt nonreality-based thinking  Description: Able to interrupt nonreality-based thinking  Outcome: Ongoing     Problem: Sleep Pattern Disturbance:  Goal: Appears well-rested  Description: Appears well-rested  Outcome: Ongoing  Note: Pt resting comfortably in bed this shift. Problem: Impaired respiratory status  Goal: Clear lung sounds  6/2/2021 2152 by Marleni Cuevas RN  Outcome: Ongoing  Note: LS clear and diminished this shift. Pt requiring high flow to maintain oxygen sats above 90%. Currently weaning per protocols. 6/2/2021 1943 by Toni Driver RCP  Outcome: Ongoing   Care plan reviewed with patient. Patient verbalize understanding of the plan of care and contribute to goal setting.

## 2021-06-04 LAB
ANION GAP SERPL CALCULATED.3IONS-SCNC: 8 MEQ/L (ref 8–16)
BUN BLDV-MCNC: 25 MG/DL (ref 7–22)
CALCIUM SERPL-MCNC: 9.1 MG/DL (ref 8.5–10.5)
CHLORIDE BLD-SCNC: 97 MEQ/L (ref 98–111)
CO2: 29 MEQ/L (ref 23–33)
CREAT SERPL-MCNC: 1.5 MG/DL (ref 0.4–1.2)
EKG ATRIAL RATE: 63 BPM
EKG P AXIS: 74 DEGREES
EKG P-R INTERVAL: 188 MS
EKG Q-T INTERVAL: 412 MS
EKG QRS DURATION: 120 MS
EKG QTC CALCULATION (BAZETT): 421 MS
EKG R AXIS: -104 DEGREES
EKG T AXIS: 72 DEGREES
EKG VENTRICULAR RATE: 63 BPM
GFR SERPL CREATININE-BSD FRML MDRD: 44 ML/MIN/1.73M2
GLUCOSE BLD-MCNC: 117 MG/DL (ref 70–108)
MRSA SCREEN: NORMAL
POTASSIUM REFLEX MAGNESIUM: 4.3 MEQ/L (ref 3.5–5.2)
SODIUM BLD-SCNC: 134 MEQ/L (ref 135–145)
TROPONIN T: 0.02 NG/ML

## 2021-06-04 PROCEDURE — 94761 N-INVAS EAR/PLS OXIMETRY MLT: CPT

## 2021-06-04 PROCEDURE — 94640 AIRWAY INHALATION TREATMENT: CPT

## 2021-06-04 PROCEDURE — 6360000002 HC RX W HCPCS: Performed by: NURSE PRACTITIONER

## 2021-06-04 PROCEDURE — 93005 ELECTROCARDIOGRAM TRACING: CPT | Performed by: INTERNAL MEDICINE

## 2021-06-04 PROCEDURE — 99223 1ST HOSP IP/OBS HIGH 75: CPT | Performed by: NUCLEAR MEDICINE

## 2021-06-04 PROCEDURE — 80048 BASIC METABOLIC PNL TOTAL CA: CPT

## 2021-06-04 PROCEDURE — 97530 THERAPEUTIC ACTIVITIES: CPT

## 2021-06-04 PROCEDURE — 99233 SBSQ HOSP IP/OBS HIGH 50: CPT | Performed by: INTERNAL MEDICINE

## 2021-06-04 PROCEDURE — 97116 GAIT TRAINING THERAPY: CPT

## 2021-06-04 PROCEDURE — 6370000000 HC RX 637 (ALT 250 FOR IP): Performed by: NURSE PRACTITIONER

## 2021-06-04 PROCEDURE — 6370000000 HC RX 637 (ALT 250 FOR IP): Performed by: STUDENT IN AN ORGANIZED HEALTH CARE EDUCATION/TRAINING PROGRAM

## 2021-06-04 PROCEDURE — 36415 COLL VENOUS BLD VENIPUNCTURE: CPT

## 2021-06-04 PROCEDURE — 2060000000 HC ICU INTERMEDIATE R&B

## 2021-06-04 PROCEDURE — 84484 ASSAY OF TROPONIN QUANT: CPT

## 2021-06-04 PROCEDURE — 2580000003 HC RX 258: Performed by: NURSE PRACTITIONER

## 2021-06-04 RX ORDER — DEXTROSE MONOHYDRATE 50 MG/ML
100 INJECTION, SOLUTION INTRAVENOUS PRN
Status: DISCONTINUED | OUTPATIENT
Start: 2021-06-04 | End: 2021-06-09 | Stop reason: HOSPADM

## 2021-06-04 RX ORDER — NICOTINE POLACRILEX 4 MG
15 LOZENGE BUCCAL PRN
Status: DISCONTINUED | OUTPATIENT
Start: 2021-06-04 | End: 2021-06-09 | Stop reason: HOSPADM

## 2021-06-04 RX ORDER — DEXTROSE MONOHYDRATE 25 G/50ML
12.5 INJECTION, SOLUTION INTRAVENOUS PRN
Status: DISCONTINUED | OUTPATIENT
Start: 2021-06-04 | End: 2021-06-09 | Stop reason: HOSPADM

## 2021-06-04 RX ORDER — METHYLPREDNISOLONE SODIUM SUCCINATE 40 MG/ML
40 INJECTION, POWDER, LYOPHILIZED, FOR SOLUTION INTRAMUSCULAR; INTRAVENOUS DAILY
Status: DISCONTINUED | OUTPATIENT
Start: 2021-06-05 | End: 2021-06-05

## 2021-06-04 RX ADMIN — GABAPENTIN 100 MG: 100 CAPSULE ORAL at 09:23

## 2021-06-04 RX ADMIN — ARFORMOTEROL TARTRATE 15 MCG: 15 SOLUTION RESPIRATORY (INHALATION) at 18:26

## 2021-06-04 RX ADMIN — ALBUTEROL SULFATE 2.5 MG: 2.5 SOLUTION RESPIRATORY (INHALATION) at 11:42

## 2021-06-04 RX ADMIN — GABAPENTIN 100 MG: 100 CAPSULE ORAL at 20:01

## 2021-06-04 RX ADMIN — Medication 500 MG: at 20:01

## 2021-06-04 RX ADMIN — PRAVASTATIN SODIUM 80 MG: 80 TABLET ORAL at 20:01

## 2021-06-04 RX ADMIN — ENOXAPARIN SODIUM 30 MG: 30 INJECTION SUBCUTANEOUS at 09:23

## 2021-06-04 RX ADMIN — ACETAMINOPHEN 650 MG: 325 TABLET ORAL at 14:01

## 2021-06-04 RX ADMIN — ARFORMOTEROL TARTRATE 15 MCG: 15 SOLUTION RESPIRATORY (INHALATION) at 07:30

## 2021-06-04 RX ADMIN — TIOTROPIUM BROMIDE INHALATION SPRAY 2 PUFF: 3.12 SPRAY, METERED RESPIRATORY (INHALATION) at 07:32

## 2021-06-04 RX ADMIN — ALBUTEROL SULFATE 2.5 MG: 2.5 SOLUTION RESPIRATORY (INHALATION) at 20:33

## 2021-06-04 RX ADMIN — ACETAMINOPHEN 650 MG: 325 TABLET ORAL at 09:45

## 2021-06-04 RX ADMIN — METOPROLOL SUCCINATE 25 MG: 25 TABLET, FILM COATED, EXTENDED RELEASE ORAL at 09:23

## 2021-06-04 RX ADMIN — PREDNISONE 5 MG: 10 TABLET ORAL at 09:23

## 2021-06-04 RX ADMIN — GLIPIZIDE 5 MG: 5 TABLET ORAL at 09:23

## 2021-06-04 RX ADMIN — ALBUTEROL SULFATE 2.5 MG: 2.5 SOLUTION RESPIRATORY (INHALATION) at 15:53

## 2021-06-04 RX ADMIN — BUDESONIDE 500 MCG: 0.5 INHALANT RESPIRATORY (INHALATION) at 07:30

## 2021-06-04 RX ADMIN — ALBUTEROL SULFATE 2.5 MG: 2.5 SOLUTION RESPIRATORY (INHALATION) at 07:30

## 2021-06-04 RX ADMIN — ACETAMINOPHEN 650 MG: 325 TABLET ORAL at 20:12

## 2021-06-04 RX ADMIN — SODIUM CHLORIDE, PRESERVATIVE FREE 10 ML: 5 INJECTION INTRAVENOUS at 20:02

## 2021-06-04 RX ADMIN — Medication 5000 UNITS: at 09:23

## 2021-06-04 RX ADMIN — BUDESONIDE 500 MCG: 0.5 INHALANT RESPIRATORY (INHALATION) at 18:26

## 2021-06-04 RX ADMIN — ACETAMINOPHEN 650 MG: 325 TABLET ORAL at 04:13

## 2021-06-04 RX ADMIN — GUAIFENESIN 400 MG: 200 SOLUTION ORAL at 09:25

## 2021-06-04 RX ADMIN — SODIUM CHLORIDE, PRESERVATIVE FREE 10 ML: 5 INJECTION INTRAVENOUS at 09:24

## 2021-06-04 RX ADMIN — TAMSULOSIN HYDROCHLORIDE 0.4 MG: 0.4 CAPSULE ORAL at 09:23

## 2021-06-04 ASSESSMENT — PAIN DESCRIPTION - ORIENTATION: ORIENTATION: LOWER

## 2021-06-04 ASSESSMENT — PAIN DESCRIPTION - PAIN TYPE
TYPE: ACUTE PAIN

## 2021-06-04 ASSESSMENT — PAIN DESCRIPTION - LOCATION
LOCATION: BACK

## 2021-06-04 ASSESSMENT — PAIN SCALES - GENERAL
PAINLEVEL_OUTOF10: 2
PAINLEVEL_OUTOF10: 9
PAINLEVEL_OUTOF10: 3
PAINLEVEL_OUTOF10: 3
PAINLEVEL_OUTOF10: 0
PAINLEVEL_OUTOF10: 3
PAINLEVEL_OUTOF10: 2

## 2021-06-04 ASSESSMENT — PAIN DESCRIPTION - DESCRIPTORS
DESCRIPTORS: SHARP
DESCRIPTORS: SHARP

## 2021-06-04 NOTE — PROGRESS NOTES
Hospitalist Progress Note    Patient:  Kenneth Mathis      Unit/Bed:4K-26/026-A    YOB: 1931    MRN: [id number]       Acct: [de-identified]     PCP: DANTE Linton CNP    Date of Admission: 6/2/2021    Assessment/Plan:  Acute on chronic hypoxic respiratory failure (baseline 6L NC): Secondary to Type 1 MI vs. Restriction lung expansion d/t back pain. Currently on HFNC with FiO2 40%. ABG: pH 7.43, PCO2 36, PO2 49, HCO3 24.  Keep SPO2 > 90%. Wean O2 as tolerated. COVID 19- negative on 6/2.    6/4- still hypoxic,however not needing more oxygen on hi deyvi- will add vq scan to rule out P.E- given CKD-3, and egfr is overestimated in this age group. 2d Echo ordered    Mechanical fall: Denies loss of consciousness or hitting his head. CT thoracic demonstrates probable fracture of the anterior enthesophytes at T12-L1. Lidoderm, heat and atenolol initiated. Pain management consulted. Elevated troponin: likely demand ischemia from severe hypoxia. NO history CAD. No chest pain, Flat trend in trops 0.013 -> 0.024 -> 0.020. Initial EKG demonstrates ST downsloping and T wave inversion  V1-V3 when hypoxic. However on repeat IMPROVED on repeat. Cardiology consulted 06/03 if needs full anticoag. 2D echo ordered 6/4    Hx of severe COPD: Not in exacerbation. Albuterol, Pulmicort, Spiriva, Brovana, prednisone resumed. 6/4- will add solumedrol given still hypoxic- decreased breath sounds throughout- albuterol nebs q 4. CKD stage III: Creatinine at baseline. Avoid nephrotoxic agents. NIDDM 2: Controlled. A1c 5.3 on 01/2021. Glipizide resumed. Added sliding scale- monitor BS meals and bed time    Primary hypertension: Controlled. Metoprolol resumed. Hyperlipidemia: Pravastatin resumed. BPH: Tamsulosin resumed. Hx prostate cancer: Noted.         Expected discharge date:  06/06/2021    Disposition:    [] Home       [] TCU       [] Rehab       [] Psych       [] SNF       [] [facility]       [] Other-TBD    Chief Complaint: Shortness of breath    Hospital Course:   Diony Sawyer is a 79 yo male with a history of COPD (baseline O2 of 6L), DM 2, HTN, HLD, BPH, Hx of prostate cancer who presents to [facility] for evaluation of shortness of breath. Patient reports a fall the morning of admission. He denies hitting his head and denies loss of consciousness. Patient's wife attended to patient's after hearing a loud noise. His wife states that he was completely alert after his fall. Family was unable to get him up so EMS was called. Patient developed labored breathing with use of accessory muscles while ambulating to the ambulance. Patient was given a breathing treatment. Upon arrival, patient continued to be in respiratory distress and was placed on high flow nasal cannula with great improvement of his O2 saturations and work of breathing. Patient complained of lower back pain after his fall. Patient states that his pain is worse with movement. In the ED, CT chest showed parenchymal scarring of the left lobe. CT of thoracic spine demonstrated probable fracture of the anterior enthesophytes at T12-L1; also possible hemorrhagic cyst of left kidney. Patient was admitted to [facility] for further care and evaluation. Subjective (past 24 hours):   Patient seen and examined. Feeling about the same as yesterday. Patient's wife and daughter were at bedside. Denies chest pain, palpitations, lightheadedness, dizziness, syncope. ROS (12 point review of systems completed. Pertinent positives noted. Otherwise ROS is negative).     Medications:  Reviewed    Infusion Medications    sodium chloride       Scheduled Medications    budesonide  0.5 mg Nebulization BID    Arformoterol Tartrate  15 mcg Nebulization BID    gabapentin  100 mg Oral BID    glipiZIDE  5 mg Oral Daily    guaiFENesin  400 mg Oral Daily    metoprolol succinate  25 mg Oral Daily    niacin  500 mg Oral Nightly    pravastatin  80 mg Oral Nightly    predniSONE  5 mg Oral Daily    tamsulosin  0.4 mg Oral Daily    tiotropium  2 puff Inhalation Daily    Vitamin D  5,000 Units Oral Daily    sodium chloride flush  5-40 mL Intravenous 2 times per day    enoxaparin  30 mg Subcutaneous Daily    albuterol  2.5 mg Nebulization Q4H WA    lidocaine  3 patch Transdermal Daily     PRN Meds: acetaminophen **OR** [DISCONTINUED] acetaminophen, albuterol sulfate HFA, sodium chloride flush, sodium chloride, ondansetron **OR** ondansetron, polyethylene glycol      Intake/Output Summary (Last 24 hours) at 6/4/2021 1745  Last data filed at 6/4/2021 1334  Gross per 24 hour   Intake 1080 ml   Output 850 ml   Net 230 ml       Diet:  ADULT DIET; Regular; 4 carb choices (60 gm/meal)    Exam:  BP (!) 122/59   Pulse 62   Temp 98.2 °F (36.8 °C) (Oral)   Resp 20   Ht 5' 6\" (1.676 m)   Wt 135 lb 4.8 oz (61.4 kg)   SpO2 96%   BMI 21.84 kg/m²     General appearance: No apparent distress, appears stated age and cooperative. HEENT: Pupils equal, round, and reactive to light. Conjunctivae/corneas clear. Neck: Supple, with full range of motion. No jugular venous distention. Trachea midline. Respiratory:  Normal respiratory effort. Decreased breath sounds. Cardiovascular: Regular rate and rhythm with normal S1/S2 without murmurs, rubs or gallops. Abdomen: Soft, non-tender, non-distended with normal bowel sounds. Musculoskeletal: passive and active ROM x 4 extremities. Skin: Skin color, texture, turgor normal.  No rashes or lesions. Neurologic:  Neurovascularly intact without any focal sensory/motor deficits.    Psychiatric: Alert and oriented, thought content appropriate, normal insight  Capillary Refill: Brisk,< 3 seconds   Peripheral Pulses: +2 palpable, equal bilaterally       Labs:   Recent Labs     06/02/21  1020 06/03/21  0621   WBC 17.3* 13.3*   HGB 14.7 13.1*   HCT 45.9 40.6*    169     Recent Labs     06/02/21  1020 06/03/21  0621 06/04/21  0931   NA 140 139 134*   K 4.9 4.7 4.3    103 97*   CO2 27 25 29   BUN 25* 26* 25*   CREATININE 1.6* 1.5* 1.5*   CALCIUM 9.5 8.8 9.1     No results for input(s): AST, ALT, BILIDIR, BILITOT, ALKPHOS in the last 72 hours. No results for input(s): INR in the last 72 hours. No results for input(s): [facility] in the last 72 hours. Microbiology:      Urinalysis:      Lab Results   Component Value Date    NITRU Negative 03/12/2018    WBCUA 0 08/23/2014    BACTERIA NONE 08/23/2014    RBCUA 0 08/23/2014    BLOODU Negative 03/12/2018    SPECGRAV 1.015 08/23/2014    GLUCOSEU negative 03/12/2018       Radiology:  CT CHEST WO CONTRAST   Final Result   Parenchymal scarring left lung. No acute posttraumatic process identified. **This report has been created using voice recognition software. It may contain minor errors which are inherent in voice recognition technology. **      Final report electronically signed by Dr. Arben Crane on 6/2/2021 1:15 PM      CT THORACIC RECONSTRUCTION WO POST PROCESS   Final Result   1. Probable fracture of the the anterior enthesophytes at T12-L1   2. Central canal stenosis which is moderate to severe at C5-C6 due to posterior marginal osteophytes            **This report has been created using voice recognition software. It may contain minor errors which are inherent in voice recognition technology. **      Final report electronically signed by Dr. Arben Crane on 6/2/2021 1:25 PM      CT LUMBAR SPINE WO CONTRAST   Final Result   1. No acute fracture or acute bony malalignment other than the probable fracture of the anterior enthesophytes at T12-L1   2. Possible hemorrhagic cyst left kidney. **This report has been created using voice recognition software. It may contain minor errors which are inherent in voice recognition technology. **      Final report electronically signed by Dr. Arben Crane on 6/2/2021 1:31 PM      XR CHEST PORTABLE   Final Result   1.  There are increased opacities at the left perihilar region including a focal parenchymal bandlike opacity, which may represent atelectasis versus scarring versus pneumonia. Recommend short-term follow-up chest x-ray to document stability or    resolution. **This report has been created using voice recognition software. It may contain minor errors which are inherent in voice recognition technology. **      Final report electronically signed by Dr. Neri Osorio on 6/2/2021 10:39 AM          DVT prophylaxis: [x] Lovenox                                 [] SCDs                                 [] SQ Heparin                                 [] Encourage ambulation           [] Already on Anticoagulation     Code Status: Full Code    PT/OT Eval Status: Yes    Tele:   [x] yes             [] no    Electronically signed by Dami Lopez MD on 6/4/2021 at 5:45 PM

## 2021-06-04 NOTE — PLAN OF CARE
Problem: Mood - Altered:  Goal: Absence of abusive behavior  Description: Absence of abusive behavior  Outcome: Met This Shift  Goal: Verbalizations of feeling emotionally comfortable while being cared for will increase  Description: Verbalizations of feeling emotionally comfortable while being cared for will increase  Outcome: Met This Shift     Problem: Psychomotor Activity - Altered:  Goal: Absence of psychomotor disturbance signs and symptoms  Description: Absence of psychomotor disturbance signs and symptoms  Outcome: Met This Shift     Problem: Falls - Risk of:  Goal: Will remain free from falls  Description: Will remain free from falls  6/3/2021 2210 by Antonio Villarreal RN  Outcome: Ongoing  Note: No falls this shift. Call light and possessions within reach, bed alarm on.  6/3/2021 1813 by Mikal Webb RN  Outcome: Met This Shift  Goal: Absence of physical injury  Description: Absence of physical injury  6/3/2021 2210 by Antonio Villarreal RN  Outcome: Ongoing  6/3/2021 1813 by Mikal Webb RN  Outcome: Met This Shift     Problem: Skin Integrity:  Goal: Will show no infection signs and symptoms  Description: Will show no infection signs and symptoms  6/3/2021 2210 by Antonio Villarreal RN  Outcome: Ongoing  6/3/2021 1813 by Mikal Webb RN  Outcome: Ongoing  Goal: Absence of new skin breakdown  Description: Absence of new skin breakdown  6/3/2021 2210 by Antonio Villarreal RN  Outcome: Ongoing  Note: No new skin breakdown this shift. Pt turned every 2 hours and as needed.   6/3/2021 1813 by Mikal Webb RN  Outcome: Ongoing     Problem: Confusion - Acute:  Goal: Absence of continued neurological deterioration signs and symptoms  Description: Absence of continued neurological deterioration signs and symptoms  6/3/2021 2210 by Antonio Villarreal RN  Outcome: Ongoing  6/3/2021 1813 by Mikal Webb RN  Outcome: Ongoing  Goal: Mental status will be restored to baseline  Description: Mental status will be restored to baseline  6/3/2021 2210 by Constance Alcantara RN  Outcome: Ongoing  Note: Pt A+O x3 with intermittent episodes of confusion. 6/3/2021 1813 by Brett Landeros RN  Outcome: Ongoing     Problem: Discharge Planning:  Goal: Ability to perform activities of daily living will improve  Description: Ability to perform activities of daily living will improve  Outcome: Ongoing  Goal: Participates in care planning  Description: Participates in care planning  Outcome: Ongoing  Note: Family and patient actively participates in care planning     Problem: Injury - Risk of, Physical Injury:  Goal: Will remain free from falls  Description: Will remain free from falls  6/3/2021 2210 by Constance Alcantara RN  Outcome: Ongoing  Note: No falls this shift. Call light and possessions within reach, bed alarm on.  6/3/2021 1813 by Brett Landeros RN  Outcome: Met This Shift  Goal: Absence of physical injury  Description: Absence of physical injury  6/3/2021 2210 by Constance Alcantara RN  Outcome: Ongoing  6/3/2021 1813 by Brett Landeros RN  Outcome: Met This Shift     Problem: Mood - Altered:  Goal: Mood stable  Description: Mood stable  Outcome: Ongoing  Note: Mood stable this shift.      Problem: Sensory Perception - Impaired:  Goal: Demonstrations of improved sensory functioning will increase  Description: Demonstrations of improved sensory functioning will increase  Outcome: Ongoing  Goal: Decrease in sensory misperception frequency  Description: Decrease in sensory misperception frequency  Outcome: Ongoing  Goal: Able to refrain from responding to false sensory perceptions  Description: Able to refrain from responding to false sensory perceptions  Outcome: Ongoing  Goal: Demonstrates accurate environmental perceptions  Description: Demonstrates accurate environmental perceptions  Outcome: Ongoing  Goal: Able to distinguish between reality-based and nonreality-based thinking  Description: Able to distinguish between reality-based and

## 2021-06-04 NOTE — PROGRESS NOTES
[address] [facility] PHYSICAL THERAPY  DAILY NOTE  [facility] ICU STEPDOWN TELEMETRY 4K - 4K-26/026-A  Time In: 1006  Time Out: 1036  Timed Code Treatment Minutes: 30 Minutes  Minutes: 30          Date: 2021  Patient Name: Kenneth Mathis,  Gender:  male        MRN: [id number]  : 1931  (80 y.o.)     Referring Practitioner: Danny Ross MD  Diagnosis: acute hypoxemic respiratory failure  Additional Pertinent Hx: Per HPI: \"80 y.o. male who presented to [address] [place] [address] with shortness of breath; patient has a past medical history of diabetes, COPD, prostate cancer along with CAD; he is on 6 L of oxygen around-the-clock patient was in his normal state of health and he subsequently fell this morning; denies hitting his head and denies loss of consciousness; his wife heard a thud and the patient immediately called his wife's name; family was unable to get him up so EMS was called; the patient is on 6 L of oxygen at baseline however he was ambulated out to the EMS cot and then family noticed extreme difficulty in breathing; he was placed on oxygen and given a breathing treatment; upon arrival here he was still in distress so he was placed on high flow nasal cannula with great improvement of his sats and decrease work of breathing; he complains of pain to his low back; his pain is worse with movement; patient is alert and oriented, family states at home he uses [employer]; both wife and daughter at the bedside and feel he looks much better now.  In the emergency department, he had a CT chest without contrast that showed parenchymal scarring the left lobe; he had a CT of the T and L-spine that showed no acute fracture or bony malalignment other than the probable fracture of the anterior enthesophytes at T12-L1; also possible hemorrhagic cyst left kidney; patient is currently on high flow oxygen so the goal is to wean him down to 6 L baseline, he was going to be admitted to stepdown for further care and evaluation. \"     Prior Level of Function:  Lives With: Spouse  Type of Home: House  Home Layout: One level  Home Access: Stairs to enter without rails  Entrance Stairs - Number of Steps: 3--daughter assists with climbing stairs  Home Equipment: Rolling walker (pt just started using walker 5 weeks ago per family's recommendation)        Ambulation Assistance: Independent  Transfer Assistance: Independent  Active : No  Additional Comments: pt fell one time a week prior to this admission in addition to fall that brought him to the hospital    Restrictions/Precautions:  Restrictions/Precautions: Fall Risk, General Precautions  Position Activity Restriction  Other position/activity restrictions: on HFNC; log roll for comfort     SUBJECTIVE: RN approved PT evaluation, and states she just titrated oxygen up, as pt has had trouble maintaining O2 sats. Pt agreeable to therapy. Wife and daughter present and very supportive. Pt did not remember cues from yesterday to push with hands from the bed, and required continued reminders this session. He c/o back pain throughout, and nose was running consistently, but continued to cooperate well with therapy. PAIN: low back-not quantified    Vitals: Oxygen: 85<-->90% with activity, up to 95% while resting; settings up to: 35L/min at 65% FiO2    OBJECTIVE:  Bed Mobility:  Rolling to Right: Contact Guard Assistance, with verbal cues , with increased time for completion, to roll from L side to back   Supine to Sit: Minimal Assistance, X 1, with verbal cues , with increased time for completion  Sit to Supine: Minimal Assistance, X 1, with verbal cues , with increased time for completion   Scooting: Contact Guard Assistance, X 1, for seated scooting; hercules for scooting towards HOB.    *did encourage pt to scoot to [address] once in supine, assisted him with flexing knees to help push back, but once in position he stated he could not due to back pain  *encouraged pt to perform log Home Exercise Program    Patient Education  Patient Education: Plan of Care, Bed Mobility, Transfers, Reviewed Prior Education, Gait,  - Patient Verbalized Understanding, - Patient Requires Continued Education    Goals:  Patient goals : improve walking and breathing  Short term goals  Time Frame for Short term goals: by hospital discharge  Short term goal 1: Supine to/from sit with CGA x 1 for ease of transfers at discharge site. Short term goal 2: Sit to/from stand with CGA x 1 for ease of transfers at discharge site. Short term goal 3: Ambulate 36' with RW and CGA x 1 for home distance ambulation. Short term goal 4: AScend/descend 3 steps with no HRs with Min A x 1 for safe entry into home. Long term goals  Time Frame for Long term goals : N/A due to short ELOS. Following session, patient left in safe position with all fall risk precautions in place.     Aris Tubbs, PT, DPT

## 2021-06-04 NOTE — PLAN OF CARE
Problem: Pain:  Goal: Control of acute pain  Description: Control of acute pain  Outcome: Met This Shift  Note: Patient using scheduled lidoderm patches and tylenol as needed. Problem: Pain:  Goal: Control of chronic pain  Description: Control of chronic pain  Outcome: Met This Shift  Note: Patient using scheduled lidoderm patches and tylenol as needed. Problem: Falls - Risk of:  Goal: Will remain free from falls  Description: Will remain free from falls  Outcome: Ongoing  Note: Patient remains in bed as he easily desats. Patient has intermittent confusion. Bed alarm on and family at bedside much of this day. Problem: Falls - Risk of:  Goal: Absence of physical injury  Description: Absence of physical injury  Outcome: Ongoing  Note: Patient remains in bed as he easily desats. Patient has intermittent confusion. Bed alarm on and family at bedside much of this day. Problem: Skin Integrity:  Goal: Will show no infection signs and symptoms  Description: Will show no infection signs and symptoms  Outcome: Ongoing  Note: Continuing to monitor labs and vitals per order. No evidence of new skin breakdown this shift. Encouraging patient to reposition every 2 hours. Problem: Skin Integrity:  Goal: Absence of new skin breakdown  Description: Absence of new skin breakdown  Outcome: Ongoing  Note: No evidence of new skin breakdown this shift. Encouraging patient to reposition every 2 hours. Problem: Confusion - Acute:  Goal: Absence of continued neurological deterioration signs and symptoms  Description: Absence of continued neurological deterioration signs and symptoms  Outcome: Ongoing  Note: Patient alert and oriented at start of this shift. Patient has had intermittent confusion throughout the day.       Problem: Confusion - Acute:  Goal: Mental status will be restored to baseline  Description: Mental status will be restored to baseline  Outcome: Ongoing  Note: Patient alert and oriented at start of this shift. Patient has had intermittent confusion throughout the day. Problem: Discharge Planning:  Goal: Ability to perform activities of daily living will improve  Description: Ability to perform activities of daily living will improve  Outcome: Ongoing  Note: Discharge pending physician approval.  Patient may benefit from nursing home on discharge for additional support. Problem: Discharge Planning:  Goal: Participates in care planning  Description: Participates in care planning  Outcome: Ongoing  Note: Patient has intermittent confusion. Patient's wife and daughter at bedside most of the day, participating in care planning. Problem: Injury - Risk of, Physical Injury:  Goal: Will remain free from falls  Description: Will remain free from falls  Outcome: Ongoing  Note: Patient remains in bed as he easily desats. Patient has intermittent confusion. Bed alarm on and family at bedside much of this day. Problem: Injury - Risk of, Physical Injury:  Goal: Absence of physical injury  Description: Absence of physical injury  Outcome: Ongoing  Note: Patient remains in bed as he easily desats. Patient has intermittent confusion. Bed alarm on and family at bedside much of this day. Problem: Sleep Pattern Disturbance:  Goal: Appears well-rested  Description: Appears well-rested  Outcome: Ongoing  Note: Patient reports poor sleep last night. Care plan reviewed with patient and his family. Patient and family verbalize understanding of the plan of care and contribute to goal setting.

## 2021-06-04 NOTE — CARE COORDINATION
6/4/21, 2:44 PM EDT    DISCHARGE ON GOING [facility] day: 2  Location: [facility]26/026-A Reason for admit: Acute hypoxemic respiratory failure ([address] [J96.01]   Procedure: No.  Barriers to Discharge: Pt continues on HHF 30L and 41% FIO2. Sat 94%. VSS. Nebulizers q 4 hrs. Sharlie Faster. Pulmicort. Prednisone. Spiriva. Therapy following and recommending SNF. PCP: DANTE Mcnamara CNP  Readmission Risk Score: 16%  Patient Goals/Plan/Treatment Preferences: Plans return home with spouse and current O2 with [employer] (6L). Family planning home with [place] but concerns they may need more. SW is following and aware of PT's recommendations.

## 2021-06-05 ENCOUNTER — APPOINTMENT (OUTPATIENT)
Dept: GENERAL RADIOLOGY | Age: 86
DRG: 189 | End: 2021-06-05
Payer: MEDICARE

## 2021-06-05 ENCOUNTER — APPOINTMENT (OUTPATIENT)
Dept: CT IMAGING | Age: 86
DRG: 189 | End: 2021-06-05
Payer: MEDICARE

## 2021-06-05 LAB
ALBUMIN SERPL-MCNC: 3.5 G/DL (ref 3.5–5.1)
ALP BLD-CCNC: 64 U/L (ref 38–126)
ALT SERPL-CCNC: 14 U/L (ref 11–66)
AMYLASE: 43 U/L (ref 20–104)
ANION GAP SERPL CALCULATED.3IONS-SCNC: 11 MEQ/L (ref 8–16)
AST SERPL-CCNC: 19 U/L (ref 5–40)
BASOPHILS # BLD: 0.2 %
BASOPHILS ABSOLUTE: 0 THOU/MM3 (ref 0–0.1)
BILIRUB SERPL-MCNC: 0.6 MG/DL (ref 0.3–1.2)
BUN BLDV-MCNC: 27 MG/DL (ref 7–22)
CALCIUM SERPL-MCNC: 9.1 MG/DL (ref 8.5–10.5)
CHLORIDE BLD-SCNC: 92 MEQ/L (ref 98–111)
CO2: 24 MEQ/L (ref 23–33)
CREAT SERPL-MCNC: 1.5 MG/DL (ref 0.4–1.2)
EOSINOPHIL # BLD: 0.2 %
EOSINOPHILS ABSOLUTE: 0 THOU/MM3 (ref 0–0.4)
ERYTHROCYTE [DISTWIDTH] IN BLOOD BY AUTOMATED COUNT: 12.6 % (ref 11.5–14.5)
ERYTHROCYTE [DISTWIDTH] IN BLOOD BY AUTOMATED COUNT: 41.9 FL (ref 35–45)
GFR SERPL CREATININE-BSD FRML MDRD: 44 ML/MIN/1.73M2
GLUCOSE BLD-MCNC: 104 MG/DL (ref 70–108)
GLUCOSE BLD-MCNC: 109 MG/DL (ref 70–108)
GLUCOSE BLD-MCNC: 216 MG/DL (ref 70–108)
GLUCOSE BLD-MCNC: 307 MG/DL (ref 70–108)
GLUCOSE BLD-MCNC: 320 MG/DL (ref 70–108)
GLUCOSE BLD-MCNC: 93 MG/DL (ref 70–108)
HCT VFR BLD CALC: 43.6 % (ref 42–52)
HEMOGLOBIN: 14 GM/DL (ref 14–18)
IMMATURE GRANS (ABS): 0.07 THOU/MM3 (ref 0–0.07)
IMMATURE GRANULOCYTES: 0.8 %
LIPASE: 39.4 U/L (ref 5.6–51.3)
LYMPHOCYTES # BLD: 2.1 %
LYMPHOCYTES ABSOLUTE: 0.2 THOU/MM3 (ref 1–4.8)
MCH RBC QN AUTO: 29.5 PG (ref 26–33)
MCHC RBC AUTO-ENTMCNC: 32.1 GM/DL (ref 32.2–35.5)
MCV RBC AUTO: 92 FL (ref 80–94)
MONOCYTES # BLD: 3 %
MONOCYTES ABSOLUTE: 0.3 THOU/MM3 (ref 0.4–1.3)
NUCLEATED RED BLOOD CELLS: 0 /100 WBC
PLATELET # BLD: 211 THOU/MM3 (ref 130–400)
PMV BLD AUTO: 9.3 FL (ref 9.4–12.4)
POTASSIUM SERPL-SCNC: 5.2 MEQ/L (ref 3.5–5.2)
RBC # BLD: 4.74 MILL/MM3 (ref 4.7–6.1)
SEG NEUTROPHILS: 93.7 %
SEGMENTED NEUTROPHILS ABSOLUTE COUNT: 8.7 THOU/MM3 (ref 1.8–7.7)
SODIUM BLD-SCNC: 127 MEQ/L (ref 135–145)
TOTAL PROTEIN: 6.3 G/DL (ref 6.1–8)
TROPONIN T: 0.01 NG/ML
TROPONIN T: < 0.01 NG/ML
WBC # BLD: 9.3 THOU/MM3 (ref 4.8–10.8)

## 2021-06-05 PROCEDURE — 6360000002 HC RX W HCPCS: Performed by: INTERNAL MEDICINE

## 2021-06-05 PROCEDURE — 2700000000 HC OXYGEN THERAPY PER DAY

## 2021-06-05 PROCEDURE — 6370000000 HC RX 637 (ALT 250 FOR IP): Performed by: STUDENT IN AN ORGANIZED HEALTH CARE EDUCATION/TRAINING PROGRAM

## 2021-06-05 PROCEDURE — 36415 COLL VENOUS BLD VENIPUNCTURE: CPT

## 2021-06-05 PROCEDURE — 74177 CT ABD & PELVIS W/CONTRAST: CPT

## 2021-06-05 PROCEDURE — 2060000000 HC ICU INTERMEDIATE R&B

## 2021-06-05 PROCEDURE — 2500000003 HC RX 250 WO HCPCS

## 2021-06-05 PROCEDURE — 85025 COMPLETE CBC W/AUTO DIFF WBC: CPT

## 2021-06-05 PROCEDURE — 6370000000 HC RX 637 (ALT 250 FOR IP): Performed by: NURSE PRACTITIONER

## 2021-06-05 PROCEDURE — 6360000004 HC RX CONTRAST MEDICATION: Performed by: INTERNAL MEDICINE

## 2021-06-05 PROCEDURE — 94669 MECHANICAL CHEST WALL OSCILL: CPT

## 2021-06-05 PROCEDURE — 93005 ELECTROCARDIOGRAM TRACING: CPT | Performed by: INTERNAL MEDICINE

## 2021-06-05 PROCEDURE — 6360000002 HC RX W HCPCS: Performed by: NURSE PRACTITIONER

## 2021-06-05 PROCEDURE — 2580000003 HC RX 258: Performed by: NURSE PRACTITIONER

## 2021-06-05 PROCEDURE — 82150 ASSAY OF AMYLASE: CPT

## 2021-06-05 PROCEDURE — 94640 AIRWAY INHALATION TREATMENT: CPT

## 2021-06-05 PROCEDURE — 6370000000 HC RX 637 (ALT 250 FOR IP): Performed by: INTERNAL MEDICINE

## 2021-06-05 PROCEDURE — 84484 ASSAY OF TROPONIN QUANT: CPT

## 2021-06-05 PROCEDURE — 94761 N-INVAS EAR/PLS OXIMETRY MLT: CPT

## 2021-06-05 PROCEDURE — 83690 ASSAY OF LIPASE: CPT

## 2021-06-05 PROCEDURE — 51702 INSERT TEMP BLADDER CATH: CPT

## 2021-06-05 PROCEDURE — 80053 COMPREHEN METABOLIC PANEL: CPT

## 2021-06-05 PROCEDURE — 99291 CRITICAL CARE FIRST HOUR: CPT | Performed by: INTERNAL MEDICINE

## 2021-06-05 PROCEDURE — 99223 1ST HOSP IP/OBS HIGH 75: CPT | Performed by: INTERNAL MEDICINE

## 2021-06-05 PROCEDURE — 71045 X-RAY EXAM CHEST 1 VIEW: CPT

## 2021-06-05 PROCEDURE — 82948 REAGENT STRIP/BLOOD GLUCOSE: CPT

## 2021-06-05 PROCEDURE — 87086 URINE CULTURE/COLONY COUNT: CPT

## 2021-06-05 RX ORDER — METHYLPREDNISOLONE SODIUM SUCCINATE 125 MG/2ML
60 INJECTION, POWDER, LYOPHILIZED, FOR SOLUTION INTRAMUSCULAR; INTRAVENOUS ONCE
Status: DISCONTINUED | OUTPATIENT
Start: 2021-06-05 | End: 2021-06-05

## 2021-06-05 RX ORDER — METHYLPREDNISOLONE SODIUM SUCCINATE 125 MG/2ML
60 INJECTION, POWDER, LYOPHILIZED, FOR SOLUTION INTRAMUSCULAR; INTRAVENOUS EVERY 8 HOURS
Status: DISCONTINUED | OUTPATIENT
Start: 2021-06-05 | End: 2021-06-05

## 2021-06-05 RX ORDER — METHYLPREDNISOLONE SODIUM SUCCINATE 40 MG/ML
40 INJECTION, POWDER, LYOPHILIZED, FOR SOLUTION INTRAMUSCULAR; INTRAVENOUS EVERY 8 HOURS
Status: DISCONTINUED | OUTPATIENT
Start: 2021-06-06 | End: 2021-06-06

## 2021-06-05 RX ORDER — BUMETANIDE 0.25 MG/ML
2 INJECTION, SOLUTION INTRAMUSCULAR; INTRAVENOUS ONCE
Status: COMPLETED | OUTPATIENT
Start: 2021-06-05 | End: 2021-06-05

## 2021-06-05 RX ORDER — INSULIN GLARGINE 100 [IU]/ML
12 INJECTION, SOLUTION SUBCUTANEOUS NIGHTLY
Status: COMPLETED | OUTPATIENT
Start: 2021-06-05 | End: 2021-06-05

## 2021-06-05 RX ORDER — ALBUTEROL SULFATE 2.5 MG/3ML
2.5 SOLUTION RESPIRATORY (INHALATION) ONCE
Status: COMPLETED | OUTPATIENT
Start: 2021-06-05 | End: 2021-06-05

## 2021-06-05 RX ORDER — METHYLPREDNISOLONE SODIUM SUCCINATE 40 MG/ML
40 INJECTION, POWDER, LYOPHILIZED, FOR SOLUTION INTRAMUSCULAR; INTRAVENOUS DAILY
Status: DISCONTINUED | OUTPATIENT
Start: 2021-06-07 | End: 2021-06-06

## 2021-06-05 RX ORDER — BUMETANIDE 0.25 MG/ML
INJECTION, SOLUTION INTRAMUSCULAR; INTRAVENOUS
Status: COMPLETED
Start: 2021-06-05 | End: 2021-06-05

## 2021-06-05 RX ORDER — MORPHINE SULFATE 2 MG/ML
1 INJECTION, SOLUTION INTRAMUSCULAR; INTRAVENOUS EVERY 4 HOURS PRN
Status: DISCONTINUED | OUTPATIENT
Start: 2021-06-05 | End: 2021-06-09 | Stop reason: HOSPADM

## 2021-06-05 RX ADMIN — ARFORMOTEROL TARTRATE 15 MCG: 15 SOLUTION RESPIRATORY (INHALATION) at 17:32

## 2021-06-05 RX ADMIN — METOPROLOL SUCCINATE 25 MG: 25 TABLET, FILM COATED, EXTENDED RELEASE ORAL at 09:10

## 2021-06-05 RX ADMIN — SODIUM CHLORIDE, PRESERVATIVE FREE 10 ML: 5 INJECTION INTRAVENOUS at 09:15

## 2021-06-05 RX ADMIN — INSULIN LISPRO 2 UNITS: 100 INJECTION, SOLUTION INTRAVENOUS; SUBCUTANEOUS at 20:32

## 2021-06-05 RX ADMIN — ARFORMOTEROL TARTRATE 15 MCG: 15 SOLUTION RESPIRATORY (INHALATION) at 07:47

## 2021-06-05 RX ADMIN — METHYLPREDNISOLONE SODIUM SUCCINATE 60 MG: 125 INJECTION, POWDER, FOR SOLUTION INTRAMUSCULAR; INTRAVENOUS at 16:05

## 2021-06-05 RX ADMIN — ACETAMINOPHEN 650 MG: 325 TABLET ORAL at 07:02

## 2021-06-05 RX ADMIN — ACETAMINOPHEN 650 MG: 325 TABLET ORAL at 01:00

## 2021-06-05 RX ADMIN — MORPHINE SULFATE 1 MG: 2 INJECTION, SOLUTION INTRAMUSCULAR; INTRAVENOUS at 16:13

## 2021-06-05 RX ADMIN — ALBUTEROL SULFATE 2.5 MG: 2.5 SOLUTION RESPIRATORY (INHALATION) at 20:18

## 2021-06-05 RX ADMIN — ALBUTEROL SULFATE 2.5 MG: 2.5 SOLUTION RESPIRATORY (INHALATION) at 07:44

## 2021-06-05 RX ADMIN — GABAPENTIN 100 MG: 100 CAPSULE ORAL at 09:11

## 2021-06-05 RX ADMIN — METHYLPREDNISOLONE SODIUM SUCCINATE 40 MG: 40 INJECTION, POWDER, FOR SOLUTION INTRAMUSCULAR; INTRAVENOUS at 09:11

## 2021-06-05 RX ADMIN — ALBUTEROL SULFATE 2.5 MG: 2.5 SOLUTION RESPIRATORY (INHALATION) at 14:56

## 2021-06-05 RX ADMIN — IOPAMIDOL 80 ML: 755 INJECTION, SOLUTION INTRAVENOUS at 18:51

## 2021-06-05 RX ADMIN — ACETAMINOPHEN 650 MG: 325 TABLET ORAL at 20:28

## 2021-06-05 RX ADMIN — ONDANSETRON 4 MG: 2 INJECTION INTRAMUSCULAR; INTRAVENOUS at 15:28

## 2021-06-05 RX ADMIN — BUDESONIDE 500 MCG: 0.5 INHALANT RESPIRATORY (INHALATION) at 07:54

## 2021-06-05 RX ADMIN — TAMSULOSIN HYDROCHLORIDE 0.4 MG: 0.4 CAPSULE ORAL at 09:10

## 2021-06-05 RX ADMIN — Medication 500 MG: at 20:28

## 2021-06-05 RX ADMIN — SODIUM CHLORIDE, PRESERVATIVE FREE 10 ML: 5 INJECTION INTRAVENOUS at 20:29

## 2021-06-05 RX ADMIN — ALBUTEROL SULFATE 2.5 MG: 2.5 SOLUTION RESPIRATORY (INHALATION) at 12:00

## 2021-06-05 RX ADMIN — BUMETANIDE 2 MG: 0.25 INJECTION, SOLUTION INTRAMUSCULAR; INTRAVENOUS at 14:50

## 2021-06-05 RX ADMIN — ALBUTEROL SULFATE 2.5 MG: 2.5 SOLUTION RESPIRATORY (INHALATION) at 17:25

## 2021-06-05 RX ADMIN — PRAVASTATIN SODIUM 80 MG: 80 TABLET ORAL at 20:28

## 2021-06-05 RX ADMIN — ACETAMINOPHEN 650 MG: 325 TABLET ORAL at 13:19

## 2021-06-05 RX ADMIN — GABAPENTIN 100 MG: 100 CAPSULE ORAL at 20:28

## 2021-06-05 RX ADMIN — BUDESONIDE 500 MCG: 0.5 INHALANT RESPIRATORY (INHALATION) at 17:35

## 2021-06-05 RX ADMIN — GUAIFENESIN 400 MG: 200 SOLUTION ORAL at 09:11

## 2021-06-05 RX ADMIN — TIOTROPIUM BROMIDE INHALATION SPRAY 2 PUFF: 3.12 SPRAY, METERED RESPIRATORY (INHALATION) at 07:56

## 2021-06-05 RX ADMIN — GLIPIZIDE 5 MG: 5 TABLET ORAL at 09:10

## 2021-06-05 RX ADMIN — INSULIN GLARGINE 12 UNITS: 100 INJECTION, SOLUTION SUBCUTANEOUS at 20:43

## 2021-06-05 RX ADMIN — ENOXAPARIN SODIUM 30 MG: 30 INJECTION SUBCUTANEOUS at 09:11

## 2021-06-05 RX ADMIN — Medication 5000 UNITS: at 09:09

## 2021-06-05 ASSESSMENT — PAIN DESCRIPTION - ORIENTATION: ORIENTATION: LOWER

## 2021-06-05 ASSESSMENT — PAIN DESCRIPTION - LOCATION: LOCATION: BACK

## 2021-06-05 ASSESSMENT — PAIN DESCRIPTION - DESCRIPTORS: DESCRIPTORS: SHARP

## 2021-06-05 ASSESSMENT — PAIN SCALES - GENERAL
PAINLEVEL_OUTOF10: 0
PAINLEVEL_OUTOF10: 4
PAINLEVEL_OUTOF10: 3
PAINLEVEL_OUTOF10: 0
PAINLEVEL_OUTOF10: 3
PAINLEVEL_OUTOF10: 3
PAINLEVEL_OUTOF10: 0
PAINLEVEL_OUTOF10: 0
PAINLEVEL_OUTOF10: 9
PAINLEVEL_OUTOF10: 0

## 2021-06-05 ASSESSMENT — PAIN DESCRIPTION - PAIN TYPE: TYPE: ACUTE PAIN

## 2021-06-05 NOTE — PROGRESS NOTES
26 - Called into patient's room by floor RNs for acute change in patient condition. Patient's family states that the patient developed acute shortness of breath, difficulty breathing, and vomiting. Family states that patient told them \"I'm dying\" when he began to have severe shortness of breath with swelling to his abdomen and vomiting. Patient appears to be in acute distress. O2 saturation 86%. Oxygen taken to 100% FIO2 and 50 L. Vitals obtained. 26 - Dr. Daniela Figueredo at bedside, CXR, EKG ordered. Patient vomiting white foamy substance. /89, HR 87, SPO2 98% on 100% FIO2 via high flow N/C. Severe inspiratory and expiratory wheezes noted throughout. 12 - Dr. Zoey Cross at bedside. Orders for albuterol nebulizer, solu-medrol, bumex, and morphine placed. Orders for stat labs and Singh catheter insertion received. 1450 - /86, HR 87, SPO2 98% on 95% FIO2 50 L via high flow N/C.    1510 - Barb arthur RN at bedside. IV placed in L forearm. 1600 - Patient remains slightly tachypneic but is no longer labored in breathing. States he is feeling much better. End expiratory wheezes noted throughout. HR 77, SPO2 100%. Will continue to monitor.

## 2021-06-05 NOTE — CONSULTS
[facility]    Patient - Vee Lerma   MRN -  [id number]   [facility] # - [de-identified]   - 1931      Date of Admission -  2021  9:51 AM  Date of evaluation -  2021  Room - [address] [facility] Primary Care Physician - Madai Yang, APRN - CNP   Chief Complaint   SOB  Active Hospital Problem List      Active Hospital Problems    Diagnosis Date Noted    Elevated troponin [R77.8]      Priority: High    Acute bilateral low back pain without sciatica [M54.5]     Acute pain due to injury [G89.11]     Acute hypoxemic respiratory failure ([address] [J96.01] 2021     HPI   Vee Lerma is a 80 y.o. male known to the lung clinic due to advanced stage emphysema and chronic respiratory failure. Admitted to [facility] after he fell at home and during evaluation by EMS he was noted to be SOB and he did not get better, the ambulance crew brought ti the ED for eval.  In the ED Ericka Edwards was anxious and on pain which worsened his SOB  Currently comfortable on bed, daughter at the bedside. Difficulty taking a deep breath due to MSK pain, oxygen saturation 97% on HFNC 55%  CT chest : advanced emphysema with basilar bullae and upper lobe centrilobular pattern. Results for Sebastian Salmon (MRN [id number]) as of 2021 09:38   Ref.  Range 6/3/2021 09:57   Source: Unknown R Brach   pH, Blood Gas Latest Ref Range: 7.35 - 7.45  7.43   PCO2 Latest Ref Range: 35 - 45 mmhg 36   pO2 Latest Ref Range: 71 - 104 mmhg 49 (L)   HCO3 Latest Ref Range: 23 - 28 mmol/l 24   Base Excess (Calculated) Latest Ref Range: -2.5 - 2.5 mmol/l 0.3   O2 Sat Latest Units: % 85   IFIO2 Unknown 40   DEVICE Unknown HFNC   COLLECTED BY: Unknown 2024       Pulm clinic notes,  -Reviewed CT no acute respiratory disease noted, per Fleischner Guidelines no follow-up needed optional 12 month follow-up for solitary lung nodule <6 mm given debilitated state and previous INGUINAL HERNIA REPAIR Bilateral 2015    Dr Marivel Brown; Regular; 4 carb choices (60 gm/meal)  Allergies    Daliresp [roflumilast], Sulfa antibiotics, and Percocet [oxycodone-acetaminophen]  Social History     Social History     Socioeconomic History    Marital status:      Spouse name: Zraa    Number of children: Not on file    Years of education: Not on file    Highest education level: Not on file   Occupational History    Not on file   Tobacco Use    Smoking status: Former Smoker     Packs/day: 2.00     Years: 40.00     Pack years: 80.00     Types: Cigarettes     Quit date: 10/5/1989     Years since quittin.6    Smokeless tobacco: Never Used   Substance and Sexual Activity    Alcohol use: Yes     Alcohol/week: 1.0 standard drinks     Types: 1 Cans of beer per week     Comment: now and then    Drug use: No    Sexual activity: Not on file   Other Topics Concern    Not on file   Social History Narrative    Not on file     Social Determinants of Health     Financial Resource Strain:     Difficulty of Paying Living Expenses:    Food Insecurity:     Worried About Running Out of Food in the Last Year:     [address] in the Last Year:    Transportation Needs:     Lack of Transportation (Medical):      Lack of Transportation (Non-Medical):    Physical Activity:     Days of Exercise per Week:     Minutes of Exercise per Session:    Stress:     Feeling of Stress :    Social Connections:     Frequency of Communication with Friends and Family:     Frequency of Social Gatherings with Friends and Family:     Attends L[employer] day Services:     Active Member of Clubs or Organizations:     Attends Club or Organization Meetings:     Marital Status:    Intimate Partner Violence:     Fear of Current or Ex-Partner:     Emotionally Abused:     Physically Abused:     Sexually Abused:      Family History          Problem Relation Age of Onset  Rheum Arthritis Mother          due to complications related to    Heart Disease Mother     Coronary Art Dis Father     Heart Disease Father     Coronary Art Dis Brother     Heart Disease Brother        ROS    General/Constitutional: weak, frequent falls  HENT: Negative. Eyes: Negative. Upper respiratory tract: No nasal stuffiness or post nasal drip. Lower respiratory tract/ lungs: difficulty coughing due to back pain, positive SOB  Cardiovascular: No palpitations, chest pain or edema. Gastrointestinal: No nausea or vomiting. Neurological: frequent falls  Extremities: No tenderness. Musculoskeletal: lower back pain  Genitourinary: No complaints. Hematological: Negative. Denies easy buising  Skin: No itching. Meds    Current Medications    methylPREDNISolone  40 mg Intravenous Daily    insulin lispro  0-12 Units Subcutaneous TID WC    insulin lispro  0-6 Units Subcutaneous Nightly    budesonide  0.5 mg Nebulization BID    Arformoterol Tartrate  15 mcg Nebulization BID    gabapentin  100 mg Oral BID    glipiZIDE  5 mg Oral Daily    guaiFENesin  400 mg Oral Daily    metoprolol succinate  25 mg Oral Daily    niacin  500 mg Oral Nightly    pravastatin  80 mg Oral Nightly    tamsulosin  0.4 mg Oral Daily    tiotropium  2 puff Inhalation Daily    Vitamin D  5,000 Units Oral Daily    sodium chloride flush  5-40 mL Intravenous 2 times per day    enoxaparin  30 mg Subcutaneous Daily    albuterol  2.5 mg Nebulization Q4H WA    lidocaine  3 patch Transdermal Daily     glucose, dextrose, glucagon (rDNA), dextrose, acetaminophen **OR** [DISCONTINUED] acetaminophen, albuterol sulfate HFA, sodium chloride flush, sodium chloride, ondansetron **OR** ondansetron, polyethylene glycol  IV Drips/Infusions   dextrose      sodium chloride       Vitals    Vitals    height is 5' 6\" (1.676 m) and weight is 128 lb 9.6 oz (58.3 kg). His oral temperature is 98.5 °F (36.9 °C).  His blood pressure is 146/62 (abnormal) and his pulse is 64. His respiration is 20 and oxygen saturation is 97%. O2 Flow Rate (L/min): 35 L/min  I/O    Intake/Output Summary (Last 24 hours) at 6/5/2021 0932  Last data filed at 6/5/2021 0703  Gross per 24 hour   Intake 1230 ml   Output 1545 ml   Net -315 ml     Patient Vitals for the past 96 hrs (Last 3 readings):   Weight   06/05/21 0539 128 lb 9.6 oz (58.3 kg)   06/04/21 0415 135 lb 4.8 oz (61.4 kg)   06/03/21 0325 135 lb 11.2 oz (61.6 kg)     Exam   Constitutional: elderly gentlan, pleasant, [place], some confusion easy to redirect  Head: Normocephalic and atraumatic. Mouth/Throat: Oropharynx is clear and moist.  No oral thrush. Eyes: Conjunctivae are normal. Pupils are equal, round, and reactive to light. No scleral icterus. Neck: Neck supple. No JVD or tracheal deviation present. Cardiovascular: Regular rate, regular rhythm, S1 and S2 with no murmur. No peripheral edema  Pulmonary/Chest: bilateral BS, no wheezes,decerased global air entry, pain with breathing  Abdominal: Soft. Bowel sounds audible. No distension or tenderness to palp  Musculoskeletal: Moves all extremities  Lymphadenopathy:  No cervical adenopathy. Neurological: Patient is alert and oriented to person, place, and time. Skin: Skin is warm and dry.       Labs   ABG  Lab Results   Component Value Date    PH 7.43 06/03/2021    PO2 49 06/03/2021    PCO2 36 06/03/2021    HCO3 24 06/03/2021    O2SAT 85 06/03/2021     Lab Results   Component Value Date    IFIO2 40 06/03/2021     CBC  Recent Labs     06/02/21  1020 06/03/21  0621   WBC 17.3* 13.3*   RBC 4.94 4.40*   HGB 14.7 13.1*   HCT 45.9 40.6*   MCV 92.9 92.3   MCH 29.8 29.8   MCHC 32.0* 32.3    169   MPV 9.1* 9.4      BMP  Recent Labs     06/02/21  1020 06/03/21  0621 06/04/21  0931    139 134*   K 4.9 4.7 4.3    103 97*   CO2 27 25 29   BUN 25* 26* 25*   CREATININE 1.6* 1.5* 1.5*   GLUCOSE 132* 93 117*   CALCIUM 9.5 8.8 9.1     LFT  No results for input(s): AST, ALT, ALB, BILITOT, ALKPHOS, LIPASE in the last 72 hours. Invalid input(s): AMYLASE  TROP  Lab Results   Component Value Date    TROPONINT 0.024 06/04/2021    TROPONINT 0.020 06/02/2021    TROPONINT 0.024 06/02/2021     BNP  Lab Results   Component Value Date    PROBNP 730.8 06/02/2021    PROBNP 220.4 09/13/2016    PROBNP 329.7 04/08/2016     D-Dimer  No results found for: DDIMER  Lactic Acid  No results for input(s): LACTA in the last 72 hours. INR  No results for input(s): INR, PROTIME in the last 72 hours. PTT  No results for input(s): APTT in the last 72 hours. Glucose  Recent Labs     06/02/21 2111 06/05/21  0002 06/05/21  0726   POCGLU 127* 104 109*     UA No results for input(s): SPECGRAV, PHUR, COLORU, CLARITYU, MUCUS, PROTEINU, BLOODU, RBCUA, WBCUA, BACTERIA, NITRU, GLUCOSEU, BILIRUBINUR, UROBILINOGEN, KETUA, LABCAST, LABCASTTY, AMORPHOS in the last 72 hours. Invalid input(s): CRYSTALS. PFTs             Echo     Conclusions      Summary   Technically difficult examination. Ejection fraction is visually estimated at 45-50%. There was mild global hypokinesis of the left ventricle. The aortic valve leaflets were not well visualized. Aortic valve appears tricuspid. Thickened aortic valve leaflets noted. Aortic valve leaflets are Moderately calcified. Myxomatotic degeneration of mitral valve. Decreased mitral valve mobility noted. Calcification of the mitral valve noted.       Signature      ----------------------------------------------------------------   Electronically signed by Padmaja Galloway MD (Interpreting   physician) on 10/07/2020 at 04:05 PM  Cultures    Procalcitonin  No results found for: [facility]     Radiology    CXR  FINDINGS: Single frontal view of the chest was performed.  Cardiac size is    within normal limits.  There are calcifications within the thoracic aortic    arch.  The lungs are again noted to be hyperinflated, compatible with COPD.  Atelectatic/fibrotic changes are noted within the lower lung zones.    Iftikhar Motto is no consolidation or pleural effusion. Iftikhar Motto is no pneumothorax.           Impression   1.  Hyperinflated lungs, compatible with COPD. 2.  Atelectatic/fibrotic changes in the lower lung zones.  No    consolidation or pleural effusion.  No pneumothorax.       This document has been electronically signed by: Lenin Lauren M.D. on    06/05/2021 03:26 AM           Nuclear scan:    Pending:    CT chest;  FINDINGS: Low-density lesion left thyroid lobe. Macrocalcification right thyroid lobe. No axillary lymphadenopathy. No mediastinal or hilar adenopathy by size criteria. Marked aortic calcifications beginning at the ascending aorta involving the arch and descending thoracic aorta. Calcified mediastinal lymph nodes calcified coronary arteries. Interval increase in the anterior pericardial effusion now measuring 11 mm compared to 8. Lungs are emphysematous. There is a atelectasis along the major fissure left lung. Dissection may represent chronic parenchymal scarring versus less noticeable on the prior exam.   No airspace consolidations or pleural effusions no pneumothorax.       Unfortunately is a great deal motion artifact in the upper images. No acute or suspicious bone lesion is identified. There is progression of marginal osteophytes at T12 and L1 somewhat simulating a fracture but there is no distinct acute fracture. Upper abdomen   Endoluminal stent graft is present.           Impression   Parenchymal scarring left lung. No acute posttraumatic process identified.               **This report has been created using voice recognition software.  It may contain minor errors which are inherent in voice recognition technology. **       Final report electronically signed by Dr. Reddy Goes on 6/2/2021 1:15 PM           (See actual reports for details)    Assessment   Very severe COPD GOLD 4  Chest wall contusion with splinting and difficulty taking a deep breath, occult fracture possible. Severe centrilobular/panacinar emphysema  Chronic hypoxemic respiratory failure without hypercarbia (due to severe emphysema and parenchymal destruction)  Would not embark in PTE work up, suspct V/Q lug scan would be inconclusive  Charo Gandhi seems to be at baseline SpO2 97 on HFNC 55%  CT chest done this admission does not show any acute pulmonary issues. Recommendations   Taper oxygen as tolerated for sats 88% (chronic hypoxemia)  Agree with current therapies, Arformoterol/albutero/Budesonide  Will replace Spiriva with  Ipratropium due to inspiratory difficulty due to acute pain. This is terminal disease and would qualify for hospice care  Consider acute rehab for pain management and mobility  Would not pursue OP PFTs or CT chest as not a candidate/interested in any special therapies for cancer on advanced COPD. Add Metaneb/Acapella  From pulm standponit I would DC home/transition to ECF when changed back to nasal cannula  Thank you for the consult and allowing us to participate in the care of your patient. Case discussed with nurse and patient/family. Questions and concerns addressed. Meds and Orders reviewed.     Electronically signed by     Kathryn Tai MD on 6/5/2021 at 9:32 AM

## 2021-06-05 NOTE — PLAN OF CARE
Problem: Impaired respiratory status  Goal: Clear lung sounds  6/5/2021 0755 by José Antonio Mack RCP  Outcome: Not Met This Shift

## 2021-06-05 NOTE — PLAN OF CARE
Problem: Falls - Risk of:  Goal: Will remain free from falls  Description: Will remain free from falls  2021 by Orlin Snow RN  Outcome: Ongoing  Note: Pt remains free from falls, call light in reach and educated patient on how to use, teachback completed. Bed alarm on zone 2, side rails up, gripper socks on, pt close to nurses station and hourly rounding completed. Problem: Falls - Risk of:  Goal: Absence of physical injury  Description: Absence of physical injury  2021 by Orlin Snow RN  Outcome: Ongoing  Note: Pt free from physical injury     Problem: Skin Integrity:  Goal: Will show no infection signs and symptoms  Description: Will show no infection signs and symptoms  2021 by Orlin Snow RN  Outcome: Ongoing  Note: No signs or symptoms of infection noted     Problem: Skin Integrity:  Goal: Absence of new skin breakdown  Description: Absence of new skin breakdown  2021 by Orlin Snow RN  Outcome: Ongoing  Note: Pt being turned every two hours, skin assessed each shift, no new skin breakdown noted. EPC applied to buttocks, urinal offered with hourly rounding as patient is incontinent at times     Problem: Confusion - Acute:  Goal: Absence of continued neurological deterioration signs and symptoms  Description: Absence of continued neurological deterioration signs and symptoms  2021 by Orlin Snow RN  Outcome: Ongoing  Note: Pt alert to name, , year and situation but stated he was in [facility] and not at [facility], pt easily reoriented, pt pleasant. Problem: Confusion - Acute:  Goal: Mental status will be restored to baseline  Description: Mental status will be restored to baseline  2021 by Orlin Snow RN  Outcome: Ongoing  Note: Pt still having intermittent confusion, pt pleasant and reorients well, will continue to monitor.      Problem: Discharge Planning:  Goal: Ability to perform activities of daily living will management following case. Pt getting lidocaine patches which have helped, tylenol ordered every 4 hours as needed. Tylenol helping slightly. Pain increases with movement, pain is in his back due to fractures. Will continue to monitor. Care plan reviewed with patient, wife and daughter. Patient and family verbalize understanding of the plan of care and contribute to goal setting.

## 2021-06-05 NOTE — ED PROVIDER NOTES
[facility] STEPDOWN TELEMETRY 4K      CHIEF COMPLAINT       Chief Complaint   Patient presents with    Shortness of Breath    Fall       Nurses Notes reviewed and I agree except as noted in the HPI. HISTORY OF PRESENT ILLNESS    Jose Barriga is a 80 y.o. male who presents with complaint of shortness of breath and a fall. Patient states he lost his balance and fell backwards hitting the back of his head. Patient had no loss of consciousness. Main complaint is axial spine. Lower thoracic all the way up to mid cervical spine. Patient does not take blood thinners. Patient was also has increased work of breathing, think that this is because of the pain in his back/anxiety. REVIEW OF SYSTEMS      Review of Systems   Constitutional: Negative for fever, chills, diaphoresis and fatigue. HENT: Negative for congestion, drooling, facial swelling and sore throat. Eyes: Negative for photophobia, pain and discharge. Respiratory: Pos for cough, shortness of breath; Neg for wheezing and stridor. Cardiovascular: Negative for chest pain, palpitations and leg swelling. Gastrointestinal: Negative for abdominal pain, blood in stool and abdominal distention. Genitourinary: Negative for dysuria, urgency, hematuria and difficulty urinating. Musculoskeletal: Negative for gait problem, neck pain and neck stiffness. Skin; No rash, No itching  Neurological: Negative for seizures, weakness and numbness. Hematological: Negative for adenopathy. Does not bruise/bleed easily. Psychiatric/Behavioral: Negative for hallucinations, confusion and agitation.      PAST MEDICAL HISTORY    has a past medical history of AAA (abdominal aortic aneurysm) ([address] CAD (coronary artery disease), Chronic kidney disease, COPD (chronic obstructive pulmonary disease) ([address] Hypercholesteremia, Hypertension, Other disorders of kidney and ureter in diseases classified elsewhere, Pneumonia, Prostate cancer ([address] SOB (shortness of breath), and Type II or unspecified type diabetes mellitus without mention of complication, not stated as uncontrolled. SURGICAL HISTORY      has a past surgical history that includes Colonoscopy (unsure); AAA repair, endovascular (Bilateral, 08/21/2014); Abdominal aortic aneurysm repair (8/21/14); Inguinal hernia repair (Bilateral, 07/28/2015); Abdomen surgery; vascular surgery; and Cardiac surgery. CURRENT MEDICATIONS       Current Discharge Medication List      CONTINUE these medications which have NOT CHANGED    Details   guaiFENesin 400 MG tablet Take 1,200 mg by mouth daily      OXYGEN Inhale 6 L into the lungs continuous      tamsulosin (FLOMAX) 0.4 MG capsule Take 1 capsule by mouth daily  Qty: 180 capsule, Refills: 2    Associated Diagnoses: Swelling; Stage 3a chronic kidney disease ([address] Dyspnea on exertion      metoprolol succinate (TOPROL XL) 25 MG extended release tablet Take 25 mg by mouth daily      albuterol sulfate HFA (VENTOLIN HFA) 108 (90 Base) MCG/ACT inhaler Inhale 2 puffs into the lungs every 6 hours as needed for Wheezing or Shortness of Breath  Qty: 3 Inhaler, Refills: 3    Associated Diagnoses: Centrilobular emphysema (HCC)      vitamin D-3 (CHOLECALCIFEROL) 125 MCG (5000 UT) TABS Take 1 tablet by mouth daily  Qty: 90 tablet, Refills: 1      Umeclidinium Bromide (INCRUSE ELLIPTA) 62.5 MCG/INH AEPB Inhale 1 puff into the lungs daily  Qty: 90 each, Refills: 3    Associated Diagnoses: Centrilobular emphysema (HCC)      predniSONE (DELTASONE) 5 MG tablet Take 1 tablet by mouth daily Prednisone 5mg po daily to take after food. Qty: 90 tablet, Refills: 3    Associated Diagnoses: Centrilobular emphysema ([address] Hypoxemic respiratory failure, chronic (HCC)      formoterol (PERFOROMIST) 20 MCG/2ML nebulizer solution Take 2 mLs by nebulization every 12 hours  Qty: 120 mL, Refills: 11    Associated Diagnoses: Centrilobular emphysema ([address]  Chronic bronchitis, unspecified chronic bronchitis type ([address]      budesonide (PULMICORT) 0.5 MG/2ML nebulizer suspension USE 1 VIAL  IN  NEBULIZER TWICE  DAILY - rinse mouth after treatment  Qty: 60 ampule, Refills: 11    Associated Diagnoses: Centrilobular emphysema ([address] Chronic bronchitis, unspecified chronic bronchitis type ([address]      Nebulizer MISC Please provide new nebulizer machine to use with Albuterol 2.5mg via nebs Q6h prn, perforomist 2 times a day and pulmicort 2 times per day. Qty: 1 each, Refills: 0    Associated Diagnoses: Centrilobular emphysema ([address]      Respiratory Therapy Supplies (NEBULIZER AIR TUBE/PLUGS) MISC Please provide nebulizer kit and supplies. Qty: 1 each, Refills: 0    Associated Diagnoses: Centrilobular emphysema (HCC)      Multiple Vitamins-Minerals (PRESERVISION AREDS 2) CAPS Take 1 capsule by mouth 2 times daily       pravastatin (PRAVACHOL) 80 MG tablet Take 80 mg by mouth nightly. glipiZIDE (GLUCOTROL) 5 MG tablet Take 5 mg by mouth daily. gabapentin (NEURONTIN) 100 MG capsule Take 100 mg by mouth 2 times daily. fish oil-omega-3 fatty acids 1000 MG capsule Take 1 g by mouth 2 times daily. niacin (SLO-NIACIN) 500 MG tablet Take 500 mg by mouth nightly. albuterol (PROVENTIL) (2.5 MG/3ML) 0.083% nebulizer solution Take 3 mLs by nebulization every 6 hours as needed for Wheezing  Qty: 360 mL, Refills: 3    Associated Diagnoses: Centrilobular emphysema ([address] Chronic bronchitis, unspecified chronic bronchitis type ([address]             ALLERGIES     is allergic to daliresp [roflumilast], sulfa antibiotics, and percocet [oxycodone-acetaminophen]. FAMILY HISTORY     He indicated that his mother is . He indicated that his father is . He indicated that his brother is . family history includes Coronary Art Dis in his brother and father; Heart Disease in his brother, father, and mother; Rheum Arthritis in his mother. SOCIAL HISTORY      reports that he quit smoking about 31 years ago. the radiologist.  Plain radiographic images are visualized and preliminarily interpreted by the emergency physician unless otherwise stated below.       LABS:   Labs Reviewed   BASIC METABOLIC PANEL W/ REFLEX TO MG FOR LOW K - Abnormal; Notable for the following components:       Result Value    Glucose 132 (*)     BUN 25 (*)     CREATININE 1.6 (*)     All other components within normal limits   CBC WITH AUTO DIFFERENTIAL - Abnormal; Notable for the following components:    WBC 17.3 (*)     MCHC 32.0 (*)     MPV 9.1 (*)     Segs Absolute 14.8 (*)     Immature Grans (Abs) 0.19 (*)     All other components within normal limits   TROPONIN - Abnormal; Notable for the following components:    Troponin T 0.013 (*)     All other components within normal limits   GLOMERULAR FILTRATION RATE, ESTIMATED - Abnormal; Notable for the following components:    Est, Glom Filt Rate 41 (*)     All other components within normal limits   TROPONIN - Abnormal; Notable for the following components:    Troponin T 0.024 (*)     All other components within normal limits   TROPONIN - Abnormal; Notable for the following components:    Troponin T 0.020 (*)     All other components within normal limits   BASIC METABOLIC PANEL W/ REFLEX TO MG FOR LOW K - Abnormal; Notable for the following components:    BUN 26 (*)     CREATININE 1.5 (*)     All other components within normal limits   CBC - Abnormal; Notable for the following components:    WBC 13.3 (*)     RBC 4.40 (*)     Hemoglobin 13.1 (*)     Hematocrit 40.6 (*)     All other components within normal limits   GLOMERULAR FILTRATION RATE, ESTIMATED - Abnormal; Notable for the following components:    Est, Glom Filt Rate 44 (*)     All other components within normal limits   BLOOD GAS, ARTERIAL - Abnormal; Notable for the following components:    PO2 49 (*)     All other components within normal limits   BASIC METABOLIC PANEL W/ REFLEX TO MG FOR LOW K - Abnormal; Notable for the following components:    Sodium 134 (*)     Chloride 97 (*)     Glucose 117 (*)     BUN 25 (*)     CREATININE 1.5 (*)     All other components within normal limits   TROPONIN - Abnormal; Notable for the following components:    Troponin T 0.024 (*)     All other components within normal limits   GLOMERULAR FILTRATION RATE, ESTIMATED - Abnormal; Notable for the following components:    Est, Glom Filt Rate 44 (*)     All other components within normal limits   POCT GLUCOSE - Abnormal; Notable for the following components:    POC Glucose 127 (*)     All other components within normal limits   POCT GLUCOSE - Abnormal; Notable for the following components:    POC Glucose 109 (*)     All other components within normal limits   COVID-19, RAPID   CULTURE, MRSA, SCREENING    Narrative:     Source: rectal       Site: swab          Current Antibiotics: none   VRE SCREEN BY PCR   BRAIN NATRIURETIC PEPTIDE   ANION GAP   OSMOLALITY   MRSA BY PCR   ANION GAP   BLOOD GAS, ARTERIAL   ANION GAP   POCT GLUCOSE   POCT GLUCOSE   POCT GLUCOSE       EMERGENCY DEPARTMENT COURSE:   Vitals:    Vitals:    06/05/21 0744 06/05/21 0855 06/05/21 1146 06/05/21 1201   BP:  (!) 146/62 134/63    Pulse:  64 63    Resp: 18 20 20 16   Temp:  98.5 °F (36.9 °C) 98.5 °F (36.9 °C)    TempSrc:  Oral Oral    SpO2: 95% 97% 97%    Weight:       Height:         Presenting after falling at home, does not take blood thinners. No loss of consciousness. Has shortness of breath, that improved after Ativan and high flow O2. Case discussed with hospitalist, will admit patient for respiratory failure. CT of chest with thoracic reconstruction obtained, and enthesophyte fracture noted. 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 CRITICAL CARE:   There was a high probability of clinically significant/life threatening deterioration in this patient's condition which required my urgent intervention.   Total critical care time was 30 minutes. This excludes any time for separately reportable procedures. CONSULTS:  None    PROCEDURES:  None    FINAL IMPRESSION      1.  Acute respiratory failure with hypoxia [facility])          DISPOSITION/PLAN   Admitted    PATIENT REFERRED TO:  DANTE Iniguez CNP [phone]            DISCHARGE MEDICATIONS:  Current Discharge Medication List          (Please note that portions of this note were completed with a voice recognition program.  Efforts were made to edit the dictations but occasionally words are mis-transcribed.)    DO Jitendra Simon DO  06/05/21 1224

## 2021-06-05 NOTE — PLAN OF CARE
Problem: Falls - Risk of:  Goal: Will remain free from falls  Description: Will remain free from falls  Outcome: Ongoing  Note: Fall prevention measures in place and explained to patient. Call light within reach, bed in low position, bed alarm on. No falls at this time. Goal: Absence of physical injury  Description: Absence of physical injury  Outcome: Ongoing  Note: Safety measures in place and explained to patient. No evidence of injury at this time. Problem: Skin Integrity:  Goal: Absence of new skin breakdown  Description: Absence of new skin breakdown  Outcome: Ongoing  Note: No evidence of new skin breakdown noted. Patient turned q2h and as needed. Problem: Confusion - Acute:  Goal: Mental status will be restored to baseline  Description: Mental status will be restored to baseline  Outcome: Ongoing  Note: Remains intermittently confused at times. Patient reoriented as needed. Problem: Discharge Planning:  Goal: Participates in care planning  Description: Participates in care planning  Outcome: Ongoing  Note: Patient participates in care planning as able. Family also involved in care planning. Problem: Injury - Risk of, Physical Injury:  Goal: Will remain free from falls  Description: Will remain free from falls  Outcome: Ongoing  Note: Fall prevention measures in place and explained to patient. Call light within reach, bed in low position, bed alarm on. No falls at this time. Goal: Absence of physical injury  Description: Absence of physical injury  Outcome: Ongoing  Note: Safety measures in place and explained to patient. No evidence of injury at this time. Problem: Pain:  Description: Pain management should include both nonpharmacologic and pharmacologic interventions. Goal: Pain level will decrease  Description: Pain level will decrease  Outcome: Ongoing  Note: Reports intermittent pain in back. Lidocaine patches in place and tylenol given as needed. Continue to monitor. Problem: Impaired respiratory status  Goal: Clear lung sounds  6/5/2021 1308 by Delroy Ruvalcaba RN  Outcome: Ongoing  Note: Lungs clear throughout, diminished in the bases. Continue to monitor. 6/5/2021 0755 by Alan Gallardo RCP  Outcome: Not Met This Shift   Care plan reviewed with patient and family. Patient and family verbalize understanding of the plan of care and contribute to goal setting.

## 2021-06-05 NOTE — CONSULTS
[address] [place] [address]                                  CONSULTATION    PATIENT NAME: Chelle Newsome                      :        1931  MED REC NO:   [id number]                           ROOM:       [id number]  ACCOUNT NO:   [de-identified]                           ADMIT DATE: 2021  PROVIDER:     LEONARDO Shaw DATE:  2021    CARDIOLOGY CONSULTATION    REASON FOR CONSULTATION:  Status post fall and hip fracture. REQUESTING PROVIDER:  Hospitalist Service. HISTORY OF PRESENT ILLNESS:  This is a pleasant 72-year-old gentleman  who has multiple risk factors for coronary artery disease including  diabetes, hypertension, history of smoking, COPD; who has known  cardiomyopathy and who has followed with me in the office and plan for  the patient was treat him medically after discussion was made with him  and the family at length in the past.  The patient has not had any  recent ischemia workup and comes in status post a fall. It is unclear  whether the patient really had syncope or he just tripped on something. In any situation, the patient is unable to give much history of  significant dementia. Family was in the room and history was mainly  obtained from the family. The patient is oxygen-dependent, has  significant COPD, on six liters of oxygen at home, currently requiring  high-flow oxygen. REVIEW OF SYSTEMS:  No obvious fever or chills. No hematuria or  dysuria. No abdominal pain, nausea, vomiting, or diarrhea. No obvious  active suicidal ideation. The patient has underlying dementia. No skin  rashes. The patient has had recurrent falls. No recent trauma except  what is mentioned above. No bleeding problem. Chronic shortness of  breath. PAST MEDICAL HISTORY:  1. Cardiomyopathy. 2.  COPD. 3.  Hypertension. 4.  Diabetes. 5.  Renal dysfunction. 6.  Arthritis.     ALLERGIES:  SULFA and PERCOCET. CURRENT MEDICATIONS:  Flomax 0.4 a day, Glucotrol, metoprolol 25 twice a  day, Pravachol 80 a day. SOCIAL HISTORY:  Remote history of smoking. No alcohol or drug abuse. FAMILY HISTORY:  Noncontributory. PHYSICAL EXAMINATION:  VITAL SIGNS:  Showed a blood pressure of 139/64, heart rate of 66. GENERAL APPEARANCE:  Pleasant gentleman, seems to be confused, requiring  high-flow oxygen. EYES AND EARS:  _____. NECK:  No JVD. No bruits. No masses. LUNGS:  Decreased air entry. No crackles. No wheezes. HEART:  Normal S1, S2. Systolic murmur grade 2/6. ABDOMEN:  Soft, nontender. Positive bowel sounds. No organomegaly. EXTREMITIES:  No significant edema. NEUROLOGIC:  As above. The patient seems to have some confusion. No  obvious focal deficits. PSYCH:  No evidence of active psychosis. SKIN:  No rashes. LABORATORY DATA:  Showed sodium 134, potassium 4.3, BUN 25, creatinine  1.5. White count 13.3, hemoglobin 13.4, hematocrit 40.6, platelets 378. IMPRESSION:  This is a gentleman who comes in status post a fall which I  believe is likely due to orthostatic hypotension which he has had. The  patient has borderline cardiac enzymes and likely has underlying  significant coronary artery disease, which I have discussed with the  patient in the past as well as today. RECOMMENDATIONS:  1. The patient and family are wanting medical therapy and do not want  to consider invasive evaluation which I think is reasonable. 2.  Continue medical therapy and consider either discontinuing Flomax or  making sure that he has taken at p.m. time. 3.  Continue to treat the patient's orthostatic hypotension, consider a  support hose. 4.  Otherwise, the patient will be continued on medical therapy and risk  factor modification. We will follow him as an outpatient. Thank you for allowing me to participate in the care of this patient.         Marylou Wagoner M.D.    D: 06/04/2021 17:11:40 T: 06/04/2021 17:22:11     [email or web address]  Job#: [id number]     Doc#: [id number]    CC:

## 2021-06-06 LAB
ANION GAP SERPL CALCULATED.3IONS-SCNC: 12 MEQ/L (ref 8–16)
BUN BLDV-MCNC: 32 MG/DL (ref 7–22)
CALCIUM SERPL-MCNC: 9.1 MG/DL (ref 8.5–10.5)
CHLORIDE BLD-SCNC: 96 MEQ/L (ref 98–111)
CO2: 26 MEQ/L (ref 23–33)
CREAT SERPL-MCNC: 1.8 MG/DL (ref 0.4–1.2)
EKG ATRIAL RATE: 93 BPM
EKG P AXIS: 74 DEGREES
EKG P-R INTERVAL: 168 MS
EKG Q-T INTERVAL: 364 MS
EKG QRS DURATION: 140 MS
EKG QTC CALCULATION (BAZETT): 452 MS
EKG R AXIS: -105 DEGREES
EKG T AXIS: 87 DEGREES
EKG VENTRICULAR RATE: 93 BPM
GFR SERPL CREATININE-BSD FRML MDRD: 36 ML/MIN/1.73M2
GLUCOSE BLD-MCNC: 112 MG/DL (ref 70–108)
GLUCOSE BLD-MCNC: 115 MG/DL (ref 70–108)
GLUCOSE BLD-MCNC: 126 MG/DL (ref 70–108)
GLUCOSE BLD-MCNC: 131 MG/DL (ref 70–108)
GLUCOSE BLD-MCNC: 141 MG/DL (ref 70–108)
POTASSIUM SERPL-SCNC: 4.8 MEQ/L (ref 3.5–5.2)
SODIUM BLD-SCNC: 134 MEQ/L (ref 135–145)

## 2021-06-06 PROCEDURE — 97167 OT EVAL HIGH COMPLEX 60 MIN: CPT

## 2021-06-06 PROCEDURE — 6370000000 HC RX 637 (ALT 250 FOR IP): Performed by: STUDENT IN AN ORGANIZED HEALTH CARE EDUCATION/TRAINING PROGRAM

## 2021-06-06 PROCEDURE — 6360000002 HC RX W HCPCS: Performed by: NURSE PRACTITIONER

## 2021-06-06 PROCEDURE — 2580000003 HC RX 258: Performed by: STUDENT IN AN ORGANIZED HEALTH CARE EDUCATION/TRAINING PROGRAM

## 2021-06-06 PROCEDURE — 94640 AIRWAY INHALATION TREATMENT: CPT

## 2021-06-06 PROCEDURE — 94761 N-INVAS EAR/PLS OXIMETRY MLT: CPT

## 2021-06-06 PROCEDURE — 82948 REAGENT STRIP/BLOOD GLUCOSE: CPT

## 2021-06-06 PROCEDURE — 80048 BASIC METABOLIC PNL TOTAL CA: CPT

## 2021-06-06 PROCEDURE — 6370000000 HC RX 637 (ALT 250 FOR IP): Performed by: NURSE PRACTITIONER

## 2021-06-06 PROCEDURE — 2580000003 HC RX 258: Performed by: NURSE PRACTITIONER

## 2021-06-06 PROCEDURE — 99232 SBSQ HOSP IP/OBS MODERATE 35: CPT | Performed by: INTERNAL MEDICINE

## 2021-06-06 PROCEDURE — 51702 INSERT TEMP BLADDER CATH: CPT

## 2021-06-06 PROCEDURE — 6360000002 HC RX W HCPCS: Performed by: INTERNAL MEDICINE

## 2021-06-06 PROCEDURE — 97530 THERAPEUTIC ACTIVITIES: CPT

## 2021-06-06 PROCEDURE — 2700000000 HC OXYGEN THERAPY PER DAY

## 2021-06-06 PROCEDURE — 2060000000 HC ICU INTERMEDIATE R&B

## 2021-06-06 PROCEDURE — 94669 MECHANICAL CHEST WALL OSCILL: CPT

## 2021-06-06 PROCEDURE — 36415 COLL VENOUS BLD VENIPUNCTURE: CPT

## 2021-06-06 RX ORDER — SENNA PLUS 8.6 MG/1
2 TABLET ORAL NIGHTLY
Status: DISCONTINUED | OUTPATIENT
Start: 2021-06-06 | End: 2021-06-09 | Stop reason: HOSPADM

## 2021-06-06 RX ORDER — SENNA PLUS 8.6 MG/1
1 TABLET ORAL NIGHTLY
Status: DISCONTINUED | OUTPATIENT
Start: 2021-06-06 | End: 2021-06-06

## 2021-06-06 RX ORDER — POLYETHYLENE GLYCOL 3350 17 G/17G
17 POWDER, FOR SOLUTION ORAL DAILY
Status: DISCONTINUED | OUTPATIENT
Start: 2021-06-06 | End: 2021-06-09 | Stop reason: HOSPADM

## 2021-06-06 RX ORDER — SODIUM CHLORIDE 9 MG/ML
INJECTION, SOLUTION INTRAVENOUS CONTINUOUS
Status: DISCONTINUED | OUTPATIENT
Start: 2021-06-06 | End: 2021-06-09 | Stop reason: HOSPADM

## 2021-06-06 RX ORDER — PREDNISONE 1 MG/1
5 TABLET ORAL DAILY
Status: DISCONTINUED | OUTPATIENT
Start: 2021-06-07 | End: 2021-06-09 | Stop reason: HOSPADM

## 2021-06-06 RX ADMIN — BUDESONIDE 500 MCG: 0.5 INHALANT RESPIRATORY (INHALATION) at 07:50

## 2021-06-06 RX ADMIN — ALBUTEROL SULFATE 2.5 MG: 2.5 SOLUTION RESPIRATORY (INHALATION) at 17:37

## 2021-06-06 RX ADMIN — ALBUTEROL SULFATE 2.5 MG: 2.5 SOLUTION RESPIRATORY (INHALATION) at 07:50

## 2021-06-06 RX ADMIN — SENNOSIDES 17.2 MG: 8.6 TABLET, FILM COATED ORAL at 20:11

## 2021-06-06 RX ADMIN — ACETAMINOPHEN 650 MG: 325 TABLET ORAL at 15:21

## 2021-06-06 RX ADMIN — GABAPENTIN 100 MG: 100 CAPSULE ORAL at 08:43

## 2021-06-06 RX ADMIN — GUAIFENESIN 400 MG: 200 SOLUTION ORAL at 08:43

## 2021-06-06 RX ADMIN — BUDESONIDE 500 MCG: 0.5 INHALANT RESPIRATORY (INHALATION) at 17:38

## 2021-06-06 RX ADMIN — POLYETHYLENE GLYCOL 3350 17 G: 17 POWDER, FOR SOLUTION ORAL at 12:26

## 2021-06-06 RX ADMIN — SODIUM CHLORIDE: 9 INJECTION, SOLUTION INTRAVENOUS at 12:26

## 2021-06-06 RX ADMIN — ALBUTEROL SULFATE 2.5 MG: 2.5 SOLUTION RESPIRATORY (INHALATION) at 11:45

## 2021-06-06 RX ADMIN — ARFORMOTEROL TARTRATE 15 MCG: 15 SOLUTION RESPIRATORY (INHALATION) at 07:50

## 2021-06-06 RX ADMIN — SODIUM CHLORIDE, PRESERVATIVE FREE 10 ML: 5 INJECTION INTRAVENOUS at 20:12

## 2021-06-06 RX ADMIN — ACETAMINOPHEN 650 MG: 325 TABLET ORAL at 08:43

## 2021-06-06 RX ADMIN — Medication 5000 UNITS: at 08:42

## 2021-06-06 RX ADMIN — TAMSULOSIN HYDROCHLORIDE 0.4 MG: 0.4 CAPSULE ORAL at 08:43

## 2021-06-06 RX ADMIN — METOPROLOL SUCCINATE 25 MG: 25 TABLET, FILM COATED, EXTENDED RELEASE ORAL at 08:43

## 2021-06-06 RX ADMIN — METHYLPREDNISOLONE SODIUM SUCCINATE 40 MG: 40 INJECTION, POWDER, FOR SOLUTION INTRAMUSCULAR; INTRAVENOUS at 05:28

## 2021-06-06 RX ADMIN — SODIUM CHLORIDE, PRESERVATIVE FREE 10 ML: 5 INJECTION INTRAVENOUS at 08:43

## 2021-06-06 RX ADMIN — ARFORMOTEROL TARTRATE 15 MCG: 15 SOLUTION RESPIRATORY (INHALATION) at 17:38

## 2021-06-06 RX ADMIN — PRAVASTATIN SODIUM 80 MG: 80 TABLET ORAL at 20:11

## 2021-06-06 RX ADMIN — Medication 500 MG: at 20:11

## 2021-06-06 RX ADMIN — GLIPIZIDE 5 MG: 5 TABLET ORAL at 08:43

## 2021-06-06 RX ADMIN — GABAPENTIN 100 MG: 100 CAPSULE ORAL at 20:11

## 2021-06-06 RX ADMIN — ACETAMINOPHEN 650 MG: 325 TABLET ORAL at 22:18

## 2021-06-06 RX ADMIN — ENOXAPARIN SODIUM 30 MG: 30 INJECTION SUBCUTANEOUS at 08:43

## 2021-06-06 ASSESSMENT — PAIN SCALES - GENERAL
PAINLEVEL_OUTOF10: 1
PAINLEVEL_OUTOF10: 5
PAINLEVEL_OUTOF10: 0
PAINLEVEL_OUTOF10: 2
PAINLEVEL_OUTOF10: 3
PAINLEVEL_OUTOF10: 0
PAINLEVEL_OUTOF10: 2
PAINLEVEL_OUTOF10: 3
PAINLEVEL_OUTOF10: 0

## 2021-06-06 ASSESSMENT — PAIN DESCRIPTION - ORIENTATION
ORIENTATION: LOWER

## 2021-06-06 ASSESSMENT — PAIN DESCRIPTION - PAIN TYPE
TYPE: ACUTE PAIN

## 2021-06-06 ASSESSMENT — PAIN DESCRIPTION - LOCATION
LOCATION: BACK

## 2021-06-06 ASSESSMENT — PAIN DESCRIPTION - DESCRIPTORS: DESCRIPTORS: SHARP

## 2021-06-06 NOTE — PROGRESS NOTES
Hospitalist Progress Note          Attending supervising physicians attestation statement:      I was present with the resident physician during the history and exam.  I Discussed the findings and plans with the resident physician  personally   After examining the patient, and agree with the resident'S note as outlined . Electronically signed by Kristi Agudelo MD on 6/6/2021 at 1:14 PM          Patient:  Baron Arnold      Unit/Bed:-26/026-A    YOB: 1931    MRN: [id number]       Acct: [de-identified]     PCP: DANTE Lipscomb - CNP    Date of Admission: 6/2/2021    Assessment/Plan:  Acute on chronic hypoxic respiratory failure (baseline 6L NC): Secondary to Type 1 MI vs. Restriction lung expansion d/t back pain. Currently on HFNC with FiO2 40%. ABG: pH 7.43, PCO2 36, PO2 49, HCO3 24.  Keep SPO2 > 90%. Wean O2 as tolerated. COVID 19- negative on 6/2.    6/4- still hypoxic,however not needing more oxygen on HFNC-pulmonology consulted. 6/5 - developed worsening acute respiratory failure. Received Bumex, Solu-Medrol, and albuterol nebulizer. MetaNeb/Acapella initiated. 6/6 - improved work of breathing/O2 requirements, down to HFNC FiO2 35%. Discussed with Dr. Joe Goncalves, pulmonology -  Will transition from [address] to nasal cannula 6L. If patient tolerates NC well, then likely discharge in 1-2 days. Acute kidney injury on CKD stage III: Likely volume depletion from diuretic use. Creatinine jumped to 1.8, from baseline of 1.5. IV fluids initiated. Avoid nephrotoxic agents. Renally dose medications. Daily BMP. Mechanical fall: Denies loss of consciousness or hitting his head. CT thoracic demonstrates probable fracture of the anterior enthesophytes at T12-L1. Lidoderm, heat initiated. Pain management consulted - patient not a candidate for opioid therapy d/t respiratory status. Nerve block outpatient if pain persists.     Elevated troponin: likely demand ischemia from severe hypoxia. NO history CAD. No chest pain, Flat trend in trops 0.013 -> 0.024 -> 0.020. Initial EKG demonstrates ST downsloping and T wave inversion  V1-V3 when hypoxic. However, IMPROVED on repeat. Cardiology consulted 06/03 if needs full anticoag. 2D echo ordered 6/4    Hx of severe COPD: Not in exacerbation. Albuterol, Pulmicort, Spiriva, Brovana, prednisone resumed. 6/4- Given solumedrol still hypoxic- decreased breath sounds throughout- albuterol nebs q 4. NIDDM 2: Controlled. A1c 5.3 on 01/2021. Glipizide resumed. Added sliding scale- monitor BS meals and bed time    Primary hypertension: Controlled. Metoprolol resumed. Hyperlipidemia: Pravastatin resumed. BPH: Tamsulosin resumed. Hx prostate cancer: Noted. Expected discharge date:  06/08/2021    Disposition:    [] Home       [] TCU       [] Rehab       [] Psych       [] SNF       [] [place]       [x] Other-TBD    Chief Complaint: Shortness of breath    Hospital Course:   Jamie Gayle is a 79 yo male with a history of COPD (baseline O2 of 6L), DM 2, HTN, HLD, BPH, Hx of prostate cancer who presents to [facility] for evaluation of shortness of breath. Patient reports a fall the morning of admission. He denies hitting his head and denies loss of consciousness. Patient's wife attended to patient's after hearing a loud noise. His wife states that he was completely alert after his fall. Family was unable to get him up so EMS was called. Patient developed labored breathing with use of accessory muscles while ambulating to the ambulance. Patient was given a breathing treatment. Upon arrival, patient continued to be in respiratory distress and was placed on high flow nasal cannula with great improvement of his O2 saturations and work of breathing. Patient complained of lower back pain after his fall. Patient states that his pain is worse with movement. In the ED, CT chest showed parenchymal scarring of the left lobe.   CT of thoracic spine demonstrated probable fracture of the anterior enthesophytes at T12-L1; also possible hemorrhagic cyst of left kidney. Patient was admitted to [facility] for further care and evaluation. Subjective (past 24 hours):   Patient seen and examined. Patient's daughter  at bedside. Feeling better today. Has not had a bowel movement since admission. Denies chest pain, palpitations, lightheadedness, dizziness, syncope. ROS (12 point review of systems completed. Pertinent positives noted. Otherwise ROS is negative). Medications:  Reviewed    Infusion Medications    sodium chloride      dextrose      sodium chloride       Scheduled Medications    [START ON 6/7/2021] predniSONE  5 mg Oral Daily    polyethylene glycol  17 g Oral Daily    senna  2 tablet Oral Nightly    insulin lispro  0-12 Units Subcutaneous TID WC    insulin lispro  0-6 Units Subcutaneous Nightly    budesonide  0.5 mg Nebulization BID    Arformoterol Tartrate  15 mcg Nebulization BID    gabapentin  100 mg Oral BID    glipiZIDE  5 mg Oral Daily    guaiFENesin  400 mg Oral Daily    metoprolol succinate  25 mg Oral Daily    niacin  500 mg Oral Nightly    pravastatin  80 mg Oral Nightly    tamsulosin  0.4 mg Oral Daily    Vitamin D  5,000 Units Oral Daily    sodium chloride flush  5-40 mL Intravenous 2 times per day    enoxaparin  30 mg Subcutaneous Daily    albuterol  2.5 mg Nebulization Q4H WA    lidocaine  3 patch Transdermal Daily     PRN Meds: morphine, glucose, dextrose, glucagon (rDNA), dextrose, acetaminophen **OR** [DISCONTINUED] acetaminophen, albuterol sulfate HFA, sodium chloride flush, sodium chloride, ondansetron **OR** ondansetron, polyethylene glycol      Intake/Output Summary (Last 24 hours) at 6/6/2021 1108  Last data filed at 6/6/2021 1100  Gross per 24 hour   Intake 1560 ml   Output 2575 ml   Net -1015 ml       Diet:  ADULT DIET;  Regular; 4 carb choices (60 gm/meal)    Exam:  /63   Pulse 70   Temp 98.2 °F (36.8 °C) (Oral)   Resp 18   Ht 5' 6\" (1.676 m)   Wt 128 lb 6.4 oz (58.2 kg)   SpO2 95%   BMI 20.72 kg/m²     General appearance: No apparent distress, appears stated age and cooperative. HEENT: Pupils equal, round, and reactive to light. Conjunctivae/corneas clear. Neck: Supple, with full range of motion. No jugular venous distention. Trachea midline. Respiratory:  Normal respiratory effort. Clear to auscultation bilaterally. Cardiovascular: Regular rate and rhythm with normal S1/S2 without murmurs, rubs or gallops. Abdomen: Soft, non-tender, non-distended with normal bowel sounds. Musculoskeletal: passive and active ROM x 4 extremities. Skin: Skin color, texture, turgor normal.  No rashes or lesions. Neurologic:  Neurovascularly intact without any focal sensory/motor deficits. Psychiatric: Alert and oriented, thought content appropriate, normal insight  Capillary Refill: Brisk,< 3 seconds   Peripheral Pulses: +2 palpable, equal bilaterally       Labs:   Recent Labs     06/05/21  1541   WBC 9.3   HGB 14.0   HCT 43.6        Recent Labs     06/04/21  0931 06/05/21  1541 06/06/21  0527   * 127* 134*   K 4.3 5.2 4.8   CL 97* 92* 96*   CO2 29 24 26   BUN 25* 27* 32*   CREATININE 1.5* 1.5* 1.8*   CALCIUM 9.1 9.1 9.1     Recent Labs     06/05/21  1541   AST 19   ALT 14   BILITOT 0.6   ALKPHOS 64     No results for input(s): INR in the last 72 hours. No results for input(s): Torres Ang in the last 72 hours. Microbiology:      Urinalysis:      Lab Results   Component Value Date    NITRU Negative 03/12/2018    WBCUA 0 08/23/2014    BACTERIA NONE 08/23/2014    RBCUA 0 08/23/2014    BLOODU Negative 03/12/2018    SPECGRAV 1.015 08/23/2014    GLUCOSEU negative 03/12/2018       Radiology:  CT ABDOMEN PELVIS W IV CONTRAST Additional Contrast? None   Final Result      1. There is an eccentric soft tissue density in the distal colon/rectum concerning for a mass. Direct visualization is recommended. Final report electronically signed by Dr. Jared Zarco on 6/2/2021 1:25 PM      CT LUMBAR SPINE WO CONTRAST   Final Result   1. No acute fracture or acute bony malalignment other than the probable fracture of the anterior enthesophytes at T12-L1   2. Possible hemorrhagic cyst left kidney. **This report has been created using voice recognition software. It may contain minor errors which are inherent in voice recognition technology. **      Final report electronically signed by Dr. Jared Zarco on 6/2/2021 1:31 PM      XR CHEST PORTABLE   Final Result   1. There are increased opacities at the left perihilar region including a focal parenchymal bandlike opacity, which may represent atelectasis versus scarring versus pneumonia. Recommend short-term follow-up chest x-ray to document stability or    resolution. **This report has been created using voice recognition software. It may contain minor errors which are inherent in voice recognition technology. **      Final report electronically signed by Dr. Mallory Shaffer on 6/2/2021 10:39 AM          DVT prophylaxis: [x] Lovenox                                 [] SCDs                                 [] SQ Heparin                                 [] Encourage ambulation           [] Already on Anticoagulation     Code Status: Limited    PT/OT Eval Status: Yes    Tele:   [x] yes             [] no    Electronically signed by Cheyenne Lopez MD on 6/6/2021 at 11:08 AM

## 2021-06-06 NOTE — PROGRESS NOTES
[facility] 60  INPATIENT OCCUPATIONAL THERAPY  [facility] ICU STEPDOWN TELEMETRY 4K  EVALUATION    Time:   Time In: 1116  Time Out: 1152  Timed Code Treatment Minutes: 23 Minutes  Minutes: 36          Date: 2021  Patient Name: Theresa Ibarra,   Gender: male      MRN: [id number]  : 1931  (80 y.o.)  Referring Practitioner: Dr. Fransisco Allen MD  Diagnosis: Acute Hypoxemic Respiratory Failure  Additional Pertinent Hx: Per H&P:89 y.o. male who presented to [facility] with shortness of breath; patient has a past medical history of diabetes, COPD, prostate cancer along with CAD; he is on 6 L of oxygen around-the-clock patient was in his normal state of health and he subsequently fell this morning; denies hitting his head and denies loss of consciousness. Pt started on HFNC,he had a CT of the T and L-spine that showed no acute fracture or bony malalignment other than the probable fracture of the anterior enthesophytes at T12-L1    Restrictions/Precautions:  Restrictions/Precautions: Fall Risk, General Precautions  Position Activity Restriction  Other position/activity restrictions: monitor O2-6L O2 baseline; log roll for comfort    Subjective  Chart Reviewed: Yes, Orders, Progress Notes, History and Physical  Patient assessed for rehabilitation services?: Yes  Family / Caregiver Present: Yes (daughter, wife, son in law)    Subjective: Pt seated in bed upon arrival, agreeable to OT session with minimal encouragement. Daughter present for majority of session, supportive/encouraging throughout and very detailed in describing pt's PLOF. RN reported recently transitioned pt off HFNC to 5L O2, although instructed to increase pt to 6L during session. Pain:  Pain Assessment  Patient Currently in Pain: Denies    Vitals: Oxygen: Sp02 at 94% on 5L on arrival. Titrated to 6L before activity started. At EOB, pt initially 90-92% although with cues for pursed lip breathing increased to 94%.  After mobility to MOBILITY:  Rolling to Left: Minimal Assistance, with verbal cues     Supine to Sit: Moderate Assistance, with head of bed raised, with verbal cues , with increased time for completion ; moderate cues for logroll technique; pt required assist to bring LEs off side of bed and elevate trunk; no pain with transition to sitting  Scooting: Minimal Assistance advancing hips forward to EOB    TRANSFERS:  Sit to Stand:  [address], with increased time for completion, cues for hand placement. ; from EOB  Stand to Sit: Minimal Assistance, with increased time for completion, cues for hand placement. ; assist for slow descent into chair as pt not following instructions to reach back for chair and sits quickly    FUNCTIONAL MOBILITY:  Assistive Device: Rolling Walker  Assist Level:  [address] Distance: EOB>bedside chair  Cues for slower pace and upright posture/gaze although decreased carryover. Minimal SOB noted upon sitting in chair although quickly diminishes with cues and initiation of pursed lip breathing. Activity Tolerance:  Patient tolerance of  treatment: fair. Assessment:  Assessment: Pt presents requiring increased assistance for ADLs, transfers, and functional mobility compared to PLOF. Pt will continue to benefit from OT services to improve independence with these tasks, in addition to overall strength/endurance to facilitate return to PLOF. Performance deficits / Impairments: Decreased functional mobility , Decreased ADL status, Decreased strength, Decreased endurance, Decreased safe awareness, Decreased balance  Prognosis: Fair  REQUIRES OT FOLLOW UP: Yes  Decision Making: High Complexity    Treatment Initiated: Treatment and education initiated within context of evaluation.   Evaluation time included review of current medical information, gathering information related to past medical, social and functional history, completion of standardized testing, formal and informal observation of tasks, assessment of data and development of plan of care and goals. Treatment time included skilled education and facilitation of tasks to increase safety and independence with ADL's for improved functional independence and quality of life. Increased time throughout for discussion re: discharge options and recommendations, differences between [facility] therapies and ECF/SNF with therapies, and role of therapies at each level. Discussed fall prevention, importance of taking time when changing positions/walking. Encouraged pt to sit up in chair 3x/day for all meals, and to walk in room with staff. Family all in agreement with ECF recommendation, although unsure if pt will be willing. At time of eval, pt appears to be willing to go to [facility] with therapies before returning home. Pt/family verbalized understanding of all. Discharge Recommendations:  [address] (if refuses, recommend 24/7 assist and [facility] OT)    Patient Education:  OT Education: OT Role, Plan of Care, Precautions, Transfer Training (discharge options/recommendations)    Equipment Recommendations: Other: will continue to monitor pending progress, discharge location    Plan:  Times per week: 3-5x  Current Treatment Recommendations: Strengthening, Balance Training, Functional Mobility Training, Endurance Training, Safety Education & Training, Patient/Caregiver Education & Training, Equipment Evaluation, Education, & procurement, Self-Care / ADL. See long-term goal time frame for expected duration of plan of care. If no long-term goals established, a short length of stay is anticipated. Goals:     Short term goals  Time Frame for Short term goals: by discharge  Short term goal 1: Pt will increase activity tolerance for functional mobility to/from bathroom with SBA and Sp02 >=90% in prep for toileting tasks.   Short term goal 2: Pt will complete BADL tasks with minimal assistance, with cuing for ECT PRN, to increase independence and ease with self care tasks. Short term goal 3: Pt will complete functional transfers with SBA and <2 cues for safety in prep for toilet transfers. Short term goal 4: Pt will tolerate dynamic standing X3 minutes with unilateral release and SBA in prep for sinkside grooming tasks. Following session, patient left in safe position with all fall risk precautions in place.

## 2021-06-06 NOTE — PLAN OF CARE
Problem: Falls - Risk of:  Goal: Will remain free from falls  Description: Will remain free from falls  2021 by Lulu Han RN  Outcome: Ongoing  Note: Pt remains free from falls, call light in reach and educated patient on how to use, teachback completed. Bed alarm on zone 2, side rails up, gripper socks on, pt close to nurses station and hourly rounding completed. Problem: Falls - Risk of:  Goal: Absence of physical injury  Description: Absence of physical injury  2021 by Lulu Han RN  Outcome: Ongoing  Note: Pt free from physical injury     Problem: Skin Integrity:  Goal: Will show no infection signs and symptoms  Description: Will show no infection signs and symptoms  2021 by Lulu Han RN  Outcome: Ongoing  Note: No signs or symptoms of infection noted     Problem: Skin Integrity:  Goal: Absence of new skin breakdown  Description: Absence of new skin breakdown  2021 by Lulu Han RN  Outcome: Ongoing  Note: Pt being turned every two hours, skin assessed each shift, no new skin breakdown noted. EPC applied to buttocks, acevedo in place. Problem: Confusion - Acute:  Goal: Absence of continued neurological deterioration signs and symptoms  Description: Absence of continued neurological deterioration signs and symptoms  2021 by Lulu Han RN  Outcome: Ongoing  Note: Pt alert to name, , year and situation but does have intermittent confusion requiring redirection from staff and reinforcement as patient is very forgetful. Pt pulling more at telemetry wire and oxygen tonight. Problem: Confusion - Acute:  Goal: Mental status will be restored to baseline  Description: Mental status will be restored to baseline  2021 by Lulu Han RN  Outcome: Ongoing  Note: Pt still having intermittent confusion, pt pleasant and reorients well, will continue to monitor.      Problem: Discharge Planning:  Goal: Ability to perform activities of daily living will improve  Description: Ability to perform activities of daily living will improve  6/4/2021 2129 by Robyn Chavez RN  Outcome: Ongoing  Note: PT and OT working with patient but patient not able to ambulate well due to ongoing back pain from fractures. Pt may benefit from rehab at discharge. Will continue to monitor patient. Problem: Discharge Planning:  Goal: Participates in care planning  Description: Participates in care planning  6/4/2021 2129 by Robyn Chavez RN  Outcome: Ongoing  Note: Plan is for patient to discharge home with wife with home health care services. Problem: Injury - Risk of, Physical Injury:  Goal: Will remain free from falls  Description: Will remain free from falls  6/4/2021 2129 by Robyn Chavez RN  Outcome: Ongoing  Note: Pt remains free from falls, call light in reach and educated patient on how to use, teachback completed. Bed alarm on zone 2, side rails up, gripper socks on, pt close to nurses station and hourly rounding completed. Problem: Injury - Risk of, Physical Injury:  Goal: Absence of physical injury  Description: Absence of physical injury  6/4/2021 2129 by Robyn Chavez RN  Outcome: Ongoing  Note: Pt free from physical injury     Problem: Sleep Pattern Disturbance:  Goal: Appears well-rested  Description: Appears well-rested  6/4/2021 2129 by Robyn Chavez RN  Outcome: Ongoing  Note: Pt appears well rested     Problem: Impaired respiratory status  Goal: Clear lung sounds  6/4/2021 2129 by Robyn Chavez RN  Outcome: Ongoing  Note: Lung sounds are clear but diminished, pt reports some SOB, saturation monitored with a continuous pulse ox, high flow oxygen on, able to wean slowly tonight, pt tolerating well. Problem: Pain:  Goal: Pain level will decrease  Description: Pain level will decrease  6/4/2021 2129 by Robyn Chavez RN  Outcome: Ongoing  Note: Pain management following case.  Pt getting lidocaine patches which have helped, tylenol ordered every 4 hours as needed. Tylenol helping slightly. Pain increases with movement, pain is in his back due to fractures. Will continue to monitor. Morphine now available every 4 hours, pt appears comfortable, have not had to utilize morphine. Care plan reviewed with patient, wife and daughter. Patient and family verbalize understanding of the plan of care and contribute to goal setting.

## 2021-06-06 NOTE — PROGRESS NOTES
Hospitalist Progress Note    Patient:  Theresa Ibarra      Unit/Bed:4K-26/026-A    YOB: 1931    MRN: [id number]       Acct: [de-identified]     PCP: DANTE Hu CNP    Date of Admission: 6/2/2021    Assessment/Plan:    Acute on chronic hypoxic respiratory failure (baseline 6L NC): Secondary to Type 1 MI vs. Restriction lung expansion d/t back pain. Currently on HFNC with FiO2 40%. ABG: pH 7.43, PCO2 36, PO2 49, HCO3 24.  Keep SPO2 > 90%. Wean O2 as tolerated. COVID 19- negative on 6/2.    6/4- still hypoxic,however not needing more oxygen on hi deyvi- will add vq scan to rule out P.E- given CKD-3, and egfr is overestimated in this age group. 2d Echo ordered    6/5 had acute changes with SOB, acute respiratory insuff was at bedside and oxygen increased, and bumex and nebs along with IV  Solumedrol given , and labs obtained. Mechanical fall: Denies loss of consciousness or hitting his head. CT thoracic demonstrates probable fracture of the anterior enthesophytes at T12-L1. Lidoderm, heat and atenolol initiated. Pain management consulted. Elevated troponin: likely demand ischemia from severe hypoxia. NO history CAD. No chest pain, Flat trend in trops 0.013 -> 0.024 -> 0.020. Initial EKG demonstrates ST downsloping and T wave inversion  V1-V3 when hypoxic. However on repeat IMPROVED on repeat. Cardiology consulted 06/03 if needs full anticoag. 2D echo ordered 6/4    Hx of severe COPD: Not in exacerbation. Albuterol, Pulmicort, Spiriva, Brovana, prednisone resumed. 6/4- will add solumedrol given still hypoxic- decreased breath sounds throughout- albuterol nebs q 4.    6/5 patient with acute respiratory insufficiency today was at bedside  Patient eventually stabilized with change in regimen, as outlined above he got stat nebulizers, Solu-Medrol Bumex, high flow oxygen has been increased to 100% is a pulse ox dropped down to 86% on 50 L.   Stat chest x-ray, I called and spoke with Dr. Angie Lee from pulmonary. He recommended holding off on the blood gas because of change in CODE STATUS as outlined below. Extensive amount of time was spent with the family and I have visited the patient also several times clinically he improved by also ordered abdominal CT because of mild degree of left-sided pain. A Singh catheter was also put in over 700 mL  Was immediately in the Singh bag. Patient basically had acute on chronic respiratory failure, further testing and including troponins are pending at this time. CKD stage III: Creatinine at baseline. Avoid nephrotoxic agents. NIDDM 2: Controlled. A1c 5.3 on 01/2021. Glipizide resumed. Added sliding scale- monitor BS meals and bed time    Primary hypertension: Controlled. Metoprolol resumed. Hyperlipidemia: Pravastatin resumed. BPH: Tamsulosin resumed. Hx prostate cancer: Noted. I had a long discussion with the patient and family regarding advanced directives today at bedside-patient and family that his 2 daughters and spouse opted for limited  code no event, CPR, shock so this was updated in the system. Expected discharge date:  06/06/2021    Disposition:    [] Home       [] TCU       [] Rehab       [] Psych       [] SNF       [] [place]       [] Other-TBD    Chief Complaint: Shortness of breath    Hospital Course:   Marco Erickson is a 79 yo male with a history of COPD (baseline O2 of 6L), DM 2, HTN, HLD, BPH, Hx of prostate cancer who presents to [address] [place] [address] for evaluation of shortness of breath. Patient reports a fall the morning of admission. He denies hitting his head and denies loss of consciousness. Patient's wife attended to patient's after hearing a loud noise. His wife states that he was completely alert after his fall. Family was unable to get him up so EMS was called. Patient developed labored breathing with use of accessory muscles while ambulating to the ambulance.   Patient was given a breathing treatment. Upon arrival, patient continued to be in respiratory distress and was placed on high flow nasal cannula with great improvement of his O2 saturations and work of breathing. Patient complained of lower back pain after his fall. Patient states that his pain is worse with movement. In the ED, CT chest showed parenchymal scarring of the left lobe. CT of thoracic spine demonstrated probable fracture of the anterior enthesophytes at T12-L1; also possible hemorrhagic cyst of left kidney. Patient was admitted to [facility] for further care and evaluation. Subjective (past 24 hours):   Patient seen and examined. Feeling about the same as yesterday. Patient's wife and daughter were at bedside. Denies chest pain, palpitations, lightheadedness, dizziness, syncope. ROS (12 point review of systems completed. Pertinent positives noted. Otherwise ROS is negative).     Medications:  Reviewed    Infusion Medications    dextrose      sodium chloride       Scheduled Medications    [START ON 6/7/2021] methylPREDNISolone  40 mg Intravenous Daily    [START ON 6/6/2021] methylPREDNISolone  40 mg Intravenous Q8H    insulin lispro  0-12 Units Subcutaneous TID WC    insulin lispro  0-6 Units Subcutaneous Nightly    budesonide  0.5 mg Nebulization BID    Arformoterol Tartrate  15 mcg Nebulization BID    gabapentin  100 mg Oral BID    glipiZIDE  5 mg Oral Daily    guaiFENesin  400 mg Oral Daily    metoprolol succinate  25 mg Oral Daily    niacin  500 mg Oral Nightly    pravastatin  80 mg Oral Nightly    tamsulosin  0.4 mg Oral Daily    Vitamin D  5,000 Units Oral Daily    sodium chloride flush  5-40 mL Intravenous 2 times per day    enoxaparin  30 mg Subcutaneous Daily    albuterol  2.5 mg Nebulization Q4H WA    lidocaine  3 patch Transdermal Daily     PRN Meds: morphine, glucose, dextrose, glucagon (rDNA), dextrose, acetaminophen **OR** [DISCONTINUED] acetaminophen, albuterol sulfate HFA, sodium chloride flush, sodium chloride, ondansetron **OR** ondansetron, polyethylene glycol      Intake/Output Summary (Last 24 hours) at 6/5/2021 2148  Last data filed at 6/5/2021 2028  Gross per 24 hour   Intake 1715 ml   Output 2950 ml   Net -1235 ml       Diet:  ADULT DIET; Regular; 4 carb choices (60 gm/meal)    Exam:  BP (!) 157/71   Pulse 88   Temp 97.4 °F (36.3 °C) (Oral)   Resp 18   Ht 5' 6\" (1.676 m)   Wt 128 lb 9.6 oz (58.3 kg)   SpO2 99%   BMI 20.76 kg/m²     General appearance: No apparent distress, appears stated age and cooperative. HEENT: Pupils equal, round, and reactive to light. Conjunctivae/corneas clear. Neck: Supple, with full range of motion. No jugular venous distention. Trachea midline. Respiratory:  Normal respiratory effort. Decreased breath sounds. Cardiovascular: Regular rate and rhythm with normal S1/S2 without murmurs, rubs or gallops. Abdomen: Soft, non-tender, non-distended with normal bowel sounds. Musculoskeletal: passive and active ROM x 4 extremities. Skin: Skin color, texture, turgor normal.  No rashes or lesions. Neurologic:  Neurovascularly intact without any focal sensory/motor deficits. Psychiatric: Alert and oriented, thought content appropriate, normal insight  Capillary Refill: Brisk,< 3 seconds   Peripheral Pulses: +2 palpable, equal bilaterally       Labs:   Recent Labs     06/03/21  0621 06/05/21  1541   WBC 13.3* 9.3   HGB 13.1* 14.0   HCT 40.6* 43.6    211     Recent Labs     06/03/21  0621 06/04/21  0931 06/05/21  1541    134* 127*   K 4.7 4.3 5.2    97* 92*   CO2 25 29 24   BUN 26* 25* 27*   CREATININE 1.5* 1.5* 1.5*   CALCIUM 8.8 9.1 9.1     Recent Labs     06/05/21  1541   AST 19   ALT 14   BILITOT 0.6   ALKPHOS 64     No results for input(s): INR in the last 72 hours. No results for input(s): Lormariana Francoise in the last 72 hours.     Microbiology:      Urinalysis:      Lab Results   Component Value Date    NITRU Negative 03/12/2018    WBCUA 0 08/23/2014    BACTERIA NONE 08/23/2014    RBCUA 0 08/23/2014    BLOODU Negative 03/12/2018    SPECGRAV 1.015 08/23/2014    GLUCOSEU negative 03/12/2018       Radiology:  CT ABDOMEN PELVIS W IV CONTRAST Additional Contrast? None   Final Result      1. There is an eccentric soft tissue density in the distal colon/rectum concerning for a mass. Direct visualization is recommended. 2. There is a pericardial effusion at the heart base measuring 8 mm in thickness. 3. Hepatic steatosis. Low-density lesion in the left hepatic lobe which is too small to characterize but likely represents a cyst.   4. Very small bilateral pleural effusions with adjacent atelectasis. **This report has been created using voice recognition software. It may contain minor errors which are inherent in voice recognition technology. **      Final report electronically signed by Dr Nicole Thomas on 6/5/2021 8:58 PM      XR CHEST PORTABLE   Final Result   Stable radiographic appearance of the chest. No evidence of an acute process. **This report has been created using voice recognition software. It may contain minor errors which are inherent in voice recognition technology. **      Final report electronically signed by Dr. Authur Lombard, MD on 6/5/2021 3:32 PM      XR CHEST PORTABLE   Final Result   1. Hyperinflated lungs, compatible with COPD. 2.  Atelectatic/fibrotic changes in the lower lung zones. No    consolidation or pleural effusion. No pneumothorax. This document has been electronically signed by: Cici Nieto M.D. on    06/05/2021 03:26 AM      CT CHEST WO CONTRAST   Final Result   Parenchymal scarring left lung. No acute posttraumatic process identified. **This report has been created using voice recognition software. It may contain minor errors which are inherent in voice recognition technology. **      Final report electronically signed by Dr. Sondra Resendez on 6/2/2021 1:15 PM      CT THORACIC RECONSTRUCTION WO POST PROCESS   Final Result   1. Probable fracture of the the anterior enthesophytes at T12-L1   2. Central canal stenosis which is moderate to severe at C5-C6 due to posterior marginal osteophytes            **This report has been created using voice recognition software. It may contain minor errors which are inherent in voice recognition technology. **      Final report electronically signed by Dr. Barbie Fernandes on 6/2/2021 1:25 PM      CT LUMBAR SPINE WO CONTRAST   Final Result   1. No acute fracture or acute bony malalignment other than the probable fracture of the anterior enthesophytes at T12-L1   2. Possible hemorrhagic cyst left kidney. **This report has been created using voice recognition software. It may contain minor errors which are inherent in voice recognition technology. **      Final report electronically signed by Dr. Barbie Fernandes on 6/2/2021 1:31 PM      XR CHEST PORTABLE   Final Result   1. There are increased opacities at the left perihilar region including a focal parenchymal bandlike opacity, which may represent atelectasis versus scarring versus pneumonia. Recommend short-term follow-up chest x-ray to document stability or    resolution. **This report has been created using voice recognition software. It may contain minor errors which are inherent in voice recognition technology. **      Final report electronically signed by Dr. Lela Jessica on 6/2/2021 10:39 AM          DVT prophylaxis: [x] Lovenox                                 [] SCDs                                 [] SQ Heparin                                 [] Encourage ambulation           [] Already on Anticoagulation     Code Status: Limited    PT/OT Eval Status: Yes    Tele:   [x] yes             [] no      Electronically signed by Mani Paiz MD on 6/5/2021 at 9:48 PM

## 2021-06-06 NOTE — PLAN OF CARE
Problem: Impaired respiratory status  Goal: Clear lung sounds  6/6/2021 1016 by Rosa Watson RCP  Outcome: Ongoing  6/6/2021 0934 by Rodolfo Marquez RN  Outcome: Ongoing  Note: End expiratory wheezes noted throughout.

## 2021-06-06 NOTE — PROGRESS NOTES
[facility]    Patient - Johan Magana   MRN -  [id number]   [facility] # - [de-identified]   - 1931      Date of Admission -  2021  9:51 AM  Date of evaluation -  2021  Room - 3100 Francisco Padron MD Primary Care Physician - Harshal Hernandez, DANTE - CNP   Chief Complaint   SOB  Active Hospital Problem List      Active Hospital Problems    Diagnosis Date Noted    Elevated troponin [R77.8]      Priority: High    Acute bilateral low back pain without sciatica [M54.5]     Acute pain due to injury [G89.11]     Acute hypoxemic respiratory failure ([address] [J96.01] 2021     HPI   Johan Magana is a 80 y.o. male known to the lung clinic due to advanced stage emphysema and chronic respiratory failure. Admitted to [facility] after he fell at home and during evaluation by EMS he was noted to be SOB and he did not get better, the ambulance crew brought ti the ED for eval.  In the ED Jose Diaz was anxious and on pain which worsened his SOB  Currently comfortable on bed, daughter at the bedside. Difficulty taking a deep breath due to MSK pain, oxygen saturation 97% on HFNC 55%  CT chest : advanced emphysema with basilar bullae and upper lobe centrilobular pattern. Results for Kevin Orosco (MRN [id number]) as of 2021 09:38   Ref.  Range 6/3/2021 09:57   Source: Unknown R Brach   pH, Blood Gas Latest Ref Range: 7.35 - 7.45  7.43   PCO2 Latest Ref Range: 35 - 45 mmhg 36   pO2 Latest Ref Range: 71 - 104 mmhg 49 (L)   HCO3 Latest Ref Range: 23 - 28 mmol/l 24   Base Excess (Calculated) Latest Ref Range: -2.5 - 2.5 mmol/l 0.3   O2 Sat Latest Units: % 85   IFIO2 Unknown 40   DEVICE Unknown HFNC   COLLECTED BY: Unknown [id number]       Pulm clinic notes,  -Reviewed CT no acute respiratory disease noted, per Fleischner Guidelines no follow-up needed optional 12 month follow-up for solitary lung nodule <6 mm given debilitated state and previous conversations regarding advanced COPD treatments Alan Velasco reports not interested  -Will continue on current inhaled therapy  -Intolerant to Karyn Mascorro in the past does not wish to try again  -Refer to fragile bone clinic , will order DEXA scan   -Extensive discussion had with patient, wife, and daughter around the process of developing COPD and how it affects individuals later in life encouraged patient to remain active and fit. May need to perform activity of shorter duration and increased frequency to maintain physical endurance. All voiced understanding and no further questions at end of discussion. Alan Velasco is not interested in having spirometry performed ever again I expect his disease has progressed with natural aging currently on Max therapy minus the noted limitations above. -Advised to maintain pneumonia vaccine with PCP and to take flu vaccine this coming season.  -Advised patient to call office with any changes, questions, or concerns regarding respiratory status     Will see Kristyn Thacker back in: 8 months at regularly scheduled follow-up     Braxton De Paz CNP  10/23/2020         Events last 24h     Had episode of respiratory distress, tends to respond well to MSO4  MSK pain improved  On HFNC but 35%, should be able to transition to NC  Afebrile  CT abdomen findings noted, h/o GIST tumor. Past Surgical History           Procedure Laterality Date    ABDOMEN SURGERY      ABDOMINAL AORTIC ANEURYSM REPAIR  8/21/14    ABDOMINAL AORTIC ANEURYSM REPAIR, ENDOVASCULAR Bilateral 08/21/2014    WITH LT FEMORAL ENDARTERECTOMY     CARDIAC SURGERY      COLONOSCOPY  unsure    INGUINAL HERNIA REPAIR Bilateral 07/28/2015    Dr Poli Drake;  Regular; 4 carb choices (60 gm/meal)  Allergies    Daliresp [roflumilast], Sulfa antibiotics, and Percocet [oxycodone-acetaminophen]  Social History     Social History     Socioeconomic History    Marital status:      Spouse name: Zara    Number of children: Not on file    Years of education: Not on file    Highest education level: Not on file   Occupational History    Not on file   Tobacco Use    Smoking status: Former Smoker     Packs/day: 2.00     Years: 40.00     Pack years: 80.00     Types: Cigarettes     Quit date: 10/5/1989     Years since quittin.6    Smokeless tobacco: Never Used   Substance and Sexual Activity    Alcohol use: Yes     Alcohol/week: 1.0 standard drinks     Types: 1 Cans of beer per week     Comment: now and then    Drug use: No    Sexual activity: Not on file   Other Topics Concern    Not on file   Social History Narrative    Not on file     Social Determinants of Health     Financial Resource Strain:     Difficulty of Paying Living Expenses:    Food Insecurity:     Worried About Running Out of Food in the Last Year:     [address] in the Last Year:    Transportation Needs:     Lack of Transportation (Medical):  Lack of Transportation (Non-Medical):    Physical Activity:     Days of Exercise per Week:     Minutes of Exercise per Session:    Stress:     Feeling of Stress :    Social Connections:     Frequency of Communication with Friends and Family:     Frequency of Social Gatherings with Friends and Family:     Attends [employer] Services:     Active Member of Clubs or Organizations:     Attends Club or Organization Meetings:     Marital Status:    Intimate Partner Violence:     Fear of Current or Ex-Partner:     Emotionally Abused:     Physically Abused:     Sexually Abused:      Family History          Problem Relation Age of Onset    Rheum Arthritis Mother          due to complications related to    Heart Disease Mother     Coronary Art Dis Father     Heart Disease Father     Coronary Art Dis Brother     Heart Disease Brother        ROS    General/Constitutional: weak, frequent falls  HENT: Negative. Eyes: Negative.   Upper respiratory tract: No nasal stuffiness 2475 ml   Net -898 ml     Patient Vitals for the past 96 hrs (Last 3 readings):   Weight   06/06/21 0330 128 lb 6.4 oz (58.2 kg)   06/05/21 0539 128 lb 9.6 oz (58.3 kg)   06/04/21 0415 135 lb 4.8 oz (61.4 kg)     Exam   Constitutional: elderly gentlan, pleasant, [place], some confusion easy to redirect  Head: Normocephalic and atraumatic. Mouth/Throat: Oropharynx is clear and moist.  No oral thrush. Eyes: Conjunctivae are normal. Pupils are equal, round, and reactive to light. No scleral icterus. Neck: Neck supple. No JVD or tracheal deviation present. Cardiovascular: Regular rate, regular rhythm, S1 and S2 with no murmur. No peripheral edema  Pulmonary/Chest: bilateral BS, no wheezes,decerased global air entry, pain with breathing  Abdominal: Soft. Bowel sounds audible. No distension or tenderness to palp  Musculoskeletal: Moves all extremities  Lymphadenopathy:  No cervical adenopathy. Neurological: Patient is alert and oriented to person, place, and time. Skin: Skin is warm and dry.       Labs   ABG  Lab Results   Component Value Date    PH 7.43 06/03/2021    PO2 49 06/03/2021    PCO2 36 06/03/2021    HCO3 24 06/03/2021    O2SAT 85 06/03/2021     Lab Results   Component Value Date    IFIO2 40 06/03/2021     CBC  Recent Labs     06/05/21  1541   WBC 9.3   RBC 4.74   HGB 14.0   HCT 43.6   MCV 92.0   MCH 29.5   MCHC 32.1*      MPV 9.3*      BMP  Recent Labs     06/04/21  0931 06/05/21  1541 06/06/21  0527   * 127* 134*   K 4.3 5.2 4.8   CL 97* 92* 96*   CO2 29 24 26   BUN 25* 27* 32*   CREATININE 1.5* 1.5* 1.8*   GLUCOSE 117* 320* 131*   CALCIUM 9.1 9.1 9.1     LFT  Recent Labs     06/05/21  1541   AST 19   ALT 14   BILITOT 0.6   ALKPHOS 64   LIPASE 39.4     TROP  Lab Results   Component Value Date    TROPONINT < 0.010 06/05/2021    TROPONINT 0.015 06/05/2021    TROPONINT 0.024 06/04/2021     BNP  Lab Results   Component Value Date    PROBNP 730.8 06/02/2021    PROBNP 220.4 09/13/2016    PROBNP document has been electronically signed by: Thao Bhandari M.D. on    06/05/2021 03:26 AM           Nuclear scan:    Pending:    CT chest;  FINDINGS: Low-density lesion left thyroid lobe. Macrocalcification right thyroid lobe. No axillary lymphadenopathy. No mediastinal or hilar adenopathy by size criteria. Marked aortic calcifications beginning at the ascending aorta involving the arch and descending thoracic aorta. Calcified mediastinal lymph nodes calcified coronary arteries. Interval increase in the anterior pericardial effusion now measuring 11 mm compared to 8. Lungs are emphysematous. There is a atelectasis along the major fissure left lung. Dissection may represent chronic parenchymal scarring versus less noticeable on the prior exam.   No airspace consolidations or pleural effusions no pneumothorax.       Unfortunately is a great deal motion artifact in the upper images. No acute or suspicious bone lesion is identified. There is progression of marginal osteophytes at T12 and L1 somewhat simulating a fracture but there is no distinct acute fracture. Upper abdomen   Endoluminal stent graft is present.           Impression   Parenchymal scarring left lung. No acute posttraumatic process identified.               **This report has been created using voice recognition software.  It may contain minor errors which are inherent in voice recognition technology. **       Final report electronically signed by Dr. Mell Young on 6/2/2021 1:15 PM           (See actual reports for details)    Assessment   Very severe COPD GOLD 4  Chest wall contusion with splinting and difficulty taking a deep breath, occult fracture possible.   Severe centrilobular/panacinar emphysema  Chronic hypoxemic respiratory failure without hypercarbia (due to severe emphysema and parenchymal destruction)  Would not embark in PTE work up, suspct V/Q lug scan would be inconclusive  Wanda Peer seems to be at baseline SpO2 97 on HFNC 55%  CT chest done this admission does not show any acute pulmonary issues. Recommendations   Taper oxygen as tolerated for sats 88% (chronic hypoxemia)  Agree with current therapies, Arformoterol/albutero/Budesonide  Change to oral steroids  Will replace Spiriva with  Ipratropium due to inspiratory difficulty due to acute pain. This is terminal disease and would qualify for hospice care  Consider acute rehab for pain management and mobility  Would not pursue OP PFTs or CT chest as not a candidate/interested in any special therapies for cancer on advanced COPD. Add Metaneb/Acapella  From pulm standponit I would DC home/transition to ECF when changed back to nasal cannula  Thank you for the consult and allowing us to participate in the care of your patient. Case discussed with nurse and patient/family. Questions and concerns addressed. Meds and Orders reviewed.     Electronically signed by     Kathryn Tai MD on 6/6/2021 at 2:23 PM

## 2021-06-06 NOTE — PLAN OF CARE
Problem: Falls - Risk of:  Goal: Will remain free from falls  Description: Will remain free from falls  Outcome: Ongoing  Note: Fall prevention measures in place and explained to patient. Call light within reach, bed in low position, bed alarm on. No falls at this time. Goal: Absence of physical injury  Description: Absence of physical injury  Outcome: Ongoing  Note: Safety measures in place and explained to patient. No evidence of injury at this time. Problem: Skin Integrity:  Goal: Will show no infection signs and symptoms  Description: Will show no infection signs and symptoms  Outcome: Ongoing  Note: No evidence of infection at this time. Continue to monitor. Goal: Absence of new skin breakdown  Description: Absence of new skin breakdown  Outcome: Ongoing  Note: No evidence of skin breakdown noted. Continue to turn q2h and as needed. Problem: Confusion - Acute:  Goal: Absence of continued neurological deterioration signs and symptoms  Description: Absence of continued neurological deterioration signs and symptoms  Outcome: Ongoing  Note: Remains intermittently confused in the evening. Oriented during the day. Goal: Mental status will be restored to baseline  Description: Mental status will be restored to baseline  Outcome: Ongoing  Note: Remains alert and oriented during the day with intermittent confusion. Problem: Discharge Planning:  Goal: Participates in care planning  Description: Participates in care planning  Outcome: Ongoing  Note: Participates in care planning as able. Continue to monitor. Problem: Injury - Risk of, Physical Injury:  Goal: Will remain free from falls  Description: Will remain free from falls  Outcome: Ongoing  Note: Fall prevention measures in place and explained to patient. Call light within reach, bed in low position, bed alarm on. No falls at this time.     Goal: Absence of physical injury  Description: Absence of physical injury  Outcome: Ongoing  Note: Safety measures in place and explained to patient. No evidence of injury at this time. Problem: Pain:  Description: Pain management should include both nonpharmacologic and pharmacologic interventions. Goal: Pain level will decrease  Description: Pain level will decrease  Outcome: Ongoing  Note: Intermittently reports pain in lower back. Continue with Tylenol and morphine as needed for pain. Problem: Impaired respiratory status  Goal: Clear lung sounds  Outcome: Ongoing  Note: End expiratory wheezes noted throughout. Care plan reviewed with patient and family. Patient and family verbalize understanding of the plan of care and contribute to goal setting.

## 2021-06-06 NOTE — PLAN OF CARE
Problem: Falls - Risk of:  Goal: Will remain free from falls  Description: Will remain free from falls  2021 by Romelia Foss RN  Outcome: Ongoing  Note: Pt remains free from falls, call light in reach and educated patient on how to use, teachback completed.  Bed alarm on zone 2, side rails up, gripper socks on, pt close to nurses station and hourly rounding completed.     Problem: Falls - Risk of:  Goal: Absence of physical injury  Description: Absence of physical injury  2021 by Romelia Foss RN  Outcome: Ongoing  Note: Pt free from physical injury     Problem: Skin Integrity:  Goal: Will show no infection signs and symptoms  Description: Will show no infection signs and symptoms  2021 by Romelia Foss RN  Outcome: Ongoing  Note: No signs or symptoms of infection noted     Problem: Skin Integrity:  Goal: Absence of new skin breakdown  Description: Absence of new skin breakdown  2021 by Romelia Foss RN  Outcome: Ongoing  Note: Pt being turned every two hours, skin assessed each shift, no new skin breakdown noted. EPC applied to buttocks, acevedo in place.     Problem: Confusion - Acute:  Goal: Absence of continued neurological deterioration signs and symptoms  Description: Absence of continued neurological deterioration signs and symptoms  2021 by Romelia Foss RN  Outcome: Ongoing  Note: Pt alert to name, , year and situation but does have intermittent confusion requiring redirection from staff and reinforcement as patient is very forgetful. pt vey forgetful about where he is, pt thinks he has left the room multiple times.   Problem: Confusion - Acute:  Goal: Mental status will be restored to baseline  Description: Mental status will be restored to baseline  2021 by Romelia Foss RN  Outcome: Ongoing  Note: Pt still having intermittent confusion, pt pleasant and reorients well, pt not sleeping well tonight, more confusion about where he is.     Problem: Discharge Planning:  Goal: Ability to perform activities of daily living will improve  Description: Ability to perform activities of daily living will improve  6/4/2021 2129 by Robyn Chavez RN  Outcome: Ongoing  Note: PT and OT working with patient but patient not able to ambulate well due to ongoing back pain from fractures. Pt up to chair today with walker, GB and one assist, pt unsteady and impulsive. Will continue to monitor patient.     Problem: Discharge Planning:  Goal: Participates in care planning  Description: Participates in care planning  6/4/2021 2129 by Robyn Chavez RN  Outcome: Ongoing  Note: Plan is for patient to discharge home with wife with home health care services.     Problem: Injury - Risk of, Physical Injury:  Goal: Will remain free from falls  Description: Will remain free from falls  6/4/2021 2129 by Robyn Chavez RN  Outcome: Ongoing  Note: Pt remains free from falls, call light in reach and educated patient on how to use, teachback completed.  Bed alarm on zone 2, side rails up, gripper socks on, pt close to nurses station and hourly rounding completed.     Problem: Injury - Risk of, Physical Injury:  Goal: Absence of physical injury  Description: Absence of physical injury  6/4/2021 2129 by Robyn Chavez RN  Outcome: Ongoing  Note: Pt free from physical injury     Problem: Sleep Pattern Disturbance:  Goal: Appears well-rested  Description: Appears well-rested  6/4/2021 2129 by Robyn Chavez RN  Outcome: Ongoing  Note: Pt not sleeping well tonight.  Lights off, room quite.     Problem: Impaired respiratory status  Goal: Clear lung sounds  6/4/2021 2129 by Robyn Chavez RN  Outcome: Ongoing  Note: Lung sounds are clear but diminished, pt reports some SOB, saturation monitored with a continuous pulse ox, oxygen on at 6 liters, pt tolerating well.     Problem: Pain:  Goal: Pain level will decrease  Description: Pain level will decrease  6/4/2021 2129 by Robyn Chavez RN  Outcome: Ongoing  Note: Pain management following case. Pt getting lidocaine patches which have helped, tylenol ordered every 4 hours as needed.  Tylenol helping slightly. Pain increases with movement, pain is in his back due to fractures. Will continue to monitor.  Morphine now available every 4 hours, pt appears comfortable, have not had to utilize morphine.     Care plan reviewed with patient, wife and daughter. Kd Carrington and family verbalize understanding of the plan of care and contribute to goal setting.

## 2021-06-07 LAB
ANION GAP SERPL CALCULATED.3IONS-SCNC: 10 MEQ/L (ref 8–16)
BUN BLDV-MCNC: 39 MG/DL (ref 7–22)
CALCIUM SERPL-MCNC: 8.9 MG/DL (ref 8.5–10.5)
CHLORIDE BLD-SCNC: 101 MEQ/L (ref 98–111)
CO2: 26 MEQ/L (ref 23–33)
CREAT SERPL-MCNC: 1.7 MG/DL (ref 0.4–1.2)
GFR SERPL CREATININE-BSD FRML MDRD: 38 ML/MIN/1.73M2
GLUCOSE BLD-MCNC: 87 MG/DL (ref 70–108)
GLUCOSE BLD-MCNC: 90 MG/DL (ref 70–108)
GLUCOSE BLD-MCNC: 91 MG/DL (ref 70–108)
GLUCOSE BLD-MCNC: 96 MG/DL (ref 70–108)
GLUCOSE BLD-MCNC: 98 MG/DL (ref 70–108)
MAGNESIUM: 2.2 MG/DL (ref 1.6–2.4)
POTASSIUM SERPL-SCNC: 4.5 MEQ/L (ref 3.5–5.2)
SODIUM BLD-SCNC: 137 MEQ/L (ref 135–145)
URINE CULTURE, ROUTINE: NORMAL

## 2021-06-07 PROCEDURE — 94640 AIRWAY INHALATION TREATMENT: CPT

## 2021-06-07 PROCEDURE — 6370000000 HC RX 637 (ALT 250 FOR IP): Performed by: NURSE PRACTITIONER

## 2021-06-07 PROCEDURE — 99232 SBSQ HOSP IP/OBS MODERATE 35: CPT | Performed by: INTERNAL MEDICINE

## 2021-06-07 PROCEDURE — APPSS30 APP SPLIT SHARED TIME 16-30 MINUTES: Performed by: NURSE PRACTITIONER

## 2021-06-07 PROCEDURE — 6370000000 HC RX 637 (ALT 250 FOR IP): Performed by: STUDENT IN AN ORGANIZED HEALTH CARE EDUCATION/TRAINING PROGRAM

## 2021-06-07 PROCEDURE — 2060000000 HC ICU INTERMEDIATE R&B

## 2021-06-07 PROCEDURE — 97110 THERAPEUTIC EXERCISES: CPT

## 2021-06-07 PROCEDURE — 82948 REAGENT STRIP/BLOOD GLUCOSE: CPT

## 2021-06-07 PROCEDURE — 94669 MECHANICAL CHEST WALL OSCILL: CPT

## 2021-06-07 PROCEDURE — 83735 ASSAY OF MAGNESIUM: CPT

## 2021-06-07 PROCEDURE — 36415 COLL VENOUS BLD VENIPUNCTURE: CPT

## 2021-06-07 PROCEDURE — 80048 BASIC METABOLIC PNL TOTAL CA: CPT

## 2021-06-07 PROCEDURE — 6360000002 HC RX W HCPCS: Performed by: NURSE PRACTITIONER

## 2021-06-07 PROCEDURE — 2580000003 HC RX 258: Performed by: NURSE PRACTITIONER

## 2021-06-07 PROCEDURE — 97530 THERAPEUTIC ACTIVITIES: CPT

## 2021-06-07 PROCEDURE — 2580000003 HC RX 258: Performed by: STUDENT IN AN ORGANIZED HEALTH CARE EDUCATION/TRAINING PROGRAM

## 2021-06-07 PROCEDURE — 2700000000 HC OXYGEN THERAPY PER DAY

## 2021-06-07 PROCEDURE — 94761 N-INVAS EAR/PLS OXIMETRY MLT: CPT

## 2021-06-07 RX ADMIN — SENNOSIDES 17.2 MG: 8.6 TABLET, FILM COATED ORAL at 19:42

## 2021-06-07 RX ADMIN — ENOXAPARIN SODIUM 30 MG: 30 INJECTION SUBCUTANEOUS at 09:13

## 2021-06-07 RX ADMIN — PRAVASTATIN SODIUM 80 MG: 80 TABLET ORAL at 19:42

## 2021-06-07 RX ADMIN — Medication 5000 UNITS: at 09:11

## 2021-06-07 RX ADMIN — METOPROLOL SUCCINATE 25 MG: 25 TABLET, FILM COATED, EXTENDED RELEASE ORAL at 09:12

## 2021-06-07 RX ADMIN — ALBUTEROL SULFATE 2.5 MG: 2.5 SOLUTION RESPIRATORY (INHALATION) at 02:25

## 2021-06-07 RX ADMIN — ALBUTEROL SULFATE 2 PUFF: 90 AEROSOL, METERED RESPIRATORY (INHALATION) at 14:21

## 2021-06-07 RX ADMIN — ALBUTEROL SULFATE 2.5 MG: 2.5 SOLUTION RESPIRATORY (INHALATION) at 20:24

## 2021-06-07 RX ADMIN — GLIPIZIDE 5 MG: 5 TABLET ORAL at 09:12

## 2021-06-07 RX ADMIN — BUDESONIDE 500 MCG: 0.5 INHALANT RESPIRATORY (INHALATION) at 07:41

## 2021-06-07 RX ADMIN — GABAPENTIN 100 MG: 100 CAPSULE ORAL at 09:12

## 2021-06-07 RX ADMIN — ARFORMOTEROL TARTRATE 15 MCG: 15 SOLUTION RESPIRATORY (INHALATION) at 07:41

## 2021-06-07 RX ADMIN — ALBUTEROL SULFATE 2.5 MG: 2.5 SOLUTION RESPIRATORY (INHALATION) at 16:27

## 2021-06-07 RX ADMIN — ALBUTEROL SULFATE 2.5 MG: 2.5 SOLUTION RESPIRATORY (INHALATION) at 12:30

## 2021-06-07 RX ADMIN — TAMSULOSIN HYDROCHLORIDE 0.4 MG: 0.4 CAPSULE ORAL at 09:12

## 2021-06-07 RX ADMIN — ALBUTEROL SULFATE 2.5 MG: 2.5 SOLUTION RESPIRATORY (INHALATION) at 07:41

## 2021-06-07 RX ADMIN — SODIUM CHLORIDE, PRESERVATIVE FREE 10 ML: 5 INJECTION INTRAVENOUS at 19:42

## 2021-06-07 RX ADMIN — POLYETHYLENE GLYCOL 3350 17 G: 17 POWDER, FOR SOLUTION ORAL at 09:10

## 2021-06-07 RX ADMIN — PREDNISONE 5 MG: 5 TABLET ORAL at 09:11

## 2021-06-07 RX ADMIN — BUDESONIDE 500 MCG: 0.5 INHALANT RESPIRATORY (INHALATION) at 17:35

## 2021-06-07 RX ADMIN — GUAIFENESIN 400 MG: 200 SOLUTION ORAL at 09:29

## 2021-06-07 RX ADMIN — SODIUM CHLORIDE: 9 INJECTION, SOLUTION INTRAVENOUS at 00:02

## 2021-06-07 RX ADMIN — MAGNESIUM CITRATE 296 ML: 1.75 LIQUID ORAL at 17:56

## 2021-06-07 RX ADMIN — GABAPENTIN 100 MG: 100 CAPSULE ORAL at 19:42

## 2021-06-07 RX ADMIN — Medication 500 MG: at 19:42

## 2021-06-07 RX ADMIN — SODIUM CHLORIDE, PRESERVATIVE FREE 10 ML: 5 INJECTION INTRAVENOUS at 09:13

## 2021-06-07 RX ADMIN — ARFORMOTEROL TARTRATE 15 MCG: 15 SOLUTION RESPIRATORY (INHALATION) at 17:35

## 2021-06-07 RX ADMIN — ACETAMINOPHEN 650 MG: 325 TABLET ORAL at 19:42

## 2021-06-07 RX ADMIN — SODIUM CHLORIDE: 9 INJECTION, SOLUTION INTRAVENOUS at 19:54

## 2021-06-07 ASSESSMENT — PAIN SCALES - GENERAL
PAINLEVEL_OUTOF10: 0
PAINLEVEL_OUTOF10: 0
PAINLEVEL_OUTOF10: 3
PAINLEVEL_OUTOF10: 0
PAINLEVEL_OUTOF10: 0

## 2021-06-07 NOTE — PROGRESS NOTES
[address] [facility] PHYSICAL THERAPY  DAILY NOTE  [facility] ICU STEPDOWN TELEMETRY 4K - 4K-26/026-A    Time In: 1110  Time Out: 1138  Timed Code Treatment Minutes: 28 Minutes  Minutes: 28          Date: 2021  Patient Name: Patricia Prasad,  Gender:  male        MRN: [id number]  : 1931  (80 y.o.)     Referring Practitioner: Rufino Nickerson MD  Diagnosis: acute hypoxemic respiratory failure  Additional Pertinent Hx: Per HPI: \"80 y.o. male who presented to [address] [place] [address] with shortness of breath; patient has a past medical history of diabetes, COPD, prostate cancer along with CAD; he is on 6 L of oxygen around-the-clock patient was in his normal state of health and he subsequently fell this morning; denies hitting his head and denies loss of consciousness; his wife heard a thud and the patient immediately called his wife's name; family was unable to get him up so EMS was called; the patient is on 6 L of oxygen at baseline however he was ambulated out to the EMS cot and then family noticed extreme difficulty in breathing; he was placed on oxygen and given a breathing treatment; upon arrival here he was still in distress so he was placed on high flow nasal cannula with great improvement of his sats and decrease work of breathing; he complains of pain to his low back; his pain is worse with movement; patient is alert and oriented, family states at home he uses Acapella; both wife and daughter at the bedside and feel he looks much better now.  In the emergency department, he had a CT chest without contrast that showed parenchymal scarring the left lobe; he had a CT of the T and L-spine that showed no acute fracture or bony malalignment other than the probable fracture of the anterior enthesophytes at T12-L1; also possible hemorrhagic cyst left kidney; patient is currently on high flow oxygen so the goal is to wean him down to 6 L baseline, he was going to be admitted to stepdown for further care and evaluation. \"     Prior Level of Function:  Lives With: Spouse  Type of Home: House  Home Layout: One level  Home Access: Stairs to enter without rails  Entrance Stairs - Number of Steps: 3--daughter assists with climbing stairs  Home Equipment: Oxygen, Rolling walker, Grab bars (transport chair; 6L O2 with activity (daughter reported pt can be on RA at rest and Sp02 is >90%, although stated he keeps it on 6L all the time))   Bathroom Shower/Tub: Tub/Shower unit  Bathroom Toilet: Handicap height  Bathroom Equipment: Shower chair, Grab bars in shower    ADL Assistance: Needs assistance  Homemaking Assistance: Needs assistance (wife completes all)  Ambulation Assistance: Independent  Transfer Assistance: Independent  Active : No  Additional Comments: pt fell one time a week prior to this admission in addition to fall that brought him to the hospital. Daughter reported pt just started using walker 5 weeks ago per family's recommendation. Daughter reported wife has been assisting with bathing/dressing as pt would become very SOB/fatigue very quickly while completing. Restrictions/Precautions:  Restrictions/Precautions: Fall Risk, General Precautions  Position Activity Restriction  Other position/activity restrictions: monitor O2-6L O2 baseline; log roll for comfort     SUBJECTIVE: RN approved session. Pt resting in bed upon arrival, daughter and wife present. RN requests to get pt up to bedside commode to try and have bowel movement. Pt pleasant and agreeable to therapy, but very confused throughout session. PAIN: Pt stated several times that his back hurt but did not rate on numerical scale. Vitals: Continuous O2 monitoring throughout session. Pt on 6L O2 through nasal cannula with mobility. Sats ranged from 86-92% throughout session. Time spent for educated regarding pursed lip breathing.      OBJECTIVE:  Bed Mobility:  Rolling to Right: Minimal Assistance, with verbal cues    Supine to Sit: Minimal Assistance, Moderate Assistance, At trunk. Transfers:  Sit to Stand: Minimal Assistance, Moderate Assistance, From EOB and from bedside commode. Verbal and tactile cuing required for hand placement  Stand to Sit:Moderate Assistance, Pt with posterior lean. Verbal and tactile cuing to reach back for chair before sitting. Ambulation:  Moderate Assistance  Distance: 2 feet x1 and 3 feet x1     Surface: Level Tile  Device:Rolling Walker  Gait Deviations: Forward Flexed Posture, Slow Luli, Decreased Step Length Bilaterally, Decreased Gait Speed and Heavy posterior lean    Balance:  Static Sitting Balance:  Contact Guard Assistance  Dynamic Sitting Balance: Minimal Assistance  Static Standing Balance: Minimal Assistance, Moderate Assistance, Due to posterior lean. Pt has difficult time finding midline due to confusion. Exercise:  Patient was guided in 1 set(s) 10 reps of exercise to both lower extremities. Seated marches, Seated hamstring curls, Seated heel/toe raises and Long arc quads. Exercises were completed for increased independence with functional mobility. Functional Outcome Measures: Completed  AM-PAC Inpatient Mobility Raw Score : 16  AM-PAC Inpatient T-Scale Score : 40.78    ASSESSMENT:  Assessment: Patient progressing toward established goals. and Pt tolerated session fairly well. Limited by decreased strength, decreased endurance, decreased balance. Not safe to be up unassisted. SNF for continued therapy at discharge. Activity Tolerance:  Patient tolerance of  treatment: good.       Equipment Recommendations:Equipment Needed: No  Discharge Recommendations:  Continue to assess pending progress, Subacute/Skilled Nursing Facility    Plan: Times per week: 5x GM  Times per day: Daily  Current Treatment Recommendations: Strengthening, Transfer Training, Functional Mobility Training, Balance Training, Endurance Training, Gait Training, Stair training, Patient/Caregiver Education & Training,

## 2021-06-07 NOTE — CARE COORDINATION
6/7/21, 2:28 PM EDT    DISCHARGE PLANNING EVALUATION    SW spoke with [facility] and they are able to accept at discharge. Spouse and daughter are coming to visit at 3:30pm today.

## 2021-06-07 NOTE — PROGRESS NOTES
Hospitalist Progress Note        Patient:  Rosa Santa      Unit/Bed:4K-26/026-A    YOB: 1931    MRN: [id number]       Acct: [de-identified]     PCP: DANTE Cates CNP    Date of Admission: 6/2/2021    Assessment/Plan:  Acute on chronic hypoxic respiratory failure (baseline 6L NC): Secondary to Type 1 MI vs. Restriction lung expansion d/t back pain. Currently on HFNC with FiO2 40%. ABG: pH 7.43, PCO2 36, PO2 49, HCO3 24.  Keep SPO2 > 90%. Wean O2 as tolerated. COVID 19- negative on 6/2. · 6/4- still hypoxic,however not needing more oxygen on HFNC-pulmonology consulted. · 6/5 - developed worsening acute respiratory failure. Received Bumex, Solu-Medrol, and albuterol nebulizer. MetaNeb/Acapella initiated. · 6/6 - improved work of breathing/O2 requirements, down to HFNC FiO2 35%. Discussed with Dr. Kiera Gross, pulmonology -  Will transition from [address] to nasal cannula 6L. · 6/7 - Patient tolerating baseline 6L NC very well. Acute kidney injury on CKD stage III: Likely volume depletion from diuretic use. Creatinine jumped to 1.8, from baseline of 1.5. IV fluids initiated. Avoid nephrotoxic agents. Renally dose medications. Daily BMP. Mechanical fall: Denies loss of consciousness or hitting his head. CT thoracic demonstrates probable fracture of the anterior enthesophytes at T12-L1. Lidoderm, heat initiated. Pain management consulted - patient not a candidate for opioid therapy d/t respiratory status. · 6/7 - Physiatry consulted for inpatient rehabilitation. Elevated troponin: likely demand ischemia from severe hypoxia. NO history CAD. No chest pain, Flat trend in trops 0.013 -> 0.024 -> 0.020. Initial EKG demonstrates ST downsloping and T wave inversion  V1-V3 when hypoxic. However, IMPROVED on repeat. Cardiology consulted 06/03 if needs full anticoag. 2D echo ordered 6/4    Hx of severe COPD: Not in exacerbation.   Albuterol, Pulmicort, Spiriva, Brovana, prednisone resumed. · 6/4- Given solumedrol still hypoxic- decreased breath sounds throughout- albuterol nebs q 4. NIDDM 2: Controlled. A1c 5.3 on 01/2021. Glipizide resumed. Added sliding scale- monitor BS meals and bed time    Primary hypertension: Controlled. Metoprolol resumed. Hyperlipidemia: Pravastatin resumed. BPH: Tamsulosin resumed. Hx prostate cancer: Noted. Expected discharge date:  06/09/2021    Disposition:    [] Home       [] TCU       [] Rehab       [] Psych       [] SNF       [] [facility]       [x] Other-TBD    Chief Complaint: Shortness of breath    Hospital Course:   Alexei Goncalves is a 81 yo male with a history of COPD (baseline O2 of 6L), DM 2, HTN, HLD, BPH, Hx of prostate cancer who presents to [address] [place] [address] for evaluation of shortness of breath. Patient reports a fall the morning of admission. He denies hitting his head and denies loss of consciousness. Patient's wife attended to patient's after hearing a loud noise. His wife states that he was completely alert after his fall. Family was unable to get him up so EMS was called. Patient developed labored breathing with use of accessory muscles while ambulating to the ambulance. Patient was given a breathing treatment. Upon arrival, patient continued to be in respiratory distress and was placed on high flow nasal cannula with great improvement of his O2 saturations and work of breathing. Patient complained of lower back pain after his fall. Patient states that his pain is worse with movement. In the ED, CT chest showed parenchymal scarring of the left lobe. CT of thoracic spine demonstrated probable fracture of the anterior enthesophytes at T12-L1; also possible hemorrhagic cyst of left kidney. Patient was admitted to [facility] for further care and evaluation. Subjective (past 24 hours):   Patient seen and examined. No acute complaints. Feeling better and in great spirit.   Denies chest pain, palpitations, lightheadedness, dizziness, syncope. ROS (12 point review of systems completed. Pertinent positives noted. Otherwise ROS is negative). Medications:  Reviewed    Infusion Medications    sodium chloride 50 mL/hr at 06/07/21 0002    dextrose      sodium chloride       Scheduled Medications    predniSONE  5 mg Oral Daily    polyethylene glycol  17 g Oral Daily    senna  2 tablet Oral Nightly    insulin lispro  0-12 Units Subcutaneous TID WC    insulin lispro  0-6 Units Subcutaneous Nightly    budesonide  0.5 mg Nebulization BID    Arformoterol Tartrate  15 mcg Nebulization BID    gabapentin  100 mg Oral BID    glipiZIDE  5 mg Oral Daily    guaiFENesin  400 mg Oral Daily    metoprolol succinate  25 mg Oral Daily    niacin  500 mg Oral Nightly    pravastatin  80 mg Oral Nightly    tamsulosin  0.4 mg Oral Daily    Vitamin D  5,000 Units Oral Daily    sodium chloride flush  5-40 mL Intravenous 2 times per day    enoxaparin  30 mg Subcutaneous Daily    albuterol  2.5 mg Nebulization Q4H WA    lidocaine  3 patch Transdermal Daily     PRN Meds: morphine, glucose, dextrose, glucagon (rDNA), dextrose, acetaminophen **OR** [DISCONTINUED] acetaminophen, albuterol sulfate HFA, sodium chloride flush, sodium chloride, ondansetron **OR** ondansetron, polyethylene glycol      Intake/Output Summary (Last 24 hours) at 6/7/2021 1507  Last data filed at 6/7/2021 1141  Gross per 24 hour   Intake 1380 ml   Output 2325 ml   Net -945 ml       Diet:  ADULT DIET; Regular; 4 carb choices (60 gm/meal)    Exam:  /65   Pulse 66   Temp 97.8 °F (36.6 °C) (Oral)   Resp 16   Ht 5' 6\" (1.676 m)   Wt 132 lb 11.2 oz (60.2 kg)   SpO2 93%   BMI 21.42 kg/m²     General appearance: No apparent distress, appears stated age and cooperative. HEENT: Pupils equal, round, and reactive to light. Conjunctivae/corneas clear. Neck: Supple, with full range of motion. No jugular venous distention.  Trachea midline. Respiratory:  Normal respiratory effort. Clear to auscultation bilaterally. Cardiovascular: Regular rate and rhythm with normal S1/S2 without murmurs, rubs or gallops. Abdomen: Soft, non-tender, non-distended with normal bowel sounds. Musculoskeletal: passive and active ROM x 4 extremities. Skin: Skin color, texture, turgor normal.  No rashes or lesions. Neurologic:  Neurovascularly intact without any focal sensory/motor deficits. Psychiatric: Alert and oriented, thought content appropriate, normal insight  Capillary Refill: Brisk,< 3 seconds   Peripheral Pulses: +2 palpable, equal bilaterally       Labs:   Recent Labs     06/05/21  1541   WBC 9.3   HGB 14.0   HCT 43.6        Recent Labs     06/05/21  1541 06/06/21  0527 06/07/21  0558   * 134* 137   K 5.2 4.8 4.5   CL 92* 96* 101   CO2 24 26 26   BUN 27* 32* 39*   CREATININE 1.5* 1.8* 1.7*   CALCIUM 9.1 9.1 8.9     Recent Labs     06/05/21  1541   AST 19   ALT 14   BILITOT 0.6   ALKPHOS 64     No results for input(s): INR in the last 72 hours. No results for input(s): Charley Loth in the last 72 hours. Microbiology:      Urinalysis:      Lab Results   Component Value Date    NITRU Negative 03/12/2018    WBCUA 0 08/23/2014    BACTERIA NONE 08/23/2014    RBCUA 0 08/23/2014    BLOODU Negative 03/12/2018    SPECGRAV 1.015 08/23/2014    GLUCOSEU negative 03/12/2018       Radiology:  CT ABDOMEN PELVIS W IV CONTRAST Additional Contrast? None   Final Result      1. There is an eccentric soft tissue density in the distal colon/rectum concerning for a mass. Direct visualization is recommended. 2. There is a pericardial effusion at the heart base measuring 8 mm in thickness. 3. Hepatic steatosis. Low-density lesion in the left hepatic lobe which is too small to characterize but likely represents a cyst.   4. Very small bilateral pleural effusions with adjacent atelectasis.          **This report has been created using voice recognition software. It may contain minor errors which are inherent in voice recognition technology. **      Final report electronically signed by Dr Selwyn Wilhelm on 6/5/2021 8:58 PM      XR CHEST PORTABLE   Final Result   Stable radiographic appearance of the chest. No evidence of an acute process. **This report has been created using voice recognition software. It may contain minor errors which are inherent in voice recognition technology. **      Final report electronically signed by Dr. Veronica Méndez MD on 6/5/2021 3:32 PM      XR CHEST PORTABLE   Final Result   1. Hyperinflated lungs, compatible with COPD. 2.  Atelectatic/fibrotic changes in the lower lung zones. No    consolidation or pleural effusion. No pneumothorax. This document has been electronically signed by: Arti Womack M.D. on    06/05/2021 03:26 AM      CT CHEST WO CONTRAST   Final Result   Parenchymal scarring left lung. No acute posttraumatic process identified. **This report has been created using voice recognition software. It may contain minor errors which are inherent in voice recognition technology. **      Final report electronically signed by Dr. Keri Leslie on 6/2/2021 1:15 PM      CT THORACIC RECONSTRUCTION WO POST PROCESS   Final Result   1. Probable fracture of the the anterior enthesophytes at T12-L1   2. Central canal stenosis which is moderate to severe at C5-C6 due to posterior marginal osteophytes            **This report has been created using voice recognition software. It may contain minor errors which are inherent in voice recognition technology. **      Final report electronically signed by Dr. Keri Leslie on 6/2/2021 1:25 PM      CT LUMBAR SPINE WO CONTRAST   Final Result   1. No acute fracture or acute bony malalignment other than the probable fracture of the anterior enthesophytes at T12-L1   2. Possible hemorrhagic cyst left kidney.             **This report has been created using voice recognition software. It may contain minor errors which are inherent in voice recognition technology. **      Final report electronically signed by Dr. Erendira Paula on 6/2/2021 1:31 PM      XR CHEST PORTABLE   Final Result   1. There are increased opacities at the left perihilar region including a focal parenchymal bandlike opacity, which may represent atelectasis versus scarring versus pneumonia. Recommend short-term follow-up chest x-ray to document stability or    resolution. **This report has been created using voice recognition software. It may contain minor errors which are inherent in voice recognition technology. **      Final report electronically signed by Dr. Jd Bernal on 6/2/2021 10:39 AM          DVT prophylaxis: [x] Lovenox                                 [] SCDs                                 [] SQ Heparin                                 [] Encourage ambulation           [] Already on Anticoagulation     Code Status: Limited    PT/OT Eval Status: Yes    Tele:   [x] yes             [] no    Electronically signed by Torrse Rome MD on 6/7/2021 at 3:07 PM

## 2021-06-07 NOTE — FLOWSHEET NOTE
06/07/21 1257   Provider Notification   Reason for Communication Evaluate   Provider Name Jeni Anspach   Method of Communication Secure Message   Response Waiting for response   Notification Time [phone]   consult to PM&R as patient's family wishes to have inpatient rehab.

## 2021-06-07 NOTE — PLAN OF CARE
Problem: Falls - Risk of:  Goal: Will remain free from falls  Description: Will remain free from falls  2021 by Carolina Caruso RN  Outcome: Ongoing  Note: Pt remains free from falls, call light in reach and educated patient on how to use, teachback completed.  Bed alarm on zone 2, side rails up, gripper socks on, pt close to nurses station and hourly rounding completed.     Problem: Falls - Risk of:  Goal: Absence of physical injury  Description: Absence of physical injury  2021 by Carolina Caruso RN  Outcome: Ongoing  Note: Pt free from physical injury     Problem: Skin Integrity:  Goal: Will show no infection signs and symptoms  Description: Will show no infection signs and symptoms  2021 by Carolina Caruso RN  Outcome: Ongoing  Note: No signs or symptoms of infection noted     Problem: Skin Integrity:  Goal: Absence of new skin breakdown  Description: Absence of new skin breakdown  2021 by Carolina Caruso RN  Outcome: Ongoing  Note: Pt being turned every two hours, skin assessed each shift, no new skin breakdown noted. EPC applied to buttocks, acevedo in place.     Problem: Confusion - Acute:  Goal: Absence of continued neurological deterioration signs and symptoms  Description: Absence of continued neurological deterioration signs and symptoms  2021 by Carolina Caruso RN  Outcome: Ongoing  Note: Pt alert to name, , year and situation but does have intermittent confusion requiring redirection from staff and reinforcement as patient is very forgetful.  pt vey forgetful about where he is.   Problem: Confusion - Acute:  Goal: Mental status will be restored to baseline  Description: Mental status will be restored to baseline  2021 by Carolina Caruso RN  Outcome: Ongoing  Note: Pt still having intermittent confusion, pt pleasant and reorients well, pt not sleeping well tonight, more confusion about where he is.     Problem: Discharge Planning:  Goal: Ability to perform decrease  6/4/2021 2129 by Romelia Foss, SUSAN  Outcome: Ongoing  Note: Pain management following case. Pt getting lidocaine patches which have helped, tylenol ordered every 4 hours as needed.  Tylenol helping slightly. Pain increases with movement, pain is in his back due to fractures.  Will continue to monitor. Morphine now available every 4 hours, pt appears comfortable, have not had to utilize morphine.     Care plan reviewed with patient, wife and daughter. Orie Antis and family verbalize understanding of the plan of care and contribute to goal setting.

## 2021-06-07 NOTE — CARE COORDINATION
6/7/21, 12:31 PM EDT    DISCHARGE PLANNING EVALUATION    SW had a lengthy conversation with both daughter Henna Bullock and spouse Zara. Zara is the primary caregiver at home and daughter Henna Bullock is the primary support. Henna Bullock stated that all of them have been vaccinated and have really not been cautious about where they have went during this 1.5 years. They truly prefer for Patient to return home with home health but do know that based off of today's therapy that Patient does need strength to return back home as he would be too much care for his spouse at this time. Henna Bergeryg asked about inpatient rehab, SW explained need for 3 hours of therapy a day and Henna Bullock feels this can happen. Patient's spouse stated that feels like it would be too much for him. SW advised family to prepare for the most needs at discharge and then that can be changed if Patient is able to return home. SW discussed consult to inpatient rehab and family does prefer this knowing that may not be an option. ALLIE discussed ECF for rehab and if needed they prefer [facility]. ALLIE received permission for a referral while awaiting results from inpatient rehab consult. [facility].

## 2021-06-07 NOTE — PROGRESS NOTES
[facility]    Patient - Cristopher Fink   MRN -  [id number]   [facility] # - [de-identified]   - 1931      Date of Admission -  2021  9:51 AM  Date of evaluation -  2021  Room - [facility], DO Primary Care Physician - Javon Gaming APRN - CNP   Chief Complaint   SOB  Active Hospital Problem List      Active Hospital Problems    Diagnosis Date Noted    Elevated troponin [R77.8]      Priority: High    Acute bilateral low back pain without sciatica [M54.5]     Acute pain due to injury [G89.11]     Acute hypoxemic respiratory failure ([address] [J96.01] 2021     HPI   Cristopher Fink is a 80 y.o. male known to the lung clinic due to advanced stage emphysema and chronic respiratory failure. Admitted to [facility] after he fell at home and during evaluation by EMS he was noted to be SOB and he did not get better, the ambulance crew brought ti the ED for eval.  In the ED Ifrah Calderon was anxious and on pain which worsened his SOB  Currently comfortable on bed, daughter at the bedside. Difficulty taking a deep breath due to MSK pain, oxygen saturation 97% on HFNC 55%  CT chest : advanced emphysema with basilar bullae and upper lobe centrilobular pattern. Results for Hillman Dubin (MRN [id number]) as of 2021 09:38   Ref.  Range 6/3/2021 09:57   Source: Unknown R Brach   pH, Blood Gas Latest Ref Range: 7.35 - 7.45  7.43   PCO2 Latest Ref Range: 35 - 45 mmhg 36   pO2 Latest Ref Range: 71 - 104 mmhg 49 (L)   HCO3 Latest Ref Range: 23 - 28 mmol/l 24   Base Excess (Calculated) Latest Ref Range: -2.5 - 2.5 mmol/l 0.3   O2 Sat Latest Units: % 85   IFIO2 Unknown 40   DEVICE Unknown HFNC   COLLECTED BY: Unknown [id number]       Pulm clinic notes,  -Reviewed CT no acute respiratory disease noted, per Fleischner Guidelines no follow-up needed optional 12 month follow-up for solitary lung nodule <6 mm given debilitated state and previous Marital status:      Spouse name: Zara    Number of children: Not on file    Years of education: Not on file    Highest education level: Not on file   Occupational History    Not on file   Tobacco Use    Smoking status: Former Smoker     Packs/day: 2.00     Years: 40.00     Pack years: 80.00     Types: Cigarettes     Quit date: 10/5/1989     Years since quittin.6    Smokeless tobacco: Never Used   Substance and Sexual Activity    Alcohol use: Yes     Alcohol/week: 1.0 standard drinks     Types: 1 Cans of beer per week     Comment: now and then    Drug use: No    Sexual activity: Not on file   Other Topics Concern    Not on file   Social History Narrative    Not on file     Social Determinants of Health     Financial Resource Strain:     Difficulty of Paying Living Expenses:    Food Insecurity:     Worried About Running Out of Food in the Last Year:     [address] in the Last Year:    Transportation Needs:     Lack of Transportation (Medical):      Lack of Transportation (Non-Medical):    Physical Activity:     Days of Exercise per Week:     Minutes of Exercise per Session:    Stress:     Feeling of Stress :    Social Connections:     Frequency of Communication with Friends and Family:     Frequency of Social Gatherings with Friends and Family:     Attends [employer] Services:     Active Member of Clubs or Organizations:     Attends Club or Organization Meetings:     Marital Status:    Intimate Partner Violence:     Fear of Current or Ex-Partner:     Emotionally Abused:     Physically Abused:     Sexually Abused:      Family History          Problem Relation Age of Onset    Rheum Arthritis Mother          due to complications related to    Heart Disease Mother     Coronary Art Dis Father     Heart Disease Father     Coronary Art Dis Brother     Heart Disease Brother      Meds    Current Medications    predniSONE  5 mg Oral Daily    polyethylene glycol  17 g Oral Daily    senna  2 tablet Oral Nightly    insulin lispro  0-12 Units Subcutaneous TID WC    insulin lispro  0-6 Units Subcutaneous Nightly    budesonide  0.5 mg Nebulization BID    Arformoterol Tartrate  15 mcg Nebulization BID    gabapentin  100 mg Oral BID    glipiZIDE  5 mg Oral Daily    guaiFENesin  400 mg Oral Daily    metoprolol succinate  25 mg Oral Daily    niacin  500 mg Oral Nightly    pravastatin  80 mg Oral Nightly    tamsulosin  0.4 mg Oral Daily    Vitamin D  5,000 Units Oral Daily    sodium chloride flush  5-40 mL Intravenous 2 times per day    enoxaparin  30 mg Subcutaneous Daily    albuterol  2.5 mg Nebulization Q4H WA    lidocaine  3 patch Transdermal Daily     morphine, glucose, dextrose, glucagon (rDNA), dextrose, acetaminophen **OR** [DISCONTINUED] acetaminophen, albuterol sulfate HFA, sodium chloride flush, sodium chloride, ondansetron **OR** ondansetron, polyethylene glycol  IV Drips/Infusions   sodium chloride 50 mL/hr at 06/07/21 0002    dextrose      sodium chloride       Vitals    Vitals    height is 5' 6\" (1.676 m) and weight is 132 lb 11.2 oz (60.2 kg). His oral temperature is 97.8 °F (36.6 °C). His blood pressure is 137/65 and his pulse is 66. His respiration is 16 and oxygen saturation is 93%. O2 Flow Rate (L/min): 6 L/min  I/O    Intake/Output Summary (Last 24 hours) at 6/7/2021 1510  Last data filed at 6/7/2021 1141  Gross per 24 hour   Intake 1380 ml   Output 2325 ml   Net -945 ml     Patient Vitals for the past 96 hrs (Last 3 readings):   Weight   06/07/21 0320 132 lb 11.2 oz (60.2 kg)   06/06/21 0330 128 lb 6.4 oz (58.2 kg)   06/05/21 0539 128 lb 9.6 oz (58.3 kg)     Exam   Physical Exam   Constitutional: No distress on O2 per NC. Patient appears elderly in good spirits, [place]. Head: Normocephalic and atraumatic. Mouth/Throat: Oropharynx is clear and moist.  No oral thrush. Eyes: Conjunctivae are normal. Pupils are equal, round. No scleral icterus. JAY JIMENEZ in the last 72 hours. Invalid input(s): CRYSTALS. PFTs             Echo     Conclusions      Summary   Technically difficult examination. Ejection fraction is visually estimated at 45-50%. There was mild global hypokinesis of the left ventricle. The aortic valve leaflets were not well visualized. Aortic valve appears tricuspid. Thickened aortic valve leaflets noted. Aortic valve leaflets are Moderately calcified. Myxomatotic degeneration of mitral valve. Decreased mitral valve mobility noted. Calcification of the mitral valve noted. Signature      ----------------------------------------------------------------   Electronically signed by Shane Doyle MD (Interpreting   physician) on 10/07/2020 at 04:05 PM  Cultures    Procalcitonin  No results found for: [facility]     Radiology    CXR  FINDINGS: Single frontal view of the chest was performed.  Cardiac size is    within normal limits.  There are calcifications within the thoracic aortic    arch.  The lungs are again noted to be hyperinflated, compatible with    COPD.  Atelectatic/fibrotic changes are noted within the lower lung zones.     There is no consolidation or pleural effusion. Crista Dach is no pneumothorax.           Impression   1.  Hyperinflated lungs, compatible with COPD. 2.  Atelectatic/fibrotic changes in the lower lung zones.  No    consolidation or pleural effusion.  No pneumothorax.       This document has been electronically signed by: Evangelina Hankins M.D. on    06/05/2021 03:26 AM             CT chest;  FINDINGS: Low-density lesion left thyroid lobe. Macrocalcification right thyroid lobe. No axillary lymphadenopathy. No mediastinal or hilar adenopathy by size criteria. Marked aortic calcifications beginning at the ascending aorta involving the arch and descending thoracic aorta. Calcified mediastinal lymph nodes calcified coronary arteries.    Interval increase in the anterior pericardial effusion now measuring 11 mm compared to 8. Lungs are emphysematous. There is a atelectasis along the major fissure left lung. Dissection may represent chronic parenchymal scarring versus less noticeable on the prior exam.   No airspace consolidations or pleural effusions no pneumothorax.       Unfortunately is a great deal motion artifact in the upper images. No acute or suspicious bone lesion is identified. There is progression of marginal osteophytes at T12 and L1 somewhat simulating a fracture but there is no distinct acute fracture. Upper abdomen   Endoluminal stent graft is present.           Impression   Parenchymal scarring left lung. No acute posttraumatic process identified.               **This report has been created using voice recognition software.  It may contain minor errors which are inherent in voice recognition technology. **       Final report electronically signed by Dr. Yesenia Esteves on 6/2/2021 1:15 PM           (See actual reports for details)    Assessment   Severe COPD GOLD 3 2013 refuses additional testing   Chest wall contusion with splinting and difficulty taking a deep breath, occult fracture possible. Severe centrilobular/panacinar emphysema  Chronic hypoxemic respiratory failure without hypercarbia (due to severe emphysema and parenchymal destruction)  Would not embark in PTE work up, suspct V/Q lug scan would be inconclusive  Eve Finders seems to be at baseline SpO2 97 on HFNC 55%  CT chest done this admission does not show any acute pulmonary issues. Recommendations   Taper oxygen as tolerated for sats 88% (chronic hypoxemia)  -Continue with nebulizations Arformoterol/albuterol/Budesonide  -On baseline prednisone 5 mg PO Daily   -This is terminal disease and would qualify for hospice care  -Consider acute rehab for pain management and mobility  -Would not pursue OP PFTs or CT chest as not a candidate/interested in any special therapies for cancer on advanced COPD.   -Continue Metaneb/Acapella  -Dispo planning eval started for Tx to ECF Cande Macario agree with rehab prior to returning home  -Discussed with patient and family and they had chosen not to complete Dexa scan after speaking with PCP now agreeable will reschedule and have follow-up with Ladd Babinski CNP in 3 months       Case discussed with nurse and patient/family. Questions and concerns addressed. Meds and Orders reviewed. Electronically signed by   DANTE Kerns CNP on 6/7/2021 at 3:10 PM    Addendum by Dr. Eleanor Mattson MD:  I have seen and examined the patient independently. Face to face evaluation and examination was performed. The above evaluation and note has been reviewed. Labs and radiographs were reviewed. I Have discussed with Mr. Ladd Babinski, CNP about this patient in detail. The above assessment and plan has been reviewed. Please see my modifications mentioned below. My modifications:  He is in 6 L of oxygen via nasal cannula  Not in distress  Continue incentive spirometer every 4 hourly as tolerated   Chalino Contreras educated about my impression and plan. He verbalizes understanding.     Sanchez Nuñez MD 6/7/2021 10:46 PM

## 2021-06-07 NOTE — CARE COORDINATION
Update: therapy recommends SNF (if family refuses, they recommend 24h care and HH (nsg, therapy, aide); SW referral; collaborated w ALLIE [facility]  Electronically signed by Yoko Wilkins RN on 6/7/2021 at 11:24 AM

## 2021-06-08 LAB
ANION GAP SERPL CALCULATED.3IONS-SCNC: 7 MEQ/L (ref 8–16)
BUN BLDV-MCNC: 30 MG/DL (ref 7–22)
CALCIUM SERPL-MCNC: 8.7 MG/DL (ref 8.5–10.5)
CHLORIDE BLD-SCNC: 101 MEQ/L (ref 98–111)
CO2: 27 MEQ/L (ref 23–33)
CREAT SERPL-MCNC: 1.7 MG/DL (ref 0.4–1.2)
GFR SERPL CREATININE-BSD FRML MDRD: 38 ML/MIN/1.73M2
GLUCOSE BLD-MCNC: 141 MG/DL (ref 70–108)
GLUCOSE BLD-MCNC: 74 MG/DL (ref 70–108)
GLUCOSE BLD-MCNC: 80 MG/DL (ref 70–108)
GLUCOSE BLD-MCNC: 85 MG/DL (ref 70–108)
LV EF: 53 %
LVEF MODALITY: NORMAL
MAGNESIUM: 2.3 MG/DL (ref 1.6–2.4)
POTASSIUM SERPL-SCNC: 4.7 MEQ/L (ref 3.5–5.2)
SODIUM BLD-SCNC: 135 MEQ/L (ref 135–145)

## 2021-06-08 PROCEDURE — 99232 SBSQ HOSP IP/OBS MODERATE 35: CPT | Performed by: INTERNAL MEDICINE

## 2021-06-08 PROCEDURE — 6370000000 HC RX 637 (ALT 250 FOR IP): Performed by: STUDENT IN AN ORGANIZED HEALTH CARE EDUCATION/TRAINING PROGRAM

## 2021-06-08 PROCEDURE — 2700000000 HC OXYGEN THERAPY PER DAY

## 2021-06-08 PROCEDURE — 36415 COLL VENOUS BLD VENIPUNCTURE: CPT

## 2021-06-08 PROCEDURE — 6360000002 HC RX W HCPCS: Performed by: NURSE PRACTITIONER

## 2021-06-08 PROCEDURE — APPSS30 APP SPLIT SHARED TIME 16-30 MINUTES: Performed by: NURSE PRACTITIONER

## 2021-06-08 PROCEDURE — 6370000000 HC RX 637 (ALT 250 FOR IP): Performed by: NURSE PRACTITIONER

## 2021-06-08 PROCEDURE — 2060000000 HC ICU INTERMEDIATE R&B

## 2021-06-08 PROCEDURE — 82948 REAGENT STRIP/BLOOD GLUCOSE: CPT

## 2021-06-08 PROCEDURE — 83735 ASSAY OF MAGNESIUM: CPT

## 2021-06-08 PROCEDURE — 99232 SBSQ HOSP IP/OBS MODERATE 35: CPT | Performed by: NURSE PRACTITIONER

## 2021-06-08 PROCEDURE — 93307 TTE W/O DOPPLER COMPLETE: CPT

## 2021-06-08 PROCEDURE — 94669 MECHANICAL CHEST WALL OSCILL: CPT

## 2021-06-08 PROCEDURE — 80048 BASIC METABOLIC PNL TOTAL CA: CPT

## 2021-06-08 PROCEDURE — 94640 AIRWAY INHALATION TREATMENT: CPT

## 2021-06-08 PROCEDURE — 94761 N-INVAS EAR/PLS OXIMETRY MLT: CPT

## 2021-06-08 RX ADMIN — GLIPIZIDE 5 MG: 5 TABLET ORAL at 10:00

## 2021-06-08 RX ADMIN — GABAPENTIN 100 MG: 100 CAPSULE ORAL at 19:48

## 2021-06-08 RX ADMIN — GUAIFENESIN 400 MG: 200 SOLUTION ORAL at 10:00

## 2021-06-08 RX ADMIN — Medication 500 MG: at 19:49

## 2021-06-08 RX ADMIN — ALBUTEROL SULFATE 2.5 MG: 2.5 SOLUTION RESPIRATORY (INHALATION) at 07:50

## 2021-06-08 RX ADMIN — ARFORMOTEROL TARTRATE 15 MCG: 15 SOLUTION RESPIRATORY (INHALATION) at 07:50

## 2021-06-08 RX ADMIN — POLYETHYLENE GLYCOL 3350 17 G: 17 POWDER, FOR SOLUTION ORAL at 11:50

## 2021-06-08 RX ADMIN — PREDNISONE 5 MG: 5 TABLET ORAL at 10:00

## 2021-06-08 RX ADMIN — BUDESONIDE 500 MCG: 0.5 INHALANT RESPIRATORY (INHALATION) at 07:50

## 2021-06-08 RX ADMIN — TAMSULOSIN HYDROCHLORIDE 0.4 MG: 0.4 CAPSULE ORAL at 10:00

## 2021-06-08 RX ADMIN — ALBUTEROL SULFATE 2.5 MG: 2.5 SOLUTION RESPIRATORY (INHALATION) at 03:51

## 2021-06-08 RX ADMIN — ALBUTEROL SULFATE 2.5 MG: 2.5 SOLUTION RESPIRATORY (INHALATION) at 12:00

## 2021-06-08 RX ADMIN — Medication 5000 UNITS: at 10:00

## 2021-06-08 RX ADMIN — METOPROLOL SUCCINATE 25 MG: 25 TABLET, FILM COATED, EXTENDED RELEASE ORAL at 10:00

## 2021-06-08 RX ADMIN — ENOXAPARIN SODIUM 30 MG: 30 INJECTION SUBCUTANEOUS at 10:01

## 2021-06-08 RX ADMIN — ARFORMOTEROL TARTRATE 15 MCG: 15 SOLUTION RESPIRATORY (INHALATION) at 17:39

## 2021-06-08 RX ADMIN — PRAVASTATIN SODIUM 80 MG: 80 TABLET ORAL at 19:48

## 2021-06-08 RX ADMIN — GABAPENTIN 100 MG: 100 CAPSULE ORAL at 10:01

## 2021-06-08 RX ADMIN — ALBUTEROL SULFATE 2.5 MG: 2.5 SOLUTION RESPIRATORY (INHALATION) at 21:15

## 2021-06-08 RX ADMIN — ACETAMINOPHEN 650 MG: 325 TABLET ORAL at 09:30

## 2021-06-08 RX ADMIN — BUDESONIDE 500 MCG: 0.5 INHALANT RESPIRATORY (INHALATION) at 17:38

## 2021-06-08 RX ADMIN — ALBUTEROL SULFATE 2.5 MG: 2.5 SOLUTION RESPIRATORY (INHALATION) at 16:03

## 2021-06-08 RX ADMIN — SENNOSIDES 17.2 MG: 8.6 TABLET, FILM COATED ORAL at 19:48

## 2021-06-08 ASSESSMENT — PAIN DESCRIPTION - ORIENTATION: ORIENTATION: LOWER

## 2021-06-08 ASSESSMENT — PAIN SCALES - GENERAL
PAINLEVEL_OUTOF10: 0
PAINLEVEL_OUTOF10: 0
PAINLEVEL_OUTOF10: 3
PAINLEVEL_OUTOF10: 0
PAINLEVEL_OUTOF10: 0
PAINLEVEL_OUTOF10: 3

## 2021-06-08 ASSESSMENT — PAIN DESCRIPTION - PAIN TYPE: TYPE: ACUTE PAIN

## 2021-06-08 ASSESSMENT — PAIN DESCRIPTION - ONSET: ONSET: ON-GOING

## 2021-06-08 ASSESSMENT — PAIN DESCRIPTION - LOCATION: LOCATION: BACK

## 2021-06-08 ASSESSMENT — PAIN DESCRIPTION - FREQUENCY: FREQUENCY: CONTINUOUS

## 2021-06-08 ASSESSMENT — PAIN DESCRIPTION - DESCRIPTORS: DESCRIPTORS: SHARP

## 2021-06-08 NOTE — CONSULTS
SNF as they would be limited visitation and they are requesting the same level of care he is receiving at this time. Discussed acute vs chronic care and managing O2 sats with NC. Patient no longer qualifies for [facility] as well. Daughter asking a couple times how recommendations can be made if an evaluation of his baseline activity hasn't been discussed. Explained these questions are asked over time and wouldn't always be a sit down evaluation. But even with that evaluation, it would not change the recommendations for SNF care at this time. Discussed with family about taking the time to discuss what patient would want through this. Current Rehabilitation Assessments:  PT:     SUBJECTIVE: RN approved session. Pt resting in bed upon arrival, daughter and wife present. RN requests to get pt up to bedside commode to try and have bowel movement. Pt pleasant and agreeable to therapy, but very confused throughout session. PAIN: Pt stated several times that his back hurt but did not rate on numerical scale. Vitals: Continuous O2 monitoring throughout session. Pt on 6L O2 through nasal cannula with mobility. Sats ranged from 86-92% throughout session. Time spent for educated regarding pursed lip breathing. OBJECTIVE:  Bed Mobility:  Rolling to Right: Minimal Assistance, with verbal cues    Supine to Sit: Minimal Assistance, Moderate Assistance, At trunk. Transfers:  Sit to Stand: Minimal Assistance, Moderate Assistance, From EOB and from bedside commode. Verbal and tactile cuing required for hand placement  Stand to Sit:Moderate Assistance, Pt with posterior lean. Verbal and tactile cuing to reach back for chair before sitting. Ambulation:  Moderate Assistance  Distance: 2 feet x1 and 3 feet x1     Surface: Level Tile  Device:Rolling Walker  Gait Deviations:   Forward Flexed Posture, Slow Luli, Decreased Step Length Bilaterally, Decreased Gait Speed and Heavy posterior lean  Balance:  Static Sitting Balance: Contact Guard Assistance  Dynamic Sitting Balance: Minimal Assistance  Static Standing Balance: Minimal Assistance, Moderate Assistance, Due to posterior lean. Pt has difficult time finding midline due to confusion. Exercise:  Patient was guided in 1 set(s) 10 reps of exercise to both lower extremities. Seated marches, Seated hamstring curls, Seated heel/toe raises and Long arc quads. Exercises were completed for increased independence with functional mobility. OT:    COGNITION: Decreased Recall, Decreased Insight, Impaired Memory, Decreased Problem Solving, Decreased Safety Awareness and Impulsive   Pt requires cues to slow down. Daughter reports pt having poor carryover of instructions for safety (taking time with movements, hand placement during transfers, safety with RW, etc)  RANGE OF MOTION:  Bilateral Upper Extremity:  WFL  STRENGTH:  Bilateral Upper Extremity:  Impaired - grossly 4-/5  BALANCE:  Sitting Balance:  Stand By Assistance. at EOB for ~5 minutes in prep for transfer  Standing Balance: [employer], with verbal cues . ; with RW for support, cues for upright posture/gaze.  Pt standing for ~1 minute before mobility completed  BED MOBILITY:  Rolling to Left: Minimal Assistance, with verbal cues     Supine to Sit: Moderate Assistance, with head of bed raised, with verbal cues , with increased time for completion ; moderate cues for logroll technique; pt required assist to bring LEs off side of bed and elevate trunk; no pain with transition to sitting  Scooting: Minimal Assistance advancing hips forward to EOB  TRANSFERS:  Sit to Stand:  [employer], with increased time for completion, cues for hand placement. ; from EOB  Stand to Sit: Minimal Assistance, with increased time for completion, cues for hand placement. ; assist for slow descent into chair as pt not following instructions to reach back for chair and sits quickly  FUNCTIONAL MOBILITY:  Assistive Device: Rolling (GLUTOSE) 40 % oral gel 15 g, 15 g, Oral, PRN  dextrose 50 % IV solution, 12.5 g, Intravenous, PRN  glucagon (rDNA) injection 1 mg, 1 mg, Intramuscular, PRN  dextrose 5 % solution, 100 mL/hr, Intravenous, PRN  acetaminophen (TYLENOL) tablet 650 mg, 650 mg, Oral, Q4H PRN **OR** [DISCONTINUED] acetaminophen (TYLENOL) suppository 650 mg, 650 mg, Rectal, Q6H PRN  albuterol sulfate  (90 Base) MCG/ACT inhaler 2 puff, 2 puff, Inhalation, Q6H PRN  budesonide (PULMICORT) nebulizer suspension 500 mcg, 0.5 mg, Nebulization, BID  Arformoterol Tartrate (BROVANA) nebulizer solution 15 mcg, 15 mcg, Nebulization, BID  gabapentin (NEURONTIN) capsule 100 mg, 100 mg, Oral, BID  glipiZIDE (GLUCOTROL) tablet 5 mg, 5 mg, Oral, Daily  guaiFENesin (ROBITUSSIN) 100 MG/5ML oral solution 400 mg, 400 mg, Oral, Daily  metoprolol succinate (TOPROL XL) extended release tablet 25 mg, 25 mg, Oral, Daily  niacin extended release capsule 500 mg, 500 mg, Oral, Nightly  pravastatin (PRAVACHOL) tablet 80 mg, 80 mg, Oral, Nightly  tamsulosin (FLOMAX) capsule 0.4 mg, 0.4 mg, Oral, Daily  Vitamin D (CHOLECALCIFEROL) tablet 5,000 Units, 5,000 Units, Oral, Daily  sodium chloride flush 0.9 % injection 5-40 mL, 5-40 mL, Intravenous, 2 times per day  sodium chloride flush 0.9 % injection 5-40 mL, 5-40 mL, Intravenous, PRN  0.9 % sodium chloride infusion, 25 mL, Intravenous, PRN  ondansetron (ZOFRAN-ODT) disintegrating tablet 4 mg, 4 mg, Oral, Q8H PRN **OR** ondansetron (ZOFRAN) injection 4 mg, 4 mg, Intravenous, Q6H PRN  polyethylene glycol (GLYCOLAX) packet 17 g, 17 g, Oral, Daily PRN  enoxaparin (LOVENOX) injection 30 mg, 30 mg, Subcutaneous, Daily  albuterol (PROVENTIL) nebulizer solution 2.5 mg, 2.5 mg, Nebulization, Q4H WA  lidocaine 4 % external patch 3 patch, 3 patch, Transdermal, Daily    Social History:  Social History     Socioeconomic History    Marital status:      Spouse name: Zara    Number of children: Not on file    Years of education: Not on file    Highest education level: Not on file   Occupational History    Not on file   Tobacco Use    Smoking status: Former Smoker     Packs/day: 2.00     Years: 40.00     Pack years: 80.00     Types: Cigarettes     Quit date: 10/5/1989     Years since quittin.6    Smokeless tobacco: Never Used   Substance and Sexual Activity    Alcohol use: Yes     Alcohol/week: 1.0 standard drinks     Types: 1 Cans of beer per week     Comment: now and then    Drug use: No    Sexual activity: Not on file   Other Topics Concern    Not on file   Social History Narrative    Not on file     Social Determinants of Health     Financial Resource Strain:     Difficulty of Paying Living Expenses:    Food Insecurity:     Worried About Running Out of Food in the Last Year:     [address] in the Last Year:    Transportation Needs:     Lack of Transportation (Medical):      Lack of Transportation (Non-Medical):    Physical Activity:     Days of Exercise per Week:     Minutes of Exercise per Session:    Stress:     Feeling of Stress :    Social Connections:     Frequency of Communication with Friends and Family:     Frequency of Social Gatherings with Friends and Family:     Attends Cheondoism Services:     Active Member of Clubs or Organizations:     Attends Club or Organization Meetings:     Marital Status:    Intimate Partner Violence:     Fear of Current or Ex-Partner:     Emotionally Abused:     Physically Abused:     Sexually Abused:      Lives With: Spouse  Type of Home: House  Home Layout: One level  Home Access: Stairs to enter without rails  Entrance Stairs - Number of Steps: 3--daughter assists with climbing stairs  Home Equipment: Oxygen, Rolling walker, Grab bars (transport chair; 6L O2 with activity (daughter reported pt can be on RA at rest and Sp02 is >90%, although stated he keeps it on 6L all the time))   Bathroom Shower/Tub: Tub/Shower unit  Bathroom Toilet: Handicap height  Bathroom Equipment: Shower chair, Grab bars in shower     ADL Assistance: Needs assistance  Homemaking Assistance: Needs assistance (wife completes all)  Ambulation Assistance: Independent  Transfer Assistance: Independent  Active : No  Additional Comments: pt fell one time a week prior to this admission in addition to fall that brought him to the hospital. Daughter reported pt just started using walker 5 weeks ago per family's recommendation. Daughter reported wife has been assisting with bathing/dressing as pt would become very SOB/fatigue very quickly while completing. Family History:       Problem Relation Age of Onset    Rheum Arthritis Mother          due to complications related to    Heart Disease Mother     Coronary Art Dis Father     Heart Disease Father     Coronary Art Dis Brother     Heart Disease Brother        Review of Systems:  Patient resting in bed. Nasal cannula in place. Patient without appropriate responses to questions or assessment. Physical Exam:  BP (!) 148/62   Pulse 62   Temp 97.6 °F (36.4 °C) (Oral)   Resp 18   Ht 5' 6\" (1.676 m)   Wt 128 lb 1.6 oz (58.1 kg)   SpO2 97%   BMI 20.68 kg/m²   lethargic  Orientation:   SATINDER  Mood: lethargic  Affect: calm  General appearance: appearing stated age, moderate distress, thin, frail, ill appearing    Memory: SATINDER  Attention/Concentration: abnormal - poor due to lethargy  Language:  abnormal - mumbled    Cranial Nerves:  cranial nerves appear intact  Motor Exam:  HG bilaterally 3+/5.  No other commands followed for MMT  Tone:  normal  Muscle bulk: decreased    Heart: normal rate, regular rhythm, normal S1, S2, no murmurs, rubs, clicks or gallops  Lungs: poor air exchange, unable to take deep breaths on command  Abdomen: soft, non-tender, non-distended, normal bowel sounds, no masses or organomegaly    Skin: warm and dry, no rash or erythema  Peripheral vascular: Pulses: Normal upper and lower extremity pulses;

## 2021-06-08 NOTE — PROGRESS NOTES
negative  MUSCULOSKELETAL:  positive for  myalgias, arthralgias   NEUROLOGICAL:  positive for gait problems and weakness  BEHAVIOR/PSYCH:  negative  System review otherwise negative       PHYSICAL EXAM:  BP (!) 148/62   Pulse 62   Temp 97.6 °F (36.4 °C) (Oral)   Resp 18   Ht 5' 6\" (1.676 m)   Wt 128 lb 1.6 oz (58.1 kg)   SpO2 97%   BMI 20.68 kg/m²  I Body mass index is 20.68 kg/m². I   Wt Readings from Last 1 Encounters:   06/08/21 128 lb 1.6 oz (58.1 kg)      awake  Orientation:   person, place, time  Mood: within normal limits  Affect: calm and quiet  General appearance: ill appearing, thin   Memory:   normal,   Attention/Concentration: normal  Language:  normal     Cranial Nerves:  cranial nerves II-XII are grossly intact  ROM:  Normal ROM all 4 extremities   Motor Exam:  Motor exam is 5 out of 5 all extremities with the exception of bilateral lower extremities 4/5   Tone:  normal  Muscle bulk: within normal limits        Heart: normal rate, regular rhythm, normal S1, S2, no murmurs, rubs, clicks or gallops  Lungs: Diminished, coarse, scattered wheezes and rhonchi on 2 liters of oxygen per nasal cannula   Abdomen: soft, non-tender, non-distended, normal bowel sounds, no masses or organomegaly     Skin: left upper extremity abrasion and skin tear covered, left knee scab  Peripheral vascular: Pulses: Normal upper and lower extremity pulses; Edema: no    DATA    Recent Labs     06/05/21  1541   WBC 9.3   RBC 4.74   HGB 14.0   HCT 43.6   MCV 92.0   MCH 29.5   MCHC 32.1*      MPV 9.3*     Recent Labs     06/06/21  0527 06/07/21  0558 06/08/21  0445   * 137 135   K 4.8 4.5 4.7   CL 96* 101 101   CO2 26 26 27   BUN 32* 39* 30*   CREATININE 1.8* 1.7* 1.7*   GLUCOSE 131* 96 85   CALCIUM 9.1 8.9 8.7     Recent Labs     06/07/21  1728 06/07/21  1938 06/08/21  0736   POCGLU 98 87 80       ASSESSMENT   1. Acute low back pain following fall   2.  Probable fracture of the anterior enthesophytes at T12-L1  3. Chronic hypoxic respiratory failure  4. COPD exacerbation on high flow oxygen  5. CKD  6. Debility        PLAN  1. Pain remains well controlled at this time: continue Tylenol prn and Lidoderm patches   2. The pain management team will sign off at this time d/t minimal pain. Will be wiling to see outpatient if needed.  Please call with any questions     Spent 25 minutes evaluating and examining patient and completing documentation      DANTE Gongora CNP, 6/8/2021, 3:11 PM

## 2021-06-08 NOTE — PROGRESS NOTES
conversations regarding advanced COPD treatments Charo Gandhi reports not interested  -Will continue on current inhaled therapy  -Intolerant to Verdie Anna Marie in the past does not wish to try again  -Refer to [facility] , will order DEXA scan   -Extensive discussion had with patient, wife, and daughter around the process of developing COPD and how it affects individuals later in life encouraged patient to remain active and fit. May need to perform activity of shorter duration and increased frequency to maintain physical endurance. All voiced understanding and no further questions at end of discussion. Charo Gandhi is not interested in having spirometry performed ever again I expect his disease has progressed with natural aging currently on Max therapy minus the noted limitations above. -Advised to maintain pneumonia vaccine with PCP and to take flu vaccine this coming season.  -Advised patient to call office with any changes, questions, or concerns regarding respiratory status     Will see Chalino Contreras back in: 8 months at regularly scheduled follow-up     Ladd Babinski CNP  10/23/2020         Events last 24h   -Fatigued today has not been sleeping well per family  -Had issue with hypoxia and per family recount anxiety   -Continuing to decrease O2 requirements at rest only on 2 LPM, baseline was needing 6 LPM with exertion informed staff to preoxygenate prior to exertion  All other systems reviewed     Past Surgical History           Procedure Laterality Date    ABDOMEN SURGERY      ABDOMINAL AORTIC ANEURYSM REPAIR  8/21/14    ABDOMINAL AORTIC ANEURYSM REPAIR, ENDOVASCULAR Bilateral 08/21/2014    WITH LT FEMORAL ENDARTERECTOMY     CARDIAC SURGERY      COLONOSCOPY  unsure    INGUINAL HERNIA REPAIR Bilateral 07/28/2015    Dr Milagro Lee;  Regular; 4 carb choices (60 gm/meal)  Allergies    Daliresp [roflumilast], Sulfa antibiotics, and Percocet [oxycodone-acetaminophen]  Social History     Social History     Socioeconomic History    Marital status:      Spouse name: Zara    Number of children: Not on file    Years of education: Not on file    Highest education level: Not on file   Occupational History    Not on file   Tobacco Use    Smoking status: Former Smoker     Packs/day: 2.00     Years: 40.00     Pack years: 80.00     Types: Cigarettes     Quit date: 10/5/1989     Years since quittin.6    Smokeless tobacco: Never Used   Substance and Sexual Activity    Alcohol use: Yes     Alcohol/week: 1.0 standard drinks     Types: 1 Cans of beer per week     Comment: now and then    Drug use: No    Sexual activity: Not on file   Other Topics Concern    Not on file   Social History Narrative    Not on file     Social Determinants of Health     Financial Resource Strain:     Difficulty of Paying Living Expenses:    Food Insecurity:     Worried About Running Out of Food in the Last Year:     [address] in the Last Year:    Transportation Needs:     Lack of Transportation (Medical):      Lack of Transportation (Non-Medical):    Physical Activity:     Days of Exercise per Week:     Minutes of Exercise per Session:    Stress:     Feeling of Stress :    Social Connections:     Frequency of Communication with Friends and Family:     Frequency of Social Gatherings with Friends and Family:     Attends Anabaptist Services:     Active Member of Clubs or Organizations:     Attends Club or Organization Meetings:     Marital Status:    Intimate Partner Violence:     Fear of Current or Ex-Partner:     Emotionally Abused:     Physically Abused:     Sexually Abused:      Family History          Problem Relation Age of Onset    Rheum Arthritis Mother          due to complications related to    Heart Disease Mother     Coronary Art Dis Father     Heart Disease Father     Coronary Art Dis Brother     Heart Disease Brother      Meds    Current Medications    predniSONE  5 mg Oral Daily    polyethylene glycol  17 g Oral Daily    senna  2 tablet Oral Nightly    insulin lispro  0-12 Units Subcutaneous TID WC    insulin lispro  0-6 Units Subcutaneous Nightly    budesonide  0.5 mg Nebulization BID    Arformoterol Tartrate  15 mcg Nebulization BID    gabapentin  100 mg Oral BID    glipiZIDE  5 mg Oral Daily    guaiFENesin  400 mg Oral Daily    metoprolol succinate  25 mg Oral Daily    niacin  500 mg Oral Nightly    pravastatin  80 mg Oral Nightly    tamsulosin  0.4 mg Oral Daily    Vitamin D  5,000 Units Oral Daily    sodium chloride flush  5-40 mL Intravenous 2 times per day    enoxaparin  30 mg Subcutaneous Daily    albuterol  2.5 mg Nebulization Q4H WA    lidocaine  3 patch Transdermal Daily     morphine, glucose, dextrose, glucagon (rDNA), dextrose, acetaminophen **OR** [DISCONTINUED] acetaminophen, albuterol sulfate HFA, sodium chloride flush, sodium chloride, ondansetron **OR** ondansetron, polyethylene glycol  IV Drips/Infusions   sodium chloride 50 mL/hr at 06/07/21 1954    dextrose      sodium chloride       Vitals    Vitals    height is 5' 6\" (1.676 m) and weight is 128 lb 1.6 oz (58.1 kg). His oral temperature is 97.7 °F (36.5 °C). His blood pressure is 134/62 and his pulse is 59. His respiration is 18 and oxygen saturation is 96%. O2 Flow Rate (L/min): 2 L/min  I/O    Intake/Output Summary (Last 24 hours) at 6/8/2021 1754  Last data filed at 6/8/2021 1550  Gross per 24 hour   Intake 901 ml   Output 3525 ml   Net -2624 ml     Patient Vitals for the past 96 hrs (Last 3 readings):   Weight   06/08/21 0300 128 lb 1.6 oz (58.1 kg)   06/07/21 0320 132 lb 11.2 oz (60.2 kg)   06/06/21 0330 128 lb 6.4 oz (58.2 kg)     Exam   Physical Exam   Constitutional: No distress on O2 per NC. Patient appears elderly and tired sleeping in bed arouses to voice. Head: Normocephalic and atraumatic.    Mouth/Throat: Oropharynx is clear and moist.  No oral GLUCOSEU, BILIRUBINUR, UROBILINOGEN, KETUA, LABCAST, LABCASTTY, AMORPHOS in the last 72 hours. Invalid input(s): CRYSTALS. PFTs             Echo     Conclusions      Summary   Technically difficult examination. Ejection fraction is visually estimated at 45-50%. There was mild global hypokinesis of the left ventricle. The aortic valve leaflets were not well visualized. Aortic valve appears tricuspid. Thickened aortic valve leaflets noted. Aortic valve leaflets are Moderately calcified. Myxomatotic degeneration of mitral valve. Decreased mitral valve mobility noted. Calcification of the mitral valve noted. Signature      ----------------------------------------------------------------   Electronically signed by Dick Cornejo MD (Interpreting   physician) on 10/07/2020 at 04:05 PM  Cultures    Procalcitonin  No results found for: [facility]     Radiology    CXR  FINDINGS: Single frontal view of the chest was performed.  Cardiac size is    within normal limits.  There are calcifications within the thoracic aortic    arch.  The lungs are again noted to be hyperinflated, compatible with    COPD.  Atelectatic/fibrotic changes are noted within the lower lung zones.     There is no consolidation or pleural effusion. Charo Callander is no pneumothorax.           Impression   1.  Hyperinflated lungs, compatible with COPD. 2.  Atelectatic/fibrotic changes in the lower lung zones.  No    consolidation or pleural effusion.  No pneumothorax.       This document has been electronically signed by: Arti Womack M.D. on    06/05/2021 03:26 AM             CT chest;  FINDINGS: Low-density lesion left thyroid lobe. Macrocalcification right thyroid lobe. No axillary lymphadenopathy. No mediastinal or hilar adenopathy by size criteria. Marked aortic calcifications beginning at the ascending aorta involving the arch and descending thoracic aorta. Calcified mediastinal lymph nodes calcified coronary arteries. prior to returning home  -Discussed with patient and family and they had chosen not to complete Dexa scan after speaking with PCP now agreeable will reschedule and have follow-up with Magdiel Benitez CNP in 3 months       Case discussed with nurse and patient/family. Questions and concerns addressed. Meds and Orders reviewed. Electronically signed by   DANTE House CNP on 6/8/2021 at 5:54 PM  Addendum by Dr. Allyson Timmons MD:  I have seen and examined the patient independently. Face to face evaluation and examination was performed. The above evaluation and note has been reviewed. Labs and radiographs were reviewed. I Have discussed with Mr. Magdiel Benitez CNP about this patient in detail. The above assessment and plan has been reviewed. Please see my modifications mentioned below. My modifications:  He is on a 6 L of oxygen and nasal cannula. Not in distress  He is a steroid-dependent COPD patient. He is currently on 5 mg of prednisone at home daily  He uses Incruse, prednisone 5 mg p.o. daily, perform vest, Pulmicort nebulization along with albuterol HFA 2 puffs every 6 hourly as needed.   Follow-up as above      Annalise Matthew MD 6/8/2021 6:04 PM

## 2021-06-08 NOTE — DISCHARGE INSTR - COC
Continuity of Care Form    Patient Name: Nina Koenig   :  1931  MRN:  [id number]    6 [facility] date:  2021  Discharge date:  ***    Code Status Order: Limited   Advance Directives:   Advance Care Flowsheet Documentation     Date/Time Healthcare Directive Type of Healthcare Directive Copy in [address] Agent's Name Healthcare Agent's Phone Number    21 1800  No, patient does not have an advance directive for healthcare treatment -- -- -- -- --          Admitting Physician:  Katie Segura MD  PCP: DANTE Katz - CNP    Discharging Nurse: [facility] Unit/Room#: 4K-26/026-A  Discharging Unit Phone Number: ***    Emergency Contact:   Extended Emergency Contact Information  Primary Emergency Contact: Hannah Louis  Address: [address] [place] [address] Phone: [phone]  Relation: Spouse  Secondary Emergency Contact: Marlavelleuzmadontae Porter  Address: [facility] Phone: [phone]  Relation: Child    Past Surgical History:  Past Surgical History:   Procedure Laterality Date    ABDOMEN SURGERY      ABDOMINAL AORTIC ANEURYSM REPAIR  14    ABDOMINAL AORTIC ANEURYSM REPAIR, ENDOVASCULAR Bilateral 2014    WITH LT FEMORAL ENDARTERECTOMY     CARDIAC SURGERY      COLONOSCOPY  unsure    INGUINAL HERNIA REPAIR Bilateral 2015    Dr Aysha Pruitt         Immunization History:   Immunization History   Administered Date(s) Administered    COVID-19, Dodson Peter, PF, 30mcg/0.3mL 2021, 2021    Influenza A (H9X2-12) Vaccine PF IM 12/15/2009    Influenza Vaccine, unspecified formulation 2016    Influenza Virus Vaccine 10/22/2010, 10/13/2011, 10/12/2012, 10/07/2014, 10/12/2015, 10/23/2015, 2016, 10/15/2018, 2018, 10/01/2020    Influenza Whole 10/23/2015    Influenza, High Dose (Fluzone 72 yrs and older) XYJP:823081333}  Bathing  {CHP DME YXXN:407390318}  Dressing  {CHP DME BVOY:324700658}  Toileting  {CHP DME NKWW:205620482}  Feeding  {CHP DME NUTS:311865133}  Med Admin  {CHP DME FKPQ:401363448}  Med Delivery   { VANGIE MED Delivery:133373270}    Wound Care Documentation and Therapy:  Wound 06/02/21 Arm Left;Upper large skin tear from fall (Active)   Wound Etiology Skin Tear 06/08/21 1000   Dressing Status New dressing applied 06/08/21 1000   Dressing/Treatment Foam 06/08/21 1000   Wound Assessment Pink/red 06/08/21 1000   Drainage Amount None 06/08/21 1000   Drainage Description Serosanguinous 06/08/21 1000   Odor None 06/08/21 0817   Vy-wound Assessment Blanchable erythema 06/08/21 1000   Number of days: 5        Elimination:  Continence:   · Bowel: {YES / FL:88826}  · Bladder: {YES / KALANI:13142}  Urinary Catheter: {Urinary Catheter:951706731}   Colostomy/Ileostomy/Ileal Conduit: {YES / SQ:68203}       Date of Last BM: ***    Intake/Output Summary (Last 24 hours) at 6/8/2021 1230  Last data filed at 6/8/2021 0817  Gross per 24 hour   Intake 1228.2 ml   Output 3750 ml   Net -2521.8 ml     I/O last 3 completed shifts:   In: 1348.2 [P.O.:388; I.V.:960.2]  Out: 3850 [Urine:3850]    Safety Concerns:     [employer] Safety Concerns:204438412}    Impairments/Disabilities:      [employer] Impairments/Disabilities:649231627}    Nutrition Therapy:  Current Nutrition Therapy:   [employer] Diet List:436445985}    Routes of Feeding: {CHP DME Other Feedings:881900974}  Liquids: {Slp liquid thickness:98130}  Daily Fluid Restriction: {CHP DME Yes amt example:755673944}  Last Modified Barium Swallow with Video (Video Swallowing Test): {Done Not Done PKIJ:616581005}    Treatments at the Time of Hospital Discharge:   Respiratory Treatments: ***  Oxygen Therapy:  {Therapy; copd oxygen:89036}  Ventilator:    {TRINI PORTILLO Vent YZDQ:928540363}    Rehab Therapies: {THERAPEUTIC INTERVENTION:6571998962}  Weight Bearing Status/Restrictions: {TRINI PORTILLO Weight Bearin}  Other Medical Equipment (for information only, NOT a DME order):  {EQUIPMENT:526653705}  Other Treatments: ***    Patient's personal belongings (please select all that are sent with patient):  {CHP DME Belongings:761250760}    RN SIGNATURE:  {Esignature:995947979}    CASE MANAGEMENT/SOCIAL WORK SECTION    Inpatient Status Date: 2021    Readmission Risk Assessment Score:  Readmission Risk              Risk of Unplanned Readmission:  17           Discharging to Facility/ Agency   · Name: [facility]  · Address: [address] [place] [address]  · Phone: [phone]  · Fax: [phone]    Dialysis Facility (if applicable)   · Name:  · Address:  · Dialysis Schedule:  · Phone:  · Fax:    / signature: Electronically signed by ANTONIO Rockwell on 21 at 12:34 PM EDT    PHYSICIAN SECTION    Prognosis: {Prognosis:6930449254}    Condition at Discharge: [address] [place] [address] Patient Condition:232623314}    Rehab Potential (if transferring to Rehab): {Prognosis:7219379281}    Recommended Labs or Other Treatments After Discharge: ***    Physician Certification: I certify the above information and transfer of Viviana Encinas  is necessary for the continuing treatment of the diagnosis listed and that he requires {Admit to Appropriate Level of Care:77899} for {GREATER/LESS:018364196} 30 days.      Update Admission H&P: {CHP DME Changes in YKD:462113935}    PHYSICIAN SIGNATURE:  {Esignature:564313218}

## 2021-06-08 NOTE — CARE COORDINATION
6/8/21, 1:43 PM EDT    DISCHARGE PLANNING EVALUATION      SW spoke with wife, daughter and son in law of patient. Daughter asked several questions regarding level of care and the level of care patient will be discharged too. Lu from inpatient rehab was present during discussion as well. Lu explained that patient could not tolerate 3 hours of therapy due to his end stage COPD. Therapy is recommending SNF. Daughter voiced understanding and reported that she did not want patient to go to rehab and they do not assist him or check his O2 levels frequently enough. SW explained that the SNF will assess his level of care and provide the care he needs. Family will look into [facility] and [facility] today.

## 2021-06-08 NOTE — PROGRESS NOTES
[facility] 60  PHYSICAL THERAPY MISSED TREATMENT NOTE  STR[facility] ICU STEPDOWN TELEMETRY 4K    Date: 2021  Patient Name: Justyna Rose        MRN: [id number]   : 1931  (80 y.o.)  Gender: male   Referring Practitioner: Rodolfo Luna MD  Diagnosis: acute hypoxemic respiratory failure         REASON FOR MISSED TREATMENT:  Missed treat. Pt attempted twice. First attempt pt sleeping and family present. Family politely requests to allow pt to sleep. Second attempt pt still sleeping. Did wake up momentarily to verbal stimuli but did not acknowledge therapist, and fell right back to sleep. Will try back on next scheduled visit. Laci Dunbar

## 2021-06-08 NOTE — FLOWSHEET NOTE
Blood glucose-74- wnl. Pt asymptomatic. Given orange juice and pt states will eat dinner.    Naya Carreon RN

## 2021-06-08 NOTE — PROGRESS NOTES
needs full anticoag. 2D echo ordered 6/4    Hx of severe COPD: Not in exacerbation. Albuterol, Pulmicort, Spiriva, Brovana, prednisone resumed. · 6/4- Given solumedrol still hypoxic- decreased breath sounds throughout- albuterol nebs q 4. NIDDM 2: Controlled. A1c 5.3 on 01/2021. Glipizide resumed. Added sliding scale- monitor BS meals and bed time    Primary hypertension: Controlled. Metoprolol resumed. Hyperlipidemia: Pravastatin resumed. BPH: Tamsulosin resumed. Hx prostate cancer: Noted. Expected discharge date:  06/09/2021    Disposition:    [] Home       [] TCU       [] Rehab       [] Psych       [x] SNF       [] [place]       [] Other-    Chief Complaint: Shortness of breath    Hospital Course:   Amrit Turk is a 79 yo male with a history of COPD (baseline O2 of 6L), DM 2, HTN, HLD, BPH, Hx of prostate cancer who presents to [facility] for evaluation of shortness of breath. Patient reports a fall the morning of admission. He denies hitting his head and denies loss of consciousness. Patient's wife attended to patient's after hearing a loud noise. His wife states that he was completely alert after his fall. Family was unable to get him up so EMS was called. Patient developed labored breathing with use of accessory muscles while ambulating to the ambulance. Patient was given a breathing treatment. Upon arrival, patient continued to be in respiratory distress and was placed on high flow nasal cannula with great improvement of his O2 saturations and work of breathing. Patient complained of lower back pain after his fall. Patient states that his pain is worse with movement. In the ED, CT chest showed parenchymal scarring of the left lobe. CT of thoracic spine demonstrated probable fracture of the anterior enthesophytes at T12-L1; also possible hemorrhagic cyst of left kidney. Patient was admitted to [facility] for further care and evaluation.     Subjective (past 24 hours): Patient seen and examined. Complains of back pain. Denies chest pain, palpitations, lightheadedness, dizziness, syncope. ROS (12 point review of systems completed. Pertinent positives noted. Otherwise ROS is negative). Medications:  Reviewed    Infusion Medications    sodium chloride 50 mL/hr at 06/07/21 1954    dextrose      sodium chloride       Scheduled Medications    predniSONE  5 mg Oral Daily    polyethylene glycol  17 g Oral Daily    senna  2 tablet Oral Nightly    insulin lispro  0-12 Units Subcutaneous TID WC    insulin lispro  0-6 Units Subcutaneous Nightly    budesonide  0.5 mg Nebulization BID    Arformoterol Tartrate  15 mcg Nebulization BID    gabapentin  100 mg Oral BID    glipiZIDE  5 mg Oral Daily    guaiFENesin  400 mg Oral Daily    metoprolol succinate  25 mg Oral Daily    niacin  500 mg Oral Nightly    pravastatin  80 mg Oral Nightly    tamsulosin  0.4 mg Oral Daily    Vitamin D  5,000 Units Oral Daily    sodium chloride flush  5-40 mL Intravenous 2 times per day    enoxaparin  30 mg Subcutaneous Daily    albuterol  2.5 mg Nebulization Q4H [place]    lidocaine  3 patch Transdermal Daily     PRN Meds: morphine, glucose, dextrose, glucagon (rDNA), dextrose, acetaminophen **OR** [DISCONTINUED] acetaminophen, albuterol sulfate HFA, sodium chloride flush, sodium chloride, ondansetron **OR** ondansetron, polyethylene glycol      Intake/Output Summary (Last 24 hours) at 6/8/2021 1732  Last data filed at 6/8/2021 1550  Gross per 24 hour   Intake 901 ml   Output 3525 ml   Net -2624 ml       Diet:  ADULT DIET; Regular; 4 carb choices (60 gm/meal)    Exam:  /62   Pulse 59   Temp 97.7 °F (36.5 °C) (Oral)   Resp 18   Ht 5' 6\" (1.676 m)   Wt 128 lb 1.6 oz (58.1 kg)   SpO2 95%   BMI 20.68 kg/m²     General appearance: No apparent distress, appears stated age and cooperative. HEENT: Pupils equal, round, and reactive to light. Conjunctivae/corneas clear.   Neck: Supple, with full range of motion. No jugular venous distention. Trachea midline. Respiratory:  Normal respiratory effort. Wheezes audible diffusely. Cardiovascular: Regular rate and rhythm with normal S1/S2 without murmurs, rubs or gallops. Abdomen: Soft, non-tender, non-distended with normal bowel sounds. Musculoskeletal: passive and active ROM x 4 extremities. Skin: Skin color, texture, turgor normal.  No rashes or lesions. Neurologic:  Neurovascularly intact without any focal sensory/motor deficits. Psychiatric: Alert and oriented, thought content appropriate, normal insight  Capillary Refill: Brisk,< 3 seconds   Peripheral Pulses: +2 palpable, equal bilaterally       Labs:   No results for input(s): WBC, HGB, HCT, PLT in the last 72 hours. Recent Labs     06/06/21  0527 06/07/21  0558 06/08/21  0445   * 137 135   K 4.8 4.5 4.7   CL 96* 101 101   CO2 26 26 27   BUN 32* 39* 30*   CREATININE 1.8* 1.7* 1.7*   CALCIUM 9.1 8.9 8.7     No results for input(s): AST, ALT, BILIDIR, BILITOT, ALKPHOS in the last 72 hours. No results for input(s): INR in the last 72 hours. No results for input(s): Rebecca Castor in the last 72 hours. Microbiology:      Urinalysis:      Lab Results   Component Value Date    NITRU Negative 03/12/2018    WBCUA 0 08/23/2014    BACTERIA NONE 08/23/2014    RBCUA 0 08/23/2014    BLOODU Negative 03/12/2018    SPECGRAV 1.015 08/23/2014    GLUCOSEU negative 03/12/2018       Radiology:  CT ABDOMEN PELVIS W IV CONTRAST Additional Contrast? None   Final Result      1. There is an eccentric soft tissue density in the distal colon/rectum concerning for a mass. Direct visualization is recommended. 2. There is a pericardial effusion at the heart base measuring 8 mm in thickness. 3. Hepatic steatosis. Low-density lesion in the left hepatic lobe which is too small to characterize but likely represents a cyst.   4. Very small bilateral pleural effusions with adjacent atelectasis. **This report has been created using voice recognition software. It may contain minor errors which are inherent in voice recognition technology. **      Final report electronically signed by Dr Kate Hudson on 6/5/2021 8:58 PM      XR CHEST PORTABLE   Final Result   Stable radiographic appearance of the chest. No evidence of an acute process. **This report has been created using voice recognition software. It may contain minor errors which are inherent in voice recognition technology. **      Final report electronically signed by Dr. Melvin Pitts MD on 6/5/2021 3:32 PM      XR CHEST PORTABLE   Final Result   1. Hyperinflated lungs, compatible with COPD. 2.  Atelectatic/fibrotic changes in the lower lung zones. No    consolidation or pleural effusion. No pneumothorax. This document has been electronically signed by: Salvador Raphael M.D. on    06/05/2021 03:26 AM      CT CHEST WO CONTRAST   Final Result   Parenchymal scarring left lung. No acute posttraumatic process identified. **This report has been created using voice recognition software. It may contain minor errors which are inherent in voice recognition technology. **      Final report electronically signed by Dr. Ivett Rai on 6/2/2021 1:15 PM      CT THORACIC RECONSTRUCTION WO POST PROCESS   Final Result   1. Probable fracture of the the anterior enthesophytes at T12-L1   2. Central canal stenosis which is moderate to severe at C5-C6 due to posterior marginal osteophytes            **This report has been created using voice recognition software. It may contain minor errors which are inherent in voice recognition technology. **      Final report electronically signed by Dr. Ivett Rai on 6/2/2021 1:25 PM      CT LUMBAR SPINE WO CONTRAST   Final Result   1. No acute fracture or acute bony malalignment other than the probable fracture of the anterior enthesophytes at T12-L1   2. Possible hemorrhagic cyst left kidney. **This report has been created using voice recognition software. It may contain minor errors which are inherent in voice recognition technology. **      Final report electronically signed by Dr. Barry Anguiano on 6/2/2021 1:31 PM      XR CHEST PORTABLE   Final Result   1. There are increased opacities at the left perihilar region including a focal parenchymal bandlike opacity, which may represent atelectasis versus scarring versus pneumonia. Recommend short-term follow-up chest x-ray to document stability or    resolution. **This report has been created using voice recognition software. It may contain minor errors which are inherent in voice recognition technology. **      Final report electronically signed by Dr. Jennifer Rose on 6/2/2021 10:39 AM          DVT prophylaxis: [x] Lovenox                                 [] SCDs                                 [] SQ Heparin                                 [] Encourage ambulation           [] Already on Anticoagulation     Code Status: Limited    PT/OT Eval Status: Yes    Tele:   [x] yes             [] no    Electronically signed by Lin Otero MD on 6/8/2021 at 5:32 PM

## 2021-06-09 VITALS
TEMPERATURE: 98.4 F | WEIGHT: 132.2 LBS | HEART RATE: 65 BPM | HEIGHT: 66 IN | RESPIRATION RATE: 18 BRPM | DIASTOLIC BLOOD PRESSURE: 62 MMHG | SYSTOLIC BLOOD PRESSURE: 137 MMHG | BODY MASS INDEX: 21.24 KG/M2 | OXYGEN SATURATION: 95 %

## 2021-06-09 LAB
ANION GAP SERPL CALCULATED.3IONS-SCNC: 8 MEQ/L (ref 8–16)
BUN BLDV-MCNC: 27 MG/DL (ref 7–22)
CALCIUM SERPL-MCNC: 8.8 MG/DL (ref 8.5–10.5)
CHLORIDE BLD-SCNC: 101 MEQ/L (ref 98–111)
CO2: 24 MEQ/L (ref 23–33)
CREAT SERPL-MCNC: 1.5 MG/DL (ref 0.4–1.2)
GFR SERPL CREATININE-BSD FRML MDRD: 44 ML/MIN/1.73M2
GLUCOSE BLD-MCNC: 85 MG/DL (ref 70–108)
GLUCOSE BLD-MCNC: 89 MG/DL (ref 70–108)
GLUCOSE BLD-MCNC: 94 MG/DL (ref 70–108)
MAGNESIUM: 2.3 MG/DL (ref 1.6–2.4)
POTASSIUM SERPL-SCNC: 4.7 MEQ/L (ref 3.5–5.2)
SARS-COV-2, NAAT: NOT DETECTED
SODIUM BLD-SCNC: 133 MEQ/L (ref 135–145)

## 2021-06-09 PROCEDURE — 6370000000 HC RX 637 (ALT 250 FOR IP): Performed by: STUDENT IN AN ORGANIZED HEALTH CARE EDUCATION/TRAINING PROGRAM

## 2021-06-09 PROCEDURE — 80048 BASIC METABOLIC PNL TOTAL CA: CPT

## 2021-06-09 PROCEDURE — APPSS30 APP SPLIT SHARED TIME 16-30 MINUTES: Performed by: NURSE PRACTITIONER

## 2021-06-09 PROCEDURE — 6370000000 HC RX 637 (ALT 250 FOR IP): Performed by: NURSE PRACTITIONER

## 2021-06-09 PROCEDURE — 6360000002 HC RX W HCPCS: Performed by: NURSE PRACTITIONER

## 2021-06-09 PROCEDURE — 87635 SARS-COV-2 COVID-19 AMP PRB: CPT

## 2021-06-09 PROCEDURE — 94669 MECHANICAL CHEST WALL OSCILL: CPT

## 2021-06-09 PROCEDURE — 97110 THERAPEUTIC EXERCISES: CPT

## 2021-06-09 PROCEDURE — 97530 THERAPEUTIC ACTIVITIES: CPT

## 2021-06-09 PROCEDURE — 94640 AIRWAY INHALATION TREATMENT: CPT

## 2021-06-09 PROCEDURE — 36415 COLL VENOUS BLD VENIPUNCTURE: CPT

## 2021-06-09 PROCEDURE — 99239 HOSP IP/OBS DSCHRG MGMT >30: CPT | Performed by: INTERNAL MEDICINE

## 2021-06-09 PROCEDURE — 99232 SBSQ HOSP IP/OBS MODERATE 35: CPT | Performed by: INTERNAL MEDICINE

## 2021-06-09 PROCEDURE — 83735 ASSAY OF MAGNESIUM: CPT

## 2021-06-09 PROCEDURE — 2580000003 HC RX 258: Performed by: NURSE PRACTITIONER

## 2021-06-09 PROCEDURE — 82948 REAGENT STRIP/BLOOD GLUCOSE: CPT

## 2021-06-09 PROCEDURE — 97116 GAIT TRAINING THERAPY: CPT

## 2021-06-09 PROCEDURE — 2580000003 HC RX 258: Performed by: STUDENT IN AN ORGANIZED HEALTH CARE EDUCATION/TRAINING PROGRAM

## 2021-06-09 RX ORDER — GUAIFENESIN 600 MG/1
1200 TABLET, EXTENDED RELEASE ORAL 2 TIMES DAILY
COMMUNITY
End: 2021-01-01

## 2021-06-09 RX ADMIN — ARFORMOTEROL TARTRATE 15 MCG: 15 SOLUTION RESPIRATORY (INHALATION) at 08:18

## 2021-06-09 RX ADMIN — ALBUTEROL SULFATE 2.5 MG: 2.5 SOLUTION RESPIRATORY (INHALATION) at 04:04

## 2021-06-09 RX ADMIN — SODIUM CHLORIDE, PRESERVATIVE FREE 10 ML: 5 INJECTION INTRAVENOUS at 08:18

## 2021-06-09 RX ADMIN — SODIUM CHLORIDE: 9 INJECTION, SOLUTION INTRAVENOUS at 02:00

## 2021-06-09 RX ADMIN — PREDNISONE 5 MG: 5 TABLET ORAL at 08:15

## 2021-06-09 RX ADMIN — ENOXAPARIN SODIUM 30 MG: 30 INJECTION SUBCUTANEOUS at 08:16

## 2021-06-09 RX ADMIN — BUDESONIDE 500 MCG: 0.5 INHALANT RESPIRATORY (INHALATION) at 08:20

## 2021-06-09 RX ADMIN — GLIPIZIDE 5 MG: 5 TABLET ORAL at 08:15

## 2021-06-09 RX ADMIN — METOPROLOL SUCCINATE 25 MG: 25 TABLET, FILM COATED, EXTENDED RELEASE ORAL at 08:16

## 2021-06-09 RX ADMIN — GABAPENTIN 100 MG: 100 CAPSULE ORAL at 08:14

## 2021-06-09 RX ADMIN — TAMSULOSIN HYDROCHLORIDE 0.4 MG: 0.4 CAPSULE ORAL at 08:16

## 2021-06-09 RX ADMIN — ACETAMINOPHEN 650 MG: 325 TABLET ORAL at 15:24

## 2021-06-09 RX ADMIN — ALBUTEROL SULFATE 2.5 MG: 2.5 SOLUTION RESPIRATORY (INHALATION) at 08:07

## 2021-06-09 RX ADMIN — ALBUTEROL SULFATE 2.5 MG: 2.5 SOLUTION RESPIRATORY (INHALATION) at 16:49

## 2021-06-09 RX ADMIN — Medication 5000 UNITS: at 08:16

## 2021-06-09 RX ADMIN — POLYETHYLENE GLYCOL 3350 17 G: 17 POWDER, FOR SOLUTION ORAL at 08:16

## 2021-06-09 RX ADMIN — ALBUTEROL SULFATE 2.5 MG: 2.5 SOLUTION RESPIRATORY (INHALATION) at 12:03

## 2021-06-09 ASSESSMENT — PAIN SCALES - GENERAL
PAINLEVEL_OUTOF10: 3
PAINLEVEL_OUTOF10: 0
PAINLEVEL_OUTOF10: 0

## 2021-06-09 NOTE — CARE COORDINATION
6/9/21, 10:42 AM EDT    Patient goals/plan/ treatment preferences discussed by  and . Patient goals/plan/ treatment preferences reviewed with patient/ family. Patient/ family verbalize understanding of discharge plan and are in agreement with goal/plan/treatment preferences. Understanding was demonstrated using the teach back method. AVS provided by RN at time of discharge, which includes all necessary medical information pertaining to the patients current course of illness, treatment, post-discharge goals of care, and treatment preferences. Services After Discharge  Services At/After Discharge: Skilled Therapy, Nursing Services, OT, PT, In ambulance, Aide Services (Counts include 234 beds at the [facility])   IMM Letter  IMM Letter given to Patient/Family/Significant other/Guardian/POA/by[de-identified]   IMM Letter date given[de-identified] 06/07/21  IMM Letter time given[de-identified] 7884       ALLIE spoke with patient and family about discharge planning. Patient and family agreeable to Counts include 234 beds at the [facility]. Patient to be discharge today via ambulance. ALLIE faxed transportation forms. ALLIE called Shobha at [facility] and notified her of discharge today. They are ready to accept patient. RN made aware and discharge instructions placed on chart.

## 2021-06-09 NOTE — DISCHARGE SUMMARY
Hospital Medicine Discharge Summary      Patient Identification:   Jose Barriga   : 1931  MRN: [id number]   Account: [de-identified]      Patient's PCP: DANTE Valdez CNP    Admit Date: 2021     Discharge Date:   2021    Admitting Physician: Angelika Tesfaye MD     Discharge Physician: Ky Martinez MD     Discharge Diagnoses:    Acute on chronic hypoxic respiratory failure (baseline 6L NC): Secondary to Type 1 MI vs. Restriction lung expansion d/t back pain.      Acute kidney injury on CKD stage III, improving: Likely volume depletion from diuretic use.      Mechanical fall: Denies loss of consciousness or hitting his head. CT thoracic demonstrates probable fracture of the anterior enthesophytes at T12-L1.      Hx of severe COPD: On Albuterol, Pulmicort, Spiriva, Brovana, prednisone.     NIDDM 2: Controlled. A1c 5.3 on 2021. On Glipizide resumed     Primary hypertension: Controlled. On Metoprolol.     Hyperlipidemia: On Pravastatin.     BPH: On Tamsulosin.     Hx prostate cancer: Noted. The patient was seen and examined on day of discharge and this discharge summary is in conjunction with any daily progress note from day of discharge. Hospital Course:   Rina Franco is a 79 yo male with a history of COPD (baseline O2 of 6L), DM 2, HTN, HLD, BPH, Hx of prostate cancer who presents to [facility] for evaluation of shortness of breath. Patient reports a fall the morning of admission. He denies hitting his head and denies loss of consciousness. Patient's wife attended to patient's after hearing a loud noise. His wife states that he was completely alert after his fall. Family was unable to get him up so EMS was called. Patient developed labored breathing with use of accessory muscles while ambulating to the ambulance. Patient was given a breathing treatment.   Upon arrival, patient continued to be in respiratory distress and was placed on high flow nasal cannula with great improvement of his O2 saturations and work of breathing. Patient complained of lower back pain after his fall. Patient states that his pain is worse with movement.     In the ED, CT chest showed parenchymal scarring of the left lobe. CT of thoracic spine demonstrated probable fracture of the anterior enthesophytes at T12-L1; also possible hemorrhagic cyst of left kidney. Patient was admitted to [facility] for further care and evaluation. · 6/4- still hypoxic, however not needing more oxygen on HFNC-pulmonology consulted. Given solumedrol still hypoxic- decreased breath sounds throughout- albuterol nebs q 4.     · 6/5 - developed worsening acute respiratory failure. Received Bumex, Solu-Medrol, and albuterol nebulizer. MetaNeb/Acapella initiated.     · 6/6 - improved work of breathing/O2 requirements, down to HFNC FiO2 35%. Discussed with Dr. Fabien Peoples, pulmonology -  Will transition from [address] to nasal cannula 6L.      · 6/7 - Patient tolerating baseline 6L NC very well. Physiatry consulted for inpatient rehabilitation. · 6/8 - Per physiatry - patient is not a candidate for inpatient rehab. Exam:     Vitals:  Vitals:    06/09/21 0343 06/09/21 0800 06/09/21 0808 06/09/21 1034   BP: (!) 161/74 132/63  137/62   Pulse: 74 65  65   Resp: 22 20 18 18   Temp: 98.4 °F (36.9 °C)   98.4 °F (36.9 °C)   TempSrc: Oral   Oral   SpO2: 92%  95% 95%   Weight: 132 lb 3.2 oz (60 kg)      Height:         Weight: Weight: 132 lb 3.2 oz (60 kg)     24 hour intake/output:    Intake/Output Summary (Last 24 hours) at 6/9/2021 1511  Last data filed at 6/9/2021 1343  Gross per 24 hour   Intake 1981.17 ml   Output 1475 ml   Net 506.17 ml         General appearance:  No apparent distress, appears stated age and cooperative. HEENT:  Normal cephalic, atraumatic without obvious deformity. Pupils equal, round, and reactive to light. Extra ocular muscles intact. Conjunctivae/corneas clear. Neck: Supple, with full range of motion.  No jugular venous distention. Trachea midline. Respiratory:  Normal respiratory effort. Clear to auscultation, bilaterally without Rales/Wheezes/Rhonchi. Cardiovascular:  Regular rate and rhythm with normal S1/S2 without murmurs, rubs or gallops. Abdomen: Soft, non-tender, non-distended with normal bowel sounds. Musculoskeletal:  No clubbing, cyanosis or edema bilaterally. Full range of motion without deformity. Skin: Skin color, texture, turgor normal.  No rashes or lesions. Neurologic:  Neurovascularly intact without any focal sensory/motor deficits. Psychiatric:  Alert and oriented, thought content appropriate, normal insight  Capillary Refill: Brisk,< 3 seconds   Peripheral Pulses: +2 palpable, equal bilaterally       Labs: For convenience and continuity at follow-up the following most recent labs are provided:      CBC:    Lab Results   Component Value Date    WBC 9.3 06/05/2021    HGB 14.0 06/05/2021    HCT 43.6 06/05/2021     06/05/2021       Renal:    Lab Results   Component Value Date     06/09/2021    K 4.7 06/09/2021    K 4.3 06/04/2021     06/09/2021    CO2 24 06/09/2021    BUN 27 06/09/2021    CREATININE 1.5 06/09/2021    CALCIUM 8.8 06/09/2021         Significant Diagnostic Studies    Radiology:   CT ABDOMEN PELVIS W IV CONTRAST Additional Contrast? None   Final Result      1. There is an eccentric soft tissue density in the distal colon/rectum concerning for a mass. Direct visualization is recommended. 2. There is a pericardial effusion at the heart base measuring 8 mm in thickness. 3. Hepatic steatosis. Low-density lesion in the left hepatic lobe which is too small to characterize but likely represents a cyst.   4. Very small bilateral pleural effusions with adjacent atelectasis. **This report has been created using voice recognition software. It may contain minor errors which are inherent in voice recognition technology. **      Final report electronically signed by Dr Kristi Bruner Dr. Maria M Pham on 6/2/2021 1:31 PM      XR CHEST PORTABLE   Final Result   1. There are increased opacities at the left perihilar region including a focal parenchymal bandlike opacity, which may represent atelectasis versus scarring versus pneumonia. Recommend short-term follow-up chest x-ray to document stability or    resolution. **This report has been created using voice recognition software. It may contain minor errors which are inherent in voice recognition technology. **      Final report electronically signed by Dr. Martha Dennis on 6/2/2021 10:39 AM             Consults:     IP CONSULT TO PAIN MANAGEMENT  IP CONSULT TO HOME CARE NEEDS  IP CONSULT TO CARDIOLOGY  IP CONSULT TO PULMONOLOGY  IP CONSULT TO PHYSICAL MEDICINE REHAB  IP CONSULT TO REHAB/TCU ADMISSION COORDINATOR    Disposition: SNF  Condition at Discharge: Stable    Code Status:  Limited     Patient Instructions:    Discharge lab work: Activity: activity as tolerated  Diet: ADULT DIET; Regular; 4 carb choices (60 gm/meal)      Follow-up visits:   Joey Welch, DANTE - CNP  [address]          Jo Benavides, APRN - CNP  [address]  [facility].  [address] [place] [address]    Call  To schedule an appointment in 3 months          Discharge Medications:      Ramses Edmond   Home Medication Instructions [id number]    Printed on:06/09/21 1511   Medication Information                      albuterol (PROVENTIL) (2.5 MG/3ML) 0.083% nebulizer solution  Take 3 mLs by nebulization every 6 hours as needed for Wheezing             albuterol sulfate HFA (VENTOLIN HFA) 108 (90 Base) MCG/ACT inhaler  Inhale 2 puffs into the lungs every 6 hours as needed for Wheezing or Shortness of Breath             budesonide (PULMICORT) 0.5 MG/2ML nebulizer suspension  USE 1 VIAL  IN NEBULIZER TWICE  DAILY - rinse mouth after treatment             fish oil-omega-3 fatty acids 1000 MG capsule  Take 1 g by mouth 2 times daily. formoterol (PERFOROMIST) 20 MCG/2ML nebulizer solution  Take 2 mLs by nebulization every 12 hours             gabapentin (NEURONTIN) 100 MG capsule  Take 100 mg by mouth 2 times daily. glipiZIDE (GLUCOTROL) 5 MG tablet  Take 5 mg by mouth daily. guaiFENesin (MUCINEX) 600 MG extended release tablet  Take 1,200 mg by mouth 2 times daily             metoprolol succinate (TOPROL XL) 25 MG extended release tablet  Take 25 mg by mouth daily             Multiple Vitamins-Minerals (PRESERVISION AREDS 2) CAPS  Take 1 capsule by mouth 2 times daily              Nebulizer MISC  Please provide new nebulizer machine to use with Albuterol 2.5mg via nebs Q6h prn, perforomist 2 times a day and pulmicort 2 times per day. niacin (SLO-NIACIN) 500 MG tablet  Take 500 mg by mouth nightly. pravastatin (PRAVACHOL) 80 MG tablet  Take 80 mg by mouth nightly. predniSONE (DELTASONE) 5 MG tablet  Take 1 tablet by mouth daily Prednisone 5mg po daily to take after food. Respiratory Therapy Supplies (NEBULIZER AIR TUBE/PLUGS) MISC  Please provide nebulizer kit and supplies. tamsulosin (FLOMAX) 0.4 MG capsule  Take 1 capsule by mouth daily             Umeclidinium Bromide (INCRUSE ELLIPTA) 62.5 MCG/INH AEPB  Inhale 1 puff into the lungs daily             vitamin D-3 (CHOLECALCIFEROL) 125 MCG (5000 UT) TABS  Take 1 tablet by mouth daily                 Time Spent on discharge is more than 30 minutes in the examination, evaluation, counseling and review of medications and discharge plan. Signed: Thank you DANTE Braga CNP for the opportunity to be involved in this patient's care.     Electronically signed by Dami Lopez MD on 6/9/2021 at 3:11 PM

## 2021-06-09 NOTE — PROGRESS NOTES
[facility]    Patient - Elmer Da Silva   MRN -  [id number]   [place] # - [de-identified]   - 1931      Date of Admission -  2021  9:51 AM  Date of evaluation -  2021  Room - [facility] [address], MD Primary Care Physician - DANTE Hitchcock - GABRIEL   Chief Complaint   SOB  Active Hospital Problem List      Active Hospital Problems    Diagnosis Date Noted    Elevated troponin [R77.8]      Priority: High    Acute bilateral low back pain without sciatica [M54.5]     Acute pain due to injury [G89.11]     Acute respiratory failure with hypoxia ([address] [J96.01] 2021    Acute on chronic respiratory failure with hypoxia ([address] [J96.21] 2016    COPD exacerbation ([address] [J44.1] 2016     HPI   Elmer Da Silva is a 80 y.o. male known to the lung clinic due to advanced stage emphysema and chronic respiratory failure. Admitted to [facility] after he fell at home and during evaluation by EMS he was noted to be SOB and he did not get better, the ambulance crew brought ti the ED for eval.  In the ED Eve Lockhart was anxious and on pain which worsened his SOB  Currently comfortable on bed, daughter at the bedside. Difficulty taking a deep breath due to MSK pain, oxygen saturation 97% on HFNC 55%  CT chest : advanced emphysema with basilar bullae and upper lobe centrilobular pattern. Results for Shorty Koenig (MRN [id number]) as of 2021 09:38   Ref.  Range 6/3/2021 09:57   Source: Unknown R Brach   pH, Blood Gas Latest Ref Range: 7.35 - 7.45  7.43   PCO2 Latest Ref Range: 35 - 45 mmhg 36   pO2 Latest Ref Range: 71 - 104 mmhg 49 (L)   HCO3 Latest Ref Range: 23 - 28 mmol/l 24   Base Excess (Calculated) Latest Ref Range: -2.5 - 2.5 mmol/l 0.3   O2 Sat Latest Units: % 85   IFIO2 Unknown 40   DEVICE Unknown HFNC   COLLECTED BY: Unknown [id number]       Pulm clinic notes,  -Reviewed CT no acute respiratory disease noted, per Fleischner Guidelines no follow-up needed optional 12 month follow-up for solitary lung nodule <6 mm given debilitated state and previous conversations regarding advanced COPD treatments Marilin Dunbar reports not interested  -Will continue on current inhaled therapy  -Intolerant to Jf Auguste in the past does not wish to try again  -Refer to fragile bone clinic , will order DEXA scan   -Extensive discussion had with patient, wife, and daughter around the process of developing COPD and how it affects individuals later in life encouraged patient to remain active and fit. May need to perform activity of shorter duration and increased frequency to maintain physical endurance. All voiced understanding and no further questions at end of discussion. Marilin Dunbar is not interested in having spirometry performed ever again I expect his disease has progressed with natural aging currently on Max therapy minus the noted limitations above. -Advised to maintain pneumonia vaccine with PCP and to take flu vaccine this coming season.  -Advised patient to call office with any changes, questions, or concerns regarding respiratory status     Will see Prema Srinivasan back in: 8 months at regularly scheduled follow-up     Jessica Bourne CNP  10/23/2020         Events last 24h   -Feels much better today  -Slept well   -reports SOB near baseline wheezing and cough improved  All other systems reviewed     Past Surgical History           Procedure Laterality Date    ABDOMEN SURGERY      ABDOMINAL AORTIC ANEURYSM REPAIR  8/21/14    ABDOMINAL AORTIC ANEURYSM REPAIR, ENDOVASCULAR Bilateral 08/21/2014    WITH LT FEMORAL ENDARTERECTOMY     CARDIAC SURGERY      COLONOSCOPY  unsure    INGUINAL HERNIA REPAIR Bilateral 07/28/2015    Dr Han Alt;  Regular; 4 carb choices (60 gm/meal)  Allergies    Daliresp [roflumilast], Sulfa antibiotics, and Percocet [oxycodone-acetaminophen]  Social History     Social History Socioeconomic History    Marital status:      Spouse name: Zara    Number of children: Not on file    Years of education: Not on file    Highest education level: Not on file   Occupational History    Not on file   Tobacco Use    Smoking status: Former Smoker     Packs/day: 2.00     Years: 40.00     Pack years: 80.00     Types: Cigarettes     Quit date: 10/5/1989     Years since quittin.6    Smokeless tobacco: Never Used   Substance and Sexual Activity    Alcohol use: Yes     Alcohol/week: 1.0 standard drinks     Types: 1 Cans of beer per week     Comment: now and then    Drug use: No    Sexual activity: Not on file   Other Topics Concern    Not on file   Social History Narrative    Not on file     Social Determinants of Health     Financial Resource Strain:     Difficulty of Paying Living Expenses:    Food Insecurity:     Worried About Running Out of Food in the Last Year:     [address] in the Last Year:    Transportation Needs:     Lack of Transportation (Medical):      Lack of Transportation (Non-Medical):    Physical Activity:     Days of Exercise per Week:     Minutes of Exercise per Session:    Stress:     Feeling of Stress :    Social Connections:     Frequency of Communication with Friends and Family:     Frequency of Social Gatherings with Friends and Family:     Attends Oriental orthodox Services:     Active Member of Clubs or Organizations:     Attends Club or Organization Meetings:     Marital Status:    Intimate Partner Violence:     Fear of Current or Ex-Partner:     Emotionally Abused:     Physically Abused:     Sexually Abused:      Family History          Problem Relation Age of Onset    Rheum Arthritis Mother          due to complications related to    Heart Disease Mother     Coronary Art Dis Father     Heart Disease Father     Coronary Art Dis Brother     Heart Disease Brother      Meds    Current Medications    predniSONE  5 mg Oral Daily    polyethylene glycol  17 g Oral Daily    senna  2 tablet Oral Nightly    insulin lispro  0-12 Units Subcutaneous TID WC    insulin lispro  0-6 Units Subcutaneous Nightly    budesonide  0.5 mg Nebulization BID    Arformoterol Tartrate  15 mcg Nebulization BID    gabapentin  100 mg Oral BID    glipiZIDE  5 mg Oral Daily    guaiFENesin  400 mg Oral Daily    metoprolol succinate  25 mg Oral Daily    niacin  500 mg Oral Nightly    pravastatin  80 mg Oral Nightly    tamsulosin  0.4 mg Oral Daily    Vitamin D  5,000 Units Oral Daily    sodium chloride flush  5-40 mL Intravenous 2 times per day    enoxaparin  30 mg Subcutaneous Daily    albuterol  2.5 mg Nebulization Q4H WA    lidocaine  3 patch Transdermal Daily     morphine, glucose, dextrose, glucagon (rDNA), dextrose, acetaminophen **OR** [DISCONTINUED] acetaminophen, albuterol sulfate HFA, sodium chloride flush, sodium chloride, ondansetron **OR** ondansetron, polyethylene glycol  IV Drips/Infusions   sodium chloride 50 mL/hr at 06/09/21 0200    dextrose      sodium chloride       Vitals    Vitals    height is 5' 6\" (1.676 m) and weight is 132 lb 3.2 oz (60 kg). His oral temperature is 98.4 °F (36.9 °C). His blood pressure is 137/62 and his pulse is 65. His respiration is 18 and oxygen saturation is 95%. O2 Flow Rate (L/min): 2 L/min  I/O    Intake/Output Summary (Last 24 hours) at 6/9/2021 1312  Last data filed at 6/9/2021 1034  Gross per 24 hour   Intake 1861.17 ml   Output 1075 ml   Net 786.17 ml     Patient Vitals for the past 96 hrs (Last 3 readings):   Weight   06/09/21 0343 132 lb 3.2 oz (60 kg)   06/08/21 0300 128 lb 1.6 oz (58.1 kg)   06/07/21 0320 132 lb 11.2 oz (60.2 kg)     Exam   Physical Exam   Constitutional: No distress on O2 per NC. Patient appears elderly and chronically ill. Head: Normocephalic and atraumatic. Mouth/Throat: Oropharynx is clear and moist.  No oral thrush.   Eyes: Conjunctivae are normal. Pupils are equal, round. No scleral icterus. Neck: Neck supple. No tracheal deviation present. Cardiovascular: S1 and S2 with no murmur. No peripheral edema  Pulmonary/Chest: Normal effort with bilateral air entry, clear breath sounds. No stridor. No respiratory distress. Patient exhibits no tenderness. Abdominal: Soft. Bowel sounds audible. No distension or tenderness to palp. Musculoskeletal: Moves all extremities  Neurological: Patient is alert and follows simple commands    Labs   ABG  Lab Results   Component Value Date    PH 7.43 06/03/2021    PO2 49 06/03/2021    PCO2 36 06/03/2021    HCO3 24 06/03/2021    O2SAT 85 06/03/2021     Lab Results   Component Value Date    IFIO2 40 06/03/2021     CBC  No results for input(s): WBC, RBC, HGB, HCT, MCV, MCH, MCHC, RDW, PLT, MPV in the last 72 hours. BMP  Recent Labs     06/07/21  0558 06/08/21  0445 06/09/21  0515    135 133*   K 4.5 4.7 4.7    101 101   CO2 26 27 24   BUN 39* 30* 27*   CREATININE 1.7* 1.7* 1.5*   GLUCOSE 96 85 94   MG 2.2 2.3 2.3   CALCIUM 8.9 8.7 8.8     LFT  No results for input(s): AST, ALT, ALB, BILITOT, ALKPHOS, LIPASE in the last 72 hours. Invalid input(s): AMYLASE  TROP  Lab Results   Component Value Date    TROPONINT < 0.010 06/05/2021    TROPONINT 0.015 06/05/2021    TROPONINT 0.024 06/04/2021     BNP  Lab Results   Component Value Date    PROBNP 730.8 06/02/2021    PROBNP 220.4 09/13/2016    PROBNP 329.7 04/08/2016     D-Dimer  No results found for: DDIMER  Lactic Acid  No results for input(s): LACTA in the last 72 hours. INR  No results for input(s): INR, PROTIME in the last 72 hours. PTT  No results for input(s): APTT in the last 72 hours.   Glucose  Recent Labs     06/08/21  1949 06/09/21  0730 06/09/21  1147   POCGLU 141* 85 89     UA No results for input(s): Jorge L Laity, COLORU, CLARITYU, MUCUS, PROTEINU, BLOODU, RBCUA, WBCUA, BACTERIA, NITRU, GLUCOSEU, BILIRUBINUR, UROBILINOGEN, KETUA, LABCAST, LABCASTTY, AMORPHOS in the last 72 hours. Invalid input(s): CRYSTALS. PFTs             Echo     Conclusions      Summary   Technically difficult examination. Ejection fraction is visually estimated at 45-50%. There was mild global hypokinesis of the left ventricle. The aortic valve leaflets were not well visualized. Aortic valve appears tricuspid. Thickened aortic valve leaflets noted. Aortic valve leaflets are Moderately calcified. Myxomatotic degeneration of mitral valve. Decreased mitral valve mobility noted. Calcification of the mitral valve noted. Signature      ----------------------------------------------------------------   Electronically signed by Mily Campos MD (Interpreting   physician) on 10/07/2020 at 04:05 PM  Cultures    Procalcitonin  No results found for: [facility]     Radiology    CXR  FINDINGS: Single frontal view of the chest was performed.  Cardiac size is    within normal limits.  There are calcifications within the thoracic aortic    arch.  The lungs are again noted to be hyperinflated, compatible with    COPD.  Atelectatic/fibrotic changes are noted within the lower lung zones.     There is no consolidation or pleural effusion. Rickford Math is no pneumothorax.           Impression   1.  Hyperinflated lungs, compatible with COPD. 2.  Atelectatic/fibrotic changes in the lower lung zones.  No    consolidation or pleural effusion.  No pneumothorax.       This document has been electronically signed by: Lawrence Choudhary M.D. on    06/05/2021 03:26 AM             CT chest;  FINDINGS: Low-density lesion left thyroid lobe. Macrocalcification right thyroid lobe. No axillary lymphadenopathy. No mediastinal or hilar adenopathy by size criteria. Marked aortic calcifications beginning at the ascending aorta involving the arch and descending thoracic aorta. Calcified mediastinal lymph nodes calcified coronary arteries.    Interval increase in the anterior pericardial effusion now measuring 11 mm compared to 8.   Lungs are emphysematous. There is a atelectasis along the major fissure left lung. Dissection may represent chronic parenchymal scarring versus less noticeable on the prior exam.   No airspace consolidations or pleural effusions no pneumothorax.       Unfortunately is a great deal motion artifact in the upper images. No acute or suspicious bone lesion is identified. There is progression of marginal osteophytes at T12 and L1 somewhat simulating a fracture but there is no distinct acute fracture. Upper abdomen   Endoluminal stent graft is present.           Impression   Parenchymal scarring left lung. No acute posttraumatic process identified.               **This report has been created using voice recognition software.  It may contain minor errors which are inherent in voice recognition technology. **       Final report electronically signed by Dr. Anastacio Morelos on 6/2/2021 1:15 PM           (See actual reports for details)    Assessment   Severe COPD GOLD 3 2013 refuses additional testing   Chest wall contusion with splinting and difficulty taking a deep breath, occult fracture possible. Severe centrilobular/panacinar emphysema  Chronic hypoxemic respiratory failure without hypercarbia (due to severe emphysema and parenchymal destruction)  CT chest done this admission does not show any acute pulmonary issues. Recommendations   -Taper oxygen as tolerated for sats 88%-93% (chronic hypoxemia)  -Continue with nebulizations Arformoterol/albuterol/Budesonide  -On baseline prednisone 5 mg PO Daily   -This is terminal disease and would qualify for hospice care  -Awaiting inpatient rehab eval   -Would not pursue OP PFTs or CT chest as not a candidate/interested in any special therapies for cancer on advanced COPD.   -Continue Metaneb/Acapella  -Dispo planning eval started for Tx to [facility] agree with rehab prior to returning home  -Discussed with patient and family and they had chosen not to complete Dexa scan after speaking with PCP now agreeable will reschedule and have follow-up with Cory Monk CNP in 3 months       Case discussed with nurse and patient/family. Questions and concerns addressed. Meds and Orders reviewed. Electronically signed by   DANTE Alston CNP on 6/9/2021 at 1:12 PM     Addendum by Dr. Araseli Chiu MD:  I have seen and examined the patient independently. Face to face evaluation and examination was performed. The above evaluation and note has been reviewed. Labs and radiographs were reviewed. I Have discussed with Mr. Cory Monk CNP about this patient in detail. The above assessment and plan has been reviewed. Please see my modifications mentioned below. My modifications:  He is on 2 L of oxygen and nasal cannula. More awake alert  Denies any shortness of breath. - Rodolfo Aragon educated about my impression and plan. He verbalizes understanding.   Follow-up as above    Doyle Gabreil MD 6/9/2021 7:12 PM

## 2021-06-09 NOTE — PLAN OF CARE
within reach. Problem: Injury - Risk of, Physical Injury:  Goal: Absence of physical injury  Description: Absence of physical injury  Outcome: Ongoing  Note: No new physical injury noted this shift. Problem: Sleep Pattern Disturbance:  Goal: Appears well-rested  Description: Appears well-rested  Outcome: Ongoing  Note: Pt appears to be resting comfortably overnight. Problem: Impaired respiratory status  Goal: Clear lung sounds  Outcome: Ongoing  Note: Pt lung sounds diminished at rest with rhonchi and wheezes on exertion. Problem: Pain:  Goal: Pain level will decrease  Description: Pain level will decrease  Outcome: Ongoing  Note: Pain Assessment: 0-10  Pain Level: 0   Patient's Stated Pain Goal: No pain   Is pain goal met at this time? Yes     Non-Pharmaceutical Pain Intervention(s): Repositioned, Rest     Care plan reviewed with patient. Patient verbalize understanding of the plan of care and contribute to goal setting.

## 2021-06-09 NOTE — PROGRESS NOTES
[facility] PHYSICAL THERAPY  DAILY NOTE  STRZ ICU STEPDOWN TELEMETRY 4K - 4K-26/026-A    Time In: 0131  Time Out: 1143  Timed Code Treatment Minutes: 40 Minutes  Minutes: 40          Date: 2021  Patient Name: Iron Rosenbaum,  Gender:  male        MRN: [id number]  : 1931  (80 y.o.)     Referring Practitioner: Magaly Bell MD  Diagnosis: acute hypoxemic respiratory failure  Additional Pertinent Hx: Per HPI: \"80 y.o. male who presented to [facility] with shortness of breath; patient has a past medical history of diabetes, COPD, prostate cancer along with CAD; he is on 6 L of oxygen around-the-clock patient was in his normal state of health and he subsequently fell this morning; denies hitting his head and denies loss of consciousness; his wife heard a thud and the patient immediately called his wife's name; family was unable to get him up so EMS was called; the patient is on 6 L of oxygen at baseline however he was ambulated out to the EMS cot and then family noticed extreme difficulty in breathing; he was placed on oxygen and given a breathing treatment; upon arrival here he was still in distress so he was placed on high flow nasal cannula with great improvement of his sats and decrease work of breathing; he complains of pain to his low back; his pain is worse with movement; patient is alert and oriented, family states at home he uses [employer]; both wife and daughter at the bedside and feel he looks much better now.  In the emergency department, he had a CT chest without contrast that showed parenchymal scarring the left lobe; he had a CT of the T and L-spine that showed no acute fracture or bony malalignment other than the probable fracture of the anterior enthesophytes at T12-L1; also possible hemorrhagic cyst left kidney; patient is currently on high flow oxygen so the goal is to wean him down to 6 L baseline, he was going to be admitted to stepdown for further care and with increased time for completion, cues for hand placement  Stand to [facility], cues for hand placement    Ambulation:  Contact Guard Assistance, with increased time for completion  Distance: 10'x1  Surface: Level Tile  Device:Rolling Walker  Gait Deviations:  Slow Luli, Decreased Step Length Bilaterally, Decreased Heel Strike Bilaterally, Narrow Base of Support and Patient needed VC to back all the way up to the chair with the rolling walker. Patient attempted to leave walker while turning to sit. Patient educated on importance of bringing rolling walker all the wall to the chair. Exercise:  Patient was guided in 1 set(s) 15 reps of exercise to both lower extremities. Hip abduction/adduction, Straight leg raises, Seated marches, Seated heel/toe raises, Long arc quads and Seated abduction/adduction. Exercises were completed for increased independence with functional mobility. Functional Outcome Measures: Completed  AM-PAC Inpatient Mobility Raw Score : 16  AM-PAC Inpatient T-Scale Score : 40.78    ASSESSMENT:  Assessment: Patient progressing toward established goals. Activity Tolerance:  Patient tolerance of  treatment: good. Patient needed extra time to complete bed mobility. Patient limited by decreased strength and decreased endurance at this time.      Equipment Recommendations:Equipment Needed: No  Discharge Recommendations:  Continue to assess pending progress, Subacute/Skilled Nursing Facility    Plan: Times per week: 5x GM  Times per day: Daily  Current Treatment Recommendations: Strengthening, Transfer Training, Functional Mobility Training, Balance Training, Endurance Training, Gait Training, Stair training, Patient/Caregiver Education & Training, Safety Education & Training, Home Exercise Program    Patient Education  Patient Education: Plan of Care, Bed Mobility, Gait, Verbal Exercise Instruction    Goals:  Patient goals : improve walking and breathing  Short term goals  Time Frame for Short term goals: by hospital discharge  Short term goal 1: Supine to/from sit with CGA x 1 for ease of transfers at discharge site. Short term goal 2: Sit to/from stand with CGA x 1 for ease of transfers at discharge site. Short term goal 3: Ambulate 36' with RW and CGA x 1 for home distance ambulation. Short term goal 4: AScend/descend 3 steps with no HRs with Min A x 1 for safe entry into home. Long term goals  Time Frame for Long term goals : N/A due to short ELOS. Following session, patient left in safe position with all fall risk precautions in place.

## 2021-07-07 ENCOUNTER — TELEPHONE (OUTPATIENT)
Dept: PULMONOLOGY | Age: 86
End: 2021-07-07

## 2021-07-07 DIAGNOSIS — J42 CHRONIC BRONCHITIS, UNSPECIFIED CHRONIC BRONCHITIS TYPE (HCC): ICD-10-CM

## 2021-07-07 DIAGNOSIS — J43.2 CENTRILOBULAR EMPHYSEMA (HCC): ICD-10-CM

## 2021-07-07 RX ORDER — ALBUTEROL SULFATE 2.5 MG/3ML
2.5 SOLUTION RESPIRATORY (INHALATION) EVERY 6 HOURS PRN
Qty: 360 ML | Refills: 3 | Status: SHIPPED | OUTPATIENT
Start: 2021-07-07 | End: 2022-01-01

## 2021-07-07 NOTE — TELEPHONE ENCOUNTER
Could you please call patient's daughter Jarrell Cervantes [phone]) regarding her father's nebulizer medication. Patient is going to be discharged from the nursing home and they are unsure of his nebulizer medication prescriptions.

## 2021-07-07 NOTE — TELEPHONE ENCOUNTER
Katie Dunn left voicemail this morning regarding medication Albuterol regarding not the same amount as last prescribe. I tried to call back and voicemail is not set up.

## 2021-07-07 NOTE — TELEPHONE ENCOUNTER
Called and spoke with patient Daughter regarding medication regimen clarified albuterol to be used as needed for SOB/wheezing.  Requested new Rx for albuterol nebulizer requesting to be sent to [address] Rx printed and signed

## 2021-07-27 PROBLEM — R41.82 ALTERED MENTAL STATUS: Status: ACTIVE | Noted: 2021-01-01

## 2021-07-27 NOTE — ED NOTES
Bed: 015A  Expected date:   Expected time:   Means of arrival: [facility]  Comments:     John Fernandez RN  07/27/21 1150

## 2021-07-27 NOTE — ACP (ADVANCE CARE PLANNING)
Advance Care Planning     Advance Care Planning Activator (Inpatient)  Conversation Note      Date of ACP Conversation: 7/27/2021     Conversation Conducted with: Patient with Decision Making Capacity    ACP Activator: Kalpana Vaishali, [address] Maker:     Current Designated Health Care Decision Maker:     Primary Decision Maker: Ramses Sammi [phone]    Secondary Decision Maker: He Jeny - [phone]    Supplemental (Other) Decision Maker: Symone Velasquez - Child - [phone]  Today we documented Decision Maker(s) consistent with ACP documents on file. Care Preferences    Ventilation: \"If you were in your present state of health and suddenly became very ill and were unable to breathe on your own, what would your preference be about the use of a ventilator (breathing machine) if it were available to you? \"      Would the patient desire the use of ventilator (breathing machine)?: yes    \"If your health worsens and it becomes clear that your chance of recovery is unlikely, what would your preference be about the use of a ventilator (breathing machine) if it were available to you? \"     Would the patient desire the use of ventilator (breathing machine)?: No      Resuscitation  \"CPR works best to restart the heart when there is a sudden event, like a heart attack, in someone who is otherwise healthy. Unfortunately, CPR does not typically restart the heart for people who have serious health conditions or who are very sick. \"    \"In the event your heart stopped as a result of an underlying serious health condition, would you want attempts to be made to restart your heart (answer \"yes\" for attempt to resuscitate) or would you prefer a natural death (answer \"no\" for do not attempt to resuscitate)? \" yes       [x] Yes   [] No   Educated Patient / Constancia Bisi regarding differences between Advance Directives and portable DNR orders.     Length of ACP Conversation in minutes:  40  Conversation Outcomes:  [x] ACP discussion completed  [] Existing advance directive reviewed with patient; no changes to patient's previously recorded wishes  [] New Advance Directive completed  [] Portable Do Not Rescitate prepared for Provider review and signature  [] POLST/POST/MOLST/MOST prepared for Provider review and signature      Follow-up plan:    [x] Schedule follow-up conversation to continue planning  [] Referred individual to Provider for additional questions/concerns   [x] Advised patient/agent/surrogate to review completed ACP document and update if needed with changes in condition, patient preferences or care setting    [x] This note routed to one or more involved healthcare providers     - Stephanie Brooks entered care because he fell asleep during therapy and wasn't able to be aroused. He stated no recall of the event. He was his typical cheerful self during our conversation. This time however he asked to be provided with CPR and ventilation both, just no going to a nursing home on a vent. The patient was a previous Limited x3 with only meds being distributed. His physician entered into the conversation so this staff opted to leave and return after to complete the ACP conversation.   - Per the previous conversation, the patient's code status should be changed back to a full code for this admission. Requested the change of his physician who agreed to taking care of it for him. Conversation with the family alerted them to the change as well. They were in agreement for this admission and will continue to monitor it with him for the future.

## 2021-07-27 NOTE — ED NOTES
Pt appears to be resting comfortably with wife, daughter at bedside. Dr. Sofya Gonzalez at bedside. Pt denies any needs or concerns at time.       Marilia Tellez RN  07/27/21 6642

## 2021-07-27 NOTE — PROGRESS NOTES
Pharmacy Medication History Note      List of current medications patient is taking is complete. Source of information: patient list, dispense report and family    Changes made to medication list:  Medications removed (include reason, ex. therapy complete or physician discontinued):  Guaifenesin 600 mg-therapy complete  Niacin 500 mg-therapy complete    Medications added/doses adjusted: Added polyvinyl alcohol 1.4% 1 gtt into both eyes as needed for dry eyes  Added colace 100 mg PO BID PRN for constipation   Lidocaine 5% place 1 patch onto skin daily 12 hours on, 12 hours off  Added guaifenesin 400 mg, take 3 tablets by mouth every morning and 2 tablets by mouth at bedtime  Added MAALOX 200-200-20 mg/ 5ml take 15 mls PO Q4H PRN for indigestion   Added acetaminophen 500 mg PO every morning PRN for pain    Other notes (ex. Recent course of antibiotics, Coumadin dosing): Added allergy to Lasix and lisinopril- reaction for both is hives and rash  Denies use of other OTC or herbal medications.     Allergies reviewed      Electronically signed by Dionicio Krabbe on 7/27/2021 at 5:20 PM

## 2021-07-27 NOTE — ED NOTES
Pt appears to be resting in the cot comfortably with his wife and daughter at bedside. Pt denies any needs or concerns at this time.       Bob Rider RN  07/27/21 8797

## 2021-07-27 NOTE — ED TRIAGE NOTES
Pt to the ED via EMS due to increased confusion. Pt was at home when the physical therapist came over to work on some exercises. The physical therapist, wife, and daughter noticed that the pt was \"very hard\" to wake up, and the pt would not respond back to them. The family decided to call the ambulance to have the pt checked out. Daughter states that the pt was leaning in his chair more towards the left side. Upon first encounter the pt is awake, A&Ox4. The pt is confused as to why he is here at the ER and wants to go back home. Pt has equal hand grasp bilaterally, no slurred speech, pt able to life legs up off the cot bilaterally without drift. VSS.

## 2021-07-27 NOTE — ED NOTES
Pt appears to be resting comfortably in bed at this time. Wife and daughter at bedside.  Pt denies any needs or concerns at this time     Leidy Abbasi RN  07/27/21 9634

## 2021-07-27 NOTE — ED NOTES
Pt appears to be resting comfortably at this time. Pt requesting warm blankets at this time. Pt denies any further needs or concerns at this time.       Steve Day RN  07/27/21 6395

## 2021-07-27 NOTE — H&P
Hospitalist - History & Physical      Patient: Debbi Sanders    Unit/Bed:15/015A  YOB: 1931  MRN: [id number]   Acct: [de-identified]   PCP: DANTE Haji CNP    Date of Service: Pt seen/examined on 07/27/21  and Admitted to Inpatient with expected LOS less than two midnights due to medical therapy. Chief Complaint:  Altered mental status/confusion with pre-syncopal episode    Assessment and Plan:-  1.  Acute metabolic encephalopathy POA likely secondary to recurring pre-syncopal episodes versus history of chronic bradycardia: the patient was altered at the time of admission but was back at his baseline when seen at the bedside; patient is known to have multiple episodes like this in the past but this particular one lasted 45 minutes  -Patient started on 100 mL IV fluids infusion  -Patient will get a 2D echo to monitor heart function as patient has had previous episodes of such pre-syncopal episodes  -Patient will likely need an event monitor at discharge and will need to followup with cardiology  -Will obtain orthostats in order to monitor patient's standing and sitting blood pressures  -CT head and chest x-ray negative for hemorrhage or any acute process  -Patient has had 2 falls: one on Sunday where he got an abrasion to his head and another in June 2021 where he broke a bone in his back and was admitted for acute hypoxic respiratory failure  -Will likely need followup with neurology outpatient to evaluate baseline cognition and mental status  -Patient will work with PT/OT  2. History of COPD-controlled: patient's baseline is 2 L of oxygen; patient was previously hospitalized in June 2021 for acute hypoxic respiratory; continue home treatments and current oxygen  3. History of type 2 diabetes mellitus-controlled: patient's sugars are well-controlled at this time and last A1C 5.3  4. History of hyperlipidemia-controlled: continue pravastatin 80 mg   5.  History of essential hypertention-monitoring: continue toprol 25 mg extended release, continue lisinopril and bisoprolol as required by pharmacy  6. History of chronic kidney disease-stable: patient at his baseline creatinine; continue to monitor  7. History of prostate cancer-stable: noted and stable  8. History of abdominal aortic aneurysm-stable: noted and stable; patient quit smoking about 31 years ago; was originally a 3 pack per day smoker  9. Leukocytosis on admission likely secondary to use of prednisone: WBC 13.1; will continue to monitor with CBC  10. Elevated troponin on admission likely secondary to supply/demand mismatch: patient previously had history of elevated troponin in June 2021 admission; no current chest pain or shortness of breath; can follow-up outpatient with cardiology for possible stress test if need be         History Of Present Illness: This is a 80year old male PMH as noted below who presented to the ED with what looks to be an episode of unresponsiveness/passing out. Per the patient's daughter, the patient was working with PT at about 10:30 AM this morning when he went to bed and became unresponsive/fell asleep for about 45 minutes. Of note the patient has had these episodes of unresponsiveness before, but they have lasted minimal 15 minutes. The patient reportedly said \"I don't feel good,\" and this caused the family to call EMS. Of note the patient fell out of his chair 2 days ago, and hit his head, causing the abrasion on his head. The patient uses Tylenol for any pain related to his injuries. The patient also has a healing pressure ulcer that is closely being monitored by the patient's daughter. The patient himself cannot recall any of the events of this morning and doesn't know why he is in the hospital. He denies any fever, chest pain, abdominal pain, shortness of breath, current dizziness, or lightheadedness, diarrhea, vomiting, or any urinary symptoms.      In the ED, the patient's vitals were significant for temperature 97.9, blood pressure 141/85, pulse 53, respirations 18, and O2 Sats 98% on 2 L. The patient was found to have a WBC 13.1, baseline creatinine 1.5, troponin 0.016 similar to previous results. His head CT and chest x-ray were negative for any acute processes. The patient was admitted inpatient for observation. Past Medical History:        Diagnosis Date    AAA [facility])     followed by Dr. Angy Desouza CAD (coronary artery disease)     Chronic kidney disease     COPD [facility]ronic obstructive pulmonary disease) ([facility] [address]     Hypercholesteremia     Hypertension     Other disorders of kidney and ureter in diseases classified elsewhere     Pneumonia     Prostate cancer ([facility] [address]     with radiation in 2006    SOB (shortness of breath)     Type II or unspecified type diabetes mellitus without mention of complication, not stated as uncontrolled        Past Surgical History:        Procedure Laterality Date    ABDOMEN SURGERY      ABDOMINAL AORTIC ANEURYSM REPAIR  8/21/14    ABDOMINAL AORTIC ANEURYSM REPAIR, ENDOVASCULAR Bilateral 08/21/2014    WITH LT FEMORAL ENDARTERECTOMY     CARDIAC SURGERY      COLONOSCOPY  unsure    INGUINAL HERNIA REPAIR Bilateral 07/28/2015    Dr Charissa Kocher Medications:   No current facility-administered medications on file prior to encounter. Current Outpatient Medications on File Prior to Encounter   Medication Sig Dispense Refill    docusate sodium (COLACE) 100 MG capsule Take 100 mg by mouth 2 times daily as needed for Constipation      lidocaine (LIDODERM) 5 % Place 1 patch onto the skin daily 12 hours on, 12 hours off.       guaiFENesin (RA MUCUS RELIEF) 400 MG tablet Take 3 tablets by mouth every morning and 2 tablets by mouth at bedtime      acetaminophen (TYLENOL) 500 MG tablet Take 500 mg by mouth every morning PRN for pain      tamsulosin (FLOMAX) 0.4 MG capsule Take 1 capsule by mouth daily 180 capsule 2    metoprolol succinate (TOPROL XL) 25 MG extended release tablet Take 25 mg by mouth daily      vitamin D-3 (CHOLECALCIFEROL) 125 MCG (5000 UT) TABS Take 1 tablet by mouth daily 90 tablet 1    Umeclidinium Bromide (INCRUSE ELLIPTA) 62.5 MCG/INH AEPB Inhale 1 puff into the lungs daily 90 each 3    predniSONE (DELTASONE) 5 MG tablet Take 1 tablet by mouth daily Prednisone 5mg po daily to take after food. 90 tablet 3    formoterol (PERFOROMIST) 20 MCG/2ML nebulizer solution Take 2 mLs by nebulization every 12 hours 120 mL 11    budesonide (PULMICORT) 0.5 MG/2ML nebulizer suspension USE 1 VIAL  IN  NEBULIZER TWICE  DAILY - rinse mouth after treatment 60 ampule 11    Multiple Vitamins-Minerals (PRESERVISION AREDS 2) CAPS Take 1 capsule by mouth 2 times daily       pravastatin (PRAVACHOL) 80 MG tablet Take 80 mg by mouth nightly.  glipiZIDE (GLUCOTROL) 5 MG tablet Take 5 mg by mouth daily.  gabapentin (NEURONTIN) 100 MG capsule Take 100 mg by mouth 2 times daily.  fish oil-omega-3 fatty acids 1000 MG capsule Take 1 g by mouth 2 times daily.  polyvinyl alcohol (LIQUIFILM TEARS) 1.4 % ophthalmic solution Place 1 drop into both eyes as needed for Dry Eyes      aluminum & magnesium hydroxide-simethicone (MAALOX) 200-200-20 MG/5ML SUSP suspension Take 15 mLs by mouth every 4 hours as needed for Indigestion      albuterol (PROVENTIL) (2.5 MG/3ML) 0.083% nebulizer solution Take 3 mLs by nebulization every 6 hours as needed for Wheezing or Shortness of Breath 360 mL 3    albuterol sulfate HFA (VENTOLIN HFA) 108 (90 Base) MCG/ACT inhaler Inhale 2 puffs into the lungs every 6 hours as needed for Wheezing or Shortness of Breath 3 Inhaler 3    Nebulizer MISC Please provide new nebulizer machine to use with Albuterol 2.5mg via nebs Q6h prn, perforomist 2 times a day and pulmicort 2 times per day.  1 each 0    Respiratory Therapy Supplies (NEBULIZER AIR TUBE/PLUGS) MISC Please provide nebulizer kit and supplies. 1 each 0       Allergies:    Lasix [furosemide], Lisinopril, Daliresp [roflumilast], Sulfa antibiotics, and Percocet [oxycodone-acetaminophen]    Social History:    reports that he quit smoking about 31 years ago. His smoking use included cigarettes. He has a 80.00 pack-year smoking history. He has never used smokeless tobacco. He reports current alcohol use of about 1.0 standard drinks of alcohol per week. He reports that he does not use drugs. Family History:       Problem Relation Age of Onset    Rheum Arthritis Mother          due to complications related to    Heart Disease Mother     Coronary Art Dis Father     Heart Disease Father     Coronary Art Dis Brother     Heart Disease Brother        Diet:  No diet orders on file    Review of systems:   Pertinent positives as noted in the HPI. All other systems reviewed and negative. PHYSICAL EXAM:  BP (!) 158/65   Pulse 61   Temp 97.9 °F (36.6 °C) (Oral)   Resp 20   Ht 5' 6\" (1.676 m)   Wt 156 lb (70.8 kg)   SpO2 97%   BMI 25.18 kg/m²   General appearance: No apparent distress, appears stated age and cooperative; abrasion noted on the left side of his head  HEENT: Normal cephalic, atraumatic without obvious deformity. Pupils equal, round, and reactive to light. Extra ocular muscles intact. Conjunctivae/corneas clear. Neck: Supple, with full range of motion. No jugular venous distention. Trachea midline. Respiratory:  Normal respiratory effort. Clear to auscultation, bilaterally without Rales/Wheezes/Rhonchi. Cardiovascular: Regular rate and rhythm with normal S1/S2 without murmurs, rubs or gallops. Abdomen: Soft, non-tender, non-distended with normal bowel sounds.   Musculoskeletal:  No clubbing, cyanosis or edema bilaterally  Skin: Skin color, dry skin  No rashes or lesions;  dry ulcer on the skin of his left foot; abrasions present throughout both hands bilaterally  Neurologic:  Neurovascularly intact without any focal sensory/motor deficits. Cranial nerves: II-XII intact, grossly non-focal; alert and oriented to person and place but not to day of the week  Psychiatric: Alert and oriented, thought content appropriate, normal insight  Capillary Refill: Brisk,< 3 seconds   Peripheral Pulses: +2 palpable, equal bilaterally     Labs:   Recent Labs     07/27/21  1303   WBC 13.1*   HGB 12.7*   HCT 41.1*        Recent Labs     07/27/21  1303      K 4.4      CO2 24   BUN 25*   CREATININE 1.5*   CALCIUM 9.0     Recent Labs     07/27/21  1303   AST 14   ALT 10*   BILITOT 0.3   ALKPHOS 74     No results for input(s): INR in the last 72 hours. No results for input(s): Verlena Marques in the last 72 hours. Urinalysis:    Lab Results   Component Value Date    NITRU NEGATIVE 07/27/2021    WBCUA 0 08/23/2014    BACTERIA NONE 08/23/2014    RBCUA 0 08/23/2014    BLOODU NEGATIVE 07/27/2021    SPECGRAV 1.015 08/23/2014    GLUCOSEU NEGATIVE 07/27/2021       Radiology:   CT HEAD WO CONTRAST   Final Result   No acute process            **This report has been created using voice recognition software. It may contain minor errors which are inherent in voice recognition technology. **      Final report electronically signed by Dr. Sonia Lane on 7/27/2021 3:05 PM      XR CHEST PORTABLE   Final Result   1. Normal heart size. Mild fibrotic stranding both lung fields. 2. No acute findings. No infiltrates or effusions are seen. .            **This report has been created using voice recognition software. It may contain minor errors which are inherent in voice recognition technology. **      Final report electronically signed by Dr. Jessica Trinh on 7/27/2021 12:57 PM        CT HEAD WO CONTRAST    Result Date: 7/27/2021  PROCEDURE: CT HEAD WO CONTRAST CLINICAL INFORMATION: AMS . COMPARISON: No prior study.  TECHNIQUE: 2-D multiplanar noncontrast CT brain All CT scans at this facility use dose modulation, iterative reconstruction, and/or weight-based dosing when appropriate to reduce radiation dose to as low as reasonably achievable. FINDINGS: Generalized cerebral volume loss. Ventricles are consistent with degree of volume loss. No evidence of acute infarction. There is no hemorrhage. There is no intra or extra-axial fluid collection. Calvarium is intact. Visualized paranasal sinuses and mastoid air cells are clear. No acute process **This report has been created using voice recognition software. It may contain minor errors which are inherent in voice recognition technology. ** Final report electronically signed by Dr. Alice Gavin on 7/27/2021 3:05 PM    XR CHEST PORTABLE    Result Date: 7/27/2021  PROCEDURE: XR CHEST PORTABLE CLINICAL INFORMATION: Altered mental status. COMPARISON: 6/5/2021 TECHNIQUE: A single mobile view of the chest was obtained. 1. Normal heart size. Mild fibrotic stranding both lung fields. 2. No acute findings. No infiltrates or effusions are seen. . **This report has been created using voice recognition software. It may contain minor errors which are inherent in voice recognition technology. ** Final report electronically signed by Dr. Villalpando Or on 7/27/2021 12:57 PM        EKG: sinus bradycardia with right bundle branch block    Electronically signed by Carol Carballo DO on 7/27/2021 at 6:34 PM

## 2021-07-27 NOTE — ED NOTES
Assisted patient with the urinal and changed lined to provide comfort. No further assistance voiced.       Tee Fresh  07/27/21 [id number]

## 2021-07-27 NOTE — ED PROVIDER NOTES
[facility] EMERGENCY DEPT      CHIEF COMPLAINT       Chief Complaint   Patient presents with    Fatigue       Nurses Notes reviewed and I agree except as noted in the HPI. HISTORY OF PRESENT ILLNESS    Reyes Reich is a 80 y.o. male who presents for altered mental status. Wife and daughter are primary historians. They report the patient was fine this morning. Home physical therapist arrived and patient fell asleep while being questioned by the therapist.  They tried to arouse him multiple times but it was difficult to get him to open his eyes. At one point he was slumped in a chair and stated \"I don't feel good. \"  His speech was also quiet and somewhat garbled. Daughter reports she did give him a Tylenol this morning which can make him sleepy but this was quite different. 2 days ago patient was leaning forward in a chair, lost his balance and fell 8 inches forward landing on his left knee, hands, and forehead. He has abrasions to these areas prompting the dispensation of Tylenol. Patient otherwise has been in normal health. Patient has a known pressure ulcer to his left heel which is being closely watched. Daughter informed me it looked like pink and well-healing yesterday. Patient himself has no complaints and was unsure why he was here. Patient and family deny fever, chest pain, abdominal pain, vomiting, diarrhea, urinary symptoms, or other complaints. Patient has a history of COPD and is chronically on 2 L of oxygen. Patient came home from a stay at [facility] on July 8. REVIEW OF SYSTEMS     Review of Systems   Constitutional: Positive for fatigue. Negative for chills, diaphoresis and fever. HENT: Negative for congestion, rhinorrhea and sore throat. Eyes: Negative for visual disturbance. Respiratory: Negative for cough and shortness of breath. Cardiovascular: Negative for chest pain and leg swelling.    Gastrointestinal: Negative for abdominal pain, diarrhea, nausea and vomiting. Genitourinary: Negative for difficulty urinating and frequency. Musculoskeletal: Negative for back pain, gait problem and neck pain. Skin: Positive for wound. Negative for rash. Neurological: Positive for speech difficulty. Negative for dizziness, weakness, light-headedness and numbness. Psychiatric/Behavioral: Negative for confusion. The patient is not nervous/anxious. PAST MEDICAL HISTORY    has a past medical history of AAA (abdominal aortic aneurysm) ([facility] CAD (coronary artery disease), Chronic kidney disease, COPD (chronic obstructive pulmonary disease) ([facility] Hypercholesteremia, Hypertension, Other disorders of kidney and ureter in diseases classified elsewhere, Pneumonia, Prostate cancer ([facility] SOB (shortness of breath), and Type II or unspecified type diabetes mellitus without mention of complication, not stated as uncontrolled. SURGICAL HISTORY      has a past surgical history that includes Colonoscopy (unsure); AAA repair, endovascular (Bilateral, 08/21/2014); Abdominal aortic aneurysm repair (8/21/14); Inguinal hernia repair (Bilateral, 07/28/2015); Abdomen surgery; vascular surgery; and Cardiac surgery. CURRENT MEDICATIONS       Previous Medications    ALBUTEROL (PROVENTIL) (2.5 MG/3ML) 0.083% NEBULIZER SOLUTION    Take 3 mLs by nebulization every 6 hours as needed for Wheezing or Shortness of Breath    ALBUTEROL SULFATE HFA (VENTOLIN HFA) 108 (90 BASE) MCG/ACT INHALER    Inhale 2 puffs into the lungs every 6 hours as needed for Wheezing or Shortness of Breath    BUDESONIDE (PULMICORT) 0.5 MG/2ML NEBULIZER SUSPENSION    USE 1 VIAL  IN  NEBULIZER TWICE  DAILY - rinse mouth after treatment    FISH OIL-OMEGA-3 FATTY ACIDS 1000 MG CAPSULE    Take 1 g by mouth 2 times daily. FORMOTEROL (PERFOROMIST) 20 MCG/2ML NEBULIZER SOLUTION    Take 2 mLs by nebulization every 12 hours    GABAPENTIN (NEURONTIN) 100 MG CAPSULE    Take 100 mg by mouth 2 times daily.      GLIPIZIDE (GLUCOTROL) 5 MG TABLET    Take 5 mg by mouth daily. GUAIFENESIN (MUCINEX) 600 MG EXTENDED RELEASE TABLET    Take 1,200 mg by mouth 2 times daily    METOPROLOL SUCCINATE (TOPROL XL) 25 MG EXTENDED RELEASE TABLET    Take 25 mg by mouth daily    MULTIPLE VITAMINS-MINERALS (PRESERVISION AREDS 2) CAPS    Take 1 capsule by mouth 2 times daily     NEBULIZER MISC    Please provide new nebulizer machine to use with Albuterol 2.5mg via nebs Q6h prn, perforomist 2 times a day and pulmicort 2 times per day. NIACIN (SLO-NIACIN) 500 MG TABLET    Take 500 mg by mouth nightly. PRAVASTATIN (PRAVACHOL) 80 MG TABLET    Take 80 mg by mouth nightly. PREDNISONE (DELTASONE) 5 MG TABLET    Take 1 tablet by mouth daily Prednisone 5mg po daily to take after food. RESPIRATORY THERAPY SUPPLIES (NEBULIZER AIR TUBE/PLUGS) MISC    Please provide nebulizer kit and supplies. TAMSULOSIN (FLOMAX) 0.4 MG CAPSULE    Take 1 capsule by mouth daily    UMECLIDINIUM BROMIDE (INCRUSE ELLIPTA) 62.5 MCG/INH AEPB    Inhale 1 puff into the lungs daily    VITAMIN D-3 (CHOLECALCIFEROL) 125 MCG (5000 UT) TABS    Take 1 tablet by mouth daily       ALLERGIES     is allergic to daliresp [roflumilast], sulfa antibiotics, and percocet [oxycodone-acetaminophen]. FAMILY HISTORY     He indicated that his mother is . He indicated that his father is . He indicated that his brother is . family history includes Coronary Art Dis in his brother and father; Heart Disease in his brother, father, and mother; Rheum Arthritis in his mother. SOCIAL HISTORY    reports that he quit smoking about 31 years ago. His smoking use included cigarettes. He has a 80.00 pack-year smoking history. He has never used smokeless tobacco. He reports current alcohol use of about 1.0 standard drinks of alcohol per week. He reports that he does not use drugs. PHYSICAL EXAM     INITIAL VITALS:  height is 5' 6\" (1.676 m) and weight is 156 lb (70.8 kg). His oral temperature is 97.9 °F (36.6 °C). His blood pressure is 141/85 (abnormal) and his pulse is 53. His respiration is 18 and oxygen saturation is 98%. Physical Exam  Constitutional:       Appearance: Normal appearance. He is well-developed. He is not ill-appearing or diaphoretic. HENT:      Head: Normocephalic. Abrasion present. Right Ear: Hearing normal.      Left Ear: Hearing normal.      Nose: Nose normal. No rhinorrhea. Mouth/Throat:      Lips: Pink. Mouth: Mucous membranes are moist.      Pharynx: Oropharynx is clear. Eyes:      General: Lids are normal. No scleral icterus. Extraocular Movements: Extraocular movements intact. Conjunctiva/sclera: Conjunctivae normal.      Pupils: Pupils are equal, round, and reactive to light. Neck:      Trachea: Trachea normal.   Cardiovascular:      Rate and Rhythm: Normal rate and regular rhythm. Heart sounds: Normal heart sounds. No murmur heard. Pulmonary:      Effort: Pulmonary effort is normal.      Breath sounds: Normal breath sounds and air entry. No decreased breath sounds, wheezing or rhonchi. Abdominal:      General: There is no distension. Palpations: Abdomen is soft. Tenderness: There is no abdominal tenderness. Musculoskeletal:      Cervical back: Normal range of motion and neck supple. No rigidity. Comments: Well perfused; movement normal as observed; no signs of DVT   Feet:      Left foot:      Skin integrity: Ulcer (Circular to left heel no signs of cellulitis) and dry skin present. No warmth. Lymphadenopathy:      Cervical: No cervical adenopathy. Skin:     General: Skin is warm and dry. Findings: Abrasion present. No rash. Neurological:      General: No focal deficit present. Mental Status: Mental status is at baseline. He is lethargic. GCS: GCS eye subscore is 4. GCS verbal subscore is 5. GCS motor subscore is 6. Cranial Nerves: Cranial nerves are intact. software. It may contain minor errors which are inherent in voice recognition technology. **      Final report electronically signed by Dr. Villalpando Or on 7/27/2021 12:57 PM          LABS:   Labs Reviewed   CBC WITH AUTO DIFFERENTIAL - Abnormal; Notable for the following components:       Result Value    WBC 13.1 (*)     RBC 4.37 (*)     Hemoglobin 12.7 (*)     Hematocrit 41.1 (*)     MCV 94.1 (*)     MCHC 30.9 (*)     MPV 8.8 (*)     Segs Absolute 11.3 (*)     Lymphocytes Absolute 0.7 (*)     Immature Grans (Abs) 0.13 (*)     All other components within normal limits   COMPREHENSIVE METABOLIC PANEL - Abnormal; Notable for the following components:    CREATININE 1.5 (*)     BUN 25 (*)     Total Protein 5.7 (*)     ALT 10 (*)     All other components within normal limits   TROPONIN - Abnormal; Notable for the following components:    Troponin T 0.016 (*)     All other components within normal limits   GLOMERULAR FILTRATION RATE, ESTIMATED - Abnormal; Notable for the following components:    Est, Glom Filt Rate 44 (*)     All other components within normal limits   URINE RT REFLEX TO CULTURE   PROCALCITONIN   LACTATE, SEPSIS   ANION GAP   OSMOLALITY       EMERGENCY DEPARTMENT COURSE:   Vitals:    Vitals:    07/27/21 1157 07/27/21 1258 07/27/21 1357 07/27/21 1515   BP: (!) 154/55 (!) 147/57 (!) 143/70 (!) 141/85   Pulse: 51 50 64 53   Resp: 18 16 19 18   Temp: 97.9 °F (36.6 °C)      TempSrc: Oral      SpO2: 94% 94% 96% 98%   Weight: 156 lb (70.8 kg)      Height: 5' 6\" (1.676 m)        1543: Consulted Marleen Hagen PA-C in regards to admission. Notified that Dr. Coty Edgar would admit    MDM:  The patient was seen by me in the emergency room for fatigue and altered mental status. Physical exam revealed a pleasant 80-year-old gentleman who is nontoxic-appearing but somnolent. There were no focal neurological deficits noted and no source of infection noted. Vital signs reviewed and noted stable.   Old records were reviewed. Appropriate laboratory and imaging studies were ordered and reviewed upon completion. Pertinent findings: Leukocytosis 13.1, baseline creatinine 1.5, troponin 0.016 also similar to previous results. Head CT and chest x-ray negative for acute process. Interventions: Saline, observation    Reexamination: Patient remained somnolent but was easily aroused. There was some confusion but he seemed to improve mildly as far as orientation. Results were discussed with the patient and family as well as desire for admission and they were amenable. Dr. Lakia Silvestre and Hallie Lentz PA-C of our hospitalists' service was consulted and graciously accepted admission. The patient was admitted to the hospital in fair condition. CRITICAL CARE:   None    CONSULTS:  Hallie Lentz PA-C (hospitalist)    PROCEDURES:  None    FINAL IMPRESSION      1. Generalized weakness    2. Altered mental status, unspecified altered mental status type    3. Chronic kidney disease, unspecified CKD stage    4. Elevated troponin          DISPOSITION/PLAN     1. Generalized weakness    2. Altered mental status, unspecified altered mental status type    3. Chronic kidney disease, unspecified CKD stage    4.  Elevated troponin    Admission      (Please note that portions of this note were completed with a voice recognition program.  Efforts were made to edit the dictations but occasionally words are mis-transcribed.)    Melvin Roberts PA-C 07/27/21 4:41 PM    NICHOLAS Wise PA-C  07/27/21 [id number]

## 2021-07-28 PROBLEM — R55 SYNCOPE: Status: ACTIVE | Noted: 2021-01-01

## 2021-07-28 NOTE — DISCHARGE SUMMARY
Hospital Medicine Discharge Summary      Patient Identification:  Name: Tova Dutta  : 1931  MRN: [id number]  Account: [de-identified]    Patient's PCP: DANTE Deras CNP    Admit Date: 2021    Discharge Date:  2021    Admitting Physician: Taryn Kaplan MD    Discharge Physician: Blaire Rojas [facility] Course: This is a 80year old male PMH as noted below who presented to the ED with what looks to be an episode of unresponsiveness/passing out. Per the patient's daughter, the patient was working with PT at about 10:30 AM this morning when he went to bed and became unresponsive/fell asleep for about 45 minutes. Of note the patient has had these episodes of unresponsiveness before, but they have lasted minimal 15 minutes. The patient reportedly said \"I don't feel good,\" and this caused the family to call EMS. Of note the patient fell out of his chair 2 days ago, and hit his head, causing the abrasion on his head. The patient uses Tylenol for any pain related to his injuries. The patient also has a healing pressure ulcer that is closely being monitored by the patient's daughter. The patient himself cannot recall any of the events of this morning and doesn't know why he is in the hospital. He denies any fever, chest pain, abdominal pain, shortness of breath, current dizziness, or lightheadedness, diarrhea, vomiting, or any urinary symptoms.      In the ED, the patient's vitals were significant for temperature 97.9, blood pressure 141/85, pulse 53, respirations 18, and O2 Sats 98% on 2 L. The patient was found to have a WBC 13.1, baseline creatinine 1.5, troponin 0.016 similar to previous results. His head CT and chest x-ray were negative for any acute processes. The patient was admitted inpatient for observation.  After one day the patient was discharged to follow-up with his PCP and to have a Holter monitor for 48 hours and was given a prescription of aspirin.            The patient was stable for discharge - all consultants were contacted and in agreement with plan for discharge. Appropriate follow up appointment was arranged prior to discharge. Please see below or view chart for more details from hospital course. Discharge Diagnoses:  1. Acute metabolic encephalopathy POA likely secondary to recurring pre-syncopal episodes versus history of chronic bradycardia: the patient was altered at the time of admission but was back at his baseline when seen at the bedside; patient is known to have multiple episodes like this in the past but this particular one lasted 45 minutes  -Patient will likely need an event monitor at discharge  -CT head and chest x-ray negative for hemorrhage or any acute process  -Patient has had 2 falls: one on Sunday where he got an abrasion to his head and another in June 2021 where he broke a bone in his back and was admitted for acute hypoxic respiratory failure  -Will likely need followup with neurology outpatient to evaluate baseline cognition and mental status  -Patient will work with PT/OT  2. History of COPD-controlled: patient's baseline is 2 L of oxygen; patient was previously hospitalized in June 2021 for acute hypoxic respiratory; continue home treatments and current oxygen  3. History of type 2 diabetes mellitus-controlled: patient's sugars are well-controlled at this time and last A1C 5.3  4. History of hyperlipidemia-controlled: continue pravastatin 80 mg   5. History of essential hypertention-monitoring: continue toprol 25 mg extended release, continue lisinopril and bisoprolol as required by pharmacy  6. History of chronic kidney disease-stable: patient at his baseline creatinine; continue to monitor  7. History of prostate cancer-stable: noted and stable  8. History of abdominal aortic aneurysm-stable: noted and stable; patient quit smoking about 31 years ago; was originally a 3 pack per day smoker  9.  Leukocytosis on admission likely secondary to use of prednisone: continue prednisone at home  10. Elevated troponin on admission likely secondary to supply/demand mismatch: patient previously had history of elevated troponin in June 2021 admission; no current chest pain or shortness of breath; patient given 48 hour Holter monitor at this time; can follow-up with cardiology if needed         The patient was seen and examined on day of discharge and this discharge summary is in conjunction with any daily progress note from day of discharge. The patient understood, was in agreement, and verbalized the plan of care at time of discharge. Exam:    Vitals:   Vitals:    07/27/21 2345 07/28/21 0430 07/28/21 0912 07/28/21 1144   BP:  (!) 162/74 (!) 151/68 (!) 165/73   Pulse: 57 58 54 55   Resp:  20 18 16   Temp: 97.9 °F (36.6 °C) 98.1 °F (36.7 °C) 98.3 °F (36.8 °C) 98.3 °F (36.8 °C)   TempSrc: Oral Oral Oral Oral   SpO2: 96% 94% 97% 97%   Weight:       Height:         Weight: Weight: 134 lb (60.8 kg)    24 hour intake/output:    Intake/Output Summary (Last 24 hours) at 7/28/2021 1643  Last data filed at 7/28/2021 1442  Gross per 24 hour   Intake 1270 ml   Output 875 ml   Net 395 ml         Physical Exam  General appearance: No apparent distress, appears stated age and cooperative; abrasion noted on the left side of his head  HEENT: Normal cephalic, atraumatic without obvious deformity. Pupils equal, round, and reactive to light. Extra ocular muscles intact. Conjunctivae/corneas clear. Neck: Supple, with full range of motion. No jugular venous distention. Trachea midline. Respiratory:  Normal respiratory effort. Clear to auscultation, bilaterally without Rales/Wheezes/Rhonchi. Cardiovascular: Regular rate and rhythm with normal S1/S2 without murmurs, rubs or gallops. Abdomen: Soft, non-tender, non-distended with normal bowel sounds.   Musculoskeletal:  No clubbing, cyanosis or edema bilaterally  Skin: Skin color, dry skin  No rashes or lesions;  dry ulcer on the skin of his left foot; abrasions present throughout both hands bilaterally  Neurologic:  Neurovascularly intact without any focal sensory/motor deficits. Cranial nerves: II-XII intact, grossly non-focal; alert and oriented to person and place but not to day of the week  Psychiatric: Alert and oriented, thought content appropriate, normal insight  Capillary Refill: Brisk,< 3 seconds   Peripheral Pulses: +2 palpable, equal bilaterally       Labs: For convenience and continuity at follow-up the following most recent labs are provided:      CBC:    Lab Results   Component Value Date    WBC 11.3 07/28/2021    HGB 12.8 07/28/2021    HCT 40.9 07/28/2021     07/28/2021       Renal:    Lab Results   Component Value Date     07/27/2021    K 4.4 07/27/2021    K 4.3 06/04/2021     07/27/2021    CO2 24 07/27/2021    BUN 25 07/27/2021    CREATININE 1.5 07/27/2021    CALCIUM 9.0 07/27/2021         Significant Diagnostic Studies    Radiology:   CT HEAD WO CONTRAST   Final Result   No acute process            **This report has been created using voice recognition software. It may contain minor errors which are inherent in voice recognition technology. **      Final report electronically signed by Dr. Denny Hilario on 7/27/2021 3:05 PM      XR CHEST PORTABLE   Final Result   1. Normal heart size. Mild fibrotic stranding both lung fields. 2. No acute findings. No infiltrates or effusions are seen. .            **This report has been created using voice recognition software. It may contain minor errors which are inherent in voice recognition technology. **      Final report electronically signed by Dr. Christopher Harmon on 7/27/2021 12:57 PM            Consults:     None    Disposition: homehealth    Condition at Discharge: stable    Code Status:  Full Code   Admitted for: Pre-syncopal episode and altered mental status    Patient Instructions:  Discharge lab work: 48 hour Holter monitor  Activity: activity as tolerated  Diet: ADULT DIET; Regular; 5 carb choices (75 gm/meal); Low Fat/Low Chol/High Fiber/REED    Follow-up visits:   [facility]  [address] [address] [phone]        DANTE Urrutia - CNP  [address] [facility] [place] [address]  [phone]    On 8/19/2021  1:00pm        Discharge Medications:        Medication List      START taking these medications    aspirin 81 MG chewable tablet  Commonly known as: Aspirin Childrens  Take 1 tablet by mouth daily     guaiFENesin 600 MG extended release tablet  Commonly known as: MUCINEX  Take 1 tablet by mouth 2 times daily  Replaces: RA Mucus Relief 400 MG tablet     polyethylene glycol 17 g packet  Commonly known as: GLYCOLAX  Take 17 g by mouth daily as needed for Constipation        CHANGE how you take these medications    budesonide 0.5 MG/2ML nebulizer suspension  Commonly known as: PULMICORT  Take 4 mLs by nebulization 2 times daily  What changed: See the new instructions.         CONTINUE taking these medications    acetaminophen 500 MG tablet  Commonly known as: TYLENOL     * albuterol sulfate  (90 Base) MCG/ACT inhaler  Commonly known as: Ventolin HFA  Inhale 2 puffs into the lungs every 6 hours as needed for Wheezing or Shortness of Breath     * albuterol (2.5 MG/3ML) 0.083% nebulizer solution  Commonly known as: PROVENTIL  Take 3 mLs by nebulization every 6 hours as needed for Wheezing or Shortness of Breath     aluminum & magnesium hydroxide-simethicone 200-200-20 MG/5ML Susp suspension  Commonly known as: MAALOX     Colace 100 MG capsule  Generic drug: docusate sodium     fish oil-omega-3 fatty acids 1000 MG capsule     formoterol 20 MCG/2ML nebulizer solution  Commonly known as: Perforomist  Take 2 mLs by nebulization every 12 hours     gabapentin 100 MG capsule  Commonly known as: NEURONTIN     Incruse Ellipta 62.5 MCG/INH Aepb  Generic drug: Umeclidinium Bromide  Inhale 1 puff into the lungs daily     lidocaine 5 %  Commonly known as: LIDODERM     metoprolol succinate 25 MG extended release tablet  Commonly known as: TOPROL XL     Nebulizer Air Tube/Plugs Misc  Please provide nebulizer kit and supplies. Nebulizer Misc  Please provide new nebulizer machine to use with Albuterol 2.5mg via nebs Q6h prn, perforomist 2 times a day and pulmicort 2 times per day. polyvinyl alcohol 1.4 % ophthalmic solution  Commonly known as: LIQUIFILM TEARS     pravastatin 80 MG tablet  Commonly known as: PRAVACHOL     predniSONE 5 MG tablet  Commonly known as: DELTASONE  Take 1 tablet by mouth daily Prednisone 5mg po daily to take after food. PreserVision AREDS 2 Caps     tamsulosin 0.4 MG capsule  Commonly known as: FLOMAX  Take 1 capsule by mouth daily     vitamin D-3 125 MCG (5000 UT) Tabs  Commonly known as: cholecalciferol  Take 1 tablet by mouth daily         * This list has 2 medication(s) that are the same as other medications prescribed for you. Read the directions carefully, and ask your doctor or other care provider to review them with you. STOP taking these medications    glipiZIDE 5 MG tablet  Commonly known as: GLUCOTROL     RA Mucus Relief 400 MG tablet  Generic drug: guaiFENesin  Replaced by: guaiFENesin 600 MG extended release tablet           Where to Get Your Medications      These medications were sent to [id number] - [place], [address] [facility] [phone]  [address] [place] [address] [facility]    Phone: [phone]   · aspirin 81 MG chewable tablet  · budesonide 0.5 MG/2ML nebulizer suspension  · guaiFENesin 600 MG extended release tablet  · polyethylene glycol 17 g packet         Discharge Time:  Time spent on discharge is >30 minutes in the examination, evaluation, counseling, and review of medications and discharge plan. The hospital course was discussed with the patient and all questions and concerns were addressed at that time.  The patient was in agreement with and verbalized understanding of the plan of care and had no additional questions or complaints. The patient was instructed to follow-up with any scheduled outpatient appointments or to report to the nearest Emergency Department if new or worsening symptoms should arise. Thank you DANTE Vizcarra CNP for the opportunity to be involved in this patient's care.     Signed:    Electronically signed by Sabino Granger DO on 7/28/2021 at 4:43 PM

## 2021-07-28 NOTE — PROGRESS NOTES
[address] [address]  INPATIENT PHYSICAL THERAPY  EVALUATION  [facility] ORTHOPEDICS 7K - 7K-18/018-A    Time In: 9550  Time Out: 1048  Timed Code Treatment Minutes: 40 Minutes  Minutes: 50          Date: 2021  Patient Name: Jo Coffman,  Gender:  male        MRN: [id number]  : 1931  (80 y.o.)      Referring Practitioner: Dr Manjeet Loera  Diagnosis: AMS  Additional Pertinent Hx: Pt came to hospital yesterday after unrepsonsiveness with [facility] PT session. Pt has h/o dementia . Pt has had a couple falls this summer . Restrictions/Precautions:  Restrictions/Precautions: General Precautions, Fall Risk  Position Activity Restriction  Other position/activity restrictions: healing ulcer on left heel,keep heel protector on in bed    Subjective:  Chart Reviewed: Yes  Patient assessed for rehabilitation services?: Yes  Family / Caregiver Present: Yes  Subjective: Pt in bed, agreeable to PT and ambualtion in room and to remain up in chair. Pt sleepy and dosing off intermittently    General:  Overall Orientation Status: Impaired  Orientation Level: Disoriented to situation, Disoriented to place, Disoriented to time  Follows Commands: Within Functional Limits    Vision: Impaired  Vision Exceptions: Wears glasses for distance    Hearing: Within functional limits         Pain: 0/10:     Vitals: Vitals not assessed per clinical judgement, see nursing flowsheet    Social/Functional History:    Lives With: Spouse  Type of Home: House  Home Layout: One level  Home Access: Stairs to enter with rails  Entrance Stairs - Number of Steps: 2-3  Home Equipment: Rolling walker (transport chair)             ADL Assistance: Needs assistance     Ambulation Assistance: Needs assistance  Transfer Assistance: Needs assistance    Active : No     Additional Comments: Pt has 24 hr care by wife and also dtr frequently. Pt has [facility] therapy and nursing. Pt has a chair and bed alram at home and someone is always with him when he is up    OBJECTIVE:  Range of Motion:  Bilateral Lower Extremity: WFL    Strength:  Right Lower Extremity: Impaired - 4-/5  Left Lower Extremity: Impaired - 4-/5    Balance:  Static Sitting Balance:  Stand By Assistance  Static Standing Balance: Contact Guard Assistance  Dynamic Standing Balance: Minimal Assistance    Bed Mobility:  Rolling to Right: Contact Guard Assistance, Minimal Assistance, X 1, with head of bed raised, with rail, with increased time for completion   Supine to Sit: Minimal Assistance, X 1, with head of bed raised, with rail, with increased time for completion  Scooting: Contact Guard Assistance    Transfers:  Sit to Stand: Minimal Assistance, X 1, with increased time for completion, cues for hand placement, to/from chair with arms, from bed and commode  Stand to Sit:Minimal Assistance, Moderate Assistance, X 1, with increased time for completion, cues for hand placement, to/from chair with arms, to commode and bedside chair     Ambulation:  Minimal Assistance, X 1, with cues for safety, with verbal cues , with increased time for completion  Distance: 5 feet X 1, 10 feet X 2    Surface: Level Tile  Device:Rolling Walker  Gait Deviations: Forward Flexed Posture, Slow Luli, Decreased Step Length Bilaterally and vcs to slow down and stay inside walker       Exercise:  Patient was guided in 1 set(s) 10 reps of exercise to both lower extremities. Ankle pumps, Heelslides, Hip abduction/adduction and Long arc quads. Exercises were completed for increased independence with functional mobility. Functional Outcome Measures: Completed  AM-PAC Inpatient Mobility Raw Score : 18  AM-PAC Inpatient T-Scale Score : 43.63    ASSESSMENT:  Activity Tolerance:  Patient tolerance of  treatment: good. Treatment Initiated: Treatment and education initiated within context of evaluation.   Evaluation time included review of current medical information, gathering information related to past medical, social and functional history, completion of standardized testing, formal and informal observation of tasks, assessment of data and development of plan of care and goals. Treatment time included skilled education and facilitation of tasks to increase safety and independence with functional mobility for improved independence and quality of life. Assessment: Body structures, Functions, Activity limitations: Decreased functional mobility , Decreased endurance, Decreased strength, Decreased balance  Assessment: Pt is deconditioned,has decreased endurance due to SOB-on O2 NC ay 5L with activity and 2L at rest in order to keep O2 sats in the 90s. Pt has decreased fucntional mobility and balance. Pt needs continued skilled therapy to improve safety with mobility and reduce fall risk. Prognosis: Good    REQUIRES PT FOLLOW UP: Yes    Discharge Recommendations:  Discharge Recommendations: Home with Home health PT, 24 hour supervision or assist    Patient Education:  PT Education: Goals, Gait Training, PT Role, Plan of Care, Home Exercise Program, Transfer Training, Functional Mobility Training    Equipment Recommendations:  Equipment Needed: No    Plan:  Times per week: 5 X GM  Current Treatment Recommendations: Strengthening, Home Exercise Program, Balance Training, Endurance Training, Functional Mobility Training, Transfer Training, Gait Training    Goals:  Patient goals : Go home  Short term goals  Time Frame for Short term goals: by discharge  Short term goal 1: Pt to complete supine to sit and return with CGA to get in and out of bed  Short term goal 2: sit to stand with CGA to get on and off various surfaces  Short term goal 3: Pt will ambulate 30 feet with RW CGA to walk safely in the home  Long term goals  Time Frame for Long term goals : NA due to short ELOS    Following session, patient left in safe position with all fall risk precautions in place.

## 2021-07-28 NOTE — PROGRESS NOTES
Left medial heel 1.5x1x0.1cm  In to see patient to assess his left medial heel pressure ulcer. Daughter states that Dr. Tash Soto saw patient last Friday and \"scraped off some yellow\". Pt was in the chair with legs and feet elevated following therapy session. The old dressing was removed. He does have thick yellow callous surrounding a 1.5x1x0.1cm open ulcer with a pink base. It looks like it is a stage 2 at this point but previous pictures indicate it was probably a stage 3. There was minimal serosanguinous, odorless drainage noted on the old dressing. The wound was cleansed with saline and dry gauze. Opticell AG was placed in the wound as per home orders and covered with a bordered foam.  Elevated feet on a pillow. Spoke with daughter regarding care at home. She verbalized understanding. Thank you for allowing us to participate in the care of your patient. TIME   Wound/ostomy individual minutes  Time In: 1030  Time Out: 1045  Minutes: 15  Time does not include documentation.

## 2021-07-28 NOTE — PROGRESS NOTES
[address]  INPATIENT OCCUPATIONAL THERAPY  [facility] ORTHOPEDICS 7K  EVALUATION      Time In: 8242  Time Out: 4807  Timed Code Treatment Minutes: 14 Minutes  Minutes: 24     Date: 2021  Patient Name: Vesna Ybarra,   Gender: male      MRN: [id number]  : 1931  (80 y.o.)  Referring Practitioner: Yoel Leger DO  Diagnosis: Altered Mental Status     Restrictions/Precautions:  Restrictions/Precautions: General Precautions, Fall Risk  Position Activity Restriction  Other position/activity restrictions: Healing ulcer on left heel, keep heel protector on in bed. Pt is currently on 2L SP02, increases to 5L 02 with activity. Pt on 2L oxygen at home, daughter reports they monitor 02 at home and increase flow if needed    Subjective  Chart Reviewed: Yes, Orders, Progress Notes, History and Physical  Patient assessed for rehabilitation services?: Yes  Family / Caregiver Present: Yes (daughter and wife present, very supportive)    Subjective: RN ok'ed session, checking BP prior to session. Pt in bedside chair upon arrival, pleasant and agreeable. Pain:  Pain Assessment  Patient Currently in Pain: Denies    Vitals: Nurse checked vitals prior to session    Social/Functional History:  Lives With: Spouse  Type of Home: House  Home Layout: One level  Home Access: Stairs to enter with rails  Entrance Stairs - Number of Steps: 2-3  Home Equipment: Rolling walker (transport chair)   Bathroom Shower/Tub:  (Patient completes sponge bath as humidity impacts breathing)  Bathroom Toilet: Handicap height  Bathroom Equipment: Grab bars around toilet       ADL Assistance: Needs assistance  Homemaking Assistance: Needs assistance  Ambulation Assistance: Needs assistance  Transfer Assistance: Needs assistance    Active : No     Additional Comments: Pt has 24 hr care by wife and also dtr frequently. Pt has [facility] therapy and nursing. Pt has a chair and bed alarm at home and someone is always with him when he is up.  Pt's daughter reports pt was recently at [facility] and OTR completed home assessment prior to discharge home. Patient's daughter reports patient currently received HHOT and aide to assist with dressing. Prior to hospitalization, patient required assist with all ADLs and does not complete cooking/cleaning    VISION:Corrected    HEARING:  Patient is hard of hearing    COGNITION: WFL    RANGE OF MOTION:  Bilateral Upper Extremity:  WFL    STRENGTH:  Bilateral Upper Extremity:  Grossly deconditioned, 4/5    SENSATION:   WFL    ADL:   No ADL's completed this session. Charly Jc BALANCE:  Sitting Balance:  Supervision. seated in bedside chair  Standing Balance: Contact Guard Assistance. in prep for functional mobility    BED MOBILITY:  Scooting: Stand By Assistance to scoot back in bedside chair    TRANSFERS:  Sit to Stand:  [address] cues for hand placement  Stand to Sit: Contact Guard Assistance. cues for hand placement    FUNCTIONAL MOBILITY:  Assistive Device: Rolling Walker  Assist Level:  Contact Guard Assistance. Distance: Short distance within room, approximately 15 steps       Activity Tolerance:  Patient tolerance of  treatment: good. Assessment:  Assessment: Pt presents requiring increased assistance for ADLs, transfers, and functional mobility compared to PLOF. Pt will continue to benefit from OT services to improve independence with these tasks, in addition to overall strength/endurance to facilitate return to PLOF. Performance deficits / Impairments: Decreased functional mobility , Decreased endurance, Decreased ADL status, Decreased balance, Decreased strength, Decreased safe awareness, Decreased high-level IADLs  Prognosis: Good  REQUIRES OT FOLLOW UP: Yes  Decision Making: Medium Complexity    Treatment Initiated: Treatment and education initiated within context of evaluation.   Evaluation time included review of current medical information, gathering information related to past medical, social and functional history, completion of standardized testing, formal and informal observation of tasks, assessment of data and development of plan of care and goals. Treatment time included skilled education and facilitation of tasks to increase safety and independence with ADL's for improved functional independence and quality of life. Discharge Recommendations:  Continue to assess pending progress, Home with Home health OT, 24 hour supervision or assist, Patient would benefit from continued therapy after discharge    Patient Education:  OT Education: OT Role, Plan of Care, Transfer Training    Equipment Recommendations:  Equipment Needed: No    Plan:  Times per week: 5x  Times per day: Daily  Current Treatment Recommendations: Strengthening, Patient/Caregiver Education & Training, Balance Training, Functional Mobility Training, Endurance Training, Safety Education & Training, Self-Care / ADL. See long-term goal time frame for expected duration of plan of care. If no long-term goals established, a short length of stay is anticipated. Goals:     Short term goals  Time Frame for Short term goals: by discharge  Short term goal 1: Pt will complete functional mobility to/from bathroom with SBA and use of 2ww as needed to complete ADL routine. Short term goal 2: Pt will complete toileting (including transfer) with CGA as needed for ADL routine. Short term goal 3: Pt will complete standing task x3-5 minutes with BUE release and SBA as needed for ADL routine. Short term goal 4: Pt will complete BUE HEP with SBA to increase BUE strength and endurance needed for ADL routine. Following session, patient left in safe position with all fall risk precautions in place.

## 2021-07-28 NOTE — PROGRESS NOTES
Discharge instructions reviewed with patient and family. Family verbalized understanding. Patient discharged with all his belongings to main entrance.

## 2021-07-28 NOTE — PLAN OF CARE
Problem: Falls - Risk of:  Goal: Will remain free from falls  Description: Will remain free from falls  Outcome: Ongoing  Note: Patient using call light appropriately to call for assistance with ambulation to the bathroom and to chair. Patient is also compliant with use of non-skid slippers. Patient reports understanding of fall prevention when discussed. Bed alarm remains in place. Goal: Absence of physical injury  Description: Absence of physical injury  Outcome: Ongoing  Note: Patient is free from physical injury. Problem: Skin Integrity:  Goal: Will show no infection signs and symptoms  Description: Will show no infection signs and symptoms  Outcome: Ongoing  Note: Patients dressings changed to left heel and bilateral hand skin tears. Redness noted to buttocks. Scattered bruising noted in various stages of healing. Rash noted to chest. Patient understands the importance of frequent repositioning in order to prevent any skin breakdown. Goal: Absence of new skin breakdown  Description: Absence of new skin breakdown  Outcome: Ongoing  Note: No new skin breakdown noted at this time. Problem: Discharge Planning:  Goal: Discharged to appropriate level of care  Description: Discharged to appropriate level of care  Outcome: Ongoing  Note: Patient and family plan to return home at discharge. Care manager and social working helping with discharge needs. Care plan reviewed with patient and family. Patient and family verbalize understanding of the plan of care and contribute to goal setting.

## 2021-07-28 NOTE — CARE COORDINATION
7/28/21, 9:50 AM EDT  DISCHARGE PLANNING EVALUATION:    Debbi Sanders       Admitted: 7/27/2021/ Apple Perez Haydee 281 day: 1   Location: -18/018-A Reason for admit: Altered mental status [R41.82]   PMH:  has a past medical history of AAA (abdominal aortic aneurysm) ([facility] [address] CAD (coronary artery disease), Chronic kidney disease, COPD (chronic obstructive pulmonary disease) ([facility] [address] Hypercholesteremia, Hypertension, Other disorders of kidney and ureter in diseases classified elsewhere, Pneumonia, Prostate cancer ([facility] [address] SOB (shortness of breath), and Type II or unspecified type diabetes mellitus without mention of complication, not stated as uncontrolled. Procedure: 7/27/21 CT of head - no acute process  Barriers to Discharge:  Went unresponsive at home during HHPT/ Squad to [facility] ED. Condition improved. To 7K as observation overnight. Workup continued. Alert and cooperative. Medical resident mentioned getting Cog eval and following up with Neuro as OP. Await notes and orders. PCP: DANTE Haji CNP  Readmission Risk Score: 15%    Patient Goals/Plan/Treatment Preferences: I spoke with Donita Larson, his wife and his daughter. Planning home today current with [facility] nursing, aid, PT and OT. Daughter updated them this morning and she will call them when he gets home - planning later today. She will requested [facility] nursing tomorrow. Has needed equipment and oxygen for home use. No other discharge needs voiced. Transportation/Food Security/Housekeeping Addressed:  No issues identified. 1355 update:   Hospitalist discharge today:  7/28/21, 1:55 PM EDT    Patient goals/plan/ treatment preferences discussed by  and . Patient goals/plan/ treatment preferences reviewed with patient/ family. Patient/ family verbalize understanding of discharge plan and are in agreement with goal/plan/treatment preferences. Understanding was demonstrated using the teach back method.   AVS provided by RN at time of discharge, which includes all necessary medical information pertaining to the patients current course of illness, treatment, post-discharge goals of care, and treatment preferences.     Services After Discharge  Services At/After Discharge: Nursing Services, OT, PT, Honey Ojeda

## 2021-07-28 NOTE — PROGRESS NOTES
Cancelled Echo per Dr. 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     Post Op Diagnosis: same    Procedure:right lower quadrant fluid collection     Procedure performed by: Chip Hickman and Alonso Carlton     Written Informed Consent Obtained: Yes    Specimen Removed: YES - brown serous fluid.     Estimated Blood Loss: Minimal    Findings: Ct was used for localization of abnormal fluid collection. Using US guidance, a needle was inserted into the fluid collection and serosanguinous fluid was aspirated.  A wire was inserted into the collection and the tract was dilated.  A 8.0 Ecuadorean all-purpose drainage catheter was inserted and a pigtail loop of the distal end was formed.  The catheter was sutured into place and was removed.     A specimen was sent to the lab for further analysis and culture.    The patient tolerated procedure well and there were no complications. Please see procedure report under Imaging for further details.    Alonso Carlton MD  PGY - 4  Department of Radiology

## 2021-08-06 NOTE — PROCEDURES
The skin was prepped and a holter monitor was applied. The patient was instructed on the documentation of symptoms and the purpose of the holter as well as the things to avoid while wearing the holter. The patient was instructed to remove and return the holter on 08/08/2021.   The serial number of the holter that was applied is [id number]

## 2021-08-19 NOTE — TELEPHONE ENCOUNTER
Alicia-daughter aware of monitor results. CONCLUSION:  *  Sinus rhythm rates 51-77 bpm and average heart rate 58 bpm.  Premature atrial complexes with 2  runs of atrial tachycadia (longest run 6 beats at  125 bpm.  Isolated Premature ventricular complexes  No  significant pauses. No entries in symptom dairy. She was wanting to ask Dr. Vasile Srinivasan if he has any other recommendations. The only symptom he had while wearing the monitor was occasional shortness of breath but he also has copd.

## 2021-09-20 NOTE — PROGRESS NOTES
[facility]    Patient: Colen Snellen, 80 y.o.   : 1931    Patient of Dr. Sharon Wetzel   Patient presents with    Follow-up     1 year COPD f/u, no testing         COPD  He complains of cough. There is no shortness of breath, sputum production or wheezing. Primary symptoms comments: Not able to clear secretions. This is a chronic problem. The current episode started more than 1 year ago. The problem has been unchanged. The cough is non-productive. Associated symptoms include dyspnea on exertion, rhinorrhea and sneezing. Pertinent negatives include no chest pain, fever, nasal congestion, postnasal drip or sore throat. His symptoms are aggravated by any activity and change in weather. His symptoms are alleviated by beta-agonist. He reports significant improvement on treatment. There are no known risk factors for lung disease. His past medical history is significant for bronchiectasis and COPD. Clyde Saldana is here for follow up for severe centrilobular/panacinar emphysema and severe COPD. He is here today with his wife and daughter. He presents in a wheelchair but reports that he uses a walker at home. He was seen by pulmonology service while hospitalized in  after a fall at home. He was requiring high flow nasal cannula during hospitalization but was able to be weaned to his baseline 6 lpm with exertion and 2 lpm at rest. He did suffer a chest wall contusion with splinting and difficulty taking a deep breath, occult fracture possible. He was discharged to [facility] for rehab for 1 month. He also had a 1 day admission on 2021 for unresponsiveness/passing out. He was working with PT when he went to bed and became unresponsive/fell asleep for about 45 minutes. The patient has had these episodes of unresponsiveness before but they have lasted minimal 15 minutes.  The patient reported that he did not feel good which caused the family to call EMS. He also fell out of his chair 2 days prior to this admission and hit his head, causing an abrasion on his head. He was discharged with a 48 hour holter monitor and was instructed to follow up with his PCP. Patient is not a candidate/interested in any special therapies for cancer on advanced COPD and thus outpatient PFTs or CT chest have not been pursued after discharge home. He is on prednisone 5 mg daily. He has been intolerant to daliresp in the past. He was ordered to have a DEXA scan in the past, however, this was not completed as his PCP had recommended not to complete the DEXA now that he is 80years old. Overall patient reports respiratory symptoms have been stable since last appointment. Patient reports good compliance with inhaled medications (Incruse, pulmicort neb, and formoterol neb). Patient using albuterol 1-3 times per day on average, since Saturday/Sunday. He has been more congested and wheezy since Saturday/Sunday. Patient reports physical limitation due to respiratory symptoms. He now does sponge baths as the humidity in the shower affected his breathing too much. His past medical history is significant for COPD, DM type 2, AAA, hypertension, prostate cancer with radiation in 2006, CKD, and CAD. They do have a pulse ox at home and monitor his pulse ox regularly. MMRC Dyspnea Scale:   0: Dyspneic on strenuous exercise  1: Dyspneic on walking a slight hill  2: Dyspneic on walking level ground; must stop occasionally due to breathlessness  3: Must stop for breathlessness after walking 100 yards or after a few minutes  4: Cannot leave house; breathless on dressing/undressing    MMRC dyspnea score: 4    Progress History:   Since last visit any new medical issues? Yes, possible low blood sugar vs. possible last heart rate vs. possible TIA  New ER or hospital visits?  Yes 7/27/2021 - hospitalized for one day due to unresponsivenss/passing out believed to be due to bradycardia vs. low blood sugar vs. TIA and was discharged on holter monitor for 48 hours with advice to follow with his PCP  Any new or changes in medicines? Yes, started on baby aspirin and stopped glipizide after his most recent hospitalization  Using inhalers? Yes, pulmicort neb, formoterol neb, Incruse, and as needed albuterol inhaler and neb  Are they helpful? Yes   Any recent exacerbations? 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 (Coal, dust, asbestos, molds ie Hay)  Denies living on a farm that primarily raised crops, livestock or combination  Denies exposure to pets/animals at home. Denies exposure to tuberculosis.     Pneumonia vaccine: [id number] 11/8/2016 & 10/12/2015 and [id number] 11/8/2016 - up to date   COVID-19 vaccine: Vaccinated  Past Medical hx   PMH:  Past Medical History:   Diagnosis Date    AAA (abdominal aortic aneurysm) [facility] [address]     followed by Dr. Isrrael Farfan CAD (coronary artery disease)     Chronic kidney disease     COPD (chronic obstructive pulmonary disease) ([facility] [facility]     Hypercholesteremia     Hypertension     Other disorders of kidney and ureter in diseases classified elsewhere     Pneumonia     Prostate cancer ([facility] [address]     with radiation in 2006    SOB (shortness of breath)     Type II or unspecified type diabetes mellitus without mention of complication, not stated as uncontrolled      SURGICAL HISTORY:  Past Surgical History:   Procedure Laterality Date    ABDOMEN SURGERY      ABDOMINAL AORTIC ANEURYSM REPAIR  8/21/14    ABDOMINAL AORTIC ANEURYSM REPAIR, ENDOVASCULAR Bilateral 08/21/2014    WITH LT FEMORAL ENDARTERECTOMY     CARDIAC SURGERY  COLONOSCOPY  unsure    INGUINAL HERNIA REPAIR Bilateral 2015    Dr Grigsby Solbelinda VASCULAR SURGERY       SOCIAL HISTORY:  Social History     Tobacco Use    Smoking status: Former Smoker     Packs/day: 2.00     Years: 40.00     Pack years: 80.00     Types: Cigarettes     Quit date: 10/5/1989     Years since quittin.9    Smokeless tobacco: Never Used   Substance Use Topics    Alcohol use: Yes     Alcohol/week: 1.0 standard drinks     Types: 1 Cans of beer per week     Comment: now and then    Drug use: No     ALLERGIES:  Allergies   Allergen Reactions    Lasix [Furosemide] Hives and Rash    Lisinopril Hives and Rash    Daliresp [Roflumilast] Other (See Comments)     Anorexia    Keflex [Cephalexin]      hallucinations    Sulfa Antibiotics Hives    Percocet [Oxycodone-Acetaminophen] Nausea And Vomiting     FAMILY HISTORY:  Family History   Problem Relation Age of Onset    Rheum Arthritis Mother          due to complications related to    Heart Disease Mother     Coronary Art Dis Father     Heart Disease Father     Coronary Art Dis Brother     Heart Disease Brother      CURRENT MEDICATIONS:  Current Outpatient Medications   Medication Sig Dispense Refill    albuterol sulfate HFA (VENTOLIN HFA) 108 (90 Base) MCG/ACT inhaler Inhale 2 puffs into the lungs every 6 hours as needed for Wheezing or Shortness of Breath 1 each 11    predniSONE (DELTASONE) 5 MG tablet Take 1 tablet by mouth daily Prednisone 5mg po daily to take after food. 90 tablet 3    Umeclidinium Bromide (INCRUSE ELLIPTA) 62.5 MCG/INH AEPB Inhale 1 puff into the lungs daily 90 each 3    azithromycin (ZITHROMAX) 500 MG tablet Take 1 tablet by mouth daily for 3 days 3 tablet 0    Respiratory Therapy Supplies (NEBULIZER AIR TUBE/PLUGS) MISC Please provide nebulizer kit and supplies.  1 each 0    budesonide (PULMICORT) 0.5 MG/2ML nebulizer suspension USE 1 VIAL  IN  NEBULIZER TWICE  DAILY - rinse mouth after treatment 60 ampule 11    formoterol (PERFOROMIST) 20 MCG/2ML nebulizer solution Take 2 mLs by nebulization every 12 hours 120 mL 11    aspirin (ASPIRIN CHILDRENS) 81 MG chewable tablet Take 1 tablet by mouth daily 30 tablet 0    polyvinyl alcohol (LIQUIFILM TEARS) 1.4 % ophthalmic solution Place 1 drop into both eyes as needed for Dry Eyes      docusate sodium (COLACE) 100 MG capsule Take 100 mg by mouth 2 times daily as needed for Constipation      lidocaine (LIDODERM) 5 % Place 1 patch onto the skin daily 12 hours on, 12 hours off.  aluminum & magnesium hydroxide-simethicone (MAALOX) 200-200-20 MG/5ML SUSP suspension Take 15 mLs by mouth every 4 hours as needed for Indigestion      acetaminophen (TYLENOL) 500 MG tablet Take 500 mg by mouth every morning PRN for pain      albuterol (PROVENTIL) (2.5 MG/3ML) 0.083% nebulizer solution Take 3 mLs by nebulization every 6 hours as needed for Wheezing or Shortness of Breath 360 mL 3    tamsulosin (FLOMAX) 0.4 MG capsule Take 1 capsule by mouth daily 180 capsule 2    metoprolol succinate (TOPROL XL) 25 MG extended release tablet Take 25 mg by mouth daily      Nebulizer MISC Please provide new nebulizer machine to use with Albuterol 2.5mg via nebs Q6h prn, perforomist 2 times a day and pulmicort 2 times per day. 1 each 0    Respiratory Therapy Supplies (NEBULIZER AIR TUBE/PLUGS) MISC Please provide nebulizer kit and supplies. 1 each 0    Multiple Vitamins-Minerals (PRESERVISION AREDS 2) CAPS Take 1 capsule by mouth 2 times daily       pravastatin (PRAVACHOL) 80 MG tablet Take 80 mg by mouth nightly.  gabapentin (NEURONTIN) 100 MG capsule Take 100 mg by mouth 2 times daily.  fish oil-omega-3 fatty acids 1000 MG capsule Take 1 g by mouth 2 times daily.  vitamin D-3 (CHOLECALCIFEROL) 125 MCG (5000 UT) TABS Take 1 tablet by mouth daily 90 tablet 1     No current facility-administered medications for this visit. Kellen KOHLI   Review of Systems Constitutional: Negative for chills, fever and unexpected weight change. HENT: Positive for rhinorrhea and sneezing. Negative for congestion, postnasal drip, sinus pressure, sinus pain and sore throat. Eyes: Negative for itching. Respiratory: Positive for cough. Negative for sputum production, chest tightness, shortness of breath and wheezing. Denies hemoptysis   Cardiovascular: Positive for dyspnea on exertion and leg swelling (chronic bilateral - improving). Negative for chest pain and palpitations. Gastrointestinal: Negative for constipation, diarrhea and nausea. Genitourinary: Negative for difficulty urinating. Allergic/Immunologic: Negative for environmental allergies. Physical exam   /62 (Site: Left Upper Arm, Position: Sitting, Cuff Size: Medium Adult)   Pulse 60   Ht 5' 6\" (1.676 m)   Wt 131 lb (59.4 kg)   SpO2 95% Comment: 2 liters cont. BMI 21.14 kg/m²    Wt Readings from Last 3 Encounters:   09/21/21 131 lb (59.4 kg)   07/27/21 134 lb (60.8 kg)   06/09/21 132 lb 3.2 oz (60 kg)       Physical Exam  Constitutional:       General: He is not in acute distress. Comments: BMI 21, appears elderly  Presents in wheelchair   HENT:      Head: Normocephalic and atraumatic. Right Ear: External ear normal.      Left Ear: External ear normal.      Mouth/Throat:      Mouth: Mucous membranes are moist.      Pharynx: No oropharyngeal exudate or posterior oropharyngeal erythema. Eyes:      General:         Right eye: No discharge. Left eye: No discharge. Cardiovascular:      Rate and Rhythm: Normal rate. Pulses: Normal pulses. Pulmonary:      Effort: Pulmonary effort is normal.      Breath sounds: No wheezing, rhonchi or rales. Comments: Diminished breath sounds throughout  Chest:      Chest wall: No tenderness. Abdominal:      General: Bowel sounds are normal.      Palpations: Abdomen is soft. Tenderness: There is no abdominal tenderness. There is no guarding. Musculoskeletal:      Cervical back: Neck supple. Right lower leg: Edema present. Left lower leg: Edema present. Comments: +1 non-pitting in bilateral feet  In wheelchair today, uses walker at home   Skin:     General: Skin is warm and dry. Capillary Refill: Capillary refill takes less than 2 seconds. Neurological:      General: No focal deficit present. Mental Status: He is alert. Psychiatric:         Mood and Affect: Mood normal.         Behavior: Behavior normal.         Thought Content: Thought content normal.          Results   Lung Nodule Screening     [] Qualifies    [x] Does not qualify   [] Declined    [] Completed  80years old and quit smoking in 1989   The USPSTF recommends annual screening for lung cancer with low-dose computed tomography (LDCT) in adults aged 48 to [de-identified] years who have a 20 pack-year smoking history and currently smoke or have quit within the past 15 years. Screening should be discontinued once a person has not smoked for 15 years or develops a health problem that substantially limits life expectancy or the ability or willingness to have curative lung surgery. CXR 7/27/2021 (Reviewed) Mild firbotic stranding in both lung fields  Narrative   PROCEDURE: XR CHEST PORTABLE       CLINICAL INFORMATION: Altered mental status.       COMPARISON: 6/5/2021       TECHNIQUE: A single mobile view of the chest was obtained.             Impression   1. Normal heart size. Mild fibrotic stranding both lung fields. 2. No acute findings. No infiltrates or effusions are seen. .               **This report has been created using voice recognition software.  It may contain minor errors which are inherent in voice recognition technology. **       Final report electronically signed by Dr. Jose E Marrero on 7/27/2021 12:57 PM       Spirometry 10/9/2013 (Reviewed) Stage 3 Severe COPD      Assessment      Diagnosis Orders   1.  COPD exacerbation ([facility] [address] azithromycin (ZITHROMAX) 500 MG tablet   2. Centrilobular emphysema ([facility])  albuterol sulfate HFA (VENTOLIN HFA) 108 (90 Base) MCG/ACT inhaler    predniSONE (DELTASONE) 5 MG tablet    Umeclidinium Bromide (INCRUSE ELLIPTA) 62.5 MCG/INH AEPB   3. Stage 3 severe COPD by GOLD classification ([facility])  albuterol sulfate HFA (VENTOLIN HFA) 108 (90 Base) MCG/ACT inhaler    predniSONE (DELTASONE) 5 MG tablet    Umeclidinium Bromide (INCRUSE ELLIPTA) 62.5 MCG/INH AEPB   4. Hypoxemic respiratory failure, chronic ([facility])  predniSONE (DELTASONE) 5 MG tablet   5. Bronchiectasis without complication ([email or web address]           Plan   1. COPD exacerbation ([facility]  - Reports increased difficulty with cough clearance and increased wheezing at home since [place]  - Will prescribe 3 day course of azithromycin as he just had a prednisone burst in June 2021  - Advised patient to increase use of acapella at home to aid in cough clearance  - Continue mucinex twice daily   - azithromycin (ZITHROMAX) 500 MG tablet; Take 1 tablet by mouth daily for 3 days  Dispense: 3 tablet; Refill: 0    2. Centrilobular emphysema ([facility] [address]  - Fairly well controlled on current regimen of Incruse, pulmicort neb, formoterol neb, and daily prednisone 5 mg. Will continue at current dosages. - Discussed albuterol inhaler and nebulizer use with the patient. Reviewed signs and symptoms indicating need for use including shortness of breath and wheezing. Discussed with the patient the importance of using the inhaler or nebulizer within the prescribed time frames. Patient verbalized understanding to use one or the other not both within prescribed time frame. Patient also verbalized understanding that if there is no relief of symptoms after using albuterol and resting for 15 minutes they need to go to nearest ER or call 911.  - albuterol sulfate HFA (VENTOLIN HFA) 108 (90 Base) MCG/ACT inhaler;  Inhale 2 puffs into the lungs every 6 hours as needed for Wheezing or Shortness of Breath Dispense: 1 each; Refill: 11  - predniSONE (DELTASONE) 5 MG tablet; Take 1 tablet by mouth daily Prednisone 5mg po daily to take after food. Dispense: 90 tablet; Refill: 3  - Umeclidinium Bromide (INCRUSE ELLIPTA) 62.5 MCG/INH AEPB; Inhale 1 puff into the lungs daily  Dispense: 90 each; Refill: 3  - Pneumonia vaccine up to date   - Advised to take flu vaccine this coming season. 3. Stage 3 severe COPD by GOLD classification ([facility] [address]  - Fairly well controlled on current regimen of Incruse, pulmicort neb, formoterol neb, and daily prednisone 5 mg. Will continue at current dosages  - Discussed albuterol inhaler and nebulizer use with the patient. Reviewed signs and symptoms indicating need for use including shortness of breath and wheezing. Discussed with the patient the importance of using the inhaler or nebulizer within the prescribed time frames. Patient verbalized understanding to use one or the other not both within prescribed time frame. Patient also verbalized understanding that if there is no relief of symptoms after using albuterol and resting for 15 minutes they need to go to nearest ER or call 911.  - albuterol sulfate HFA (VENTOLIN HFA) 108 (90 Base) MCG/ACT inhaler; Inhale 2 puffs into the lungs every 6 hours as needed for Wheezing or Shortness of Breath  Dispense: 1 each; Refill: 11  - predniSONE (DELTASONE) 5 MG tablet; Take 1 tablet by mouth daily Prednisone 5mg po daily to take after food. Dispense: 90 tablet; Refill: 3  - Umeclidinium Bromide (INCRUSE ELLIPTA) 62.5 MCG/INH AEPB; Inhale 1 puff into the lungs daily  Dispense: 90 each; Refill: 3    4. Hypoxemic respiratory failure, chronic ([facility] [address]  - Patient was weaned down to 2 lpm at all times at the [facility]. Reports that his family closely monitors his oxygen at home. His SpO2 has been maintaining 90-94% at home. He intermittently will drop to 88% but comes back up quickly.   - No need for 6 MWT at this time as patient was just recently assessed for oxygenation needs while at [facility] and does not have increased dyspnea on exertion at this time. - predniSONE (DELTASONE) 5 MG tablet; Take 1 tablet by mouth daily Prednisone 5mg po daily to take after food. Dispense: 90 tablet; Refill: 3    5. Bronchiectasis without complication ([address]  - Having increase in difficulty with cough clearance at home. - Using acapella throughout the day and Mucinex twice daily   - Advised patient that we may need to consider vest therapy if he continues to have issues with cough clearance     Advised patient to call office with any changes, questions, or concerns regarding respiratory status or issues with prescribed medications    Return in about 3 months (around 12/21/2021) for COPD/Emphysema med check after exacerbation - Colin patient, back on Colin's schedule.        Electronically signed by DANTE Flores CNP on 9/21/2021 at 3:47 PM

## 2021-09-21 PROBLEM — J96.11 HYPOXEMIC RESPIRATORY FAILURE, CHRONIC (HCC): Status: ACTIVE | Noted: 2021-01-01

## 2021-09-21 PROBLEM — J47.9 BRONCHIECTASIS WITHOUT COMPLICATION (HCC): Status: ACTIVE | Noted: 2021-01-01

## 2021-09-21 PROBLEM — J43.2 CENTRILOBULAR EMPHYSEMA (HCC): Status: ACTIVE | Noted: 2021-01-01

## 2021-09-21 NOTE — PATIENT INSTRUCTIONS
I am putting you on a 3 day course of azithromycin. If your cough and congestion does not improve on azithromycin, please call the office. Continue using acapella \"green pickle\" intermittently throughout the day to help with sputum production. COPD Exacerbation Plan: Care Instructions  Your Care Instructions     If you have chronic obstructive pulmonary disease (COPD), your usual shortness of breath could suddenly get worse. You may start coughing more and have more mucus. This flare-up is called a COPD exacerbation (say \"ke-ADA-rf-BAY-brayden\"). A lung infection or air pollution could set off an exacerbation. Sometimes it can happen after a quick change in temperature or being around chemicals. Work with your doctor to make a plan for dealing with an exacerbation. You can better manage it if you plan ahead. Follow-up care is a key part of your treatment and safety. Be sure to make and go to all appointments, and call your doctor if you are having problems. It's also a good idea to know your test results and keep a list of the medicines you take. How can you care for yourself at home? During an exacerbation  1. Do not panic if you start to have one. Quick treatment at home may help you prevent serious breathing problems. If you have a COPD exacerbation plan that you developed with your doctor, follow it. 2. Take your medicines exactly as your doctor tells you.  ? Use your inhaler as directed by your doctor. If your symptoms do not get better after you use your medicine, have someone take you to the emergency room. Call an ambulance if necessary. ? With inhaled medicines, a spacer or a nebulizer may help you get more medicine to your lungs. Ask your doctor or pharmacist how to use them properly. Practice using the spacer in front of a mirror before you have an exacerbation. This may help you get the medicine into your lungs quickly.   ? If your doctor has given you steroid pills, take them as directed. ? Your doctor may have given you a prescription for antibiotics, which you can fill if you need it. ? Talk to your doctor if you have any problems with your medicine. And call your doctor if you have to use your antibiotic or steroid pills. Preventing an exacerbation  1. Do not smoke. This is the most important step you can take to prevent more damage to your lungs and prevent problems. If you already smoke, it is never too late to stop. If you need help quitting, talk to your doctor about stop-smoking programs and medicines. These can increase your chances of quitting for good. 2. Take your daily medicines as prescribed. 3. Avoid colds and flu. ? Get a pneumococcal vaccine. ? Get a flu vaccine each year, as soon as it is available. Ask those you live or work with to do the same, so they will not get the flu and infect you. ? Try to stay away from people with colds or the flu. ? Wash your hands often. 4. Avoid secondhand smoke; air pollution; cold, dry air; hot, humid air; and high altitudes. Stay at home with your windows closed when air pollution is bad. 5. Learn breathing techniques for COPD, such as breathing through pursed lips. These techniques can help you breathe easier during an exacerbation. When should you call for help? Call 911 anytime you think you may need emergency care. For example, call if:    · You have severe trouble breathing. · You have severe chest pain. Call your doctor now or seek immediate medical care if:    · You have new or worse shortness of breath. · You develop new chest pain. · You are coughing more deeply or more often, especially if you notice more mucus or a change in the color of your mucus. · You cough up blood. · You have new or increased swelling in your legs or belly. · You have a fever. Watch closely for changes in your health, and be sure to contact your doctor if:    · You need to use your antibiotic or steroid pills. and keep a list of the medicines you take. How can you care for yourself at home? 1. Be safe with medicines. Take your medicines exactly as prescribed. Call your doctor if you think you are having a problem with your medicine. You may be taking medicines such as:  ? Bronchodilators. These help open your airways and make breathing easier. ? Corticosteroids. These reduce airway inflammation. They may be given as pills, in a vein, or in an inhaled form. You may go home with pills in addition to an inhaler that you already use. 2. A spacer may help you get more inhaled medicine to your lungs. Ask your doctor or pharmacist if a spacer is right for you. If it is, ask how to use it properly. 3. If your doctor prescribed antibiotics, take them as directed. Do not stop taking them just because you feel better. You need to take the full course of antibiotics. 4. If your doctor prescribed oxygen, use the flow rate your doctor has recommended. Do not change it without talking to your doctor first.  5. Do not smoke. Smoking makes COPD worse. If you need help quitting, talk to your doctor about stop-smoking programs and medicines. These can increase your chances of quitting for good. When should you call for help? Call 911 anytime you think you may need emergency care. For example, call if:    · You have severe trouble breathing. Call your doctor now or seek immediate medical care if:    · You have new or worse trouble breathing. · Your coughing or wheezing gets worse. · You cough up dark brown or bloody mucus (sputum). · You have a new or higher fever. Watch closely for changes in your health, and be sure to contact your doctor if:    · You notice more mucus or a change in the color of your mucus. · You need to use your antibiotic or steroid pills. · You do not get better as expected. Where can you learn more? Go to https://chmonica[employer]. org and sign in to your [employer] account. Enter K135 in the [facility] box to learn more about \"Chronic Obstructive Pulmonary Disease (COPD) Flare-Ups: Care Instructions. \"     If you do not have an account, please click on the \"Sign Up Now\" link. Current as of: October 26, 2020               Content Version: 12.9  © 7852-5507 Healthwise, Incorporated. Care instructions adapted under license by [facility] ([facility]). If you have questions about a medical condition or this instruction, always ask your healthcare professional. [address] any warranty or liability for your use of this information.

## 2021-09-29 NOTE — TELEPHONE ENCOUNTER
[facility] day [facility] I will send a 30 mg prednisone taper to [facility] [address]. He will take 30 mg (3 tablets) for 3 days, 20 mg (2 tablets) for 3 days, and 10 mg (1 tablet) for 3 days and then stop. Please call the patient/patient's daughter to notify of the above and ensure that patient completed the azithromycin that I ordered at his appointment. If patient experiences worsening of his shortness of breath, dyspnea, fevers, or low SpO2, I would advise him to seek treatment at urgent care or the emergency department. Thanks!

## 2021-09-29 NOTE — TELEPHONE ENCOUNTER
Daughter called in stating patient is still congested. Stated at last visit you discussed a short term prednisone burst.  They are requesting that now. Patient also needs a 3 month supply of his Ventolin inhaler sent to [facility]. It was originally written for a one month. Prednisone needs to go to [facility] on [address].

## 2021-11-08 NOTE — PROGRESS NOTES
[facility] [address] [place] [address] [address].  [address]  [facility] [address]  Dept: [phone]  Dept Fax: [phone]  Loc: [phone]    Visit Date: 2021    Arnulfo Blount is a 80 y.o. male who presents todayfor:  Chief Complaint   Patient presents with    Check-Up    Hypertension    Hyperlipidemia   the rash is better  Off of lisinopril   On home O2  No chest pain   No changes in breathing  No dizziness  No syncope        HPI:  HPI  Past Medical History:   Diagnosis Date    AAA (abdominal aortic aneurysm) ([facility]     followed by Dr. Chani Hein CAD (coronary artery disease)     Chronic kidney disease     COPD [facility]chronic obstructive [facility] disease) ([facility]     Hypercholesteremia     Hypertension     Other disorders of kidney and ureter in diseases classified elsewhere     Pneumonia     Prostate cancer ([facility]     with radiation in     SOB (shortness of breath)     Type II or unspecified type diabetes mellitus without mention of complication, not stated as uncontrolled       Past Surgical History:   Procedure Laterality Date    ABDOMEN SURGERY      ABDOMINAL AORTIC ANEURYSM REPAIR  14    ABDOMINAL AORTIC ANEURYSM REPAIR, ENDOVASCULAR Bilateral 2014    WITH LT FEMORAL ENDARTERECTOMY     CARDIAC SURGERY      COLONOSCOPY  unsure    INGUINAL HERNIA REPAIR Bilateral 2015    Dr Kenny Garnica VASCULAR SURGERY       Family History   Problem Relation Age of Onset    Rheum Arthritis Mother          due to complications related to    Heart Disease Mother     Coronary Art Dis Father     Heart Disease Father     Coronary Art Dis Brother     Heart Disease Brother      Social History     Tobacco Use    Smoking status: Former Smoker     Packs/day: 2.00     Years: 40.00     Pack years: 80.00     Types: Cigarettes     Quit date: 10/5/1989     Years since quittin.1    Smokeless tobacco: Never Used   Substance Use Topics    Alcohol Nebulizer MISC Please provide new nebulizer machine to use with Albuterol 2.5mg via nebs Q6h prn, perforomist 2 times a day and pulmicort 2 times per day. 1 each 0    Respiratory Therapy Supplies (NEBULIZER AIR TUBE/PLUGS) MISC Please provide nebulizer kit and supplies. 1 each 0    Multiple Vitamins-Minerals (PRESERVISION AREDS 2) CAPS Take 1 capsule by mouth 2 times daily       pravastatin (PRAVACHOL) 80 MG tablet Take 80 mg by mouth nightly.  gabapentin (NEURONTIN) 100 MG capsule Take 100 mg by mouth 2 times daily.  fish oil-omega-3 fatty acids 1000 MG capsule Take 1 g by mouth 2 times daily.  vitamin D-3 (CHOLECALCIFEROL) 125 MCG (5000 UT) TABS Take 1 tablet by mouth daily 90 tablet 1     No current facility-administered medications for this visit.      Allergies   Allergen Reactions    Lasix [Furosemide] Hives and Rash    Lisinopril Hives and Rash    Daliresp [Roflumilast] Other (See Comments)     Anorexia    Keflex [Cephalexin]      hallucinations    Sulfa Antibiotics Hives    Percocet [Oxycodone-Acetaminophen] Nausea And Vomiting     Health Maintenance   Topic Date Due    DTaP/Tdap/Td vaccine (1 - Tdap) Never done    Shingles Vaccine (1 of 2) Never done   [employer] Visit (AWV)  Never done    COVID-19 Vaccine (3 - Pfizer booster) 09/02/2021    Lipid screen  01/13/2022    PSA counseling  01/13/2022    Potassium monitoring  07/27/2022    Creatinine monitoring  07/27/2022    Flu vaccine  Completed    Pneumococcal 65+ years Vaccine  Completed    Hepatitis A vaccine  Aged Out    Hepatitis B vaccine  Aged Out    Hib vaccine  Aged Out    Meningococcal (ACWY) vaccine  Aged Out       Subjective:  Review of Systems  General:   No fever, no chills, No fatigue or weight loss  Pulmonary:    No dyspnea, no wheezing  Cardiac:    Denies recent chest pain,   GI:     No nausea or vomiting, no abdominal pain  Neuro:    No dizziness or light headedness,   Musculoskeletal:  No recent active issues  Extremities:   No edema, no obvious claudication       Objective:  Physical Exam  BP (!) 162/78   Pulse 64   Wt 127 lb (57.6 kg) Comment: patient in wheel chair  BMI 20.50 kg/m²   General:   Well developed, well nourished  Lungs:   Clear to auscultation  Heart:    Normal S1 S2, Slight murmur. no rubs, no gallops  Abdomen:   Soft, non tender, no organomegalies, positive bowel sounds  Extremities:   No edema, no cyanosis, good peripheral pulses  Neurological:   Awake, alert, oriented. No obvious focal deficits  Musculoskelatal:  No obvious deformities    Assessment:      Diagnosis Orders   1. Primary hypertension     2. Familial hypercholesterolemia     as above  Cardiac fair for now     Plan:  No follow-ups on file. As above  Continue risk factor modification and medical management  Thank you for allowing me to participate in the care of your patient. Please don't hesitate to contact me regarding any further issues related to the patient care]      Orders Placed:  No orders of the defined types were placed in this encounter. Medications Prescribed:  No orders of the defined types were placed in this encounter. Discussed use, benefit, and side effects of prescribed medications. All patient questions answered. Pt voicedunderstanding. Instructed to continue current medications, diet and exercise. Continue risk factor modification and medical management. Patient agreed with treatment plan. Follow up as directed.     Electronically signedby Juan Vasques MD on 11/8/2021 at 10:53 AM

## 2021-11-17 NOTE — PROGRESS NOTES
disease)     Chronic kidney disease     COPD (chronic obstructive pulmonary disease) (HCC)     Hypercholesteremia     Hypertension     Other disorders of kidney and ureter in diseases classified elsewhere     Pneumonia     Prostate cancer ([facility]     with radiation in     SOB (shortness of breath)     Type II or unspecified type diabetes mellitus without mention of complication, not stated as uncontrolled      SURGICAL HISTORY:  Past Surgical History:   Procedure Laterality Date    ABDOMEN SURGERY      ABDOMINAL AORTIC ANEURYSM REPAIR  14    ABDOMINAL AORTIC ANEURYSM REPAIR, ENDOVASCULAR Bilateral 2014    WITH LT FEMORAL ENDARTERECTOMY     CARDIAC SURGERY      COLONOSCOPY  unsure    INGUINAL HERNIA REPAIR Bilateral 2015    Dr Megan Koenig HISTORY:  Social History     Tobacco Use    Smoking status: Former Smoker     Packs/day: 2.00     Years: 40.00     Pack years: 80.00     Types: Cigarettes     Quit date: 10/5/1989     Years since quittin.1    Smokeless tobacco: Never Used   Substance Use Topics    Alcohol use:  Yes     Alcohol/week: 1.0 standard drink     Types: 1 Cans of beer per week     Comment: now and then    Drug use: No     ALLERGIES:  Allergies   Allergen Reactions    Lasix [Furosemide] Hives and Rash    Lisinopril Hives and Rash    Daliresp [Roflumilast] Other (See Comments)     Anorexia    Keflex [Cephalexin]      hallucinations    Sulfa Antibiotics Hives    Percocet [Oxycodone-Acetaminophen] Nausea And Vomiting     FAMILY HISTORY:  Family History   Problem Relation Age of Onset    Rheum Arthritis Mother          due to complications related to    Heart Disease Mother     Coronary Art Dis Father     Heart Disease Father     Coronary Art Dis Brother     Heart Disease Brother      CURRENT MEDICATIONS:  Current Outpatient Medications   Medication Sig Dispense Refill    Respiratory Therapy Supplies (NEBULIZER AIR TUBE/PLUGS) MISC Please provide nebulizer kit and supplies with mask interface. 1 each 0    metoprolol succinate (TOPROL XL) 25 MG extended release tablet Take 1 tablet by mouth daily 90 tablet 3    tamsulosin (FLOMAX) 0.4 MG capsule Take 1 capsule by mouth daily 90 capsule 3    albuterol sulfate HFA (VENTOLIN HFA) 108 (90 Base) MCG/ACT inhaler Inhale 2 puffs into the lungs every 6 hours as needed for Wheezing or Shortness of Breath 3 each 4    predniSONE (DELTASONE) 5 MG tablet Take 1 tablet by mouth daily Prednisone 5mg po daily to take after food. 90 tablet 3    Umeclidinium Bromide (INCRUSE ELLIPTA) 62.5 MCG/INH AEPB Inhale 1 puff into the lungs daily 90 each 3    budesonide (PULMICORT) 0.5 MG/2ML nebulizer suspension USE 1 VIAL  IN  NEBULIZER TWICE  DAILY - rinse mouth after treatment 60 ampule 11    formoterol (PERFOROMIST) 20 MCG/2ML nebulizer solution Take 2 mLs by nebulization every 12 hours 120 mL 11    aspirin (ASPIRIN CHILDRENS) 81 MG chewable tablet Take 1 tablet by mouth daily 30 tablet 0    polyvinyl alcohol (LIQUIFILM TEARS) 1.4 % ophthalmic solution Place 1 drop into both eyes as needed for Dry Eyes      docusate sodium (COLACE) 100 MG capsule Take 100 mg by mouth 2 times daily as needed for Constipation      lidocaine (LIDODERM) 5 % Place 1 patch onto the skin daily 12 hours on, 12 hours off.  aluminum & magnesium hydroxide-simethicone (MAALOX) 200-200-20 MG/5ML SUSP suspension Take 15 mLs by mouth every 4 hours as needed for Indigestion      acetaminophen (TYLENOL) 500 MG tablet Take 500 mg by mouth every morning PRN for pain      albuterol (PROVENTIL) (2.5 MG/3ML) 0.083% nebulizer solution Take 3 mLs by nebulization every 6 hours as needed for Wheezing or Shortness of Breath 360 mL 3    Multiple Vitamins-Minerals (PRESERVISION AREDS 2) CAPS Take 1 capsule by mouth 2 times daily       pravastatin (PRAVACHOL) 80 MG tablet Take 80 mg by mouth nightly.       gabapentin (NEURONTIN) 100 MG capsule Take 100 mg by mouth 2 times daily.  fish oil-omega-3 fatty acids 1000 MG capsule Take 1 g by mouth 2 times daily.  vitamin D-3 (CHOLECALCIFEROL) 125 MCG (5000 UT) TABS Take 1 tablet by mouth daily 90 tablet 1     No current facility-administered medications for this visit. Candice KOHLI   Review of Systems   Constitutional: Negative for chills, fever and unexpected weight change. Respiratory: Positive for shortness of breath and wheezing. Negative for cough, sputum production, chest tightness and stridor. Cardiovascular: Positive for dyspnea on exertion. Negative for chest pain and leg swelling. Gastrointestinal: Negative for diarrhea, nausea and vomiting. Genitourinary: Negative for dysuria. Physical exam   /70 (Site: Left Upper Arm, Position: Sitting, Cuff Size: Medium Adult)   Pulse 55   Temp 98.3 °F (36.8 °C) (Skin)   Ht 5' 6\" (1.676 m)   Wt 127 lb (57.6 kg)   SpO2 99% Comment: 2L of O2  BMI 20.50 kg/m²    Wt Readings from Last 3 Encounters:   11/17/21 127 lb (57.6 kg)   11/08/21 127 lb (57.6 kg)   09/21/21 131 lb (59.4 kg)       Physical Exam  Vitals and nursing note reviewed. Constitutional:       General: He is not in acute distress. Appearance: He is well-developed. Comments: Appears chronically ill, well groomed, in [facility] thinly built   HENT:      Head: Normocephalic and atraumatic. Neck:      Trachea: No tracheal deviation. Cardiovascular:      Rate and Rhythm: Normal rate and regular rhythm. Heart sounds: Normal heart sounds. No murmur heard. Pulmonary:      Effort: Pulmonary effort is normal. No respiratory distress. Breath sounds: Normal breath sounds. No stridor. No wheezing or rales. Comments: Diminished throughout  Chest:      Chest wall: No tenderness. Abdominal:      General: Bowel sounds are normal. There is no distension. Palpations: Abdomen is soft. Musculoskeletal:      Cervical back: Neck supple. Skin:     General: Skin is warm and dry. Capillary Refill: Capillary refill takes less than 2 seconds. Neurological:      Mental Status: He is alert and oriented to person, place, and time. Psychiatric:         Behavior: Behavior normal.         Thought Content: Thought content normal.          Results   Lung Nodule Screening     [] Qualifies    [x] Does not qualify   [] Declined    [] Completed  Age 80   The USPSTF recommends annual screening for lung cancer with low-dose computed tomography (LDCT) in adults aged 48 to [de-identified] years who have a 20 pack-year smoking history and currently smoke or have quit within the past 15 years. Screening should be discontinued once a person has not smoked for 15 years or develops a health problem that substantially limits life expectancy or the ability or willingness to have curative lung surgery. Assessment      Diagnosis Orders   1. Stage 3 severe COPD by GOLD classification ([facility])  Respiratory Therapy Supplies (NEBULIZER AIR TUBE/PLUGS) MISC   2. Centrilobular emphysema (HCC)  Respiratory Therapy Supplies (NEBULIZER AIR TUBE/PLUGS) MISC   3. Bronchiectasis without complication ([facility]     4.  Chronic respiratory failure with hypoxia ([facility])           Plan   -Will continue on current version of triple therapy tolerating well Incruse, formoterol, and budesonide  -Patient prefers ventolin will continue this   -Will continue on current O2 requirements with exertion  -Discussed with patient and family previously regarding questions about long term oral glucocorticoid Per GOLD guidelines long term use of prednisone is associated with numerous side effects without documented benefit in respiratory function-outlined possible side effects including thinning skin and ecchymosis, increased risk of cataracts and glaucoma, fluid retention, increased risk of A-fib, and HTN, increased risk of gastritis, ulcer formation, depletion of bone mineral density, uncontrolled blood glucose, and mood imbalance.  Patient and family understand risks and accept them Wished to continue on 5 mg prednisone did not complete previously ordered DEXA scan   -Advised to maintain pneumonia vaccine with PCP and to take flu vaccine this coming season.  -Advised patient to call office with any changes, questions, or concerns regarding respiratory status    Will see Eleazar Mcburney in: 12 months    Brian Santos CNP  11/17/2021

## 2022-01-01 ENCOUNTER — ANESTHESIA EVENT (OUTPATIENT)
Dept: ENDOSCOPY | Age: 87
DRG: 378 | End: 2022-01-01
Payer: MEDICARE

## 2022-01-01 ENCOUNTER — ANESTHESIA (OUTPATIENT)
Dept: ENDOSCOPY | Age: 87
DRG: 378 | End: 2022-01-01
Payer: MEDICARE

## 2022-01-01 ENCOUNTER — APPOINTMENT (OUTPATIENT)
Dept: GENERAL RADIOLOGY | Age: 87
DRG: 542 | End: 2022-01-01
Payer: MEDICARE

## 2022-01-01 ENCOUNTER — TELEPHONE (OUTPATIENT)
Dept: PULMONOLOGY | Age: 87
End: 2022-01-01

## 2022-01-01 ENCOUNTER — APPOINTMENT (OUTPATIENT)
Dept: CT IMAGING | Age: 87
DRG: 378 | End: 2022-01-01
Payer: MEDICARE

## 2022-01-01 ENCOUNTER — APPOINTMENT (OUTPATIENT)
Dept: GENERAL RADIOLOGY | Age: 87
DRG: 378 | End: 2022-01-01
Payer: MEDICARE

## 2022-01-01 ENCOUNTER — APPOINTMENT (OUTPATIENT)
Dept: INTERVENTIONAL RADIOLOGY/VASCULAR | Age: 87
DRG: 542 | End: 2022-01-01
Payer: MEDICARE

## 2022-01-01 ENCOUNTER — HOSPITAL ENCOUNTER (INPATIENT)
Age: 87
LOS: 3 days | Discharge: HOME HEALTH | DRG: 378 | End: 2022-04-09
Attending: EMERGENCY MEDICINE | Admitting: INTERNAL MEDICINE
Payer: MEDICARE

## 2022-01-01 ENCOUNTER — APPOINTMENT (OUTPATIENT)
Dept: CT IMAGING | Age: 87
DRG: 542 | End: 2022-01-01
Payer: MEDICARE

## 2022-01-01 ENCOUNTER — APPOINTMENT (OUTPATIENT)
Dept: ULTRASOUND IMAGING | Age: 87
DRG: 542 | End: 2022-01-01
Payer: MEDICARE

## 2022-01-01 ENCOUNTER — OFFICE VISIT (OUTPATIENT)
Dept: NEPHROLOGY | Age: 87
End: 2022-01-01
Payer: MEDICARE

## 2022-01-01 ENCOUNTER — HOSPITAL ENCOUNTER (INPATIENT)
Age: 87
LOS: 2 days | DRG: 542 | End: 2022-07-20
Attending: EMERGENCY MEDICINE | Admitting: ORTHOPAEDIC SURGERY
Payer: MEDICARE

## 2022-01-01 ENCOUNTER — OFFICE VISIT (OUTPATIENT)
Dept: CARDIOLOGY CLINIC | Age: 87
End: 2022-01-01
Payer: MEDICARE

## 2022-01-01 VITALS
RESPIRATION RATE: 15 BRPM | OXYGEN SATURATION: 95 % | DIASTOLIC BLOOD PRESSURE: 52 MMHG | SYSTOLIC BLOOD PRESSURE: 90 MMHG

## 2022-01-01 VITALS
TEMPERATURE: 100 F | BODY MASS INDEX: 24.8 KG/M2 | WEIGHT: 154.32 LBS | OXYGEN SATURATION: 99 % | HEIGHT: 66 IN | DIASTOLIC BLOOD PRESSURE: 80 MMHG | SYSTOLIC BLOOD PRESSURE: 148 MMHG | RESPIRATION RATE: 21 BRPM

## 2022-01-01 VITALS
OXYGEN SATURATION: 98 % | HEIGHT: 66 IN | DIASTOLIC BLOOD PRESSURE: 58 MMHG | RESPIRATION RATE: 18 BRPM | HEART RATE: 68 BPM | TEMPERATURE: 97.9 F | BODY MASS INDEX: 23.59 KG/M2 | SYSTOLIC BLOOD PRESSURE: 129 MMHG | WEIGHT: 146.8 LBS

## 2022-01-01 VITALS
SYSTOLIC BLOOD PRESSURE: 132 MMHG | HEIGHT: 66 IN | BODY MASS INDEX: 22.6 KG/M2 | HEART RATE: 54 BPM | DIASTOLIC BLOOD PRESSURE: 66 MMHG

## 2022-01-01 VITALS
WEIGHT: 140 LBS | DIASTOLIC BLOOD PRESSURE: 62 MMHG | BODY MASS INDEX: 22.6 KG/M2 | OXYGEN SATURATION: 98 % | HEART RATE: 56 BPM | SYSTOLIC BLOOD PRESSURE: 152 MMHG

## 2022-01-01 VITALS
OXYGEN SATURATION: 99 % | DIASTOLIC BLOOD PRESSURE: 38 MMHG | RESPIRATION RATE: 14 BRPM | SYSTOLIC BLOOD PRESSURE: 85 MMHG

## 2022-01-01 DIAGNOSIS — R06.02 SOB (SHORTNESS OF BREATH): ICD-10-CM

## 2022-01-01 DIAGNOSIS — J43.2 CENTRILOBULAR EMPHYSEMA (HCC): ICD-10-CM

## 2022-01-01 DIAGNOSIS — J44.1 COPD EXACERBATION (HCC): Primary | ICD-10-CM

## 2022-01-01 DIAGNOSIS — N18.31 STAGE 3A CHRONIC KIDNEY DISEASE (HCC): Primary | ICD-10-CM

## 2022-01-01 DIAGNOSIS — J42 UNSPECIFIED CHRONIC BRONCHITIS (HCC): ICD-10-CM

## 2022-01-01 DIAGNOSIS — K92.2 LOWER GI BLEED: Primary | ICD-10-CM

## 2022-01-01 DIAGNOSIS — I10 ESSENTIAL HYPERTENSION: Primary | ICD-10-CM

## 2022-01-01 DIAGNOSIS — J42 CHRONIC BRONCHITIS, UNSPECIFIED CHRONIC BRONCHITIS TYPE (HCC): ICD-10-CM
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
## 2022-01-01 DIAGNOSIS — E78.01 FAMILIAL HYPERCHOLESTEROLEMIA: ICD-10-CM

## 2022-01-01 DIAGNOSIS — M25.551 HIP PAIN, ACUTE, RIGHT: ICD-10-CM

## 2022-01-01 DIAGNOSIS — N18.32 STAGE 3B CHRONIC KIDNEY DISEASE (HCC): ICD-10-CM

## 2022-01-01 DIAGNOSIS — S72.001A CLOSED FRACTURE OF RIGHT HIP, INITIAL ENCOUNTER (HCC): ICD-10-CM

## 2022-01-01 LAB
ACINETOBACTER CALCOACETICUS-BAUMANNII BY PCR: NOT DETECTED
ADENOVIRUS BY PCR: NOT DETECTED
ALBUMIN SERPL-MCNC: 3.1 G/DL (ref 3.5–5.1)
ALBUMIN SERPL-MCNC: 3.7 G/DL (ref 3.5–5.1)
ALBUMIN SERPL-MCNC: 3.8 G/DL (ref 3.5–5.1)
ALBUMIN SERPL-MCNC: 3.9 G/DL (ref 3.5–5.1)
ALLEN TEST: POSITIVE
ALLEN TEST: POSITIVE
ALP BLD-CCNC: 61 U/L (ref 38–126)
ALP BLD-CCNC: 68 U/L (ref 38–126)
ALP BLD-CCNC: 73 U/L (ref 38–126)
ALP BLD-CCNC: 77 U/L (ref 38–126)
ALT SERPL-CCNC: 11 U/L (ref 11–66)
ALT SERPL-CCNC: 15 U/L (ref 11–66)
ALT SERPL-CCNC: 45 U/L (ref 11–66)
ALT SERPL-CCNC: 48 U/L (ref 11–66)
AMPHETAMINE+METHAMPHETAMINE URINE SCREEN: NEGATIVE
ANION GAP SERPL CALCULATED.3IONS-SCNC: 11 MEQ/L (ref 8–16)
ANION GAP SERPL CALCULATED.3IONS-SCNC: 11 MEQ/L (ref 8–16)
ANION GAP SERPL CALCULATED.3IONS-SCNC: 12 MEQ/L (ref 8–16)
ANION GAP SERPL CALCULATED.3IONS-SCNC: 12 MEQ/L (ref 8–16)
ANION GAP SERPL CALCULATED.3IONS-SCNC: 15 MEQ/L (ref 8–16)
ANION GAP SERPL CALCULATED.3IONS-SCNC: 17 MEQ/L (ref 8–16)
ANION GAP SERPL CALCULATED.3IONS-SCNC: 22 MEQ/L (ref 8–16)
ANION GAP SERPL CALCULATED.3IONS-SCNC: 9 MEQ/L (ref 8–16)
APTT: 28.8 SECONDS (ref 22–38)
APTT: 30.2 SECONDS (ref 22–38)
AST SERPL-CCNC: 17 U/L (ref 5–40)
AST SERPL-CCNC: 19 U/L (ref 5–40)
AST SERPL-CCNC: 54 U/L (ref 5–40)
AST SERPL-CCNC: 57 U/L (ref 5–40)
AVERAGE GLUCOSE: 123 MG/DL (ref 70–126)
BARBITURATE QUANTITATIVE URINE: NEGATIVE
BASE EXCESS (CALCULATED): -3.7 MMOL/L (ref -2.5–2.5)
BASE EXCESS (CALCULATED): -4.4 MMOL/L (ref -2.5–2.5)
BASOPHILS # BLD: 0.1 %
BASOPHILS # BLD: 0.1 %
BASOPHILS # BLD: 0.4 %
BASOPHILS # BLD: 0.5 %
BASOPHILS # BLD: 0.6 %
BASOPHILS ABSOLUTE: 0 THOU/MM3 (ref 0–0.1)
BASOPHILS ABSOLUTE: 0 THOU/MM3 (ref 0–0.1)
BASOPHILS ABSOLUTE: 0.1 THOU/MM3 (ref 0–0.1)
BENZODIAZEPINE QUANTITATIVE URINE: NEGATIVE
BILIRUB SERPL-MCNC: 0.2 MG/DL (ref 0.3–1.2)
BILIRUB SERPL-MCNC: 0.3 MG/DL (ref 0.3–1.2)
BILIRUB SERPL-MCNC: 0.4 MG/DL (ref 0.3–1.2)
BILIRUB SERPL-MCNC: 0.4 MG/DL (ref 0.3–1.2)
BILIRUBIN DIRECT: < 0.2 MG/DL (ref 0–0.3)
BILIRUBIN DIRECT: < 0.2 MG/DL (ref 0–0.3)
BILIRUBIN URINE: NEGATIVE
BLOOD, URINE: NEGATIVE
BUN BLDV-MCNC: 27 MG/DL (ref 7–22)
BUN BLDV-MCNC: 27 MG/DL (ref 7–22)
BUN BLDV-MCNC: 28 MG/DL (ref 7–22)
BUN BLDV-MCNC: 28 MG/DL (ref 7–22)
BUN BLDV-MCNC: 38 MG/DL (ref 7–22)
BUN BLDV-MCNC: 39 MG/DL (ref 7–22)
BUN BLDV-MCNC: 39 MG/DL (ref 7–22)
BUN BLDV-MCNC: 40 MG/DL (ref 7–22)
CALCIUM SERPL-MCNC: 7.9 MG/DL (ref 8.5–10.5)
CALCIUM SERPL-MCNC: 8 MG/DL (ref 8.5–10.5)
CALCIUM SERPL-MCNC: 8.3 MG/DL (ref 8.5–10.5)
CALCIUM SERPL-MCNC: 8.7 MG/DL (ref 8.5–10.5)
CALCIUM SERPL-MCNC: 9 MG/DL (ref 8.5–10.5)
CALCIUM SERPL-MCNC: 9.1 MG/DL (ref 8.5–10.5)
CALCIUM SERPL-MCNC: 9.3 MG/DL (ref 8.5–10.5)
CALCIUM SERPL-MCNC: 9.3 MG/DL (ref 8.5–10.5)
CANNABINOID QUANTITATIVE URINE: NEGATIVE
CHARACTER, URINE: CLEAR
CHLAMYDIA PNEUMONIAE BY PCR: NOT DETECTED
CHLORIDE BLD-SCNC: 101 MEQ/L (ref 98–111)
CHLORIDE BLD-SCNC: 102 MEQ/L (ref 98–111)
CHLORIDE BLD-SCNC: 103 MEQ/L (ref 98–111)
CHLORIDE BLD-SCNC: 104 MEQ/L (ref 98–111)
CHLORIDE BLD-SCNC: 105 MEQ/L (ref 98–111)
CHLORIDE BLD-SCNC: 105 MEQ/L (ref 98–111)
CO2: 14 MEQ/L (ref 23–33)
CO2: 18 MEQ/L (ref 23–33)
CO2: 21 MEQ/L (ref 23–33)
CO2: 21 MEQ/L (ref 23–33)
CO2: 25 MEQ/L (ref 23–33)
CO2: 25 MEQ/L (ref 23–33)
CO2: 26 MEQ/L (ref 23–33)
CO2: 26 MEQ/L (ref 23–33)
COCAINE METABOLITE QUANTITATIVE URINE: NEGATIVE
COLLECTED BY:: ABNORMAL
COLLECTED BY:: ABNORMAL
COLOR: YELLOW
CREAT SERPL-MCNC: 1.6 MG/DL (ref 0.4–1.2)
CREAT SERPL-MCNC: 2 MG/DL (ref 0.4–1.2)
CREAT SERPL-MCNC: 2.8 MG/DL (ref 0.4–1.2)
CREAT SERPL-MCNC: 2.9 MG/DL (ref 0.4–1.2)
CREAT SERPL-MCNC: 3 MG/DL (ref 0.4–1.2)
CREATININE URINE: 177.6 MG/DL
DEVICE: ABNORMAL
DEVICE: ABNORMAL
EKG ATRIAL RATE: 79 BPM
EKG ATRIAL RATE: 92 BPM
EKG P AXIS: 79 DEGREES
EKG P AXIS: 93 DEGREES
EKG P-R INTERVAL: 202 MS
EKG P-R INTERVAL: 248 MS
EKG Q-T INTERVAL: 364 MS
EKG Q-T INTERVAL: 398 MS
EKG QRS DURATION: 108 MS
EKG QRS DURATION: 108 MS
EKG QTC CALCULATION (BAZETT): 450 MS
EKG QTC CALCULATION (BAZETT): 456 MS
EKG R AXIS: -89 DEGREES
EKG R AXIS: 149 DEGREES
EKG T AXIS: 72 DEGREES
EKG T AXIS: 81 DEGREES
EKG VENTRICULAR RATE: 79 BPM
EKG VENTRICULAR RATE: 92 BPM
ENTEROBACTER CLOACAE COMPLEX BY PCR: NOT DETECTED
EOSINOPHIL # BLD: 0 %
EOSINOPHIL # BLD: 0.1 %
EOSINOPHIL # BLD: 1.9 %
EOSINOPHIL # BLD: 2.9 %
EOSINOPHIL # BLD: 3.4 %
EOSINOPHILS ABSOLUTE: 0 THOU/MM3 (ref 0–0.4)
EOSINOPHILS ABSOLUTE: 0 THOU/MM3 (ref 0–0.4)
EOSINOPHILS ABSOLUTE: 0.3 THOU/MM3 (ref 0–0.4)
EOSINOPHILS ABSOLUTE: 0.4 THOU/MM3 (ref 0–0.4)
EOSINOPHILS ABSOLUTE: 0.4 THOU/MM3 (ref 0–0.4)
ERYTHROCYTE [DISTWIDTH] IN BLOOD BY AUTOMATED COUNT: 12.4 % (ref 11.5–14.5)
ERYTHROCYTE [DISTWIDTH] IN BLOOD BY AUTOMATED COUNT: 12.7 % (ref 11.5–14.5)
ERYTHROCYTE [DISTWIDTH] IN BLOOD BY AUTOMATED COUNT: 12.8 % (ref 11.5–14.5)
ERYTHROCYTE [DISTWIDTH] IN BLOOD BY AUTOMATED COUNT: 13 % (ref 11.5–14.5)
ERYTHROCYTE [DISTWIDTH] IN BLOOD BY AUTOMATED COUNT: 40.8 FL (ref 35–45)
ERYTHROCYTE [DISTWIDTH] IN BLOOD BY AUTOMATED COUNT: 43.3 FL (ref 35–45)
ERYTHROCYTE [DISTWIDTH] IN BLOOD BY AUTOMATED COUNT: 43.6 FL (ref 35–45)
ERYTHROCYTE [DISTWIDTH] IN BLOOD BY AUTOMATED COUNT: 43.7 FL (ref 35–45)
ERYTHROCYTE [DISTWIDTH] IN BLOOD BY AUTOMATED COUNT: 43.8 FL (ref 35–45)
ERYTHROCYTE [DISTWIDTH] IN BLOOD BY AUTOMATED COUNT: 44.1 FL (ref 35–45)
ESCHERICHIA COLI BY PCR: NOT DETECTED
ETHYL ALCOHOL, SERUM: < 0.01 %
FLU A ANTIGEN: NEGATIVE
FLU B ANTIGEN: NEGATIVE
GFR SERPL CREATININE-BSD FRML MDRD: 20 ML/MIN/1.73M2
GFR SERPL CREATININE-BSD FRML MDRD: 20 ML/MIN/1.73M2
GFR SERPL CREATININE-BSD FRML MDRD: 21 ML/MIN/1.73M2
GFR SERPL CREATININE-BSD FRML MDRD: 31 ML/MIN/1.73M2
GFR SERPL CREATININE-BSD FRML MDRD: 41 ML/MIN/1.73M2
GLUCOSE BLD-MCNC: 102 MG/DL (ref 70–108)
GLUCOSE BLD-MCNC: 103 MG/DL (ref 70–108)
GLUCOSE BLD-MCNC: 105 MG/DL (ref 70–108)
GLUCOSE BLD-MCNC: 109 MG/DL (ref 70–108)
GLUCOSE BLD-MCNC: 110 MG/DL (ref 70–108)
GLUCOSE BLD-MCNC: 111 MG/DL (ref 70–108)
GLUCOSE BLD-MCNC: 119 MG/DL (ref 70–108)
GLUCOSE BLD-MCNC: 123 MG/DL (ref 70–108)
GLUCOSE BLD-MCNC: 125 MG/DL (ref 70–108)
GLUCOSE BLD-MCNC: 127 MG/DL (ref 70–108)
GLUCOSE BLD-MCNC: 128 MG/DL (ref 70–108)
GLUCOSE BLD-MCNC: 129 MG/DL (ref 70–108)
GLUCOSE BLD-MCNC: 133 MG/DL (ref 70–108)
GLUCOSE BLD-MCNC: 135 MG/DL (ref 70–108)
GLUCOSE BLD-MCNC: 140 MG/DL (ref 70–108)
GLUCOSE BLD-MCNC: 149 MG/DL (ref 70–108)
GLUCOSE BLD-MCNC: 158 MG/DL (ref 70–108)
GLUCOSE BLD-MCNC: 170 MG/DL (ref 70–108)
GLUCOSE BLD-MCNC: 231 MG/DL (ref 70–108)
GLUCOSE BLD-MCNC: 240 MG/DL (ref 70–108)
GLUCOSE BLD-MCNC: 252 MG/DL (ref 70–108)
GLUCOSE BLD-MCNC: 255 MG/DL (ref 70–108)
GLUCOSE BLD-MCNC: 306 MG/DL (ref 70–108)
GLUCOSE BLD-MCNC: 86 MG/DL (ref 70–108)
GLUCOSE BLD-MCNC: 93 MG/DL (ref 70–108)
GLUCOSE BLD-MCNC: 94 MG/DL (ref 70–108)
GLUCOSE BLD-MCNC: 98 MG/DL (ref 70–108)
GLUCOSE URINE: NEGATIVE MG/DL
HAEMOPHILUS INFLUENZAE BY PCR: NOT DETECTED
HBA1C MFR BLD: 6.1 % (ref 4.4–6.4)
HCO3: 22 MMOL/L (ref 23–28)
HCO3: 24 MMOL/L (ref 23–28)
HCT VFR BLD CALC: 37.3 % (ref 42–52)
HCT VFR BLD CALC: 37.6 % (ref 42–52)
HCT VFR BLD CALC: 37.9 % (ref 42–52)
HCT VFR BLD CALC: 38 % (ref 42–52)
HCT VFR BLD CALC: 38.1 % (ref 42–52)
HCT VFR BLD CALC: 39.9 % (ref 42–52)
HCT VFR BLD CALC: 42.8 % (ref 42–52)
HCT VFR BLD CALC: 43.5 % (ref 42–52)
HCT VFR BLD CALC: 45.4 % (ref 42–52)
HCT VFR BLD CALC: 51.1 % (ref 42–52)
HEMOCCULT STL QL: NORMAL
HEMOGLOBIN: 11.6 GM/DL (ref 14–18)
HEMOGLOBIN: 12 GM/DL (ref 14–18)
HEMOGLOBIN: 12.1 GM/DL (ref 14–18)
HEMOGLOBIN: 12.1 GM/DL (ref 14–18)
HEMOGLOBIN: 12.2 GM/DL (ref 14–18)
HEMOGLOBIN: 12.8 GM/DL (ref 14–18)
HEMOGLOBIN: 13.5 GM/DL (ref 14–18)
HEMOGLOBIN: 13.7 GM/DL (ref 14–18)
HEMOGLOBIN: 14.1 GM/DL (ref 14–18)
HEMOGLOBIN: 16 GM/DL (ref 14–18)
IFIO2: 15
IFIO2: 85
IMMATURE GRANS (ABS): 0.09 THOU/MM3 (ref 0–0.07)
IMMATURE GRANS (ABS): 0.13 THOU/MM3 (ref 0–0.07)
IMMATURE GRANS (ABS): 0.14 THOU/MM3 (ref 0–0.07)
IMMATURE GRANS (ABS): 0.18 THOU/MM3 (ref 0–0.07)
IMMATURE GRANS (ABS): 0.19 THOU/MM3 (ref 0–0.07)
IMMATURE GRANULOCYTES: 0.7 %
IMMATURE GRANULOCYTES: 0.8 %
IMMATURE GRANULOCYTES: 0.9 %
IMMATURE GRANULOCYTES: 1 %
IMMATURE GRANULOCYTES: 1.3 %
INFLUENZA A BY PCR: NOT DETECTED
INFLUENZA B BY PCR: NOT DETECTED
INR BLD: 0.92 (ref 0.85–1.13)
INR BLD: 1.01 (ref 0.85–1.13)
KETONES, URINE: NEGATIVE
KLEBSIELLA AEROGENES BY PCR: NOT DETECTED
KLEBSIELLA OXYTOCA BY PCR: NOT DETECTED
KLEBSIELLA PNEUMONIAE GROUP BY PCR: NOT DETECTED
LACTIC ACID: 1.4 MMOL/L (ref 0.5–2)
LACTIC ACID: 1.7 MMOL/L (ref 0.5–2)
LACTIC ACID: 2.6 MMOL/L (ref 0.5–2)
LACTIC ACID: 2.8 MMOL/L (ref 0.5–2)
LACTIC ACID: 4.7 MMOL/L (ref 0.5–2)
LACTIC ACID: 4.9 MMOL/L (ref 0.5–2)
LACTIC ACID: 5.7 MMOL/L (ref 0.5–2)
LACTIC ACID: 7.2 MMOL/L (ref 0.5–2)
LACTIC ACID: 7.4 MMOL/L (ref 0.5–2)
LEGIONELLA PNEUMOPHILIA BY PCR: NOT DETECTED
LEUKOCYTE ESTERASE, URINE: NEGATIVE
LIPASE: 67.8 U/L (ref 5.6–51.3)
LIPASE: 77.5 U/L (ref 5.6–51.3)
LYMPHOCYTES # BLD: 1.3 %
LYMPHOCYTES # BLD: 13.1 %
LYMPHOCYTES # BLD: 14.4 %
LYMPHOCYTES # BLD: 2.2 %
LYMPHOCYTES # BLD: 8.1 %
LYMPHOCYTES ABSOLUTE: 0.3 THOU/MM3 (ref 1–4.8)
LYMPHOCYTES ABSOLUTE: 0.5 THOU/MM3 (ref 1–4.8)
LYMPHOCYTES ABSOLUTE: 1.1 THOU/MM3 (ref 1–4.8)
LYMPHOCYTES ABSOLUTE: 1.6 THOU/MM3 (ref 1–4.8)
LYMPHOCYTES ABSOLUTE: 1.7 THOU/MM3 (ref 1–4.8)
MAGNESIUM: 2 MG/DL (ref 1.6–2.4)
MAGNESIUM: 2.1 MG/DL (ref 1.6–2.4)
MAGNESIUM: 2.3 MG/DL (ref 1.6–2.4)
MCH RBC QN AUTO: 28.4 PG (ref 26–33)
MCH RBC QN AUTO: 28.8 PG (ref 26–33)
MCH RBC QN AUTO: 28.9 PG (ref 26–33)
MCH RBC QN AUTO: 29.6 PG (ref 26–33)
MCH RBC QN AUTO: 29.8 PG (ref 26–33)
MCH RBC QN AUTO: 29.9 PG (ref 26–33)
MCHC RBC AUTO-ENTMCNC: 31 GM/DL (ref 32.2–35.5)
MCHC RBC AUTO-ENTMCNC: 31.1 GM/DL (ref 32.2–35.5)
MCHC RBC AUTO-ENTMCNC: 31.3 GM/DL (ref 32.2–35.5)
MCHC RBC AUTO-ENTMCNC: 31.8 GM/DL (ref 32.2–35.5)
MCHC RBC AUTO-ENTMCNC: 31.9 GM/DL (ref 32.2–35.5)
MCHC RBC AUTO-ENTMCNC: 32 GM/DL (ref 32.2–35.5)
MCV RBC AUTO: 90.8 FL (ref 80–94)
MCV RBC AUTO: 92.8 FL (ref 80–94)
MCV RBC AUTO: 93 FL (ref 80–94)
MCV RBC AUTO: 93.2 FL (ref 80–94)
MCV RBC AUTO: 93.2 FL (ref 80–94)
MCV RBC AUTO: 93.5 FL (ref 80–94)
METAPNEUMOVIRUS BY PCR: NOT DETECTED
MONOCYTES # BLD: 2.4 %
MONOCYTES # BLD: 4.9 %
MONOCYTES # BLD: 6.5 %
MONOCYTES # BLD: 7.5 %
MONOCYTES # BLD: 9.2 %
MONOCYTES ABSOLUTE: 0.5 THOU/MM3 (ref 0.4–1.3)
MONOCYTES ABSOLUTE: 0.9 THOU/MM3 (ref 0.4–1.3)
MONOCYTES ABSOLUTE: 1 THOU/MM3 (ref 0.4–1.3)
MORAXELLA CATARRHALIS BY PCR: NOT DETECTED
MRSA SCREEN RT-PCR: POSITIVE
MYCOPLASMA PNEUMONIAE BY PCR: NOT DETECTED
NITRITE, URINE: NEGATIVE
NON-SARS CORONAVIRUS: NOT DETECTED
NUCLEATED RED BLOOD CELLS: 0 /100 WBC
O2 SATURATION: 100 %
O2 SATURATION: 99 %
OPIATES, URINE: NEGATIVE
OSMOLALITY CALCULATION: 284.3 MOSMOL/KG (ref 275–300)
OSMOLALITY CALCULATION: 287.9 MOSMOL/KG (ref 275–300)
OXYCODONE: NEGATIVE
PARAINFLUENZA VIRUS BY PCR: NOT DETECTED
PCO2: 42 MMHG (ref 35–45)
PCO2: 50 MMHG (ref 35–45)
PH BLOOD GAS: 7.28 (ref 7.35–7.45)
PH BLOOD GAS: 7.32 (ref 7.35–7.45)
PH UA: 6 (ref 5–9)
PHENCYCLIDINE QUANTITATIVE URINE: NEGATIVE
PHOSPHORUS: 3.4 MG/DL (ref 2.4–4.7)
PHOSPHORUS: 4.1 MG/DL (ref 2.4–4.7)
PIP: 14 CMH2O
PLATELET # BLD: 111 THOU/MM3 (ref 130–400)
PLATELET # BLD: 132 THOU/MM3 (ref 130–400)
PLATELET # BLD: 184 THOU/MM3 (ref 130–400)
PLATELET # BLD: 185 THOU/MM3 (ref 130–400)
PLATELET # BLD: 200 THOU/MM3 (ref 130–400)
PLATELET # BLD: 214 THOU/MM3 (ref 130–400)
PMV BLD AUTO: 10 FL (ref 9.4–12.4)
PMV BLD AUTO: 8.9 FL (ref 9.4–12.4)
PMV BLD AUTO: 9.2 FL (ref 9.4–12.4)
PMV BLD AUTO: 9.2 FL (ref 9.4–12.4)
PMV BLD AUTO: 9.3 FL (ref 9.4–12.4)
PMV BLD AUTO: 9.7 FL (ref 9.4–12.4)
PO2: 182 MMHG (ref 71–104)
PO2: 315 MMHG (ref 71–104)
POTASSIUM REFLEX MAGNESIUM: 4.2 MEQ/L (ref 3.5–5.2)
POTASSIUM REFLEX MAGNESIUM: 4.6 MEQ/L (ref 3.5–5.2)
POTASSIUM REFLEX MAGNESIUM: 4.6 MEQ/L (ref 3.5–5.2)
POTASSIUM SERPL-SCNC: 4.2 MEQ/L (ref 3.5–5.2)
POTASSIUM SERPL-SCNC: 4.9 MEQ/L (ref 3.5–5.2)
POTASSIUM SERPL-SCNC: 5.1 MEQ/L (ref 3.5–5.2)
POTASSIUM SERPL-SCNC: 5.5 MEQ/L (ref 3.5–5.2)
POTASSIUM SERPL-SCNC: 6.2 MEQ/L (ref 3.5–5.2)
PRO-BNP: 458.7 PG/ML (ref 0–1800)
PROCALCITONIN: 0.07 NG/ML (ref 0.01–0.09)
PROCALCITONIN: 4.34 NG/ML (ref 0.01–0.09)
PROTEIN UA: NEGATIVE
PROTEUS SPECIES BY PCR: NOT DETECTED
PSEUDOMONAS AERUGINOSA BY PCR: NOT DETECTED
RBC # BLD: 4.01 MILL/MM3 (ref 4.7–6.1)
RBC # BLD: 4.02 MILL/MM3 (ref 4.7–6.1)
RBC # BLD: 4.09 MILL/MM3 (ref 4.7–6.1)
RBC # BLD: 4.59 MILL/MM3 (ref 4.7–6.1)
RBC # BLD: 4.69 MILL/MM3 (ref 4.7–6.1)
RBC # BLD: 5.63 MILL/MM3 (ref 4.7–6.1)
RESISTANT GENE CTX-M BY PCR: NORMAL
RESISTANT GENE IMP BY PCR: NORMAL
RESISTANT GENE KPC BY PCR: NORMAL
RESISTANT GENE MECA/C & MREJ BY PCR: NORMAL
RESISTANT GENE NDM BY PCR: NORMAL
RESISTANT GENE OXA-48-LIKE BY PCR: NORMAL
RESISTANT GENE VIM BY PCR: NORMAL
RESPIRATORY SYNCYTIAL VIRUS BY PCR: NOT DETECTED
RHINOVIRUS ENTEROVIRUS PCR: NOT DETECTED
SARS-COV-2, NAAT: NOT  DETECTED
SCAN OF BLOOD SMEAR: NORMAL
SEG NEUTROPHILS: 71.7 %
SEG NEUTROPHILS: 76.8 %
SEG NEUTROPHILS: 79.9 %
SEG NEUTROPHILS: 92 %
SEG NEUTROPHILS: 95.3 %
SEGMENTED NEUTROPHILS ABSOLUTE COUNT: 10.2 THOU/MM3 (ref 1.8–7.7)
SEGMENTED NEUTROPHILS ABSOLUTE COUNT: 10.7 THOU/MM3 (ref 1.8–7.7)
SEGMENTED NEUTROPHILS ABSOLUTE COUNT: 19.4 THOU/MM3 (ref 1.8–7.7)
SEGMENTED NEUTROPHILS ABSOLUTE COUNT: 20.3 THOU/MM3 (ref 1.8–7.7)
SEGMENTED NEUTROPHILS ABSOLUTE COUNT: 7.7 THOU/MM3 (ref 1.8–7.7)
SERRATIA MARCESCENS BY PCR: NOT DETECTED
SET PEEP: 6 MMHG
SET RESPIRATORY RATE: 14 BPM
SODIUM BLD-SCNC: 135 MEQ/L (ref 135–145)
SODIUM BLD-SCNC: 138 MEQ/L (ref 135–145)
SODIUM BLD-SCNC: 139 MEQ/L (ref 135–145)
SODIUM BLD-SCNC: 140 MEQ/L (ref 135–145)
SODIUM BLD-SCNC: 140 MEQ/L (ref 135–145)
SODIUM BLD-SCNC: 141 MEQ/L (ref 135–145)
SODIUM URINE: 47 MEQ/L
SOURCE, BLOOD GAS: ABNORMAL
SOURCE, BLOOD GAS: ABNORMAL
SOURCE: NORMAL
SPECIFIC GRAVITY, URINE: 1.01 (ref 1–1.03)
SPECIMEN ACCEPTABILITY: NORMAL
STAPH AUREUS BY PCR: NOT DETECTED
STREP AGALACTIAE BY PCR: NOT DETECTED
STREP PNEUMONIAE BY PCR: NOT DETECTED
STREP PYOGENES BY PCR: NOT DETECTED
T4 FREE: 1.13 NG/DL (ref 0.93–1.76)
TOTAL CK: 213 U/L (ref 55–170)
TOTAL PROTEIN: 5.2 G/DL (ref 6.1–8)
TOTAL PROTEIN: 5.7 G/DL (ref 6.1–8)
TOTAL PROTEIN: 5.9 G/DL (ref 6.1–8)
TOTAL PROTEIN: 6.3 G/DL (ref 6.1–8)
TROPONIN T: < 0.01 NG/ML
TSH SERPL DL<=0.05 MIU/L-ACNC: 4.32 UIU/ML (ref 0.4–4.2)
UREA NITROGEN, UR: 472 MG/DL
URINE CULTURE, ROUTINE: NORMAL
UROBILINOGEN, URINE: 0.2 EU/DL (ref 0–1)
VANCOMYCIN RESISTANT ENTEROCOCCUS: NEGATIVE
WBC # BLD: 10.6 THOU/MM3 (ref 4.8–10.8)
WBC # BLD: 10.8 THOU/MM3 (ref 4.8–10.8)
WBC # BLD: 13.3 THOU/MM3 (ref 4.8–10.8)
WBC # BLD: 13.4 THOU/MM3 (ref 4.8–10.8)
WBC # BLD: 21.1 THOU/MM3 (ref 4.8–10.8)
WBC # BLD: 21.3 THOU/MM3 (ref 4.8–10.8)

## 2022-01-01 PROCEDURE — 6370000000 HC RX 637 (ALT 250 FOR IP): Performed by: FAMILY MEDICINE

## 2022-01-01 PROCEDURE — 71045 X-RAY EXAM CHEST 1 VIEW: CPT

## 2022-01-01 PROCEDURE — 82803 BLOOD GASES ANY COMBINATION: CPT

## 2022-01-01 PROCEDURE — 94761 N-INVAS EAR/PLS OXIMETRY MLT: CPT

## 2022-01-01 PROCEDURE — 93010 ELECTROCARDIOGRAM REPORT: CPT | Performed by: INTERNAL MEDICINE

## 2022-01-01 PROCEDURE — 6370000000 HC RX 637 (ALT 250 FOR IP): Performed by: PHYSICIAN ASSISTANT

## 2022-01-01 PROCEDURE — 6360000002 HC RX W HCPCS: Performed by: PHYSICIAN ASSISTANT

## 2022-01-01 PROCEDURE — 2500000003 HC RX 250 WO HCPCS: Performed by: FAMILY MEDICINE

## 2022-01-01 PROCEDURE — 85610 PROTHROMBIN TIME: CPT

## 2022-01-01 PROCEDURE — 1200000003 HC TELEMETRY R&B

## 2022-01-01 PROCEDURE — 6370000000 HC RX 637 (ALT 250 FOR IP)

## 2022-01-01 PROCEDURE — 2709999900 HC NON-CHARGEABLE SUPPLY: Performed by: INTERNAL MEDICINE

## 2022-01-01 PROCEDURE — 94640 AIRWAY INHALATION TREATMENT: CPT

## 2022-01-01 PROCEDURE — 51798 US URINE CAPACITY MEASURE: CPT

## 2022-01-01 PROCEDURE — 94660 CPAP INITIATION&MGMT: CPT

## 2022-01-01 PROCEDURE — 82948 REAGENT STRIP/BLOOD GLUCOSE: CPT

## 2022-01-01 PROCEDURE — 82248 BILIRUBIN DIRECT: CPT

## 2022-01-01 PROCEDURE — 2580000003 HC RX 258

## 2022-01-01 PROCEDURE — 2580000003 HC RX 258: Performed by: ANESTHESIOLOGY

## 2022-01-01 PROCEDURE — 85025 COMPLETE CBC W/AUTO DIFF WBC: CPT

## 2022-01-01 PROCEDURE — 93000 ELECTROCARDIOGRAM COMPLETE: CPT | Performed by: NUCLEAR MEDICINE

## 2022-01-01 PROCEDURE — 6360000002 HC RX W HCPCS: Performed by: INTERNAL MEDICINE

## 2022-01-01 PROCEDURE — 2700000000 HC OXYGEN THERAPY PER DAY

## 2022-01-01 PROCEDURE — 2580000003 HC RX 258: Performed by: FAMILY MEDICINE

## 2022-01-01 PROCEDURE — 97530 THERAPEUTIC ACTIVITIES: CPT

## 2022-01-01 PROCEDURE — 3700000001 HC ADD 15 MINUTES (ANESTHESIA): Performed by: INTERNAL MEDICINE

## 2022-01-01 PROCEDURE — 97162 PT EVAL MOD COMPLEX 30 MIN: CPT

## 2022-01-01 PROCEDURE — 76705 ECHO EXAM OF ABDOMEN: CPT

## 2022-01-01 PROCEDURE — 6370000000 HC RX 637 (ALT 250 FOR IP): Performed by: EMERGENCY MEDICINE

## 2022-01-01 PROCEDURE — G8427 DOCREV CUR MEDS BY ELIG CLIN: HCPCS | Performed by: INTERNAL MEDICINE

## 2022-01-01 PROCEDURE — 82570 ASSAY OF URINE CREATININE: CPT

## 2022-01-01 PROCEDURE — 83880 ASSAY OF NATRIURETIC PEPTIDE: CPT

## 2022-01-01 PROCEDURE — 72125 CT NECK SPINE W/O DYE: CPT

## 2022-01-01 PROCEDURE — 2000000000 HC ICU R&B

## 2022-01-01 PROCEDURE — 82077 ASSAY SPEC XCP UR&BREATH IA: CPT

## 2022-01-01 PROCEDURE — 99223 1ST HOSP IP/OBS HIGH 75: CPT

## 2022-01-01 PROCEDURE — 83690 ASSAY OF LIPASE: CPT

## 2022-01-01 PROCEDURE — 99291 CRITICAL CARE FIRST HOUR: CPT | Performed by: INTERNAL MEDICINE

## 2022-01-01 PROCEDURE — 6360000002 HC RX W HCPCS: Performed by: STUDENT IN AN ORGANIZED HEALTH CARE EDUCATION/TRAINING PROGRAM

## 2022-01-01 PROCEDURE — 85014 HEMATOCRIT: CPT

## 2022-01-01 PROCEDURE — 0BH18EZ INSERTION OF ENDOTRACHEAL AIRWAY INTO TRACHEA, VIA NATURAL OR ARTIFICIAL OPENING ENDOSCOPIC: ICD-10-PCS | Performed by: FAMILY MEDICINE

## 2022-01-01 PROCEDURE — 84484 ASSAY OF TROPONIN QUANT: CPT

## 2022-01-01 PROCEDURE — 94760 N-INVAS EAR/PLS OXIMETRY 1: CPT

## 2022-01-01 PROCEDURE — 83735 ASSAY OF MAGNESIUM: CPT

## 2022-01-01 PROCEDURE — 6370000000 HC RX 637 (ALT 250 FOR IP): Performed by: NURSE PRACTITIONER

## 2022-01-01 PROCEDURE — 85018 HEMOGLOBIN: CPT

## 2022-01-01 PROCEDURE — 99223 1ST HOSP IP/OBS HIGH 75: CPT | Performed by: PHYSICIAN ASSISTANT

## 2022-01-01 PROCEDURE — 6360000002 HC RX W HCPCS: Performed by: NURSE PRACTITIONER

## 2022-01-01 PROCEDURE — 99239 HOSP IP/OBS DSCHRG MGMT >30: CPT | Performed by: HOSPITALIST

## 2022-01-01 PROCEDURE — 6360000002 HC RX W HCPCS

## 2022-01-01 PROCEDURE — 83605 ASSAY OF LACTIC ACID: CPT

## 2022-01-01 PROCEDURE — G8420 CALC BMI NORM PARAMETERS: HCPCS | Performed by: INTERNAL MEDICINE

## 2022-01-01 PROCEDURE — 36415 COLL VENOUS BLD VENIPUNCTURE: CPT

## 2022-01-01 PROCEDURE — 3609008300 HC SIGMOIDOSCOPY W/BIOPSY SINGLE/MULTIPLE: Performed by: INTERNAL MEDICINE

## 2022-01-01 PROCEDURE — 82272 OCCULT BLD FECES 1-3 TESTS: CPT

## 2022-01-01 PROCEDURE — 70450 CT HEAD/BRAIN W/O DYE: CPT

## 2022-01-01 PROCEDURE — 6370000000 HC RX 637 (ALT 250 FOR IP): Performed by: INTERNAL MEDICINE

## 2022-01-01 PROCEDURE — 99285 EMERGENCY DEPT VISIT HI MDM: CPT

## 2022-01-01 PROCEDURE — 83036 HEMOGLOBIN GLYCOSYLATED A1C: CPT

## 2022-01-01 PROCEDURE — 73700 CT LOWER EXTREMITY W/O DYE: CPT

## 2022-01-01 PROCEDURE — 2580000003 HC RX 258: Performed by: PHYSICIAN ASSISTANT

## 2022-01-01 PROCEDURE — 87086 URINE CULTURE/COLONY COUNT: CPT

## 2022-01-01 PROCEDURE — 3700000000 HC ANESTHESIA ATTENDED CARE: Performed by: INTERNAL MEDICINE

## 2022-01-01 PROCEDURE — 2500000003 HC RX 250 WO HCPCS: Performed by: NURSE PRACTITIONER

## 2022-01-01 PROCEDURE — 87205 SMEAR GRAM STAIN: CPT

## 2022-01-01 PROCEDURE — 2580000003 HC RX 258: Performed by: STUDENT IN AN ORGANIZED HEALTH CARE EDUCATION/TRAINING PROGRAM

## 2022-01-01 PROCEDURE — 73502 X-RAY EXAM HIP UNI 2-3 VIEWS: CPT

## 2022-01-01 PROCEDURE — 6370000000 HC RX 637 (ALT 250 FOR IP): Performed by: STUDENT IN AN ORGANIZED HEALTH CARE EDUCATION/TRAINING PROGRAM

## 2022-01-01 PROCEDURE — 31720 CLEARANCE OF AIRWAYS: CPT

## 2022-01-01 PROCEDURE — 85730 THROMBOPLASTIN TIME PARTIAL: CPT

## 2022-01-01 PROCEDURE — 80053 COMPREHEN METABOLIC PANEL: CPT

## 2022-01-01 PROCEDURE — 6360000002 HC RX W HCPCS: Performed by: FAMILY MEDICINE

## 2022-01-01 PROCEDURE — 84145 PROCALCITONIN (PCT): CPT

## 2022-01-01 PROCEDURE — 84300 ASSAY OF URINE SODIUM: CPT

## 2022-01-01 PROCEDURE — 82550 ASSAY OF CK (CPK): CPT

## 2022-01-01 PROCEDURE — 2580000003 HC RX 258: Performed by: INTERNAL MEDICINE

## 2022-01-01 PROCEDURE — 87804 INFLUENZA ASSAY W/OPTIC: CPT

## 2022-01-01 PROCEDURE — 2500000003 HC RX 250 WO HCPCS: Performed by: INTERNAL MEDICINE

## 2022-01-01 PROCEDURE — 73060 X-RAY EXAM OF HUMERUS: CPT

## 2022-01-01 PROCEDURE — 0DJD8ZZ INSPECTION OF LOWER INTESTINAL TRACT, VIA NATURAL OR ARTIFICIAL OPENING ENDOSCOPIC: ICD-10-PCS | Performed by: INTERNAL MEDICINE

## 2022-01-01 PROCEDURE — 99232 SBSQ HOSP IP/OBS MODERATE 35: CPT

## 2022-01-01 PROCEDURE — 3609027000 HC COLONOSCOPY: Performed by: INTERNAL MEDICINE

## 2022-01-01 PROCEDURE — 1036F TOBACCO NON-USER: CPT | Performed by: NUCLEAR MEDICINE

## 2022-01-01 PROCEDURE — 87631 RESP VIRUS 3-5 TARGETS: CPT

## 2022-01-01 PROCEDURE — 99213 OFFICE O/P EST LOW 20 MIN: CPT | Performed by: INTERNAL MEDICINE

## 2022-01-01 PROCEDURE — 6360000002 HC RX W HCPCS: Performed by: REGISTERED NURSE

## 2022-01-01 PROCEDURE — 87500 VANOMYCIN DNA AMP PROBE: CPT

## 2022-01-01 PROCEDURE — 87070 CULTURE OTHR SPECIMN AEROBIC: CPT

## 2022-01-01 PROCEDURE — 7100000010 HC PHASE II RECOVERY - FIRST 15 MIN: Performed by: INTERNAL MEDICINE

## 2022-01-01 PROCEDURE — 87541 LEGION PNEUMO DNA AMP PROB: CPT

## 2022-01-01 PROCEDURE — 51702 INSERT TEMP BLADDER CATH: CPT

## 2022-01-01 PROCEDURE — 84100 ASSAY OF PHOSPHORUS: CPT

## 2022-01-01 PROCEDURE — 99283 EMERGENCY DEPT VISIT LOW MDM: CPT

## 2022-01-01 PROCEDURE — 6360000002 HC RX W HCPCS: Performed by: EMERGENCY MEDICINE

## 2022-01-01 PROCEDURE — 99213 OFFICE O/P EST LOW 20 MIN: CPT | Performed by: NUCLEAR MEDICINE

## 2022-01-01 PROCEDURE — 31500 INSERT EMERGENCY AIRWAY: CPT | Performed by: FAMILY MEDICINE

## 2022-01-01 PROCEDURE — 80048 BASIC METABOLIC PNL TOTAL CA: CPT

## 2022-01-01 PROCEDURE — 85027 COMPLETE CBC AUTOMATED: CPT

## 2022-01-01 PROCEDURE — 80305 DRUG TEST PRSMV DIR OPT OBS: CPT

## 2022-01-01 PROCEDURE — 36600 WITHDRAWAL OF ARTERIAL BLOOD: CPT

## 2022-01-01 PROCEDURE — 97116 GAIT TRAINING THERAPY: CPT

## 2022-01-01 PROCEDURE — 93005 ELECTROCARDIOGRAM TRACING: CPT | Performed by: EMERGENCY MEDICINE

## 2022-01-01 PROCEDURE — 96375 TX/PRO/DX INJ NEW DRUG ADDON: CPT

## 2022-01-01 PROCEDURE — 6820000001 HC L2 TRAUMA SURGERY EVALUATION: Performed by: ORTHOPAEDIC SURGERY

## 2022-01-01 PROCEDURE — 93971 EXTREMITY STUDY: CPT

## 2022-01-01 PROCEDURE — 87486 CHLMYD PNEUM DNA AMP PROBE: CPT

## 2022-01-01 PROCEDURE — G8420 CALC BMI NORM PARAMETERS: HCPCS | Performed by: NUCLEAR MEDICINE

## 2022-01-01 PROCEDURE — 5A1935Z RESPIRATORY VENTILATION, LESS THAN 24 CONSECUTIVE HOURS: ICD-10-PCS | Performed by: FAMILY MEDICINE

## 2022-01-01 PROCEDURE — G8427 DOCREV CUR MEDS BY ELIG CLIN: HCPCS | Performed by: NUCLEAR MEDICINE

## 2022-01-01 PROCEDURE — 99291 CRITICAL CARE FIRST HOUR: CPT | Performed by: FAMILY MEDICINE

## 2022-01-01 PROCEDURE — 1123F ACP DISCUSS/DSCN MKR DOCD: CPT | Performed by: NUCLEAR MEDICINE

## 2022-01-01 PROCEDURE — 2720000010 HC SURG SUPPLY STERILE: Performed by: INTERNAL MEDICINE

## 2022-01-01 PROCEDURE — 1200000000 HC SEMI PRIVATE

## 2022-01-01 PROCEDURE — 6360000002 HC RX W HCPCS: Performed by: ANESTHESIOLOGY

## 2022-01-01 PROCEDURE — 99233 SBSQ HOSP IP/OBS HIGH 50: CPT

## 2022-01-01 PROCEDURE — 87635 SARS-COV-2 COVID-19 AMP PRB: CPT

## 2022-01-01 PROCEDURE — 1123F ACP DISCUSS/DSCN MKR DOCD: CPT | Performed by: INTERNAL MEDICINE

## 2022-01-01 PROCEDURE — 87798 DETECT AGENT NOS DNA AMP: CPT

## 2022-01-01 PROCEDURE — 84540 ASSAY OF URINE/UREA-N: CPT

## 2022-01-01 PROCEDURE — 93005 ELECTROCARDIOGRAM TRACING: CPT | Performed by: STUDENT IN AN ORGANIZED HEALTH CARE EDUCATION/TRAINING PROGRAM

## 2022-01-01 PROCEDURE — 87641 MR-STAPH DNA AMP PROBE: CPT

## 2022-01-01 PROCEDURE — 84443 ASSAY THYROID STIM HORMONE: CPT

## 2022-01-01 PROCEDURE — 87581 M.PNEUMON DNA AMP PROBE: CPT

## 2022-01-01 PROCEDURE — 74176 CT ABD & PELVIS W/O CONTRAST: CPT

## 2022-01-01 PROCEDURE — 97166 OT EVAL MOD COMPLEX 45 MIN: CPT

## 2022-01-01 PROCEDURE — 81003 URINALYSIS AUTO W/O SCOPE: CPT

## 2022-01-01 PROCEDURE — P9047 ALBUMIN (HUMAN), 25%, 50ML: HCPCS | Performed by: STUDENT IN AN ORGANIZED HEALTH CARE EDUCATION/TRAINING PROGRAM

## 2022-01-01 PROCEDURE — 1036F TOBACCO NON-USER: CPT | Performed by: INTERNAL MEDICINE

## 2022-01-01 PROCEDURE — 31500 INSERT EMERGENCY AIRWAY: CPT

## 2022-01-01 PROCEDURE — 99223 1ST HOSP IP/OBS HIGH 75: CPT | Performed by: INTERNAL MEDICINE

## 2022-01-01 PROCEDURE — 94002 VENT MGMT INPAT INIT DAY: CPT

## 2022-01-01 PROCEDURE — 94003 VENT MGMT INPAT SUBQ DAY: CPT

## 2022-01-01 PROCEDURE — 96374 THER/PROPH/DIAG INJ IV PUSH: CPT

## 2022-01-01 PROCEDURE — 84439 ASSAY OF FREE THYROXINE: CPT

## 2022-01-01 PROCEDURE — 7100000011 HC PHASE II RECOVERY - ADDTL 15 MIN: Performed by: INTERNAL MEDICINE

## 2022-01-01 RX ORDER — TRAMADOL HYDROCHLORIDE 50 MG/1
50 TABLET ORAL EVERY 6 HOURS PRN
Status: DISCONTINUED | OUTPATIENT
Start: 2022-01-01 | End: 2022-01-01

## 2022-01-01 RX ORDER — BUDESONIDE 0.5 MG/2ML
1 INHALANT ORAL 2 TIMES DAILY
Qty: 120 ML | Refills: 11 | Status: SHIPPED | OUTPATIENT
Start: 2022-01-01

## 2022-01-01 RX ORDER — ONDANSETRON 2 MG/ML
4 INJECTION INTRAMUSCULAR; INTRAVENOUS EVERY 6 HOURS PRN
Status: DISCONTINUED | OUTPATIENT
Start: 2022-01-01 | End: 2022-01-01

## 2022-01-01 RX ORDER — INSULIN LISPRO 100 [IU]/ML
0-3 INJECTION, SOLUTION INTRAVENOUS; SUBCUTANEOUS NIGHTLY
Status: DISCONTINUED | OUTPATIENT
Start: 2022-01-01 | End: 2022-01-01

## 2022-01-01 RX ORDER — PREDNISONE 10 MG/1
TABLET ORAL
Qty: 30 TABLET | Refills: 0 | Status: SHIPPED | OUTPATIENT
Start: 2022-01-01 | End: 2022-01-01

## 2022-01-01 RX ORDER — INSULIN GLARGINE 100 [IU]/ML
0.15 INJECTION, SOLUTION SUBCUTANEOUS NIGHTLY
Status: DISCONTINUED | OUTPATIENT
Start: 2022-01-01 | End: 2022-01-01

## 2022-01-01 RX ORDER — PROPOFOL 10 MG/ML
5-50 INJECTION, EMULSION INTRAVENOUS CONTINUOUS
Status: DISCONTINUED | OUTPATIENT
Start: 2022-01-01 | End: 2022-01-01

## 2022-01-01 RX ORDER — METOPROLOL SUCCINATE 25 MG/1
25 TABLET, EXTENDED RELEASE ORAL DAILY
Status: DISCONTINUED | OUTPATIENT
Start: 2022-01-01 | End: 2022-01-01

## 2022-01-01 RX ORDER — KETOROLAC TROMETHAMINE 30 MG/ML
30 INJECTION, SOLUTION INTRAMUSCULAR; INTRAVENOUS ONCE
Status: DISCONTINUED | OUTPATIENT
Start: 2022-01-01 | End: 2022-01-01

## 2022-01-01 RX ORDER — METHYLPREDNISOLONE SODIUM SUCCINATE 40 MG/ML
40 INJECTION, POWDER, LYOPHILIZED, FOR SOLUTION INTRAMUSCULAR; INTRAVENOUS EVERY 12 HOURS
Status: DISCONTINUED | OUTPATIENT
Start: 2022-01-01 | End: 2022-01-01

## 2022-01-01 RX ORDER — ARFORMOTEROL TARTRATE 15 UG/2ML
15 SOLUTION RESPIRATORY (INHALATION) 2 TIMES DAILY
Status: DISCONTINUED | OUTPATIENT
Start: 2022-01-01 | End: 2022-01-01

## 2022-01-01 RX ORDER — FENTANYL CITRATE 50 UG/ML
50 INJECTION, SOLUTION INTRAMUSCULAR; INTRAVENOUS ONCE
Status: COMPLETED | OUTPATIENT
Start: 2022-01-01 | End: 2022-01-01

## 2022-01-01 RX ORDER — MORPHINE SULFATE 4 MG/ML
4 INJECTION, SOLUTION INTRAMUSCULAR; INTRAVENOUS
Status: DISCONTINUED | OUTPATIENT
Start: 2022-01-01 | End: 2022-01-01 | Stop reason: HOSPADM

## 2022-01-01 RX ORDER — LORAZEPAM 2 MG/ML
4 INJECTION INTRAMUSCULAR EVERY 4 HOURS PRN
Status: DISCONTINUED | OUTPATIENT
Start: 2022-01-01 | End: 2022-01-01 | Stop reason: HOSPADM

## 2022-01-01 RX ORDER — NALOXONE HYDROCHLORIDE 1 MG/ML
INJECTION INTRAMUSCULAR; INTRAVENOUS; SUBCUTANEOUS
Status: DISCONTINUED
Start: 2022-01-01 | End: 2022-01-01

## 2022-01-01 RX ORDER — IPRATROPIUM BROMIDE AND ALBUTEROL SULFATE 2.5; .5 MG/3ML; MG/3ML
1 SOLUTION RESPIRATORY (INHALATION) ONCE
Status: COMPLETED | OUTPATIENT
Start: 2022-01-01 | End: 2022-01-01

## 2022-01-01 RX ORDER — INSULIN LISPRO 100 [IU]/ML
0-12 INJECTION, SOLUTION INTRAVENOUS; SUBCUTANEOUS EVERY 4 HOURS
Status: DISCONTINUED | OUTPATIENT
Start: 2022-01-01 | End: 2022-01-01

## 2022-01-01 RX ORDER — ACETAMINOPHEN 650 MG/1
650 SUPPOSITORY RECTAL EVERY 4 HOURS PRN
Status: DISCONTINUED | OUTPATIENT
Start: 2022-01-01 | End: 2022-01-01 | Stop reason: HOSPADM

## 2022-01-01 RX ORDER — SODIUM CHLORIDE 0.9 % (FLUSH) 0.9 %
10 SYRINGE (ML) INJECTION PRN
Status: DISCONTINUED | OUTPATIENT
Start: 2022-01-01 | End: 2022-01-01 | Stop reason: HOSPADM

## 2022-01-01 RX ORDER — FENTANYL CITRATE 50 UG/ML
25 INJECTION, SOLUTION INTRAMUSCULAR; INTRAVENOUS ONCE
Status: DISCONTINUED | OUTPATIENT
Start: 2022-01-01 | End: 2022-01-01

## 2022-01-01 RX ORDER — SODIUM CHLORIDE 9 MG/ML
INJECTION, SOLUTION INTRAVENOUS CONTINUOUS
Status: DISCONTINUED | OUTPATIENT
Start: 2022-01-01 | End: 2022-01-01

## 2022-01-01 RX ORDER — MIDAZOLAM IN NACL,ISO-OSMOT/PF 50 MG/50ML
1-10 INFUSION BOTTLE (ML) INTRAVENOUS CONTINUOUS
Status: DISCONTINUED | OUTPATIENT
Start: 2022-01-01 | End: 2022-01-01

## 2022-01-01 RX ORDER — PRAVASTATIN SODIUM 80 MG/1
80 TABLET ORAL NIGHTLY
Status: DISCONTINUED | OUTPATIENT
Start: 2022-01-01 | End: 2022-01-01

## 2022-01-01 RX ORDER — HEPARIN SODIUM 5000 [USP'U]/ML
5000 INJECTION, SOLUTION INTRAVENOUS; SUBCUTANEOUS EVERY 8 HOURS SCHEDULED
Status: DISCONTINUED | OUTPATIENT
Start: 2022-01-01 | End: 2022-01-01

## 2022-01-01 RX ORDER — ALBUMIN (HUMAN) 12.5 G/50ML
25 SOLUTION INTRAVENOUS ONCE
Status: COMPLETED | OUTPATIENT
Start: 2022-01-01 | End: 2022-01-01

## 2022-01-01 RX ORDER — 0.9 % SODIUM CHLORIDE 0.9 %
1000 INTRAVENOUS SOLUTION INTRAVENOUS ONCE
Status: COMPLETED | OUTPATIENT
Start: 2022-01-01 | End: 2022-01-01

## 2022-01-01 RX ORDER — SODIUM CHLORIDE 9 MG/ML
INJECTION, SOLUTION INTRAVENOUS PRN
Status: DISCONTINUED | OUTPATIENT
Start: 2022-01-01 | End: 2022-01-01

## 2022-01-01 RX ORDER — POLYETHYLENE GLYCOL 3350 17 G/17G
17 POWDER, FOR SOLUTION ORAL DAILY PRN
Status: DISCONTINUED | OUTPATIENT
Start: 2022-01-01 | End: 2022-01-01 | Stop reason: HOSPADM

## 2022-01-01 RX ORDER — MORPHINE SULFATE 2 MG/ML
2 INJECTION, SOLUTION INTRAMUSCULAR; INTRAVENOUS
Status: DISCONTINUED | OUTPATIENT
Start: 2022-01-01 | End: 2022-01-01

## 2022-01-01 RX ORDER — TAMSULOSIN HYDROCHLORIDE 0.4 MG/1
0.4 CAPSULE ORAL DAILY
Status: DISCONTINUED | OUTPATIENT
Start: 2022-01-01 | End: 2022-01-01 | Stop reason: HOSPADM

## 2022-01-01 RX ORDER — ALBUTEROL SULFATE 2.5 MG/3ML
2.5 SOLUTION RESPIRATORY (INHALATION) EVERY 6 HOURS PRN
Status: DISCONTINUED | OUTPATIENT
Start: 2022-01-01 | End: 2022-01-01

## 2022-01-01 RX ORDER — ACETYLCYSTEINE 200 MG/ML
1 SOLUTION ORAL; RESPIRATORY (INHALATION) 2 TIMES DAILY
Status: DISCONTINUED | OUTPATIENT
Start: 2022-01-01 | End: 2022-01-01

## 2022-01-01 RX ORDER — BUDESONIDE 0.5 MG/2ML
0.5 INHALANT ORAL 2 TIMES DAILY
Status: DISCONTINUED | OUTPATIENT
Start: 2022-01-01 | End: 2022-01-01 | Stop reason: HOSPADM

## 2022-01-01 RX ORDER — FENTANYL CITRATE 50 UG/ML
INJECTION, SOLUTION INTRAMUSCULAR; INTRAVENOUS
Status: COMPLETED
Start: 2022-01-01 | End: 2022-01-01

## 2022-01-01 RX ORDER — SODIUM CHLORIDE, SODIUM LACTATE, POTASSIUM CHLORIDE, CALCIUM CHLORIDE 600; 310; 30; 20 MG/100ML; MG/100ML; MG/100ML; MG/100ML
INJECTION, SOLUTION INTRAVENOUS CONTINUOUS
Status: DISCONTINUED | OUTPATIENT
Start: 2022-01-01 | End: 2022-01-01 | Stop reason: HOSPADM

## 2022-01-01 RX ORDER — POLYETHYLENE GLYCOL 3350 17 G/17G
17 POWDER, FOR SOLUTION ORAL DAILY PRN
Status: DISCONTINUED | OUTPATIENT
Start: 2022-01-01 | End: 2022-01-01

## 2022-01-01 RX ORDER — GLYCOPYRROLATE 1 MG/5 ML
0.4 SYRINGE (ML) INTRAVENOUS 4 TIMES DAILY PRN
Status: DISCONTINUED | OUTPATIENT
Start: 2022-01-01 | End: 2022-01-01 | Stop reason: HOSPADM

## 2022-01-01 RX ORDER — HYDROCODONE BITARTRATE AND ACETAMINOPHEN 5; 325 MG/1; MG/1
1 TABLET ORAL EVERY 6 HOURS PRN
Status: DISCONTINUED | OUTPATIENT
Start: 2022-01-01 | End: 2022-01-01

## 2022-01-01 RX ORDER — NALOXONE HYDROCHLORIDE 0.4 MG/ML
INJECTION, SOLUTION INTRAMUSCULAR; INTRAVENOUS; SUBCUTANEOUS
Status: COMPLETED
Start: 2022-01-01 | End: 2022-01-01

## 2022-01-01 RX ORDER — DEXAMETHASONE SODIUM PHOSPHATE 4 MG/ML
4 INJECTION, SOLUTION INTRA-ARTICULAR; INTRALESIONAL; INTRAMUSCULAR; INTRAVENOUS; SOFT TISSUE EVERY 24 HOURS
Status: DISCONTINUED | OUTPATIENT
Start: 2022-01-01 | End: 2022-01-01

## 2022-01-01 RX ORDER — SODIUM CHLORIDE, SODIUM LACTATE, POTASSIUM CHLORIDE, CALCIUM CHLORIDE 600; 310; 30; 20 MG/100ML; MG/100ML; MG/100ML; MG/100ML
INJECTION, SOLUTION INTRAVENOUS CONTINUOUS PRN
Status: DISCONTINUED | OUTPATIENT
Start: 2022-01-01 | End: 2022-01-01 | Stop reason: SDUPTHER

## 2022-01-01 RX ORDER — METOPROLOL SUCCINATE 25 MG/1
25 TABLET, EXTENDED RELEASE ORAL DAILY
Status: DISCONTINUED | OUTPATIENT
Start: 2022-01-01 | End: 2022-01-01 | Stop reason: HOSPADM

## 2022-01-01 RX ORDER — NICOTINE POLACRILEX 4 MG
15 LOZENGE BUCCAL PRN
Status: DISCONTINUED | OUTPATIENT
Start: 2022-01-01 | End: 2022-01-01 | Stop reason: ALTCHOICE

## 2022-01-01 RX ORDER — ACETAMINOPHEN 325 MG/1
650 TABLET ORAL EVERY 6 HOURS PRN
Status: DISCONTINUED | OUTPATIENT
Start: 2022-01-01 | End: 2022-01-01

## 2022-01-01 RX ORDER — HYDROCODONE BITARTRATE AND ACETAMINOPHEN 5; 325 MG/1; MG/1
2 TABLET ORAL EVERY 6 HOURS PRN
Status: DISCONTINUED | OUTPATIENT
Start: 2022-01-01 | End: 2022-01-01

## 2022-01-01 RX ORDER — MIDAZOLAM HYDROCHLORIDE 1 MG/ML
INJECTION INTRAMUSCULAR; INTRAVENOUS
Status: COMPLETED | OUTPATIENT
Start: 2022-01-01 | End: 2022-01-01

## 2022-01-01 RX ORDER — ACETAMINOPHEN 650 MG/1
650 SUPPOSITORY RECTAL EVERY 6 HOURS PRN
Status: DISCONTINUED | OUTPATIENT
Start: 2022-01-01 | End: 2022-01-01 | Stop reason: HOSPADM

## 2022-01-01 RX ORDER — DEXTROSE MONOHYDRATE 25 G/50ML
12.5 INJECTION, SOLUTION INTRAVENOUS PRN
Status: DISCONTINUED | OUTPATIENT
Start: 2022-01-01 | End: 2022-01-01 | Stop reason: ALTCHOICE

## 2022-01-01 RX ORDER — VITAMIN B COMPLEX
5000 TABLET ORAL DAILY
Status: DISCONTINUED | OUTPATIENT
Start: 2022-01-01 | End: 2022-01-01 | Stop reason: HOSPADM

## 2022-01-01 RX ORDER — ACETAMINOPHEN 650 MG/1
650 SUPPOSITORY RECTAL EVERY 4 HOURS
Status: DISCONTINUED | OUTPATIENT
Start: 2022-01-01 | End: 2022-01-01

## 2022-01-01 RX ORDER — PREDNISONE 1 MG/1
5 TABLET ORAL DAILY
Status: DISCONTINUED | OUTPATIENT
Start: 2022-01-01 | End: 2022-01-01 | Stop reason: HOSPADM

## 2022-01-01 RX ORDER — ETOMIDATE 2 MG/ML
INJECTION INTRAVENOUS
Status: COMPLETED | OUTPATIENT
Start: 2022-01-01 | End: 2022-01-01

## 2022-01-01 RX ORDER — ALBUTEROL SULFATE 2.5 MG/3ML
5 SOLUTION RESPIRATORY (INHALATION) ONCE
Status: COMPLETED | OUTPATIENT
Start: 2022-01-01 | End: 2022-01-01

## 2022-01-01 RX ORDER — TAMSULOSIN HYDROCHLORIDE 0.4 MG/1
0.4 CAPSULE ORAL DAILY
Status: DISCONTINUED | OUTPATIENT
Start: 2022-01-01 | End: 2022-01-01

## 2022-01-01 RX ORDER — ONDANSETRON 2 MG/ML
INJECTION INTRAMUSCULAR; INTRAVENOUS
Status: COMPLETED
Start: 2022-01-01 | End: 2022-01-01

## 2022-01-01 RX ORDER — HALOPERIDOL 5 MG/ML
5 INJECTION INTRAMUSCULAR ONCE
Status: DISCONTINUED | OUTPATIENT
Start: 2022-01-01 | End: 2022-01-01 | Stop reason: HOSPADM

## 2022-01-01 RX ORDER — IPRATROPIUM BROMIDE AND ALBUTEROL SULFATE 2.5; .5 MG/3ML; MG/3ML
1 SOLUTION RESPIRATORY (INHALATION) 4 TIMES DAILY
Status: DISCONTINUED | OUTPATIENT
Start: 2022-01-01 | End: 2022-01-01

## 2022-01-01 RX ORDER — PRAVASTATIN SODIUM 80 MG/1
80 TABLET ORAL NIGHTLY
Status: DISCONTINUED | OUTPATIENT
Start: 2022-01-01 | End: 2022-01-01 | Stop reason: HOSPADM

## 2022-01-01 RX ORDER — PROPOFOL 10 MG/ML
INJECTION, EMULSION INTRAVENOUS PRN
Status: DISCONTINUED | OUTPATIENT
Start: 2022-01-01 | End: 2022-01-01 | Stop reason: SDUPTHER

## 2022-01-01 RX ORDER — DEXTROSE MONOHYDRATE 25 G/50ML
25 INJECTION, SOLUTION INTRAVENOUS ONCE
Status: DISCONTINUED | OUTPATIENT
Start: 2022-01-01 | End: 2022-01-01

## 2022-01-01 RX ORDER — NALOXONE HYDROCHLORIDE 0.4 MG/ML
0.4 INJECTION, SOLUTION INTRAMUSCULAR; INTRAVENOUS; SUBCUTANEOUS PRN
Status: DISCONTINUED | OUTPATIENT
Start: 2022-01-01 | End: 2022-01-01

## 2022-01-01 RX ORDER — ACETAMINOPHEN 500 MG
500 TABLET ORAL EVERY 6 HOURS PRN
COMMUNITY

## 2022-01-01 RX ORDER — ASPIRIN 81 MG/1
81 TABLET, CHEWABLE ORAL DAILY
Status: DISCONTINUED | OUTPATIENT
Start: 2022-01-01 | End: 2022-01-01

## 2022-01-01 RX ORDER — PROPOFOL 10 MG/ML
INJECTION, EMULSION INTRAVENOUS
Status: COMPLETED
Start: 2022-01-01 | End: 2022-01-01

## 2022-01-01 RX ORDER — FENTANYL CITRATE 50 UG/ML
25 INJECTION, SOLUTION INTRAMUSCULAR; INTRAVENOUS
Status: DISCONTINUED | OUTPATIENT
Start: 2022-01-01 | End: 2022-01-01

## 2022-01-01 RX ORDER — SODIUM CHLORIDE 0.9 % (FLUSH) 0.9 %
5-40 SYRINGE (ML) INJECTION EVERY 12 HOURS SCHEDULED
Status: DISCONTINUED | OUTPATIENT
Start: 2022-01-01 | End: 2022-01-01

## 2022-01-01 RX ORDER — ALBUTEROL SULFATE 2.5 MG/3ML
2.5 SOLUTION RESPIRATORY (INHALATION) EVERY 6 HOURS PRN
Status: DISCONTINUED | OUTPATIENT
Start: 2022-01-01 | End: 2022-01-01 | Stop reason: HOSPADM

## 2022-01-01 RX ORDER — DEXTROSE MONOHYDRATE 50 MG/ML
100 INJECTION, SOLUTION INTRAVENOUS PRN
Status: DISCONTINUED | OUTPATIENT
Start: 2022-01-01 | End: 2022-01-01

## 2022-01-01 RX ORDER — PREDNISONE 1 MG/1
5 TABLET ORAL DAILY
Status: DISCONTINUED | OUTPATIENT
Start: 2022-01-01 | End: 2022-01-01

## 2022-01-01 RX ORDER — AZITHROMYCIN 500 MG/1
500 TABLET, FILM COATED ORAL DAILY
Qty: 3 TABLET | Refills: 0 | Status: SHIPPED | OUTPATIENT
Start: 2022-01-01 | End: 2022-01-01

## 2022-01-01 RX ORDER — ONDANSETRON 2 MG/ML
4 INJECTION INTRAMUSCULAR; INTRAVENOUS ONCE
Status: COMPLETED | OUTPATIENT
Start: 2022-01-01 | End: 2022-01-01

## 2022-01-01 RX ORDER — ALBUTEROL SULFATE 90 UG/1
2 AEROSOL, METERED RESPIRATORY (INHALATION) EVERY 6 HOURS PRN
Status: DISCONTINUED | OUTPATIENT
Start: 2022-01-01 | End: 2022-01-01

## 2022-01-01 RX ORDER — BUDESONIDE 0.5 MG/2ML
500 INHALANT ORAL 2 TIMES DAILY
Status: DISCONTINUED | OUTPATIENT
Start: 2022-01-01 | End: 2022-01-01

## 2022-01-01 RX ORDER — GUAIFENESIN 600 MG/1
1200 TABLET, EXTENDED RELEASE ORAL 2 TIMES DAILY
Status: DISCONTINUED | OUTPATIENT
Start: 2022-01-01 | End: 2022-01-01 | Stop reason: HOSPADM

## 2022-01-01 RX ORDER — ONDANSETRON 4 MG/1
4 TABLET, ORALLY DISINTEGRATING ORAL EVERY 8 HOURS PRN
Status: DISCONTINUED | OUTPATIENT
Start: 2022-01-01 | End: 2022-01-01 | Stop reason: HOSPADM

## 2022-01-01 RX ORDER — DOCUSATE SODIUM 100 MG/1
100 CAPSULE, LIQUID FILLED ORAL 2 TIMES DAILY PRN
Status: DISCONTINUED | OUTPATIENT
Start: 2022-01-01 | End: 2022-01-01 | Stop reason: HOSPADM

## 2022-01-01 RX ORDER — NALOXONE HYDROCHLORIDE 1 MG/ML
INJECTION INTRAMUSCULAR; INTRAVENOUS; SUBCUTANEOUS
Status: COMPLETED
Start: 2022-01-01 | End: 2022-01-01

## 2022-01-01 RX ORDER — DEXTROSE MONOHYDRATE 50 MG/ML
100 INJECTION, SOLUTION INTRAVENOUS PRN
Status: DISCONTINUED | OUTPATIENT
Start: 2022-01-01 | End: 2022-01-01 | Stop reason: HOSPADM

## 2022-01-01 RX ORDER — SODIUM PHOSPHATE, DIBASIC AND SODIUM PHOSPHATE, MONOBASIC 7; 19 G/133ML; G/133ML
1 ENEMA RECTAL ONCE
Status: COMPLETED | OUTPATIENT
Start: 2022-01-01 | End: 2022-01-01

## 2022-01-01 RX ORDER — POLYETHYLENE GLYCOL 3350 17 G/17G
238 POWDER, FOR SOLUTION ORAL ONCE
Status: COMPLETED | OUTPATIENT
Start: 2022-01-01 | End: 2022-01-01

## 2022-01-01 RX ORDER — SENNA PLUS 8.6 MG/1
15 TABLET ORAL ONCE
Status: COMPLETED | OUTPATIENT
Start: 2022-01-01 | End: 2022-01-01

## 2022-01-01 RX ORDER — NALOXONE HYDROCHLORIDE 1 MG/ML
2 INJECTION INTRAMUSCULAR; INTRAVENOUS; SUBCUTANEOUS PRN
Status: DISCONTINUED | OUTPATIENT
Start: 2022-01-01 | End: 2022-01-01

## 2022-01-01 RX ORDER — ONDANSETRON 4 MG/1
4 TABLET, ORALLY DISINTEGRATING ORAL EVERY 8 HOURS PRN
Status: DISCONTINUED | OUTPATIENT
Start: 2022-01-01 | End: 2022-01-01

## 2022-01-01 RX ORDER — ALBUTEROL SULFATE 2.5 MG/3ML
2.5 SOLUTION RESPIRATORY (INHALATION) EVERY 6 HOURS PRN
Qty: 360 ML | Refills: 11 | Status: SHIPPED | OUTPATIENT
Start: 2022-01-01

## 2022-01-01 RX ORDER — DEXMEDETOMIDINE HYDROCHLORIDE 4 UG/ML
.1-1.5 INJECTION, SOLUTION INTRAVENOUS CONTINUOUS
Status: DISCONTINUED | OUTPATIENT
Start: 2022-01-01 | End: 2022-01-01

## 2022-01-01 RX ORDER — ASPIRIN 81 MG/1
81 TABLET, CHEWABLE ORAL DAILY
Status: DISCONTINUED | OUTPATIENT
Start: 2022-01-01 | End: 2022-01-01 | Stop reason: HOSPADM

## 2022-01-01 RX ORDER — CYCLOBENZAPRINE HCL 10 MG
10 TABLET ORAL 3 TIMES DAILY PRN
Status: DISCONTINUED | OUTPATIENT
Start: 2022-01-01 | End: 2022-01-01

## 2022-01-01 RX ORDER — MORPHINE SULFATE 4 MG/ML
4 INJECTION, SOLUTION INTRAMUSCULAR; INTRAVENOUS
Status: DISCONTINUED | OUTPATIENT
Start: 2022-01-01 | End: 2022-01-01

## 2022-01-01 RX ORDER — DEXTROSE MONOHYDRATE 50 MG/ML
100 INJECTION, SOLUTION INTRAVENOUS PRN
Status: DISCONTINUED | OUTPATIENT
Start: 2022-01-01 | End: 2022-01-01 | Stop reason: SDUPTHER

## 2022-01-01 RX ORDER — ALBUTEROL SULFATE 90 UG/1
2 AEROSOL, METERED RESPIRATORY (INHALATION) EVERY 6 HOURS PRN
Status: DISCONTINUED | OUTPATIENT
Start: 2022-01-01 | End: 2022-01-01 | Stop reason: HOSPADM

## 2022-01-01 RX ORDER — 0.9 % SODIUM CHLORIDE 0.9 %
500 INTRAVENOUS SOLUTION INTRAVENOUS ONCE
Status: COMPLETED | OUTPATIENT
Start: 2022-01-01 | End: 2022-01-01

## 2022-01-01 RX ORDER — PREDNISONE 20 MG/1
40 TABLET ORAL DAILY
Qty: 10 TABLET | Refills: 0 | Status: SHIPPED | OUTPATIENT
Start: 2022-01-01 | End: 2022-01-01

## 2022-01-01 RX ORDER — GABAPENTIN 100 MG/1
100 CAPSULE ORAL 2 TIMES DAILY
Status: DISCONTINUED | OUTPATIENT
Start: 2022-01-01 | End: 2022-01-01 | Stop reason: HOSPADM

## 2022-01-01 RX ORDER — SODIUM CHLORIDE 0.9 % (FLUSH) 0.9 %
5-40 SYRINGE (ML) INJECTION PRN
Status: DISCONTINUED | OUTPATIENT
Start: 2022-01-01 | End: 2022-01-01

## 2022-01-01 RX ORDER — ACETAMINOPHEN 325 MG/1
650 TABLET ORAL EVERY 6 HOURS
Status: DISCONTINUED | OUTPATIENT
Start: 2022-01-01 | End: 2022-01-01

## 2022-01-01 RX ORDER — INSULIN LISPRO 100 [IU]/ML
0-6 INJECTION, SOLUTION INTRAVENOUS; SUBCUTANEOUS
Status: DISCONTINUED | OUTPATIENT
Start: 2022-01-01 | End: 2022-01-01

## 2022-01-01 RX ORDER — ONDANSETRON 2 MG/ML
4 INJECTION INTRAMUSCULAR; INTRAVENOUS EVERY 6 HOURS PRN
Status: DISCONTINUED | OUTPATIENT
Start: 2022-01-01 | End: 2022-01-01 | Stop reason: HOSPADM

## 2022-01-01 RX ORDER — DOCUSATE SODIUM 100 MG/1
100 CAPSULE, LIQUID FILLED ORAL 2 TIMES DAILY PRN
Status: DISCONTINUED | OUTPATIENT
Start: 2022-01-01 | End: 2022-01-01

## 2022-01-01 RX ORDER — FORMOTEROL FUMARATE 20 UG/2ML
20 SOLUTION RESPIRATORY (INHALATION) EVERY 12 HOURS SCHEDULED
Qty: 120 ML | Refills: 11 | Status: SHIPPED | OUTPATIENT
Start: 2022-01-01

## 2022-01-01 RX ORDER — GABAPENTIN 100 MG/1
100 CAPSULE ORAL 2 TIMES DAILY
Status: DISCONTINUED | OUTPATIENT
Start: 2022-01-01 | End: 2022-01-01

## 2022-01-01 RX ORDER — ARFORMOTEROL TARTRATE 15 UG/2ML
15 SOLUTION RESPIRATORY (INHALATION) 2 TIMES DAILY
Status: DISCONTINUED | OUTPATIENT
Start: 2022-01-01 | End: 2022-01-01 | Stop reason: HOSPADM

## 2022-01-01 RX ORDER — ACETAMINOPHEN 325 MG/1
650 TABLET ORAL EVERY 6 HOURS PRN
Status: DISCONTINUED | OUTPATIENT
Start: 2022-01-01 | End: 2022-01-01 | Stop reason: HOSPADM

## 2022-01-01 RX ORDER — SODIUM CHLORIDE 0.9 % (FLUSH) 0.9 %
10 SYRINGE (ML) INJECTION EVERY 12 HOURS SCHEDULED
Status: DISCONTINUED | OUTPATIENT
Start: 2022-01-01 | End: 2022-01-01 | Stop reason: HOSPADM

## 2022-01-01 RX ORDER — OMEGA-3/DHA/EPA/FISH OIL 300-1000MG
1 CAPSULE ORAL 2 TIMES DAILY
Status: DISCONTINUED | OUTPATIENT
Start: 2022-01-01 | End: 2022-01-01 | Stop reason: CLARIF

## 2022-01-01 RX ORDER — NOREPINEPHRINE BIT/0.9 % NACL 16MG/250ML
1-100 INFUSION BOTTLE (ML) INTRAVENOUS CONTINUOUS
Status: DISCONTINUED | OUTPATIENT
Start: 2022-01-01 | End: 2022-01-01

## 2022-01-01 RX ORDER — MORPHINE SULFATE/PF 50 MG/50ML
PATIENT CONTROLLED ANALGESIA SYRINGE INTRAVENOUS CONTINUOUS
Status: DISCONTINUED | OUTPATIENT
Start: 2022-01-01 | End: 2022-01-01 | Stop reason: HOSPADM

## 2022-01-01 RX ORDER — SODIUM CHLORIDE 9 MG/ML
INJECTION, SOLUTION INTRAVENOUS PRN
Status: DISCONTINUED | OUTPATIENT
Start: 2022-01-01 | End: 2022-01-01 | Stop reason: HOSPADM

## 2022-01-01 RX ADMIN — ARFORMOTEROL TARTRATE 15 MCG: 15 SOLUTION RESPIRATORY (INHALATION) at 22:24

## 2022-01-01 RX ADMIN — TIOTROPIUM BROMIDE INHALATION SPRAY 2 PUFF: 3.12 SPRAY, METERED RESPIRATORY (INHALATION) at 08:10

## 2022-01-01 RX ADMIN — ARFORMOTEROL TARTRATE 15 MCG: 15 SOLUTION RESPIRATORY (INHALATION) at 20:43

## 2022-01-01 RX ADMIN — INSULIN GLARGINE 10 UNITS: 100 INJECTION, SOLUTION SUBCUTANEOUS at 21:38

## 2022-01-01 RX ADMIN — PROPOFOL 40 MG: 10 INJECTION, EMULSION INTRAVENOUS at 15:45

## 2022-01-01 RX ADMIN — SODIUM CHLORIDE, POTASSIUM CHLORIDE, SODIUM LACTATE AND CALCIUM CHLORIDE: 600; 310; 30; 20 INJECTION, SOLUTION INTRAVENOUS at 15:41

## 2022-01-01 RX ADMIN — MOMETASONE FUROATE AND FORMOTEROL FUMARATE DIHYDRATE 2 PUFF: 100; 5 AEROSOL RESPIRATORY (INHALATION) at 08:23

## 2022-01-01 RX ADMIN — HYDROMORPHONE HYDROCHLORIDE 1 MG: 1 INJECTION, SOLUTION INTRAMUSCULAR; INTRAVENOUS; SUBCUTANEOUS at 05:40

## 2022-01-01 RX ADMIN — BUDESONIDE 500 MCG: 0.5 INHALANT RESPIRATORY (INHALATION) at 08:04

## 2022-01-01 RX ADMIN — ALBUTEROL SULFATE 2.5 MG: 2.5 SOLUTION RESPIRATORY (INHALATION) at 15:33

## 2022-01-01 RX ADMIN — ALBUTEROL SULFATE 2.5 MG: 2.5 SOLUTION RESPIRATORY (INHALATION) at 14:19

## 2022-01-01 RX ADMIN — TAMSULOSIN HYDROCHLORIDE 0.4 MG: 0.4 CAPSULE ORAL at 09:15

## 2022-01-01 RX ADMIN — DEXAMETHASONE SODIUM PHOSPHATE 4 MG: 4 INJECTION, SOLUTION INTRA-ARTICULAR; INTRALESIONAL; INTRAMUSCULAR; INTRAVENOUS; SOFT TISSUE at 08:58

## 2022-01-01 RX ADMIN — ALBUTEROL SULFATE 5 MG: 2.5 SOLUTION RESPIRATORY (INHALATION) at 18:03

## 2022-01-01 RX ADMIN — FENTANYL CITRATE 25 MCG: 50 INJECTION, SOLUTION INTRAMUSCULAR; INTRAVENOUS at 23:06

## 2022-01-01 RX ADMIN — DEXTROSE MONOHYDRATE 250 ML: 100 INJECTION, SOLUTION INTRAVENOUS at 02:34

## 2022-01-01 RX ADMIN — PRAVASTATIN SODIUM 80 MG: 80 TABLET ORAL at 20:36

## 2022-01-01 RX ADMIN — ALBUTEROL SULFATE 10 MG: 2.5 SOLUTION RESPIRATORY (INHALATION) at 23:54

## 2022-01-01 RX ADMIN — PHENYLEPHRINE HYDROCHLORIDE 200 MCG: 10 INJECTION INTRAVENOUS at 15:58

## 2022-01-01 RX ADMIN — FENTANYL CITRATE 25 MCG: 50 INJECTION, SOLUTION INTRAMUSCULAR; INTRAVENOUS at 00:08

## 2022-01-01 RX ADMIN — HEPARIN SODIUM 5000 UNITS: 5000 INJECTION INTRAVENOUS; SUBCUTANEOUS at 06:07

## 2022-01-01 RX ADMIN — PHENYLEPHRINE HYDROCHLORIDE 200 MCG: 10 INJECTION INTRAVENOUS at 09:55

## 2022-01-01 RX ADMIN — FENTANYL CITRATE 25 MCG: 50 INJECTION, SOLUTION INTRAMUSCULAR; INTRAVENOUS at 21:25

## 2022-01-01 RX ADMIN — ALBUTEROL SULFATE 2.5 MG: 2.5 SOLUTION RESPIRATORY (INHALATION) at 01:48

## 2022-01-01 RX ADMIN — PREDNISONE 5 MG: 5 TABLET ORAL at 09:15

## 2022-01-01 RX ADMIN — PREDNISONE 5 MG: 5 TABLET ORAL at 10:12

## 2022-01-01 RX ADMIN — SODIUM CHLORIDE 1000 ML: 9 INJECTION, SOLUTION INTRAVENOUS at 00:59

## 2022-01-01 RX ADMIN — SODIUM CHLORIDE, PRESERVATIVE FREE 10 ML: 5 INJECTION INTRAVENOUS at 20:37

## 2022-01-01 RX ADMIN — TAMSULOSIN HYDROCHLORIDE 0.4 MG: 0.4 CAPSULE ORAL at 08:58

## 2022-01-01 RX ADMIN — PROPOFOL 40 MG: 10 INJECTION, EMULSION INTRAVENOUS at 15:57

## 2022-01-01 RX ADMIN — INSULIN LISPRO 2 UNITS: 100 INJECTION, SOLUTION INTRAVENOUS; SUBCUTANEOUS at 19:16

## 2022-01-01 RX ADMIN — INSULIN LISPRO 2 UNITS: 100 INJECTION, SOLUTION INTRAVENOUS; SUBCUTANEOUS at 15:50

## 2022-01-01 RX ADMIN — IPRATROPIUM BROMIDE AND ALBUTEROL SULFATE 1 AMPULE: .5; 3 SOLUTION RESPIRATORY (INHALATION) at 08:42

## 2022-01-01 RX ADMIN — DEXAMETHASONE SODIUM PHOSPHATE 4 MG: 4 INJECTION, SOLUTION INTRA-ARTICULAR; INTRALESIONAL; INTRAMUSCULAR; INTRAVENOUS; SOFT TISSUE at 06:17

## 2022-01-01 RX ADMIN — GUAIFENESIN 1200 MG: 600 TABLET, EXTENDED RELEASE ORAL at 09:13

## 2022-01-01 RX ADMIN — SODIUM CHLORIDE, PRESERVATIVE FREE 10 ML: 5 INJECTION INTRAVENOUS at 22:31

## 2022-01-01 RX ADMIN — ACETAMINOPHEN 650 MG: 650 SUPPOSITORY RECTAL at 20:55

## 2022-01-01 RX ADMIN — NALOXONE HYDROCHLORIDE 2 MG: 1 INJECTION PARENTERAL at 18:08

## 2022-01-01 RX ADMIN — AZITHROMYCIN DIHYDRATE 500 MG: 500 INJECTION, POWDER, LYOPHILIZED, FOR SOLUTION INTRAVENOUS at 03:01

## 2022-01-01 RX ADMIN — INSULIN LISPRO 6 UNITS: 100 INJECTION, SOLUTION INTRAVENOUS; SUBCUTANEOUS at 09:16

## 2022-01-01 RX ADMIN — ACETAMINOPHEN 650 MG: 650 SUPPOSITORY RECTAL at 00:27

## 2022-01-01 RX ADMIN — ONDANSETRON 4 MG: 2 INJECTION INTRAMUSCULAR; INTRAVENOUS at 11:00

## 2022-01-01 RX ADMIN — ALBUTEROL SULFATE 5 MG: 2.5 SOLUTION RESPIRATORY (INHALATION) at 13:18

## 2022-01-01 RX ADMIN — SODIUM CHLORIDE: 9 INJECTION, SOLUTION INTRAVENOUS at 12:55

## 2022-01-01 RX ADMIN — ALBUTEROL SULFATE 2.5 MG: 2.5 SOLUTION RESPIRATORY (INHALATION) at 08:22

## 2022-01-01 RX ADMIN — IPRATROPIUM BROMIDE AND ALBUTEROL SULFATE 1 AMPULE: .5; 3 SOLUTION RESPIRATORY (INHALATION) at 20:53

## 2022-01-01 RX ADMIN — IPRATROPIUM BROMIDE AND ALBUTEROL SULFATE 1 AMPULE: .5; 3 SOLUTION RESPIRATORY (INHALATION) at 17:58

## 2022-01-01 RX ADMIN — NALOXONE HYDROCHLORIDE 2 MG: 1 INJECTION INTRAMUSCULAR; INTRAVENOUS; SUBCUTANEOUS at 18:08

## 2022-01-01 RX ADMIN — SODIUM CHLORIDE, POTASSIUM CHLORIDE, SODIUM LACTATE AND CALCIUM CHLORIDE: 600; 310; 30; 20 INJECTION, SOLUTION INTRAVENOUS at 12:54

## 2022-01-01 RX ADMIN — PROPOFOL 5 MCG/KG/MIN: 10 INJECTION, EMULSION INTRAVENOUS at 23:45

## 2022-01-01 RX ADMIN — TAMSULOSIN HYDROCHLORIDE 0.4 MG: 0.4 CAPSULE ORAL at 21:14

## 2022-01-01 RX ADMIN — SODIUM CHLORIDE, POTASSIUM CHLORIDE, SODIUM LACTATE AND CALCIUM CHLORIDE: 600; 310; 30; 20 INJECTION, SOLUTION INTRAVENOUS at 10:05

## 2022-01-01 RX ADMIN — SODIUM CHLORIDE: 9 INJECTION, SOLUTION INTRAVENOUS at 04:42

## 2022-01-01 RX ADMIN — SENNOSIDES 129 MG: 8.6 TABLET, COATED ORAL at 21:46

## 2022-01-01 RX ADMIN — HEPARIN SODIUM 5000 UNITS: 5000 INJECTION INTRAVENOUS; SUBCUTANEOUS at 15:45

## 2022-01-01 RX ADMIN — METHYLPREDNISOLONE SODIUM SUCCINATE 40 MG: 40 INJECTION, POWDER, FOR SOLUTION INTRAMUSCULAR; INTRAVENOUS at 15:43

## 2022-01-01 RX ADMIN — SODIUM CHLORIDE, PRESERVATIVE FREE 10 ML: 5 INJECTION INTRAVENOUS at 08:58

## 2022-01-01 RX ADMIN — ARFORMOTEROL TARTRATE 15 MCG: 15 SOLUTION RESPIRATORY (INHALATION) at 08:39

## 2022-01-01 RX ADMIN — FENTANYL CITRATE 50 MCG: 50 INJECTION, SOLUTION INTRAMUSCULAR; INTRAVENOUS at 02:43

## 2022-01-01 RX ADMIN — ALBUTEROL SULFATE 2.5 MG: 2.5 SOLUTION RESPIRATORY (INHALATION) at 13:25

## 2022-01-01 RX ADMIN — SODIUM CHLORIDE 1000 ML: 9 INJECTION, SOLUTION INTRAVENOUS at 21:32

## 2022-01-01 RX ADMIN — PRAVASTATIN SODIUM 80 MG: 80 TABLET ORAL at 21:49

## 2022-01-01 RX ADMIN — TIOTROPIUM BROMIDE AND OLODATEROL 2 PUFF: 3.124; 2.736 SPRAY, METERED RESPIRATORY (INHALATION) at 08:23

## 2022-01-01 RX ADMIN — SODIUM PHOSPHATE 1 ENEMA: 7; 19 ENEMA RECTAL at 13:49

## 2022-01-01 RX ADMIN — TIOTROPIUM BROMIDE INHALATION SPRAY 2 PUFF: 3.12 SPRAY, METERED RESPIRATORY (INHALATION) at 08:39

## 2022-01-01 RX ADMIN — ACETAMINOPHEN 325MG 650 MG: 325 TABLET ORAL at 08:58

## 2022-01-01 RX ADMIN — SODIUM CHLORIDE, PRESERVATIVE FREE 10 ML: 5 INJECTION INTRAVENOUS at 08:08

## 2022-01-01 RX ADMIN — PIPERACILLIN AND TAZOBACTAM 3375 MG: 3; .375 INJECTION, POWDER, LYOPHILIZED, FOR SOLUTION INTRAVENOUS at 13:20

## 2022-01-01 RX ADMIN — IPRATROPIUM BROMIDE AND ALBUTEROL SULFATE 1 AMPULE: .5; 3 SOLUTION RESPIRATORY (INHALATION) at 15:59

## 2022-01-01 RX ADMIN — GUAIFENESIN 1200 MG: 600 TABLET, EXTENDED RELEASE ORAL at 20:36

## 2022-01-01 RX ADMIN — ASPIRIN 81 MG 81 MG: 81 TABLET ORAL at 12:48

## 2022-01-01 RX ADMIN — Medication 5000 UNITS: at 09:13

## 2022-01-01 RX ADMIN — INSULIN LISPRO 2 UNITS: 100 INJECTION, SOLUTION INTRAVENOUS; SUBCUTANEOUS at 22:48

## 2022-01-01 RX ADMIN — BUDESONIDE 500 MCG: 0.5 INHALANT RESPIRATORY (INHALATION) at 22:29

## 2022-01-01 RX ADMIN — ALBUMIN (HUMAN) 25 G: 0.25 INJECTION, SOLUTION INTRAVENOUS at 08:55

## 2022-01-01 RX ADMIN — SODIUM BICARBONATE: 84 INJECTION, SOLUTION INTRAVENOUS at 06:25

## 2022-01-01 RX ADMIN — SODIUM BICARBONATE: 84 INJECTION, SOLUTION INTRAVENOUS at 17:27

## 2022-01-01 RX ADMIN — SODIUM BICARBONATE: 84 INJECTION, SOLUTION INTRAVENOUS at 04:30

## 2022-01-01 RX ADMIN — METOPROLOL SUCCINATE 25 MG: 25 TABLET, EXTENDED RELEASE ORAL at 12:52

## 2022-01-01 RX ADMIN — PROPOFOL 150 MG: 10 INJECTION, EMULSION INTRAVENOUS at 09:44

## 2022-01-01 RX ADMIN — BUDESONIDE 500 MCG: 0.5 INHALANT RESPIRATORY (INHALATION) at 08:39

## 2022-01-01 RX ADMIN — GABAPENTIN 100 MG: 100 CAPSULE ORAL at 10:11

## 2022-01-01 RX ADMIN — GABAPENTIN 100 MG: 100 CAPSULE ORAL at 20:36

## 2022-01-01 RX ADMIN — Medication 5000 UNITS: at 12:48

## 2022-01-01 RX ADMIN — ARFORMOTEROL TARTRATE 15 MCG: 15 SOLUTION RESPIRATORY (INHALATION) at 08:10

## 2022-01-01 RX ADMIN — ALBUTEROL SULFATE 2.5 MG: 2.5 SOLUTION RESPIRATORY (INHALATION) at 07:26

## 2022-01-01 RX ADMIN — LORAZEPAM 4 MG: 2 INJECTION INTRAMUSCULAR; INTRAVENOUS at 11:43

## 2022-01-01 RX ADMIN — PHENYLEPHRINE HYDROCHLORIDE 200 MCG: 10 INJECTION INTRAVENOUS at 10:03

## 2022-01-01 RX ADMIN — ACETAMINOPHEN 650 MG: 650 SUPPOSITORY RECTAL at 03:24

## 2022-01-01 RX ADMIN — ETOMIDATE 20 MG: 2 INJECTION INTRAVENOUS at 23:39

## 2022-01-01 RX ADMIN — PREDNISONE 5 MG: 5 TABLET ORAL at 12:49

## 2022-01-01 RX ADMIN — FENTANYL CITRATE 25 MCG: 50 INJECTION, SOLUTION INTRAMUSCULAR; INTRAVENOUS at 19:11

## 2022-01-01 RX ADMIN — Medication 5000 UNITS: at 10:12

## 2022-01-01 RX ADMIN — GUAIFENESIN 1200 MG: 600 TABLET, EXTENDED RELEASE ORAL at 10:15

## 2022-01-01 RX ADMIN — ACETAMINOPHEN 650 MG: 650 SUPPOSITORY RECTAL at 21:29

## 2022-01-01 RX ADMIN — AZITHROMYCIN DIHYDRATE 500 MG: 500 INJECTION, POWDER, LYOPHILIZED, FOR SOLUTION INTRAVENOUS at 01:25

## 2022-01-01 RX ADMIN — PHENYLEPHRINE HYDROCHLORIDE 200 MCG: 10 INJECTION INTRAVENOUS at 16:07

## 2022-01-01 RX ADMIN — FENTANYL CITRATE 25 MCG: 50 INJECTION, SOLUTION INTRAMUSCULAR; INTRAVENOUS at 01:16

## 2022-01-01 RX ADMIN — PROPOFOL 40 MG: 10 INJECTION, EMULSION INTRAVENOUS at 15:48

## 2022-01-01 RX ADMIN — PIPERACILLIN AND TAZOBACTAM 3375 MG: 3; .375 INJECTION, POWDER, LYOPHILIZED, FOR SOLUTION INTRAVENOUS at 03:04

## 2022-01-01 RX ADMIN — PRAVASTATIN SODIUM 80 MG: 80 TABLET ORAL at 21:14

## 2022-01-01 RX ADMIN — INSULIN LISPRO 1 UNITS: 100 INJECTION, SOLUTION INTRAVENOUS; SUBCUTANEOUS at 21:34

## 2022-01-01 RX ADMIN — TAMSULOSIN HYDROCHLORIDE 0.4 MG: 0.4 CAPSULE ORAL at 12:49

## 2022-01-01 RX ADMIN — METHYLPREDNISOLONE SODIUM SUCCINATE 40 MG: 40 INJECTION, POWDER, FOR SOLUTION INTRAMUSCULAR; INTRAVENOUS at 02:42

## 2022-01-01 RX ADMIN — ONDANSETRON 4 MG: 2 INJECTION INTRAMUSCULAR; INTRAVENOUS at 07:14

## 2022-01-01 RX ADMIN — SODIUM CHLORIDE, POTASSIUM CHLORIDE, SODIUM LACTATE AND CALCIUM CHLORIDE: 600; 310; 30; 20 INJECTION, SOLUTION INTRAVENOUS at 22:54

## 2022-01-01 RX ADMIN — PROPOFOL 40 MG: 10 INJECTION, EMULSION INTRAVENOUS at 15:54

## 2022-01-01 RX ADMIN — ALBUTEROL SULFATE 2.5 MG: 2.5 SOLUTION RESPIRATORY (INHALATION) at 06:30

## 2022-01-01 RX ADMIN — TAMSULOSIN HYDROCHLORIDE 0.4 MG: 0.4 CAPSULE ORAL at 10:12

## 2022-01-01 RX ADMIN — GABAPENTIN 100 MG: 100 CAPSULE ORAL at 09:15

## 2022-01-01 RX ADMIN — FENTANYL CITRATE 25 MCG: 50 INJECTION, SOLUTION INTRAMUSCULAR; INTRAVENOUS at 09:23

## 2022-01-01 RX ADMIN — SODIUM CHLORIDE, POTASSIUM CHLORIDE, SODIUM LACTATE AND CALCIUM CHLORIDE: 600; 310; 30; 20 INJECTION, SOLUTION INTRAVENOUS at 22:32

## 2022-01-01 RX ADMIN — BUDESONIDE 500 MCG: 0.5 INHALANT RESPIRATORY (INHALATION) at 08:10

## 2022-01-01 RX ADMIN — HEPARIN SODIUM 5000 UNITS: 5000 INJECTION INTRAVENOUS; SUBCUTANEOUS at 22:30

## 2022-01-01 RX ADMIN — FENTANYL CITRATE 25 MCG: 50 INJECTION, SOLUTION INTRAMUSCULAR; INTRAVENOUS at 10:27

## 2022-01-01 RX ADMIN — MIDAZOLAM 4 MG: 1 INJECTION INTRAMUSCULAR; INTRAVENOUS at 23:39

## 2022-01-01 RX ADMIN — PIPERACILLIN AND TAZOBACTAM 3375 MG: 3; .375 INJECTION, POWDER, LYOPHILIZED, FOR SOLUTION INTRAVENOUS at 01:27

## 2022-01-01 RX ADMIN — ARFORMOTEROL TARTRATE 15 MCG: 15 SOLUTION RESPIRATORY (INHALATION) at 08:04

## 2022-01-01 RX ADMIN — NALOXONE HYDROCHLORIDE 0.4 MG: 0.4 INJECTION, SOLUTION INTRAMUSCULAR; INTRAVENOUS; SUBCUTANEOUS at 13:53

## 2022-01-01 RX ADMIN — GABAPENTIN 100 MG: 100 CAPSULE ORAL at 21:48

## 2022-01-01 RX ADMIN — POLYETHYLENE GLYCOL 3350 238 G: 17 POWDER, FOR SOLUTION ORAL at 21:46

## 2022-01-01 RX ADMIN — BUDESONIDE 500 MCG: 0.5 INHALANT RESPIRATORY (INHALATION) at 20:53

## 2022-01-01 RX ADMIN — SODIUM CHLORIDE 500 ML: 9 INJECTION, SOLUTION INTRAVENOUS at 15:47

## 2022-01-01 RX ADMIN — GABAPENTIN 100 MG: 100 CAPSULE ORAL at 12:48

## 2022-01-01 RX ADMIN — PHENYLEPHRINE HYDROCHLORIDE 100 MCG: 10 INJECTION INTRAVENOUS at 16:12

## 2022-01-01 RX ADMIN — SODIUM CHLORIDE 1000 ML: 9 INJECTION, SOLUTION INTRAVENOUS at 09:22

## 2022-01-01 RX ADMIN — BUDESONIDE 500 MCG: 0.5 INHALANT RESPIRATORY (INHALATION) at 20:43

## 2022-01-01 RX ADMIN — METOPROLOL SUCCINATE 25 MG: 25 TABLET, EXTENDED RELEASE ORAL at 09:14

## 2022-01-01 RX ADMIN — SODIUM CHLORIDE: 9 INJECTION, SOLUTION INTRAVENOUS at 04:22

## 2022-01-01 RX ADMIN — MORPHINE SULFATE 4 MG: 4 INJECTION, SOLUTION INTRAMUSCULAR; INTRAVENOUS at 11:37

## 2022-01-01 RX ADMIN — GABAPENTIN 100 MG: 100 CAPSULE ORAL at 21:14

## 2022-01-01 RX ADMIN — Medication 0.2 MCG/KG/HR: at 08:57

## 2022-01-01 RX ADMIN — Medication 2 MG/HR: at 00:08

## 2022-01-01 RX ADMIN — PROPOFOL 40 MG: 10 INJECTION, EMULSION INTRAVENOUS at 15:51

## 2022-01-01 RX ADMIN — FENTANYL CITRATE 25 MCG: 50 INJECTION, SOLUTION INTRAMUSCULAR; INTRAVENOUS at 06:06

## 2022-01-01 RX ADMIN — Medication 0.4 MG: at 11:43

## 2022-01-01 RX ADMIN — SODIUM CHLORIDE, PRESERVATIVE FREE 10 ML: 5 INJECTION INTRAVENOUS at 22:53

## 2022-01-01 RX ADMIN — SODIUM ZIRCONIUM CYCLOSILICATE 5 G: 5 POWDER, FOR SUSPENSION ORAL at 03:10

## 2022-01-01 RX ADMIN — PROPOFOL 20 MG: 10 INJECTION, EMULSION INTRAVENOUS at 16:01

## 2022-01-01 RX ADMIN — INSULIN LISPRO 4 UNITS: 100 INJECTION, SOLUTION INTRAVENOUS; SUBCUTANEOUS at 11:10

## 2022-01-01 RX ADMIN — ASPIRIN 81 MG: 81 TABLET, CHEWABLE ORAL at 08:58

## 2022-01-01 RX ADMIN — MORPHINE SULFATE 2 MG: 1 INJECTION INTRAVENOUS at 11:21

## 2022-01-01 RX ADMIN — GABAPENTIN 100 MG: 100 CAPSULE ORAL at 08:58

## 2022-01-01 RX ADMIN — CALCIUM GLUCONATE 1000 MG: 98 INJECTION, SOLUTION INTRAVENOUS at 02:28

## 2022-01-01 RX ADMIN — GUAIFENESIN 1200 MG: 600 TABLET, EXTENDED RELEASE ORAL at 21:48

## 2022-01-01 RX ADMIN — GUAIFENESIN 1200 MG: 600 TABLET, EXTENDED RELEASE ORAL at 12:49

## 2022-01-01 RX ADMIN — FENTANYL CITRATE 25 MCG: 50 INJECTION, SOLUTION INTRAMUSCULAR; INTRAVENOUS at 02:47

## 2022-01-01 RX ADMIN — TIOTROPIUM BROMIDE INHALATION SPRAY 2 PUFF: 3.12 SPRAY, METERED RESPIRATORY (INHALATION) at 22:40

## 2022-01-01 RX ADMIN — Medication 2 MCG/MIN: at 21:36

## 2022-01-01 RX ADMIN — FENTANYL CITRATE 25 MCG: 50 INJECTION, SOLUTION INTRAMUSCULAR; INTRAVENOUS at 04:12

## 2022-01-01 RX ADMIN — SODIUM CHLORIDE: 9 INJECTION, SOLUTION INTRAVENOUS at 17:25

## 2022-01-01 RX ADMIN — BUDESONIDE 500 MCG: 0.5 INHALANT RESPIRATORY (INHALATION) at 14:39

## 2022-01-01 RX ADMIN — FENTANYL CITRATE 25 MCG: 50 INJECTION, SOLUTION INTRAMUSCULAR; INTRAVENOUS at 08:04

## 2022-01-01 RX ADMIN — SODIUM CHLORIDE, PRESERVATIVE FREE 10 ML: 5 INJECTION INTRAVENOUS at 09:26

## 2022-01-01 RX ADMIN — ARFORMOTEROL TARTRATE 15 MCG: 15 SOLUTION RESPIRATORY (INHALATION) at 14:41

## 2022-01-01 RX ADMIN — ASPIRIN 81 MG 81 MG: 81 TABLET ORAL at 09:13

## 2022-01-01 RX ADMIN — INSULIN HUMAN 10 UNITS: 100 INJECTION, SOLUTION PARENTERAL at 02:30

## 2022-01-01 ASSESSMENT — ENCOUNTER SYMPTOMS
CHOKING: 0
EYE PAIN: 0
NAUSEA: 0
EYE ITCHING: 0
SORE THROAT: 0
ANAL BLEEDING: 1
ABDOMINAL PAIN: 0
RHINORRHEA: 0
SHORTNESS OF BREATH: 1
VOMITING: 0
SHORTNESS OF BREATH: 1
EYE REDNESS: 0
ABDOMINAL PAIN: 0
CONSTIPATION: 0
CHEST TIGHTNESS: 0
NAUSEA: 0
VOMITING: 0
WHEEZING: 0
SINUS PRESSURE: 0
TROUBLE SWALLOWING: 0
BLOOD IN STOOL: 0
COUGH: 0
DYSPNEA ACTIVITY LEVEL: AFTER AMBULATING 1 FLIGHT OF STAIRS
BACK PAIN: 0
RHINORRHEA: 0
EYE DISCHARGE: 0
DIARRHEA: 0
DYSPNEA ACTIVITY LEVEL: AFTER AMBULATING 1 FLIGHT OF STAIRS
SHORTNESS OF BREATH: 0
RECTAL PAIN: 0
ABDOMINAL DISTENTION: 0
DIARRHEA: 0
CONSTIPATION: 0
CHEST TIGHTNESS: 0
BLOOD IN STOOL: 1
SORE THROAT: 0
PHOTOPHOBIA: 0
SHORTNESS OF BREATH: 1
VOICE CHANGE: 0
ABDOMINAL DISTENTION: 0

## 2022-01-01 ASSESSMENT — PAIN - FUNCTIONAL ASSESSMENT
PAIN_FUNCTIONAL_ASSESSMENT: PREVENTS OR INTERFERES SOME ACTIVE ACTIVITIES AND ADLS
PAIN_FUNCTIONAL_ASSESSMENT: PREVENTS OR INTERFERES WITH MANY ACTIVE NOT PASSIVE ACTIVITIES
PAIN_FUNCTIONAL_ASSESSMENT: PREVENTS OR INTERFERES SOME ACTIVE ACTIVITIES AND ADLS
PAIN_FUNCTIONAL_ASSESSMENT: 0-10
PAIN_FUNCTIONAL_ASSESSMENT: PREVENTS OR INTERFERES WITH MANY ACTIVE NOT PASSIVE ACTIVITIES
PAIN_FUNCTIONAL_ASSESSMENT: 0-10

## 2022-01-01 ASSESSMENT — PAIN DESCRIPTION - DESCRIPTORS
DESCRIPTORS: SORE
DESCRIPTORS: DISCOMFORT;SORE

## 2022-01-01 ASSESSMENT — PAIN SCALES - GENERAL
PAINLEVEL_OUTOF10: 5
PAINLEVEL_OUTOF10: 8
PAINLEVEL_OUTOF10: 6
PAINLEVEL_OUTOF10: 6
PAINLEVEL_OUTOF10: 0
PAINLEVEL_OUTOF10: 6
PAINLEVEL_OUTOF10: 0
PAINLEVEL_OUTOF10: 0
PAINLEVEL_OUTOF10: 9
PAINLEVEL_OUTOF10: 0
PAINLEVEL_OUTOF10: 8
PAINLEVEL_OUTOF10: 3
PAINLEVEL_OUTOF10: 8
PAINLEVEL_OUTOF10: 0
PAINLEVEL_OUTOF10: 6
PAINLEVEL_OUTOF10: 0
PAINLEVEL_OUTOF10: 6
PAINLEVEL_OUTOF10: 0

## 2022-01-01 ASSESSMENT — PAIN DESCRIPTION - ORIENTATION
ORIENTATION: RIGHT

## 2022-01-01 ASSESSMENT — PAIN DESCRIPTION - ONSET
ONSET: ON-GOING

## 2022-01-01 ASSESSMENT — PAIN DESCRIPTION - LOCATION
LOCATION: HIP;LEG

## 2022-01-01 ASSESSMENT — PAIN DESCRIPTION - PAIN TYPE
TYPE: ACUTE PAIN

## 2022-01-01 ASSESSMENT — PAIN SCALES - WONG BAKER
WONGBAKER_NUMERICALRESPONSE: 6
WONGBAKER_NUMERICALRESPONSE: 8
WONGBAKER_NUMERICALRESPONSE: 8
WONGBAKER_NUMERICALRESPONSE: 6
WONGBAKER_NUMERICALRESPONSE: 6;4

## 2022-01-01 ASSESSMENT — COPD QUESTIONNAIRES
CAT_SEVERITY: SEVERE
CAT_SEVERITY: SEVERE

## 2022-01-01 ASSESSMENT — PAIN DESCRIPTION - FREQUENCY
FREQUENCY: CONTINUOUS

## 2022-01-01 ASSESSMENT — PULMONARY FUNCTION TESTS
PIF_VALUE: 20
PIF_VALUE: 22
PIF_VALUE: 16
PIF_VALUE: 20

## 2022-01-03 NOTE — TELEPHONE ENCOUNTER
Spoke with daughter Lauri Masterson reports father having some changes to breathing pattern vitals reported by daughter below. BPM 80's  133/70  SpO2 2 LPM remaining in mid 80's   Confirmed daughter concerned about increased BOWER    Call made to patient Ty Le    Reports he is having more SOB with exertion and had some just after eating yesterday evening she was not with patient during my call     Called and spoke with patient. He reports they have been having increased increased dyspnea, cough, wheezing with sputum production frothy white in color. Denies any fever chills, CP or hemoptysis. Tried using short-acting beta agonist inhaler, anticholinergic inhaler, home 02- at 2 LPM, perforomist and pulmicort nebs, which has been somewhat effective. After reviewing symptoms discussed plan of care with patient sent order for prednisone 40 mg PO Daily x5 days and azithromycin 500 mg PO Daily x 3 days to patient preferred pharmacy advised if no improvement after completing therapy to contact office and if they have any increased SOB that is unrelieved by rest and using medications to go to nearest [facility] for further evaluation. Patient verbalized understanding and voiced no further questions at end of conversation.     Return call made to daughter Lauri Masterson after speaking with Yessenia Holt relaying the above information per Stevie's request

## 2022-04-06 PROBLEM — K92.1 HEMATOCHEZIA: Status: ACTIVE | Noted: 2022-01-01

## 2022-04-06 PROBLEM — K62.5 BRIGHT RED BLOOD PER RECTUM: Status: ACTIVE | Noted: 2022-01-01

## 2022-04-06 NOTE — ED NOTES
Patient to ED for rectal bleeding. Patient reports bleeding started last night after a BM.  Bleeding has continued throughout the night and into this AM. Patient denies pain, alert and oriented      Patrice Woodall RN  04/06/22 2338

## 2022-04-06 NOTE — ED NOTES
Patient in bed and talking with family at the bedside. Patient provided education regarding current situation with pt plan of care. Patient VSS and patient does not appear to be in any current distress at this time. Will continue to monitor. Call light within reach.        Jason Garcias RN  04/06/22 2025

## 2022-04-06 NOTE — H&P
Hospitalist - History & Physical      Patient: Kalee Prakash    Unit/Bed:5K-22/022-A  YOB: 1931  MRN: [id number]   Acct: [de-identified]   PCP: DANTE Swanson - CNP    Date of Service: Pt seen/examined on 04/06/22  and Admitted to Inpatient with expected LOS greater than two midnights due to medical therapy. Chief Complaint:  Bright blood per rectum    Assessment and Plan:  1. Hematochezia:    Pt endorses 3 episodes of hematochezia since yesterday. Pt denies abdominal pain, rectal pain. Hgb 13.7 , Hct 42.8 today. Pt is afebrile, non-toxic appearing with a WBC 13.4. 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 Follows with Dr. Heather Arciniega. CT abdomen and pelvis today. Plan to hold ASA and metoprolol. Trend H&H Q8H. I&Os. GI consulted from ED. NPO. Start IVF. Repeat CBC in the AM. Telemetry. 2. COPD, without acute exacerbation:    Stable. Pt requires 2L chronically- at baseline. Continue home inhalers. 3. CKD, stage 3a   Cr 1.6 today. Appears to be around baseline. IVF started. Repeat BMP in the AM.       4. Essential HTN:    Last /60. Hold home metoprolol for #1. Continue to monitor BP closely. 5. CAD:    Stable. Pt dose not endorse chest pain or worsening SOB today. Continue home statin. Hold ASA and metoporol for #1 for now. Telemetry. 6. T2DM:    Pt not on any home medications. Low dose SSI ordered. Hypoglycemia protocol ordered. POC glucose orders. Repeat BMP in the AM.     7. AAA:    CT abdomen pelvis today for #1 to further evaluate. History Of Present Illness:    Theo Roth is a 81 y/o  male with a PMHx of CAD, COPD, HTN, T2DM who presents to [facility] today for the evaluation of bright red blood per rectum x 3 episodes since yesterday. The patient states he felt as if he had to pass gas, but realized that he was actually bleeding. Patient is overall poor historian, but daughter and wife at bedside provided additional history.  Daughter states that the blood saturated his pants and chucks each time. The patient denies chest pain, worsening SOB, lightheadedness, dizziness, nausea, vomiting, change in appetite, abdominal pain. Past Medical History:        Diagnosis Date    [facility]     followed by Dr. Oralia Potts CAD (coronary artery disease)     Chronic kidney disease     [facility] obstructive [facility]) [facility]     Hypercholesteremia     Hypertension     Other disorders of kidney and ureter in diseases classified elsewhere     Pneumonia     Prostate cancer ([facility]     with radiation in 2006    SOB (shortness of breath)     Type II or unspecified type diabetes mellitus without mention of complication, not stated as uncontrolled        Past Surgical History:        Procedure Laterality Date    ABDOMEN SURGERY      ABDOMINAL AORTIC ANEURYSM REPAIR  8/21/14    ABDOMINAL AORTIC ANEURYSM REPAIR, ENDOVASCULAR Bilateral 08/21/2014    WITH LT FEMORAL ENDARTERECTOMY     CARDIAC SURGERY      COLONOSCOPY  unsure    INGUINAL HERNIA REPAIR Bilateral 07/28/2015    Dr Luiz Ramirez Medications:   No current facility-administered medications on file prior to encounter. Current Outpatient Medications on File Prior to Encounter   Medication Sig Dispense Refill    acetaminophen (TYLENOL) 500 MG tablet Take 500 mg by mouth every 6 hours as needed for Pain      INCRUSE ELLIPTA 62.5 MCG/INH AEPB USE 1 INHALATION DAILY (Patient taking differently: Takes at 6 PM) 90 each 3    Respiratory Therapy Supplies (NEBULIZER AIR TUBE/PLUGS) MISC Please provide nebulizer kit and supplies with mask interface.  1 each 0    metoprolol succinate (TOPROL XL) 25 MG extended release tablet Take 1 tablet by mouth daily 90 tablet 3    tamsulosin (FLOMAX) 0.4 MG capsule Take 1 capsule by mouth daily 90 capsule 3    albuterol sulfate HFA (VENTOLIN HFA) 108 (90 Base) MCG/ACT inhaler Inhale 2 puffs into the lungs every 6 hours as needed for Wheezing or Shortness of Breath 3 each 4    predniSONE (DELTASONE) 5 MG tablet Take 1 tablet by mouth daily Prednisone 5mg po daily to take after food. 90 tablet 3    budesonide (PULMICORT) 0.5 MG/2ML nebulizer suspension USE 1 VIAL  IN  NEBULIZER TWICE  DAILY - rinse mouth after treatment 60 ampule 11    formoterol (PERFOROMIST) 20 MCG/2ML nebulizer solution Take 2 mLs by nebulization every 12 hours 120 mL 11    aspirin (ASPIRIN CHILDRENS) 81 MG chewable tablet Take 1 tablet by mouth daily 30 tablet 0    docusate sodium (COLACE) 100 MG capsule Take 100 mg by mouth 2 times daily as needed for Constipation      albuterol (PROVENTIL) (2.5 MG/3ML) 0.083% nebulizer solution Take 3 mLs by nebulization every 6 hours as needed for Wheezing or Shortness of Breath (Patient taking differently: Take 2.5 mg by nebulization daily At 4 PM) 360 mL 3    vitamin D-3 (CHOLECALCIFEROL) 125 MCG (5000 UT) TABS Take 1 tablet by mouth daily 90 tablet 1    pravastatin (PRAVACHOL) 80 MG tablet Take 80 mg by mouth nightly.  gabapentin (NEURONTIN) 100 MG capsule Take 100 mg by mouth 2 times daily.  fish oil-omega-3 fatty acids 1000 MG capsule Take 1 g by mouth 2 times daily. Allergies:    Lasix [furosemide], Lisinopril, Daliresp [roflumilast], Keflex [cephalexin], Sulfa antibiotics, and Percocet [oxycodone-acetaminophen]    Social History:    reports that he quit smoking about 32 years ago. His smoking use included cigarettes. He has a 80.00 pack-year smoking history. He has never used smokeless tobacco. He reports current alcohol use of about 1.0 standard drink of alcohol per week. He reports that he does not use drugs.     Family History:       Problem Relation Age of Onset    Rheum Arthritis Mother          due to complications related to    Heart Disease Mother     Coronary Art Dis Father     Heart Disease Father     Coronary Art Dis Brother     Heart Disease Brother        Diet:  Diet NPO    Review of systems:     Review of Systems   Constitutional: Negative for activity change, chills, fatigue and fever. HENT: Negative for congestion, rhinorrhea and sore throat. Respiratory: Positive for shortness of breath (chronic). Negative for chest tightness. Cardiovascular: Positive for leg swelling (chronic). Negative for chest pain and palpitations. Gastrointestinal: Positive for anal bleeding and blood in stool. Negative for abdominal distention, abdominal pain, constipation, diarrhea, nausea, rectal pain and vomiting. Neurological: Negative for dizziness, tremors, weakness, light-headedness, numbness and headaches. PHYSICAL EXAM:  /60   Pulse 62   Temp 98 °F (36.7 °C) (Oral)   Resp 16   Ht 5' 6\" (1.676 m)   Wt 146 lb 12.8 oz (66.6 kg)   SpO2 96%   BMI 23.69 kg/m²   General appearance: No apparent distress. Appears stated age and cooperative. Skin: Skin color, texture, turgor normal.  No rashes or lesions. HEENT: Normal cephalic, atraumatic without obvious deformity. Pupils equal, round, and reactive to light. Extra-ocular muscles intact. Conjunctivae/corneas clear. Neck: Trachea midline. Supple, with full range of motion. No jugular venous distention. Cardiovascular: Diminished heart sounds. Regular rate and rhythm with normal S1/S2. No murmurs, rubs or gallops. Respiratory:  Normal respiratory effort. Clear to auscultation, bilaterally without rales, wheezes, or rhonchi. Abdomen: Soft, non-tender, non-distended. Normal bowel sounds. Musculoskeletal: Pitting edema noted in LE bilaterally. No weakness or instability noted. No erythema, or gross deformity noted. Vascular:  Pulses +2 palpable, equal bilaterally. Neurologic:  Neurovascularly intact without any focal sensory/motor deficits. Cranial nerves: II-XII grossly intact.    Psychiatric: Alert and oriented, thought content appropriate, normal insight      Labs:   Recent Labs     04/06/22  1227   WBC 13.4*   HGB 13.7*   HCT 42.8        Recent Labs     04/06/22  1227      K 4.6      CO2 25   BUN 28*   CREATININE 1.6*   CALCIUM 9.1     Recent Labs     04/06/22  1227   AST 17   ALT 11   BILITOT 0.4   ALKPHOS 61     Recent Labs     04/06/22  1227   INR 1.01     No results for input(s): Earlis Wilder in the last 72 hours. Urinalysis:    Lab Results   Component Value Date    NITRU NEGATIVE 07/27/2021    WBCUA 0 08/23/2014    BACTERIA NONE 08/23/2014    RBCUA 0 08/23/2014    BLOODU NEGATIVE 07/27/2021    SPECGRAV 1.015 08/23/2014    GLUCOSEU NEGATIVE 07/27/2021       Radiology:   CT ABDOMEN PELVIS WO CONTRAST Additional Contrast? None    (Results Pending)     No results found.         Electronically signed by Rosario Del Real PA-C on 4/6/2022 at 7:20 PM

## 2022-04-06 NOTE — ED NOTES
Patient cleaned up with family in the waiting room eating food. Patient updated on admission and lab results at this time. Patient respirations are regular and unlabored. Patient appears to be in no current distress. Patient VSS. Call light is within reach. Patient expresses no needs at this time.        Bunnie Hammans, RN  04/06/22 7330

## 2022-04-06 NOTE — ED PROVIDER NOTES
[facility] [address] COMPLAINT       Chief Complaint   Patient presents with    Rectal Bleeding       Nurses Notes reviewed and I agree except as noted in the HPI. HISTORY OF PRESENT ILLNESS    Yayn Morales is a 80 y.o. male. 66-year-old male. They have noticed bright red blood in stool and its all over the underwear on arrival.  Appears to be a lower bleed. 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 He sees Dr. Yates Lesch in the office. REVIEW OF SYSTEMS         No current abdominal pain, no chest pain, no dyspnea    Remainder of review of systems is otherwise reviewed as negative. PAST MEDICAL HISTORY    has a past medical history of AAA (abdominal aortic aneurysm) ([address] CAD (coronary artery disease), Chronic kidney disease, COPD (chronic obstructive pulmonary disease) ([address] Hypercholesteremia, Hypertension, Other disorders of kidney and ureter in diseases classified elsewhere, Pneumonia, Prostate cancer ([address] SOB (shortness of breath), and Type II or unspecified type diabetes mellitus without mention of complication, not stated as uncontrolled. SURGICAL HISTORY      has a past surgical history that includes Colonoscopy (unsure); AAA repair, endovascular (Bilateral, 08/21/2014); Abdominal aortic aneurysm repair (8/21/14); Inguinal hernia repair (Bilateral, 07/28/2015); Abdomen surgery; vascular surgery; and Cardiac surgery. CURRENT MEDICATIONS       Current Discharge Medication List      CONTINUE these medications which have NOT CHANGED    Details   INCRUSE ELLIPTA 62.5 MCG/INH AEPB USE 1 INHALATION DAILY  Qty: 90 each, Refills: 3    Associated Diagnoses: Centrilobular emphysema ([address] Stage 3 severe COPD by GOLD classification ([address]      Respiratory Therapy Supplies (NEBULIZER AIR TUBE/PLUGS) MISC Please provide nebulizer kit and supplies with mask interface.   Qty: 1 each, Refills: 0    Associated Diagnoses: Stage 3 severe COPD by GOLD classification ([facility] Centrilobular emphysema (HCC)      metoprolol succinate (TOPROL XL) 25 MG extended release tablet Take 1 tablet by mouth daily  Qty: 90 tablet, Refills: 3      tamsulosin (FLOMAX) 0.4 MG capsule Take 1 capsule by mouth daily  Qty: 90 capsule, Refills: 3    Associated Diagnoses: Swelling; Stage 3a chronic kidney disease ([address] Dyspnea on exertion      albuterol sulfate HFA (VENTOLIN HFA) 108 (90 Base) MCG/ACT inhaler Inhale 2 puffs into the lungs every 6 hours as needed for Wheezing or Shortness of Breath  Qty: 3 each, Refills: 4    Associated Diagnoses: Centrilobular emphysema ([address] Stage 3 severe COPD by GOLD classification ([facility])      predniSONE (DELTASONE) 5 MG tablet Take 1 tablet by mouth daily Prednisone 5mg po daily to take after food. Qty: 90 tablet, Refills: 3    Associated Diagnoses: Centrilobular emphysema ([facility] Stage 3 severe COPD by GOLD classification ([facility] Hypoxemic respiratory failure, chronic ([facility])      budesonide (PULMICORT) 0.5 MG/2ML nebulizer suspension USE 1 VIAL  IN  NEBULIZER TWICE  DAILY - rinse mouth after treatment  Qty: 60 ampule, Refills: 11    Associated Diagnoses: Centrilobular emphysema ([facility] Unspecified chronic bronchitis ([facility])      formoterol (PERFOROMIST) 20 MCG/2ML nebulizer solution Take 2 mLs by nebulization every 12 hours  Qty: 120 mL, Refills: 11    Associated Diagnoses: Centrilobular emphysema ([facility]  Chronic bronchitis, unspecified chronic bronchitis type ([facility])      aspirin (ASPIRIN CHILDRENS) 81 MG chewable tablet Take 1 tablet by mouth daily  Qty: 30 tablet, Refills: 0      docusate sodium (COLACE) 100 MG capsule Take 100 mg by mouth 2 times daily as needed for Constipation      lidocaine (LIDODERM) 5 % Place 1 patch onto the skin daily 12 hours on, 12 hours off.      acetaminophen (TYLENOL) 500 MG tablet Take 500 mg by mouth every morning PRN for pain      albuterol (PROVENTIL) (2.5 MG/3ML) 0.083% nebulizer solution Take 3 mLs by nebulization every 6 hours as needed for Wheezing or Shortness of Breath  Qty: 360 mL, Refills: 3    Associated Diagnoses: Centrilobular emphysema ([address] Chronic bronchitis, unspecified chronic bronchitis type (HCC)      vitamin D-3 (CHOLECALCIFEROL) 125 MCG (5000 UT) TABS Take 1 tablet by mouth daily  Qty: 90 tablet, Refills: 1      pravastatin (PRAVACHOL) 80 MG tablet Take 80 mg by mouth nightly.      gabapentin (NEURONTIN) 100 MG capsule Take 100 mg by mouth 2 times daily. fish oil-omega-3 fatty acids 1000 MG capsule Take 1 g by mouth 2 times daily. ALLERGIES     is allergic to lasix [furosemide], lisinopril, daliresp [roflumilast], keflex [cephalexin], sulfa antibiotics, and percocet [oxycodone-acetaminophen]. FAMILY HISTORY     He indicated that his mother is . He indicated that his father is . He indicated that his brother is . family history includes Coronary Art Dis in his brother and father; Heart Disease in his brother, father, and mother; Rheum Arthritis in his mother. SOCIAL HISTORY      reports that he quit smoking about 32 years ago. His smoking use included cigarettes. He has a 80.00 pack-year smoking history. He has never used smokeless tobacco. He reports current alcohol use of about 1.0 standard drink of alcohol per week. He reports that he does not use drugs. PHYSICAL EXAM     INITIAL VITALS:  height is 5' 6\" (1.676 m) and weight is 139 lb (63 kg). His temperature is 97.5 °F (36.4 °C). His blood pressure is 137/55 (abnormal) and his pulse is 54. His respiration is 15 and oxygen saturation is 96%. Constitutional: Somewhat chronically ill-appearing  Eyes:  Pupils are equal and reactive   HENT:  Atraumatic appearing  oropharynx moist, no pharyngeal exudates.   Neck- normal range of motion, supple   Respiratory:  No wheezing, rhonchi or rales  Cardiovascular: regular, no murmur  GI:  Non tender, no rigidity, rebound or guarding  : Grossly positive for hematochezia  Musculoskeletal:  2/4 distal pulses, no pitting edema  Integument: warm and dry  Neurologic:  Alert & oriented x 3  Psychiatric:  Speech and behavior appropriate          DIAGNOSTIC RESULTS         LABS:   Labs Reviewed   CBC WITH AUTO DIFFERENTIAL - Abnormal; Notable for the following components:       Result Value    WBC 13.4 (*)     RBC 4.59 (*)     Hemoglobin 13.7 (*)     MCHC 32.0 (*)     MPV 9.2 (*)     Segs Absolute 10.7 (*)     Immature Grans (Abs) 0.13 (*)     All other components within normal limits   COMPREHENSIVE METABOLIC PANEL W/ REFLEX TO MG FOR LOW K - Abnormal; Notable for the following components:    Glucose 119 (*)     CREATININE 1.6 (*)     BUN 28 (*)     Total Protein 5.9 (*)     All other components within normal limits   GLOMERULAR FILTRATION RATE, ESTIMATED - Abnormal; Notable for the following components:    Est, Glom Filt Rate 41 (*)     All other components within normal limits   BLOOD OCCULT STOOL SCREEN #1   PROTIME-INR   APTT   ANION GAP   OSMOLALITY       EMERGENCY DEPARTMENT COURSE:   Vitals:    Vitals:    04/06/22 1356 04/06/22 1439 04/06/22 1604 04/06/22 1607   BP: (!) 171/57 (!) 137/55     Pulse: 56 56  54   Resp: 22 18  15   Temp:       SpO2: 97% 96% 96% 96%   Weight:       Height:         Patient is grossly positive for hematochezia. Does have a history of a rectal tumor. Case was discussed with the GI group and ultimately admitted to hospitalist service. Vitals have remained stable. CRITICAL CARE:   none    CONSULTS:  none    PROCEDURES:  None    FINAL IMPRESSION      1.  Lower GI bleed          DISPOSITION/PLAN   Admitted       DISCHARGE MEDICATIONS:  Current Discharge Medication List          (Please note that portions of this note were completed with a voice recognition program.  Efforts were made to edit the dictations but occasionally words are mis-transcribed.)    Chelsea Roldan, [address], DO  04/06/22 [facility]

## 2022-04-07 NOTE — PROGRESS NOTES
ENDOSCOPY POSTPROCEDURE REPORT     PT NAME: Tess Aguilar  MEDICAL RECORD NUMBER: [id number]  YOB: 1931    PROCEDURE PERFORMED BY : Winifred Bowman MD    PROCEDURE TYPE:   EGD ______   COLONOSCOPY _______   Flex Sig ___x_____  MEDICATIONS USED :  VERSED _____   FENTANYL_____   PROPOFOL __x____  Patient tolerated procedure well. No apparent complications. Estimated blood loss:  Nil  Specimens removed:  Yes_____  No___x___  Disposition of Specimens:  To Lab      BRIEF  FINDINGS:  1. 3cm submucosal lesion in distal rectum. Normal mucosa. No ulceration or bleeding from this lesion. Blood in rectum and to the extent of exam 50 cm. Prominent tics, filled with blood. Extensive washing and suctioning done, but no definitive site found to 45 cm. 2. Minimal vasular ectasias distal rectum c/w XRT. None bleeding and not thought to be most likely cause. 3.Blood coating colon from rectum to extent of exam 45 cm, stool encountered. IMP:  Most likely diverticular bleeding. Hemo stable. PRELIMINARY PLAN:  1. Clear liquids and colon prep today  2. Colon prep and colonoscopy tomorrow  3. If active bleeding, get a tagged RBC scan (Bleeding scan stat)    For complete findings and plan, see dictated report.      Winifred Bowman MD, MD  4/7/2022  4:07 PM

## 2022-04-07 NOTE — PROGRESS NOTES
Hospitalist Progress Note      Patient:  Nick Sharp    Unit/Bed:5K-22/022-A  YOB: 1931  MRN: [id number]   Acct: [de-identified]   PCP: DANTE Rivera CNP  Date of Admission: 4/6/2022    Assessment/Plan:    1. Hematochezia:              Stable. Pt endorses 3 episodes of hematochezia yesterday, however no episodes of hematochezia since admission. Pt denies abdominal pain, rectal pain. Hgb is 12.2 and appears stable. Pt is afebrile, non-toxic appearing without an acute leukocytosis. 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 Follows with Dr. Shelli Baird. CT abdomen and pelvis yesterday reveals right posterior lateral eccentric mass in distal colon measuring 3.0 x 3.6 cm. Plan to resume home ASA and metoprolol.  I&Os. GI onboard and planning sigmoidoscopy today. Repeat CBC in the AM. Telemetry. Pt/OT ordered.      2. COPD, without acute exacerbation:               Stable. Pt requires 2L chronically- at baseline. Required 6L this morning, however improved and resumed 2L after breathing treatments and home mucinex was resumed. CXR this morning was negative for acute changes. Continue home inhalers.      3. CKD, stage 3a              Cr 1.6 today. Appears to be around baseline. Continue IVF. Repeat BMP in the AM.        4. Essential HTN:               Last /67. Resume home metoprolol. Continue to monitor BP closely.      5. CAD:               Stable. Pt dose not endorse chest pain or worsening SOB today. Continue home statin. Hold ASA and metoporol for #1 for now. Telemetry.      6. T2DM:               Pt not on any home medications. Low dose SSI ordered. Hypoglycemia protocol ordered. POC glucose orders. Repeat BMP in the AM.      7. AAA:               CT abdomen pelvis yesterday reveals distal aorta measuring 3 cm in diameter. DISPO: [address] home tomorrow, pending sigmoidoscopy and PT/OT evaluation.        Chief Complaint: Bright red blood per rectum    Initial H and P:-    Dionte Feliz is a 79 y/o  male with a PMHx of CAD, COPD, HTN, T2DM who presents to [facility] today for the evaluation of bright red blood per rectum x 3 episodes since yesterday. The patient states he felt as if he had to pass gas, but realized that he was actually bleeding. Patient is overall poor historian, but daughter and wife at bedside provided additional history. Daughter states that the blood saturated his pants and chucks each time. The patient denies chest pain, worsening SOB, lightheadedness, dizziness, nausea, vomiting, change in appetite, abdominal pain. Subjective (past 24 hours):   4/7/2022: Patient resting comfortably in bed with family at bedside. States he has no pain or new change in symptoms. He states that he has not had a repeat episode of hematochezia. Denies feeling short of breath. Pt and pt family has no concerns at this time. Past medical history, family history, social history and allergies reviewed again and is unchanged since admission. ROS (All review of systems completed. Pertinent positives noted.  Otherwise All other systems reviewed and negative.)     Medications:  Reviewed    Infusion Medications    sodium chloride      dextrose      lactated ringers 75 mL/hr at 04/06/22 8314     Scheduled Medications    haloperidol lactate  5 mg IntraMUSCular Once    guaiFENesin  1,200 mg Oral BID    budesonide  0.5 mg Nebulization BID    gabapentin  100 mg Oral BID    Arformoterol Tartrate  15 mcg Nebulization BID    tiotropium  2 puff Inhalation Daily    pravastatin  80 mg Oral Nightly    predniSONE  5 mg Oral Daily    tamsulosin  0.4 mg Oral Daily    Vitamin D  5,000 Units Oral Daily    sodium chloride flush  10 mL IntraVENous 2 times per day    insulin lispro  0-6 Units SubCUTAneous TID     insulin lispro  0-3 Units SubCUTAneous Nightly     PRN Meds: albuterol, albuterol sulfate HFA, docusate sodium, sodium chloride flush, sodium chloride, ondansetron **OR** ondansetron, polyethylene glycol, acetaminophen **OR** acetaminophen, glucagon (rDNA), dextrose, glucose, dextrose bolus (hypoglycemia) **OR** dextrose bolus (hypoglycemia)      Intake/Output Summary (Last 24 hours) at 4/7/2022 1001  Last data filed at 4/7/2022 0604  Gross per 24 hour   Intake 10 ml   Output 900 ml   Net -890 ml       Diet:  Diet NPO    Exam:  /81   Pulse 70   Temp 98.7 °F (37.1 °C) (Oral)   Resp 16   Ht 5' 6\" (1.676 m)   Wt 146 lb 12.8 oz (66.6 kg)   SpO2 95%   BMI 23.69 kg/m²   General appearance: No apparent distress, appears stated age and cooperative. HEENT: Pupils equal, round, and reactive to light. Conjunctivae/corneas clear. Neck: Supple, with full range of motion. No jugular venous distention. Trachea midline. Respiratory: On 2L O2 NC. Normal respiratory effort. Clear to auscultation, bilaterally without Rales/Wheezes/Rhonchi. Cardiovascular: Diminished heart sounds. Regular rate and rhythm with normal S1/S2 without murmurs, rubs or gallops. Abdomen: Soft, non-tender, non-distended with normal bowel sounds. Musculoskeletal: passive and active ROM x 4 extremities. Skin: Skin color, texture, turgor normal.  No rashes or lesions. Neurologic:  Neurovascularly intact without any focal sensory/motor deficits. Cranial nerves: II-XII intact, grossly non-focal.  Psychiatric: Alert and oriented, thought content appropriate, normal insight. Confused at times, consistent with Hx of dementia.    Capillary Refill: Brisk,< 3 seconds   Peripheral Pulses: +2 palpable, equal bilaterally     Labs:   Recent Labs     04/06/22  1227 04/07/22  0444   WBC 13.4* 10.8   HGB 13.7* 12.0*   HCT 42.8 37.6*    184     Recent Labs     04/06/22  1227 04/07/22  0444    140   K 4.6 4.2    105   CO2 25 26   BUN 28* 28*   CREATININE 1.6* 1.6*   CALCIUM 9.1 8.7     Recent Labs     04/06/22  1227   AST 17   ALT 11   BILITOT 0.4   ALKPHOS 61     Recent Labs 04/06/22  1227   INR 1.01     No results for input(s): CKTOTAL, TROPONINI in the last 72 hours. Microbiology:    Blood culture #1:   Lab Results   Component Value Date    BC No growth-preliminary  No growth   11/28/2017       Blood culture #2:No results found for: Manjeet Zuniga    Organism:No results found for: ORG    No results found for: LABGRAM    MRSA culture only:No results found for: [facility]    Urine culture:   Lab Results   Component Value Date    LABURIN No growth-preliminary No growth  06/05/2021       Respiratory culture: No results found for: CULTRESP    Aerobic and Anaerobic :  No results found for: LABAERO  No results found for: LABANAE    Urinalysis:      Lab Results   Component Value Date    NITRU NEGATIVE 07/27/2021    WBCUA 0 08/23/2014    BACTERIA NONE 08/23/2014    RBCUA 0 08/23/2014    BLOODU NEGATIVE 07/27/2021    SPECGRAV 1.015 08/23/2014    GLUCOSEU NEGATIVE 07/27/2021       Radiology:  CT ABDOMEN PELVIS WO CONTRAST Additional Contrast? None   Final Result   1. Right posterior lateral eccentric mass in the distal colon/rectum measuring approximately 3.0 x 3.6 cm (series 2, image 70). Direct visualization is again recommended. 2. Colonic diverticulosis without diverticulitis. Normal appendix. No bowel obstruction. Multiple stable chronic findings are discussed. **This report has been created using voice recognition software. It may contain minor errors which are inherent in voice recognition technology. **      Final report electronically signed by Dr Jessie Silverio on 4/7/2022 9:22 AM      XR CHEST PORTABLE    (Results Pending)     CT ABDOMEN PELVIS WO CONTRAST Additional Contrast? None    Result Date: 4/7/2022  PROCEDURE: CT ABDOMEN PELVIS WO CONTRAST CLINICAL INFORMATION: Hematochezia. COMPARISON: CT abdomen and pelvis 6/5/2021. TECHNIQUE: 5 mm axial imaging through the abdomen and pelvis without IV contrast.  Coronal and sagittal reconstructions were performed.  All CT scans at this facility use dose modulation, iterative reconstruction, and/or weight based dosing when appropriate to reduce the radiation dose to as low as reasonably achievable. FINDINGS: LIMITATIONS: The evaluation of the solid organs is limited without IV contrast. Lung bases: Improved aeration is noted at the lung bases. Small bilateral pleural effusions are no longer visualized. Liver/gallbladder/bilary tree: No radiopaque gallstones or biliary ductal dilatation is observed. Evaluation is limited due to patient motion. The noncontrast liver is unremarkable. Pancreas: Unremarkable noncontrast CT appearance. Limited due to patient motion. Spleen : Unremarkable noncontrast CT appearance. Adrenal glands: Unremarkable noncontrast CT appearance. Kidneys/ ureters/ bladder: The evaluation is limited due to patient motion. No hydronephrosis or hydroureter is present. Vascular calcifications are noted. The urinary bladder is distended. Gastrointestinal:  Soft tissue mass in the rectosigmoid colon measures 3.0 x 3.6 cm, not significantly changed accounting for differences in technique (series 2, image 70). Colonic diverticulosis without diverticulitis is observed. The appendix is normal. Postoperative changes suggesting left inguinal hernia repair appear stable. No bowel obstruction, free fluid, fluid collection, or free air is observed. A small sliding-type hiatal hernia is unchanged. Retroperitoneum / lymph nodes: Aortobiiliac stent endograft is stable. The distal aorta measures 3 cm in diameter, unchanged. No lymphadenopathy is observed. Pelvis: The prostate gland is not enlarged. Musculoskeletal: Diffuse osteopenia is present. The visualized skeletal structures appear intact. 1. Right posterior lateral eccentric mass in the distal colon/rectum measuring approximately 3.0 x 3.6 cm (series 2, image 70). Direct visualization is again recommended. 2. Colonic diverticulosis without diverticulitis. Normal appendix.  No bowel obstruction. Multiple stable chronic findings are discussed. **This report has been created using voice recognition software. It may contain minor errors which are inherent in voice recognition technology. ** Final report electronically signed by Dr Keiry Badillo on 4/7/2022 9:22 AM      Electronically signed by Norbert Andrade PA-C on 4/7/2022 at 10:01 AM

## 2022-04-07 NOTE — CARE COORDINATION
4/7/22, 9:10 AM EDT  DISCHARGE PLANNING EVALUATION:    Margarette Timmons       Admitted: 4/6/2022/ 1142   Hospital day: 1   Location: [facility]22/022-A Reason for admit: Bright red blood per rectum [K62.5]  Lower GI bleed [K92.2]  Hematochezia [K92.1]   PMH:  has a past medical history of AAA (abdominal aortic aneurysm) ([facility] [address] CAD (coronary artery disease), Chronic kidney disease, COPD (chronic obstructive pulmonary disease) ([facility] [address] Hypercholesteremia, Hypertension, Other disorders of kidney and ureter in diseases classified elsewhere, Pneumonia, Prostate cancer ([facility] [address] SOB (shortness of breath), and Type II or unspecified type diabetes mellitus without mention of complication, not stated as uncontrolled. Procedure: 4/07/2022 Sigmoidoscopy   Barriers to Discharge:  Patient presents due to rectal bleeding. 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 Saint Furlough whom is consulted on his case. H/H 12.0/37. 6. GI consult, Aspirin and Metoprolol on hold, IV fluids, fleet enema x 1 dose, Haldol x 1 dose, DM management, Brovana and Pulmicort nebulizer, prn Tylenol and Zofran, H/H q 8 hr., NPO, SCD's, telemetry, up with assistance. PCP: DANTE Roberts CNP  Readmission Risk Score: 14.5 ( )%    Patient Goals/Plan/Treatment Preferences: Met with Larissa Grimes, his wife and daughter present at bedside. Larissa Grimes resides at home with his wife, Young Baumann. She verifies his insurance and PCP. They are able to afford his medications and have all the DME needed at home: Walker, power chair, oxygen through [employer], and nebulizer. They deny a need for additional DME. Larissa Grimes will have transportation to home at discharge. He does have a private duty aide that comes twice weekly to bathe Larissa Grimes. Larissa Grimes has had [facility] services in the past through [facility] ([facility]) and has been at [facility] for rehab. Therapies are planned to evaluate patient prior to his discharge to home. If Stevie's maintains his strength he will return to home. If he needs rehab they would be in agreement. SS consult placed. Transportation/Food Security/Housekeeping Addressed:  No issues identified.

## 2022-04-07 NOTE — PROGRESS NOTES
Recovery mode. Patient denies discomfort. Passing gas.  discussed findings with family, understanding verbalized.

## 2022-04-07 NOTE — ANESTHESIA PRE PROCEDURE
Department of Anesthesiology  Preprocedure Note       Name:  Kalee Prakash   Age:  80 y.o.  :  1931                                          MRN:  [id number]         Date:  2022      Surgeon: Ashleigh Hernandez):  Aurea Basurto MD    Procedure: Procedure(s):  Flexible Sigmoidoscopy    Medications prior to admission:   Prior to Admission medications    Medication Sig Start Date End Date Taking? Authorizing Provider   acetaminophen (TYLENOL) 500 MG tablet Take 500 mg by mouth every 6 hours as needed for Pain   Yes Historical Provider, MD JONES ELLIPTA 62.5 MCG/INH AEPB USE 1 INHALATION DAILY  Patient taking differently: Takes at 6 PM 21   Beverlie Buerger Neumeier, APRN - CNP   Respiratory Therapy Supplies (NEBULIZER AIR TUBE/PLUGS) MISC Please provide nebulizer kit and supplies with mask interface. 21   Beverlie Buerger Neumeier, APRN - CNP   metoprolol succinate (TOPROL XL) 25 MG extended release tablet Take 1 tablet by mouth daily 21   Patricia Law MD   tamsulosin (FLOMAX) 0.4 MG capsule Take 1 capsule by mouth daily 21   Rolando Winn DO   albuterol sulfate HFA (VENTOLIN HFA) 108 (90 Base) MCG/ACT inhaler Inhale 2 puffs into the lungs every 6 hours as needed for Wheezing or Shortness of Breath 21  DANTE Yusuf CNP   predniSONE (DELTASONE) 5 MG tablet Take 1 tablet by mouth daily Prednisone 5mg po daily to take after food.  21  DANTE Yusuf CNP   budesonide (PULMICORT) 0.5 MG/2ML nebulizer suspension USE 1 VIAL  IN  NEBULIZER TWICE  DAILY - rinse mouth after treatment 21   Beverlie Buerger Neumeier, APRN - CNP   formoterol (PERFOROMIST) 20 MCG/2ML nebulizer solution Take 2 mLs by nebulization every 12 hours 21   Beverlie Buerger Neumeier, APRN - CNP   aspirin (ASPIRIN CHILDRENS) 81 MG chewable tablet Take 1 tablet by mouth daily 21   Janes Freedman MD   docusate sodium (COLACE) 100 MG capsule Take 100 mg by mouth 2 times daily as needed for Constipation    Historical Provider, MD   albuterol (PROVENTIL) (2.5 MG/3ML) 0.083% nebulizer solution Take 3 mLs by nebulization every 6 hours as needed for Wheezing or Shortness of Breath  Patient taking differently: Take 2.5 mg by nebulization daily At 4 PM 7/7/21 7/2/22  DANTE Castillo - CNP   vitamin D-3 (CHOLECALCIFEROL) 125 MCG (5000 UT) TABS Take 1 tablet by mouth daily 10/26/20 7/27/21  Barb Winn DO   pravastatin (PRAVACHOL) 80 MG tablet Take 80 mg by mouth nightly. Historical Provider, MD   gabapentin (NEURONTIN) 100 MG capsule Take 100 mg by mouth 2 times daily. Historical Provider, MD   fish oil-omega-3 fatty acids 1000 MG capsule Take 1 g by mouth 2 times daily.     Historical Provider, MD       Current medications:    Current Facility-Administered Medications   Medication Dose Route Frequency Provider Last Rate Last Admin    haloperidol lactate (HALDOL) injection 5 mg  5 mg IntraMUSCular Once [employer], PA-C        guaiFENesin [facility]) extended release tablet 1,200 mg  1,200 mg Oral BID Wanda Huang PA-C   1,200 mg at 04/07/22 1015    [START ON 4/8/2022] aspirin chewable tablet 81 mg  81 mg Oral Daily Wanda Huang PA-C        metoprolol succinate (TOPROL XL) extended release tablet 25 mg  25 mg Oral Daily Wanda Huang PA-C        albuterol (PROVENTIL) nebulizer solution 2.5 mg  2.5 mg Nebulization Q6H PRN Wanda Huang PA-C   2.5 mg at 04/07/22 0726    albuterol sulfate  (90 Base) MCG/ACT inhaler 2 puff  2 puff Inhalation Q6H PRN Wanad Huang PA-C        budesonide (PULMICORT) nebulizer suspension 500 mcg  0.5 mg Nebulization BID Wanda Huang PA-C   500 mcg at 04/07/22 0804    docusate sodium (COLACE) capsule 100 mg  100 mg Oral BID PRN Wanda Huang PA-C        gabapentin (NEURONTIN) capsule 100 mg  100 mg Oral BID Wanda Huang PA-C   100 mg at 04/07/22 1011    Arformoterol Tartrate (BROVANA) nebulizer solution 15 mcg  15 mcg Nebulization BID NICHOLAS Vera   15 mcg at 04/07/22 0804    tiotropium (SPIRIVA RESPIMAT) 2.5 MCG/ACT inhaler 2 puff  2 puff Inhalation Daily Wanda Huang PA-C   2 puff at 04/06/22 2240    pravastatin (PRAVACHOL) tablet 80 mg  80 mg Oral Nightly Wanda Huang PA-C   80 mg at 04/06/22 2114    predniSONE (DELTASONE) tablet 5 mg  5 mg Oral Daily Wanda Huang PA-C   5 mg at 04/07/22 1012    tamsulosin (FLOMAX) capsule 0.4 mg  0.4 mg Oral Daily Wanda Huang PA-C   0.4 mg at 04/07/22 1012    Vitamin D (CHOLECALCIFEROL) tablet 5,000 Units  5,000 Units Oral Daily Wanda Huang PA-C   5,000 Units at 04/07/22 1012    sodium chloride flush 0.9 % injection 10 mL  10 mL IntraVENous 2 times per day Wanda Huang PA-C   10 mL at 04/06/22 2253    sodium chloride flush 0.9 % injection 10 mL  10 mL IntraVENous PRN Wadna Huang PA-C        0.9 % sodium chloride infusion   IntraVENous PRN Wanda Huang PA-C        ondansetron (ZOFRAN-ODT) disintegrating tablet 4 mg  4 mg Oral Q8H PRN Wanda Huang PA-C        Or    ondansetron (ZOFRAN) injection 4 mg  4 mg IntraVENous Q6H PRN Wanda Huang PA-C        polyethylene glycol (GLYCOLAX) packet 17 g  17 g Oral Daily PRN Wanda Huang PA-C        acetaminophen (TYLENOL) tablet 650 mg  650 mg Oral Q6H PRN Wanda Huang PA-C        Or    acetaminophen (TYLENOL) suppository 650 mg  650 mg Rectal Q6H PRN Wanda Huang PA-C        insulin lispro (HUMALOG) injection vial 0-6 Units  0-6 Units SubCUTAneous TID WC Wanda Huang PA-C        insulin lispro (HUMALOG) injection vial 0-3 Units  0-3 Units SubCUTAneous Nightly Wanda Huang PA-C        glucagon (rDNA) injection 1 mg  1 mg IntraMUSCular PRN Wanda Huang PA-C        dextrose 5 % solution  100 mL/hr IntraVENous PRN Wanda Huang PA-C        lactated ringers infusion   IntraVENous Continuous Wanda Huang PA-C 75 mL/hr at 04/07/22 1254 New Bag at 04/07/22 1254  glucose chewable tablet 4 each  4 tablet Oral PRN Wanda Huang PA-C        dextrose bolus (hypoglycemia) 10% 125 mL  125 mL IntraVENous PRN Wanda Huang PA-C        Or    dextrose bolus (hypoglycemia) 10% 250 mL  250 mL IntraVENous PRN Wanda Huang PA-C           Allergies:     Allergies   Allergen Reactions    Lasix [Furosemide] Hives and Rash    Lisinopril Hives and Rash    Daliresp [Roflumilast] Other (See Comments)     Anorexia    Keflex [Cephalexin]      hallucinations    Sulfa Antibiotics Hives    Percocet [Oxycodone-Acetaminophen] Nausea And Vomiting       Problem List:    Patient Active Problem List   Diagnosis Code    Stage 3 severe COPD by GOLD classification ([facility] J44.9    AAA (abdominal aortic aneurysm) ([facility] I71.4    Bilateral inguinal hernia K40.20    S/P bilateral inguinal hernia repair Z98.890, Z87.19    Hypoxemia R09.02    Atelectasis J98.11    COPD exacerbation ([email or web address] J44.1    Acute on chronic respiratory failure with hypoxia ([facility]) J96.21    CKD (chronic kidney disease), stage III ([facility]) N18.30    Essential hypertension I10    Swelling R60.9    Dyspnea on exertion R06.00    Acute respiratory failure with hypoxia ([facility]) J96.01    Acute bilateral low back pain without sciatica M54.50    Acute pain due to injury G89.11    Altered mental status R41.82    Syncope R55    Generalized weakness R53.1    Centrilobular emphysema ([facility]) J43.2    Hypoxemic respiratory failure, chronic ([facility]) J96.11    Bronchiectasis without complication ([facility]) A75.1    Bright red blood per rectum K62.5    Hematochezia K92.1       Past Medical History:        Diagnosis Date    AAA (abdominal aortic aneurysm) ([facility])     followed by Dr. Merrilee Cabot CAD (coronary artery disease)     Chronic kidney disease     COPD (chronic obstructive pulmonary disease) ([facility]     Hypercholesteremia     Hypertension     Other disorders of kidney and ureter in diseases classified elsewhere     Pneumonia     Prostate cancer ([facility]     with radiation in     SOB (shortness of breath)     Type II or unspecified type diabetes mellitus without mention of complication, not stated as uncontrolled        Past Surgical History:        Procedure Laterality Date    ABDOMEN SURGERY      ABDOMINAL AORTIC ANEURYSM REPAIR  14    ABDOMINAL AORTIC ANEURYSM REPAIR, ENDOVASCULAR Bilateral 2014    WITH LT FEMORAL ENDARTERECTOMY     CARDIAC SURGERY      COLONOSCOPY  unsure    INGUINAL HERNIA REPAIR Bilateral 2015    Dr Brownlee [facility]    VASCULAR SURGERY         Social History:    Social History     Tobacco Use    Smoking status: Former Smoker     Packs/day: 2.00     Years: 40.00     Pack years: 80.00     Types: Cigarettes     Quit date: 10/5/1989     Years since quittin.5    Smokeless tobacco: Never Used   Substance Use Topics    Alcohol use: Yes     Alcohol/week: 1.0 standard drink     Types: 1 Cans of beer per week     Comment: now and then                                Counseling given: Not Answered      Vital Signs (Current):   Vitals:    22 0726 22 1006 22 1443 22 1444   BP:  (!) 147/67 (!) 145/64    Pulse:  61 64    Resp:  18 18    Temp:  36.4 °C (97.5 °F)  36 °C (96.8 °F)   TempSrc:  Axillary  Tympanic   SpO2: 92%  96%    Weight:       Height:                                                  BP Readings from Last 3 Encounters:   22 (!) 145/64   21 130/70   21 (!) 162/78       NPO Status: Time of last liquid consumption: 0800                        Time of last solid consumption: 1400                        Date of last liquid consumption: 22                        Date of last solid food consumption: 22    BMI:   Wt Readings from Last 3 Encounters:   22 146 lb 12.8 oz (66.6 kg)   21 127 lb (57.6 kg)   21 127 lb (57.6 kg)     Body mass index is 23.69 kg/m².     CBC:   Lab Results   Component Value Date    WBC 10.8 2022    RBC 4.02 04/07/2022    HGB 12.2 04/07/2022    HCT 37.9 04/07/2022    MCV 93.5 04/07/2022    RDW 13.7 11/29/2017     04/07/2022       CMP:   Lab Results   Component Value Date     04/07/2022    K 4.2 04/07/2022     04/07/2022    CO2 26 04/07/2022    BUN 28 04/07/2022    CREATININE 1.6 04/07/2022    LABGLOM 41 04/07/2022    GLUCOSE 105 04/07/2022    GLUCOSE 97 03/06/2018    PROT 5.9 04/06/2022    CALCIUM 8.7 04/07/2022    BILITOT 0.4 04/06/2022    ALKPHOS 61 04/06/2022    AST 17 04/06/2022    ALT 11 04/06/2022       POC Tests:   Recent Labs     04/07/22  1130   POCGLU 102       Coags:   Lab Results   Component Value Date    INR 1.01 04/06/2022    APTT 30.2 04/06/2022       HCG (If Applicable): No results found for: PREGTESTUR, PREGSERUM, HCG, HCGQUANT     ABGs: No results found for: PHART, PO2ART, QQK6JKX, HFS5TPL, BEART, O4POJLCE     Type & Screen (If Applicable):  Lab Results   Component Value Date    LABRH POS 08/19/2014       Drug/Infectious Status (If Applicable):  No results found for: HIV, HEPCAB    COVID-19 Screening (If Applicable):   Lab Results   Component Value Date    COVID19 NOT DETECTED 06/09/2021           Anesthesia Evaluation  Patient summary reviewed and Nursing notes reviewed no history of anesthetic complications:   Airway: Mallampati: I  TM distance: >3 FB   Neck ROM: full  Mouth opening: > = 3 FB Dental:    (+) poor dentition      Pulmonary:   (+) COPD: severe,  shortness of breath:                             Cardiovascular:    (+) hypertension:, BOWER: after ambulating 1 flight of stairs, hyperlipidemia                  Neuro/Psych:               GI/Hepatic/Renal:   (+) renal disease: CRI,           Endo/Other:    (+) DiabetesType II DM, , malignancy/cancer. Abdominal:             Vascular:   + PVD, aortic or cerebral, . Other Findings:             Anesthesia Plan      MAC     ASA 3       Induction: intravenous.       Anesthetic plan and risks discussed with patient. Plan discussed with attending.                   Fernand Councilman, APRN - [id number]   4/7/2022

## 2022-04-07 NOTE — PROGRESS NOTES
BRIEF H&P//fENDOSCOPY PREPROCEDURE REPORT     Patient: Halima Bailey  : 1931  Acct#: [de-identified]    Date: 2022  Indications/Brief History: Rectal bleeding. Hx/o rectal Submucosal lesion  Anticipated Procedure: Flex sig    ASA class:  3  Airway Adequate? Yes___x__   No_______    Preprocedure Exam:   Neuro: Alert, oriented. Heart:  Regular rate and rhythm   Lungs: Clear to ausculation bilaterally, no evidence respiratory distress  Abdomen:  soft.   NT    PLAN:  EGD_____   COLONOSCOPY ______     [place] SIG___x_____    Osmin Mascorro MD MD  2022, 3:44 PM

## 2022-04-07 NOTE — ANESTHESIA POSTPROCEDURE EVALUATION
Department of Anesthesiology  Postprocedure Note    Patient: Dana Chinchilla  MRN: [id number]  YOB: 1931  Date of evaluation: 4/7/2022  Time:  4:20 PM     Procedure Summary     Date: 04/07/22 Room / Location: [address] [place] [address] / [address] [place] [address]    Anesthesia Start: 8742 Anesthesia Stop: 1618    Procedure: Flexible Sigmoidoscopy (Left Anus) Diagnosis: (Rectal mass & bleeding)    Surgeons: Norah Fernandez MD Responsible Provider: Joel Kinney MD    Anesthesia Type: MAC ASA Status: 3          Anesthesia Type: MAC    Sena Phase I: Sena Score: 10    Sena Phase II: Sena Score: 8    Last vitals: Reviewed and per EMR flowsheets.        Anesthesia Post Evaluation    Patient location during evaluation: PACU  Patient participation: complete - patient participated  Level of consciousness: awake and alert  Airway patency: patent  Nausea & Vomiting: no nausea  Complications: no  Cardiovascular status: blood pressure returned to baseline and hemodynamically stable  Respiratory status: acceptable and spontaneous ventilation  Hydration status: euvolemic

## 2022-04-07 NOTE — CONSULTS
Consult History & Physical      Patient:  Maryan Hager  YOB: 1931  MRN: [id number]     Acct: [de-identified]    Chief Complaint:    Chief Complaint   Patient presents with    Rectal Bleeding       Date of Service: Pt seen/examined in consultation on 4/7/2022    History Of Present Illness:      80 y.o. male who [facility]. HPI from patient, patient's family, and chart review. He came to the ED yesterday for bright red blood per rectum that started the previous night. His daughter states it \"saturated his entire diaper. \" He denies abdominal and rectal pain. He denies nausea, vomiting, diarrhea, constipation, and melena. Initial Hgb 13.7, most recent 12.0. He is on low dose aspirin, denies any other NSAIDs. CT A/P demonstrated right posterior lateral eccentric mass in the distal colon/rectum measuring 3.0 x 3.6 cm, colonic diverticulosis without diverticulitis. 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 Past Medical History:    Past Medical History:   Diagnosis Date    AAA (abdominal aortic aneurysm) ([facility]     followed by Dr. Deniz Murray CAD (coronary artery disease)     Chronic kidney disease     COPD (chronic obstructive pulmonary disease) ([facility]     Hypercholesteremia     Hypertension     Other disorders of kidney and ureter in diseases classified elsewhere     Pneumonia     Prostate cancer ([facility]     with radiation in 2006    SOB (shortness of breath)     Type II or unspecified type diabetes mellitus without mention of complication, not stated as uncontrolled        Home Medications:  Prior to Admission medications    Medication Sig Start Date End Date Taking?  Authorizing Provider   acetaminophen (TYLENOL) 500 MG tablet Take 500 mg by mouth every 6 hours as needed for Pain   Yes Historical Provider, MD JONES ELLIPTA 62.5 MCG/INH AEPB USE 1 INHALATION DAILY  Patient taking differently: Takes at 6 PM 11/23/21   DANTE Carter CNP   Respiratory Therapy Supplies (NEBULIZER AIR TUBE/PLUGS) MISC Please provide nebulizer kit and supplies with mask interface. 11/17/21   DANTE Carter CNP   metoprolol succinate (TOPROL XL) 25 MG extended release tablet Take 1 tablet by mouth daily 11/8/21   Patricia Schroeder MD   tamsulosin (FLOMAX) 0.4 MG capsule Take 1 capsule by mouth daily 11/1/21   Bryn Winn DO   albuterol sulfate HFA (VENTOLIN HFA) 108 (90 Base) MCG/ACT inhaler Inhale 2 puffs into the lungs every 6 hours as needed for Wheezing or Shortness of Breath 9/29/21 9/29/22  DANTE Vazquez CNP   predniSONE (DELTASONE) 5 MG tablet Take 1 tablet by mouth daily Prednisone 5mg po daily to take after food. 9/21/21 9/21/22  DANTE Vazquez CNP   budesonide (PULMICORT) 0.5 MG/2ML nebulizer suspension USE 1 VIAL  IN  NEBULIZER TWICE  DAILY - rinse mouth after treatment 8/12/21   DANTE Carter CNP   formoterol (PERFOROMIST) 20 MCG/2ML nebulizer solution Take 2 mLs by nebulization every 12 hours 8/12/21   DANTE Catrer CNP   aspirin (ASPIRIN CHILDRENS) 81 MG chewable tablet Take 1 tablet by mouth daily 7/28/21   Laurel Odonnell MD   docusate sodium (COLACE) 100 MG capsule Take 100 mg by mouth 2 times daily as needed for Constipation    Historical MD Melissa   albuterol (PROVENTIL) (2.5 MG/3ML) 0.083% nebulizer solution Take 3 mLs by nebulization every 6 hours as needed for Wheezing or Shortness of Breath  Patient taking differently: Take 2.5 mg by nebulization daily At 4 PM 7/7/21 7/2/22  DANTE Carter CNP   vitamin D-3 (CHOLECALCIFEROL) 125 MCG (5000 UT) TABS Take 1 tablet by mouth daily 10/26/20 7/27/21  Barb Winn DO   pravastatin (PRAVACHOL) 80 MG tablet Take 80 mg by mouth nightly. Historical Provider, MD   gabapentin (NEURONTIN) 100 MG capsule Take 100 mg by mouth 2 times daily.      Historical Provider, MD   fish oil-omega-3 fatty acids 1000 MG capsule Take 1 g by mouth 2 times daily. Historical Provider, MD       Surgical History:  Past Surgical History:   Procedure Laterality Date    ABDOMEN SURGERY      ABDOMINAL AORTIC ANEURYSM REPAIR  14    ABDOMINAL AORTIC ANEURYSM REPAIR, ENDOVASCULAR Bilateral 2014    WITH LT FEMORAL ENDARTERECTOMY     CARDIAC SURGERY      COLONOSCOPY  unsure    INGUINAL HERNIA REPAIR Bilateral 2015    Dr Nicolasa Lockhart VASCULAR SURGERY         Family History:  Family History   Problem Relation Age of Onset    Rheum Arthritis Mother          due to complications related to    Heart Disease Mother     Coronary Art Dis Father     Heart Disease Father     Coronary Art Dis Brother     Heart Disease Brother        Past GI History:  Rectal GIST tumor, colon polyps, diverticulosis, internal hemorrhoids, colonoscopy, flexible sigmoidoscopy, chronic anemia    Last flexible sigmoidoscopy 2018- known rectal GIST tumor noted    Dr. Bruce Reynolds patient    Allergies:  Lasix [furosemide], Lisinopril, Daliresp [roflumilast], Keflex [cephalexin], Sulfa antibiotics, and Percocet [oxycodone-acetaminophen]    Social History:   TOBACCO:   reports that he quit smoking about 32 years ago. His smoking use included cigarettes. He has a 80.00 pack-year smoking history. He has never used smokeless tobacco.  ETOH:   reports current alcohol use of about 1.0 standard drink of alcohol per week. Review Of Systems  GENERAL: No fever, chills or weight loss. EYES:  No  blurred vision, double vision   CARDIOVASCULAR: No chest pain or palpitations. RESPIRATORY:  No dyspnea or cough. GI:  See HPI  MUSCULOSKELETAL: No new painful or swollen joints or myalgias. :   No dysuria or hematuria. SKIN:  No rashes or jaundice. NEUROLOGIC:  +dementia  PSYCH:  No anxiety or depression. ENDOCRINE:  No polyuria or polydipsia.     BLOOD:  +anemia    PHYSICAL EXAM:  BP (!) 147/67   Pulse 61   Temp 97.5 °F (36.4 °C) (Axillary)   Resp 18   Ht 5' 6\" (1.676 m)   Wt 146 lb 12.8 oz (66.6 kg)   SpO2 92%   BMI 23.69 kg/m²     General appearance: No apparent distress, appears stated age and cooperative. HEENT: Normal cephalic, atraumatic without obvious deformity. Pupils equal, round, and reactive to light. Neck: Supple, with full range of motion. No jugular venous distention. Trachea midline. Respiratory:  Normal respiratory effort. Clear, diminished to auscultation, bilaterally without Rales/Wheezes/Rhonchi. Cardiovascular: Regular rate and rhythm without murmurs, rubs or gallops. Abdomen: Soft, non-tender, non-distended with active bowel sounds. Musculoskeletal: No clubbing, cyanosis bilaterally. Trace BLE edema. Skin: Pink, warm, dry. No rashes or lesions. Psychiatric: Alert and oriented x 1-2, confused at times, history of dementia  Rectal: No hemorrhoids, masses, or fissures noted. Brunetta Sartorius stool returned with very scant blood noted. Labs:   Recent Labs     04/07/22  0444   WBC 10.8   HGB 12.0*   HCT 37.6*        Recent Labs     04/07/22  0444      K 4.2      CO2 26   BUN 28*   CREATININE 1.6*   CALCIUM 8.7     Recent Labs     04/06/22  1227   AST 17   ALT 11   BILITOT 0.4   ALKPHOS 61     Recent Labs     04/06/22  1227   INR 1.01       Radiology:   CT Abdomen/pelvis WO contrast 04/07/22  FINDINGS:    LIMITATIONS: The evaluation of the solid organs is limited without IV contrast.       Lung bases: Improved aeration is noted at the lung bases. Small bilateral pleural effusions are no longer visualized.       Liver/gallbladder/bilary tree: No radiopaque gallstones or biliary ductal dilatation is observed. Evaluation is limited due to patient motion. The noncontrast liver is unremarkable.       Pancreas: Unremarkable noncontrast CT appearance. Limited due to patient motion. Spleen : Unremarkable noncontrast CT appearance.    Adrenal glands: Unremarkable noncontrast CT appearance.     Kidneys/ ureters/ bladder: The evaluation is limited due to patient motion. No hydronephrosis or hydroureter is present. Vascular calcifications are noted. The urinary bladder is distended.       Gastrointestinal:  Soft tissue mass in the rectosigmoid colon measures 3.0 x 3.6 cm, not significantly changed accounting for differences in technique (series 2, image 70). Colonic diverticulosis without diverticulitis is observed. The appendix is    normal. Postoperative changes suggesting left inguinal hernia repair appear stable. No bowel obstruction, free fluid, fluid collection, or free air is observed. A small sliding-type hiatal hernia is unchanged.       Retroperitoneum / lymph nodes: Aortobiiliac stent endograft is stable. The distal aorta measures 3 cm in diameter, unchanged. No lymphadenopathy is observed.       Pelvis: The prostate gland is not enlarged.       Musculoskeletal: Diffuse osteopenia is present. The visualized skeletal structures appear intact.               Impression   1. Right posterior lateral eccentric mass in the distal colon/rectum measuring approximately 3.0 x 3.6 cm (series 2, image 70). Direct visualization is again recommended.       2. Colonic diverticulosis without diverticulitis. Normal appendix. No bowel obstruction. Multiple stable chronic findings are discussed. Code Status: Full Code    ASSESSMENT:  1. Painless rectal bleeding- likely secondary to known rectal GIST tumor  2. Chronic normocytic anemia  3. H/O diverticulosis  4. H/O internal hemorrhoids  5. COPD, not exacerbated  6. Chronic hypoxic respiratory failure- on home O2 via nasal cannula  7. Dementia  8. CKD stage III  9. DM  10. CAD- on ASA & statin  11. H/O HTN  12.  Known, chronic, rectal GIST tumor    PLAN:     Monitor H & H, transfuse prn   Nursing to monitor stool output & document   NPO   Fleets enema once prior to procedure   Flexible sigmoidoscopy with risks & benefits discussed, patient & family agreeable   Will schedule for urgent flexible sigmoidoscopy today with Dr. Raz Weems RN updated   Supportive care per primary team  Will follow       Case reviewed and impression/plan reviewed in collaboration with Dr. Corina Hawley  Electronically signed by Weyman Schirmer, APRN - CNP on 4/7/2022 at 10:15 AM    [facility]  Thank you for the consultation.

## 2022-04-08 NOTE — PROGRESS NOTES
BRIEF H&P//fENDOSCOPY PREPROCEDURE REPORT     Patient: Nick Sharp  : 1931  Acct#: [de-identified]    Date: 2022  Indications/Brief History: Lower GI bleeding  Anticipated Procedure: Colonoscopy    ASA class:  3  Airway Adequate? Yes__x___   No_______    Preprocedure Exam:   Neuro: Alert, oriented. Heart:  Regular rate and rhythm   Lungs: Clear to ausculation bilaterally, no evidence respiratory distress  Abdomen:  soft.       PLAN:  EGD_____   COLONOSCOPY __x____     ERCP________    Anna Zamora MD MD  2022, 10:05 AM

## 2022-04-08 NOTE — PROGRESS NOTES
Recovery mode, arouses easily and denies discomfort. Dr. Aiden Avendaño discussed findings with pt and family. Report called to [id number].

## 2022-04-08 NOTE — ANESTHESIA PRE PROCEDURE
Department of Anesthesiology  Preprocedure Note       Name:  Aide Diego   Age:  80 y.o.  :  1931                                          MRN:  [id number]         Date:  2022      Surgeon: Andrea Carrizales):  Jefe Awad MD    Procedure: Procedure(s):  COLONOSCOPY    Medications prior to admission:   Prior to Admission medications    Medication Sig Start Date End Date Taking? Authorizing Provider   acetaminophen (TYLENOL) 500 MG tablet Take 500 mg by mouth every 6 hours as needed for Pain    Historical Provider, MD LANZARU ELLIPTA 62.5 MCG/INH AEPB USE 1 INHALATION DAILY  Patient taking differently: Takes at 6 PM 21   DANTE Dunn CNP   Respiratory Therapy Supplies (NEBULIZER AIR TUBE/PLUGS) MISC Please provide nebulizer kit and supplies with mask interface. 21   DANTE Dunn CNP   metoprolol succinate (TOPROL XL) 25 MG extended release tablet Take 1 tablet by mouth daily 21   Patricia Rosenthal MD   tamsulosin (FLOMAX) 0.4 MG capsule Take 1 capsule by mouth daily 21   Ирина Winn DO   albuterol sulfate HFA (VENTOLIN HFA) 108 (90 Base) MCG/ACT inhaler Inhale 2 puffs into the lungs every 6 hours as needed for Wheezing or Shortness of Breath 21  DANTE Saenz CNP   predniSONE (DELTASONE) 5 MG tablet Take 1 tablet by mouth daily Prednisone 5mg po daily to take after food.  21  DANTE Saenz CNP   budesonide (PULMICORT) 0.5 MG/2ML nebulizer suspension USE 1 VIAL  IN  NEBULIZER TWICE  DAILY - rinse mouth after treatment 21   DANTE Dunn CNP   formoterol (PERFOROMIST) 20 MCG/2ML nebulizer solution Take 2 mLs by nebulization every 12 hours 21   DANTE Dunn CNP   aspirin (ASPIRIN CHILDRENS) 81 MG chewable tablet Take 1 tablet by mouth daily 21   Abeba Lindo MD   docusate sodium (COLACE) 100 MG capsule Take 100 mg by mouth 2 times daily as needed for Constipation Historical Provider, MD   albuterol (PROVENTIL) (2.5 MG/3ML) 0.083% nebulizer solution Take 3 mLs by nebulization every 6 hours as needed for Wheezing or Shortness of Breath  Patient taking differently: Take 2.5 mg by nebulization daily At 4 PM 7/7/21 7/2/22  DANTE Mcneal - CNP   vitamin D-3 (CHOLECALCIFEROL) 125 MCG (5000 UT) TABS Take 1 tablet by mouth daily 10/26/20 7/27/21  Barb Winn DO   pravastatin (PRAVACHOL) 80 MG tablet Take 80 mg by mouth nightly. Historical Provider, MD   gabapentin (NEURONTIN) 100 MG capsule Take 100 mg by mouth 2 times daily. Historical Provider, MD   fish oil-omega-3 fatty acids 1000 MG capsule Take 1 g by mouth 2 times daily. Historical Provider, MD       Current medications:    No current facility-administered medications for this visit. No current outpatient medications on file.      Facility-Administered Medications Ordered in Other Visits   Medication Dose Route Frequency Provider Last Rate Last Admin    haloperidol lactate (HALDOL) injection 5 mg  5 mg IntraMUSCular Once [employer], NICHOLAS        guaiFENesin [facility]) extended release tablet 1,200 mg  1,200 mg Oral BID Wanda Huang PA-C   1,200 mg at 04/07/22 2148    aspirin chewable tablet 81 mg  81 mg Oral Daily Wanda Huang PA-C        metoprolol succinate (TOPROL XL) extended release tablet 25 mg  25 mg Oral Daily Wanda Huang PA-C        albuterol (PROVENTIL) nebulizer solution 2.5 mg  2.5 mg Nebulization Q6H PRN Wanda Huang PA-C   2.5 mg at 04/08/22 0148    albuterol sulfate  (90 Base) MCG/ACT inhaler 2 puff  2 puff Inhalation Q6H PRN Wanda Huang PA-C        budesonide (PULMICORT) nebulizer suspension 500 mcg  0.5 mg Nebulization BID Wanda Huang PA-C   500 mcg at 04/08/22 0839    docusate sodium (COLACE) capsule 100 mg  100 mg Oral BID PRN Wanda Huang PA-C        gabapentin (NEURONTIN) capsule 100 mg  100 mg Oral BID NICHOLAS Morris mg at 04/07/22 2148    Arformoterol Tartrate (BROVANA) nebulizer solution 15 mcg  15 mcg Nebulization BID Wanda Huang PA-C   15 mcg at 04/08/22 0839    tiotropium (SPIRIVA RESPIMAT) 2.5 MCG/ACT inhaler 2 puff  2 puff Inhalation Daily Wanda Huang PA-C   2 puff at 04/08/22 0839    pravastatin (PRAVACHOL) tablet 80 mg  80 mg Oral Nightly Wanda Huang PA-C   80 mg at 04/07/22 2149    predniSONE (DELTASONE) tablet 5 mg  5 mg Oral Daily Wanda Huang PA-C   5 mg at 04/07/22 1012    tamsulosin (FLOMAX) capsule 0.4 mg  0.4 mg Oral Daily Wanda Huang PA-C   0.4 mg at 04/07/22 1012    Vitamin D (CHOLECALCIFEROL) tablet 5,000 Units  5,000 Units Oral Daily Wanda Huang PA-C   5,000 Units at 04/07/22 1012    sodium chloride flush 0.9 % injection 10 mL  10 mL IntraVENous 2 times per day Wanda Huang PA-C   10 mL at 04/07/22 2231    sodium chloride flush 0.9 % injection 10 mL  10 mL IntraVENous PRN Wanda Huang PA-C        0.9 % sodium chloride infusion   IntraVENous PRN Wanda Huang PA-C        ondansetron (ZOFRAN-ODT) disintegrating tablet 4 mg  4 mg Oral Q8H PRN Wanda Huang PA-C        Or    ondansetron (ZOFRAN) injection 4 mg  4 mg IntraVENous Q6H PRN Wanda Huang PA-C        polyethylene glycol (GLYCOLAX) packet 17 g  17 g Oral Daily PRN Wanda Huang PA-C        acetaminophen (TYLENOL) tablet 650 mg  650 mg Oral Q6H PRN Wanda Huang PA-C        Or    acetaminophen (TYLENOL) suppository 650 mg  650 mg Rectal Q6H PRN Wanda Huang PA-C        insulin lispro (HUMALOG) injection vial 0-6 Units  0-6 Units SubCUTAneous TID WC Wanda Huang PA-C        insulin lispro (HUMALOG) injection vial 0-3 Units  0-3 Units SubCUTAneous Nightly Wanda Huang PA-C        glucagon (rDNA) injection 1 mg  1 mg IntraMUSCular PRN Wanda Huang PA-C        dextrose 5 % solution  100 mL/hr IntraVENous PRN Wanda Huang PA-C        lactated ringers infusion IntraVENous Continuous Wanda MARY Huang-C 75 mL/hr at 04/07/22 2232 New Bag at 04/07/22 2232    glucose chewable tablet 4 each  4 tablet Oral PRN MARY Morris-C        dextrose bolus (hypoglycemia) 10% 125 mL  125 mL IntraVENous PRN Wanda Sal PA-C        Or    dextrose bolus (hypoglycemia) 10% 250 mL  250 mL IntraVENous PRN Wanda Haung PA-C           Allergies:     Allergies   Allergen Reactions    Lasix [Furosemide] Hives and Rash    Lisinopril Hives and Rash    Daliresp [Roflumilast] Other (See Comments)     Anorexia    Keflex [Cephalexin]      hallucinations    Sulfa Antibiotics Hives    Percocet [Oxycodone-Acetaminophen] Nausea And Vomiting       Problem List:    Patient Active Problem List   Diagnosis Code    Stage 3 severe COPD by GOLD classification ([facility] J44.9    AAA (abdominal aortic aneurysm) ([facility] I71.4    Bilateral inguinal hernia K40.20    S/P bilateral inguinal hernia repair Z98.890, Z87.19    Hypoxemia R09.02    Atelectasis J98.11    COPD exacerbation ([facility] J44.1    Acute on chronic respiratory failure with hypoxia ([facility]) J96.21    CKD (chronic kidney disease), stage III ([facility]) N18.30    Essential hypertension I10    Swelling R60.9    Dyspnea on exertion R06.00    Acute respiratory failure with hypoxia ([facility]) J96.01    Acute bilateral low back pain without sciatica M54.50    Acute pain due to injury G89.11    Altered mental status R41.82    Syncope R55    Generalized weakness R53.1    Centrilobular emphysema ([facility]) J43.2    Hypoxemic respiratory failure, chronic ([facility]) J96.11    Bronchiectasis without complication ([facility]) S21.6    Bright red blood per rectum K62.5    Hematochezia K92.1       Past Medical History:        Diagnosis Date    AAA (abdominal aortic aneurysm) ([facility])     followed by Dr. Rajiv Mckinney CAD (coronary artery disease)     Chronic kidney disease     COPD (chronic obstructive pulmonary disease) ([facility]     Hypercholesteremia     Hypertension     Other disorders of kidney and ureter in diseases classified elsewhere     Pneumonia     Prostate cancer ([facility]     with radiation in     SOB (shortness of breath)     Type II or unspecified type diabetes mellitus without mention of complication, not stated as uncontrolled        Past Surgical History:        Procedure Laterality Date    ABDOMEN SURGERY      ABDOMINAL AORTIC ANEURYSM REPAIR  14    ABDOMINAL AORTIC ANEURYSM REPAIR, ENDOVASCULAR Bilateral 2014    WITH LT FEMORAL ENDARTERECTOMY     CARDIAC SURGERY      COLONOSCOPY  unsure    INGUINAL HERNIA REPAIR Bilateral 2015    Dr Johanna Steve Left 2022    Flexible Sigmoidoscopy performed by Екатерина Shah MD at [facility]    VASCULAR SURGERY         Social History:    Social History     Tobacco Use    Smoking status: Former Smoker     Packs/day: 2.00     Years: 40.00     Pack years: 80.00     Types: Cigarettes     Quit date: 10/5/1989     Years since quittin.5    Smokeless tobacco: Never Used   Substance Use Topics    Alcohol use: Yes     Alcohol/week: 1.0 standard drink     Types: 1 Cans of beer per week     Comment: now and then                                Counseling given: Not Answered      Vital Signs (Current): There were no vitals filed for this visit.                                            BP Readings from Last 3 Encounters:   22 132/62   22 (!) 85/38   21 130/70       NPO Status:                                                                                 BMI:   Wt Readings from Last 3 Encounters:   22 146 lb 12.8 oz (66.6 kg)   21 127 lb (57.6 kg)   21 127 lb (57.6 kg)     There is no height or weight on file to calculate BMI.    CBC:   Lab Results   Component Value Date    WBC 10.8 2022    RBC 4.02 2022    HGB 12.1 2022    HCT 38.0 2022    MCV 93.5 2022    RDW 13.7 2017     2022 CMP:   Lab Results   Component Value Date     04/07/2022    K 4.2 04/07/2022     04/07/2022    CO2 26 04/07/2022    BUN 28 04/07/2022    CREATININE 1.6 04/07/2022    LABGLOM 41 04/07/2022    GLUCOSE 105 04/07/2022    GLUCOSE 97 03/06/2018    PROT 5.9 04/06/2022    CALCIUM 8.7 04/07/2022    BILITOT 0.4 04/06/2022    ALKPHOS 61 04/06/2022    AST 17 04/06/2022    ALT 11 04/06/2022       POC Tests:   Recent Labs     04/08/22  0755   POCGLU 93       Coags:   Lab Results   Component Value Date    INR 1.01 04/06/2022    APTT 30.2 04/06/2022       HCG (If Applicable): No results found for: PREGTESTUR, PREGSERUM, HCG, HCGQUANT     ABGs: No results found for: PHART, PO2ART, VPP4RLU, QVL3KWG, BEART, Q3JRQQQI     Type & Screen (If Applicable):  Lab Results   Component Value Date    LABRH POS 08/19/2014       Drug/Infectious Status (If Applicable):  No results found for: HIV, HEPCAB    COVID-19 Screening (If Applicable):   Lab Results   Component Value Date    COVID19 NOT DETECTED 06/09/2021           Anesthesia Evaluation  Patient summary reviewed and Nursing notes reviewed no history of anesthetic complications:   Airway: Mallampati: I  TM distance: >3 FB   Neck ROM: full  Mouth opening: > = 3 FB Dental:    (+) poor dentition      Pulmonary:   (+) COPD: severe,  shortness of breath:  rhonchi,                             Cardiovascular:    (+) hypertension:, BOWER: after ambulating 1 flight of stairs, hyperlipidemia                  Neuro/Psych:               GI/Hepatic/Renal:   (+) renal disease: CRI,           Endo/Other:    (+) DiabetesType II DM, , malignancy/cancer. Abdominal:             Vascular:   + PVD, aortic or cerebral, . Other Findings:               Anesthesia Plan      MAC     ASA 3       Induction: intravenous. Anesthetic plan and risks discussed with patient. Plan discussed with CRNA.     Attending anesthesiologist reviewed and agrees with Preprocedure [facility], APRN - CRNA   4/8/2022

## 2022-04-08 NOTE — PROCEDURES
and particulate stool,  some pieces of smaller formed stool. With gentle maneuvering, the scope  was advanced to the base of cecum which was washed and inspected. The  cecum was normal.  The ileocecal valve was normal.  Efforts at advancing  across the valve proved unsuccessful. The scope was then brought up  through the ascending colon, across hepatic flexure, through transverse  colon, across splenic flexure, down through descending colon, through  sigmoid colon, across rectosigmoid junction, into the rectum. Visualization of the colon was limited due to very poor prep. There was  no blood present in the right colon. There was some blood tinged  brownish liquidy stool in the rectosigmoid area, and again some  diverticula with old blood in the sigmoid colon. No bright red blood,  no bleeding, no lesions appreciated, though again inspection was  limited. No obstructing lesions. There was very limited visualization  through portions of the left colon due to angulated turn and stool. Otherwise unremarkable. Back to the rectum, air was withdrawn. The  scope was removed. The patient tolerated the procedure well. No  apparent complications. Photograph #1 shows formed stool, 2 shows right  colon with just stool, 3 is right colon stool, 4 and 5 base of cecum, 6  is long view of cecum and valve, 7 ileocecal valve, 8 shows portions of  the right colon, and 9 is large amount of stool limited visualization. IMPRESSION:  1. Colonoscopy to the base of cecum. 2.  Very poor colonic preparation limited the examination. 3.  Very prominent left-sided diverticulosis especially sigmoid. No  bright red blood on today's exam, mostly brown liquidy soupy stool  throughout the colon. Noted slight blood tinge in the rectosigmoid. Also, some small amount of blood within diverticula in the sigmoid  noted. 4.  Otherwise unremarkable. No blood tinging in the right colon. Again  no active bleeding.     ASSESSMENT: The colonoscopy demonstrates resolution of acute bleeding. It is most likely that the patient's bleeding was due to sigmoid  diverticulum which subsequently stopped. PLAN:  1. Resume diet. 2.  Further plans based on clinical course. The patient's hemoglobin  has been stable at around 12 gm.         Farzana Tamez M.D.    D: 04/08/2022 10:18:26       T: 04/08/2022 10:20:54     [email or web address]  Job#: [id number]     Doc#: [id number]    CC:

## 2022-04-08 NOTE — PROGRESS NOTES
Hospitalist Progress Note      Patient:  Vane Tenorio    Unit/Bed:[facility] POOL RM/NONE  YOB: 1931  MRN: [id number]   Acct: [de-identified]   PCP: DANTE Hinojosa CNP  Date of Admission: 4/6/2022    Assessment/Plan:   1. Hematochezia:              Stable. Pt endorses 3 episodes of hematochezia prior to admission, however no episodes of hematochezia since. Pt denies abdominal pain, rectal pain. Hgb is 12.1 and appears stable. Pt is afebrile, non-toxic appearing without an acute leukocytosis. 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 Follows with Dr. Tyrone Flores. CT abdomen and pelvis yesterday reveals right posterior lateral eccentric mass in distal colon measuring 3.0 x 3.6 cm. GI onboard. Underwent sigmoidoscopy yesterday with noted severe diverticulosis with blood in the diverticula of the distal colon with no apparent source of bleeding. Patient underwent colonoscopy this morning. Plan to resume medications and start soft diet. PT/OT ordered. Repeat CBC in the AM. Continue I&Os.    2. COPD, without acute exacerbation:               Stable. Pt requires 2L chronically- at baseline. Required 5-6L in early mornings, however improved and resumed 2L after breathing treatments and home mucinex was resumed. CXR  was negative for acute changes. Continue home inhalers and prednisone.     3. CKD, stage 3a              Cr 1.6 today. Appears to be around baseline. Hold IVF, as patient is now advancing diet and PO intake. Repeat BMP in the AM.        4. Essential HTN:               Last /62. Continue metoprolol. Continue to monitor BP closely.      5. CAD:               Stable. Pt dose not endorse chest pain or worsening SOB today. Continue home statin. Continue ASA and metoprolol. Telemetry.      6. T2DM:               Glucose 98 today. Pt not on any home diabetes medications. Low dose SSI ordered. Hypoglycemia protocol ordered. POC glucose orders.  Repeat BMP in the AM.      7. AAA:               CT abdomen pelvis reveals distal aorta measuring 3 cm in diameter. DISPO: Likely discharge home tomorrow, given patient tolerates advancement in diet and Hgb continue to be stable. Chief Complaint: Bright red blood per rectum    Initial H and P:-    Theo Roth is a 79 y/o  male with a PMHx of CAD, COPD, HTN, T2DM who presents to [facility] today for the evaluation of bright red blood per rectum x 3 episodes since yesterday. The patient states he felt as if he had to pass gas, but realized that he was actually bleeding. Patient is overall poor historian, but daughter and wife at bedside provided additional history. Daughter states that the blood saturated his pants and chucks each time. The patient denies chest pain, worsening SOB, lightheadedness, dizziness, nausea, vomiting, change in appetite, abdominal pain. 4/7/2022: Patient resting comfortably in bed with family at bedside. States he has no pain or new change in symptoms. He states that he has not had a repeat episode of hematochezia. Denies feeling short of breath. Pt and pt family has no concerns at this time. Subjective (past 24 hours):   4/8/2022: Patient tolerated sigmoidoscopy and colonoscopy well. Pt denies any new symptoms and denies any additional episodes of hematochezia since admission. Plans to begin soft bland diet today, and possible discharge tomorrow. Patient was very agreeable. Family at bedside had many questions for which each was answered thoroughly. Past medical history, family history, social history and allergies reviewed again and is unchanged since admission. ROS (All review of systems completed. Pertinent positives noted.  Otherwise All other systems reviewed and negative.)     Medications:  Reviewed    Infusion Medications    sodium chloride      dextrose      lactated ringers 75 mL/hr at 04/07/22 1902     Scheduled Medications    haloperidol lactate  5 mg IntraMUSCular Once    guaiFENesin  1,200 mg Oral BID    aspirin  81 mg Oral Daily    metoprolol succinate  25 mg Oral Daily    budesonide  0.5 mg Nebulization BID    gabapentin  100 mg Oral BID    Arformoterol Tartrate  15 mcg Nebulization BID    tiotropium  2 puff Inhalation Daily    pravastatin  80 mg Oral Nightly    predniSONE  5 mg Oral Daily    tamsulosin  0.4 mg Oral Daily    Vitamin D  5,000 Units Oral Daily    sodium chloride flush  10 mL IntraVENous 2 times per day    insulin lispro  0-6 Units SubCUTAneous TID WC    insulin lispro  0-3 Units SubCUTAneous Nightly     PRN Meds: albuterol, albuterol sulfate HFA, docusate sodium, sodium chloride flush, sodium chloride, ondansetron **OR** ondansetron, polyethylene glycol, acetaminophen **OR** acetaminophen, glucagon (rDNA), dextrose, glucose, dextrose bolus (hypoglycemia) **OR** dextrose bolus (hypoglycemia)      Intake/Output Summary (Last 24 hours) at 4/8/2022 0934  Last data filed at 4/8/2022 0451  Gross per 24 hour   Intake 1400 ml   Output 450 ml   Net 950 ml       Diet:  Diet NPO    Exam:  /62   Pulse 69   Temp 98 °F (36.7 °C) (Oral)   Resp 16   Ht 5' 6\" (1.676 m)   Wt 146 lb 12.8 oz (66.6 kg)   SpO2 97%   BMI 23.69 kg/m²   General appearance: No apparent distress, appears stated age and cooperative. HEENT: Pupils equal, round, and reactive to light. Conjunctivae/corneas clear. Neck: Supple, with full range of motion. No jugular venous distention. Trachea midline. Respiratory: On 2L O2 NC. Normal respiratory effort. Clear to auscultation, bilaterally without Rales/Wheezes/Rhonchi. Cardiovascular: Diminished heart sounds. Regular rate and rhythm with normal S1/S2 without murmurs, rubs or gallops. Abdomen: Soft, non-tender, non-distended with normal bowel sounds. Musculoskeletal: passive and active ROM x 4 extremities. Skin: Skin color, texture, turgor normal.  No rashes or lesions.   Neurologic:  Neurovascularly intact without any focal sensory/motor deficits. Cranial nerves: II-XII intact, grossly non-focal.  Psychiatric: Alert and oriented, thought content appropriate, normal insight. Confused at times, consistent with Hx of dementia. Peripheral Pulses: +2 palpable, equal bilaterally     Labs:   Recent Labs     04/06/22  1227 04/06/22  1227 04/07/22  0444 04/07/22  0444 04/07/22  1158 04/07/22  1923 04/08/22  0543   WBC 13.4*  --  10.8  --   --   --   --    HGB 13.7*   < > 12.0*   < > 12.2* 12.8* 12.1*   HCT 42.8   < > 37.6*   < > 37.9* 39.9* 38.0*     --  184  --   --   --   --     < > = values in this interval not displayed. Recent Labs     04/06/22  1227 04/07/22  0444    140   K 4.6 4.2    105   CO2 25 26   BUN 28* 28*   CREATININE 1.6* 1.6*   CALCIUM 9.1 8.7     Recent Labs     04/06/22  1227   AST 17   ALT 11   BILITOT 0.4   ALKPHOS 61     Recent Labs     04/06/22  1227   INR 1.01     No results for input(s): Day Shaffer in the last 72 hours. Microbiology:    Blood culture #1:   Lab Results   Component Value Date    BC No growth-preliminary  No growth   11/28/2017       Blood culture #2:No results found for: Alex Marshall    Organism:No results found for: ORG    No results found for: LABGRAM    MRSA culture only:No results found for: [facility]    Urine culture:   Lab Results   Component Value Date    LABURIN No growth-preliminary No growth  06/05/2021       Respiratory culture: No results found for: CULTRESP    Aerobic and Anaerobic :  No results found for: LABAERO  No results found for: LABANAE    Urinalysis:      Lab Results   Component Value Date    NITRU NEGATIVE 07/27/2021    WBCUA 0 08/23/2014    BACTERIA NONE 08/23/2014    RBCUA 0 08/23/2014    BLOODU NEGATIVE 07/27/2021    SPECGRAV 1.015 08/23/2014    GLUCOSEU NEGATIVE 07/27/2021       Radiology:  XR CHEST PORTABLE   Final Result   1. No interval change since previous study dated 27 July 2021, no acute cardiopulmonary disease. .. **This report has been created using voice recognition software. It may contain minor errors which are inherent in voice recognition technology. **      Final report electronically signed by DR Mana Johnston on 4/7/2022 10:54 AM      CT ABDOMEN PELVIS WO CONTRAST Additional Contrast? None   Final Result   1. Right posterior lateral eccentric mass in the distal colon/rectum measuring approximately 3.0 x 3.6 cm (series 2, image 70). Direct visualization is again recommended. 2. Colonic diverticulosis without diverticulitis. Normal appendix. No bowel obstruction. Multiple stable chronic findings are discussed. **This report has been created using voice recognition software. It may contain minor errors which are inherent in voice recognition technology. **      Final report electronically signed by Dr Tan Crum on 4/7/2022 9:22 AM        CT ABDOMEN PELVIS WO CONTRAST Additional Contrast? None    Result Date: 4/7/2022  PROCEDURE: CT ABDOMEN PELVIS WO CONTRAST CLINICAL INFORMATION: Hematochezia. COMPARISON: CT abdomen and pelvis 6/5/2021. TECHNIQUE: 5 mm axial imaging through the abdomen and pelvis without IV contrast.  Coronal and sagittal reconstructions were performed. All CT scans at this facility use dose modulation, iterative reconstruction, and/or weight based dosing when appropriate to reduce the radiation dose to as low as reasonably achievable. FINDINGS: LIMITATIONS: The evaluation of the solid organs is limited without IV contrast. Lung bases: Improved aeration is noted at the lung bases. Small bilateral pleural effusions are no longer visualized. Liver/gallbladder/bilary tree: No radiopaque gallstones or biliary ductal dilatation is observed. Evaluation is limited due to patient motion. The noncontrast liver is unremarkable. Pancreas: Unremarkable noncontrast CT appearance. Limited due to patient motion. Spleen : Unremarkable noncontrast CT appearance.  Adrenal glands: Unremarkable noncontrast CT appearance. Kidneys/ ureters/ bladder: The evaluation is limited due to patient motion. No hydronephrosis or hydroureter is present. Vascular calcifications are noted. The urinary bladder is distended. Gastrointestinal:  Soft tissue mass in the rectosigmoid colon measures 3.0 x 3.6 cm, not significantly changed accounting for differences in technique (series 2, image 70). Colonic diverticulosis without diverticulitis is observed. The appendix is normal. Postoperative changes suggesting left inguinal hernia repair appear stable. No bowel obstruction, free fluid, fluid collection, or free air is observed. A small sliding-type hiatal hernia is unchanged. Retroperitoneum / lymph nodes: Aortobiiliac stent endograft is stable. The distal aorta measures 3 cm in diameter, unchanged. No lymphadenopathy is observed. Pelvis: The prostate gland is not enlarged. Musculoskeletal: Diffuse osteopenia is present. The visualized skeletal structures appear intact. 1. Right posterior lateral eccentric mass in the distal colon/rectum measuring approximately 3.0 x 3.6 cm (series 2, image 70). Direct visualization is again recommended. 2. Colonic diverticulosis without diverticulitis. Normal appendix. No bowel obstruction. Multiple stable chronic findings are discussed. **This report has been created using voice recognition software. It may contain minor errors which are inherent in voice recognition technology. ** Final report electronically signed by Dr Debra Quiroz on 4/7/2022 9:22 AM      Electronically signed by Marilou Flores PA-C on 4/8/2022 at 9:47 AM

## 2022-04-08 NOTE — PLAN OF CARE
Problem: Pain:  Goal: Patient's pain/discomfort is manageable  Description: Patient's pain/discomfort is manageable  Outcome: Ongoing   Pain Assessment: 0-10  Pain Level: 0   Patient's Stated Pain Goal: No pain   Is pain goal met at this time? Yes  Patient denies pain this shift. Problem: Skin Integrity:  Goal: Skin integrity will stabilize  Description: Skin integrity will stabilize  Outcome: Ongoing  No new skin breakdown. Turns self. Problem: Falls - Risk of:  Goal: Will remain free from falls  Description: Will remain free from falls  Outcome: Ongoing   Patient remains free from falls this shift. Fall risk assessment complete. Fall sign posted. Non skid socks on. Bed in lowest position, 2/4 siderails up. Bed alarm on. Call light and all possessions within reach. Increased rounding hourly. Problem: Discharge Planning:  Goal: Patients continuum of care needs are met  Description: Patients continuum of care needs are met  Outcome: Ongoing   Discharge plans unclear at this time.  following. Care plan reviewed with patient and family. Patient and family verbalize understanding of the plan of care and contribute to goal setting.

## 2022-04-08 NOTE — PROGRESS NOTES
Patient confused and climbing out of bed this shift. Oriented to self only. On wait list for telesitter. Consumed 30oz out of 64oz of bowel prep. Patient educated on importance of bowel prep for colonoscopy tomorrow. Still refused to drink anymore. Continues having large, watery brown bowel movements.

## 2022-04-08 NOTE — PROGRESS NOTES
[facility]  PHYSICAL THERAPY MISSED TREATMENT NOTE  [facility]    Date: 2022  Patient Name: Nerissa Oneal        MRN: [id number]   : 1931  (80 y.o.)  Gender: male                REASON FOR MISSED TREATMENT:  Hold treatment per nursing request.  RN initially approving session. Pt's family requesting pt have rescue inhaler if going to be working with PT. Communicated this with RN, who contacted repiratory therapy. Planned to hold PT eval until pt had a breathing treatment with respiratory.  PT to check back as time allows and pt medically appropriate for PT eval.     Rosemarie Grider PT, DPT

## 2022-04-08 NOTE — PROCEDURES
[address] [place] [address]                                 PROCEDURE NOTE    PATIENT NAME: Jeanine Pickett                      :        1931  MED REC NO:   [id number]                           ROOM:       0022  ACCOUNT NO:   [de-identified]                           ADMIT DATE: 2022  PROVIDER:     Cielo Zarco M.D.    Mattie Go:  2022    FLEXIBLE SIGMOIDOSCOPY REPORT    SURGEON:  Richard Hudson. MD Yarely    PROCEDURE TYPE:  Flex sig. INSTRUMENT:  Olympus video flexible sigmoidoscope. MEDICATIONS:  Propofol. See Anesthesia documentation report. ESTIMATED BLOOD LOSS: nil    BRIEF HISTORY:  The patient is a 61-year-old who presents with  hematochezia. I have seen the patient over the years in the office. He  has a history of a submucosal lesion in the rectum. Initially diagnosed  in . It was 1 cm at that time. He has been followed periodically  with flex sigs and has not wanted to undergo any surgery. He was  scheduled for repeat flex sig in 2019, but did not return at that time. His last exam was in 2018. He presented to the hospital with  hematochezia passing some blood and clots. Due to his history, flexible  sigmoidoscopy was planned. The patient was prepared using a Fleet's  enema. The procedure was discussed with the patient and his wife and  daughter. All expressed understanding and did wish to proceed. DESCRIPTION OF PROCEDURE:  The patient was brought from the medical  floor to the Endoscopy Department. He was placed on the appropriate  monitoring devices. He was placed in the left lateral decubitus  position. Sedation provided by the nurse anesthetist using propofol. Digital rectal exam demonstrated an easily palpable large mass in the  posterior rectum. About 3 cm. This was movable. Otherwise  unremarkable. The scope was then advanced into the rectum.   There was  blood and some small clots. Irrigation was performed and detailed  inspection was made to the rectum. The mucosa overlying the submucosal  lesion was normal.  There were no ulcers. There was no bleeding from  the site. There were some very small scattered telangiectatic vessels  in the very distal rectum near the anorectal junction consistent with  remote radiation. This was minimal.  These were not actively bleeding. The scope was gently retroflexed and the anorectal junction inspected. Retroflexed view was unremarkable. The scope was straightened. The  scope was advanced to 45 cm and there was blood all the way to this  point. There was some stool encountered at this point. The scope was  withdrawn back and extensive washing and suctioning was done. There was  extensive sigmoid diverticulosis present with blood and clots in the  diverticula. Numerous tics were washed and inspected, but there was no  specific diverticulum identified that was the cause of active bleeding  at this time. Subsequently after efforts of washing and suctioning were  made through the sigmoid, scope was brought back, air was withdrawn, and  the scope was removed. The patient tolerated the procedure well. No  apparent complications. PHOTOGRAPHS:  Photograph #1 and #2 show submucosal lesion in the rectum  with blood in the rectum. #3 is diverticulum. #4 vascular ectasias. #5 is rectum. #6 is vascular ectasias near the distal rectum consistent  with radiation change. #7 is in retroflexion. #8 shows vascular  ectasias secondary to radiation. #9 is up in the sigmoid. #10, #11,  #12 all show sigmoid colon with blood coating the mucosa, blood in the  diverticula. This is shown in photograph #10, #11, #12, #13, and #14. IMPRESSION:  1. Flexible sigmoidoscopy to 45 cm. 2.  Severe sigmoid diverticulosis with blood in the diverticula. Also  blood coating the mucosa.   No specific site was able to be identified  despite efforts of washing and suctioning. 3.  3 cm submucosal lesion identified in the rectum. The mucosa was  normal.  4.  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 No bleeding. 5.  Otherwise unremarkable exam within limits of visualization. ASSESSMENT:  As described above, flex sig demonstrates blood through the  sigmoid colon and severe diverticulosis. The overall findings are most  suggestive of diverticular bleeding. The rectal lesion appears  unremarkable with regard to the mucosa. PLAN:  1. Clear liquids. 2.  Colon prep. 3.  Anticipate colonoscopy tomorrow after prep. 4.  If the patient has active bleeding, obtain stat nuclear medicine,  tagged red blood cell scan. 5.  Continue to follow clinically and lab wise with regard to bleeding.         Myriam Maddox M.D.    D: 04/07/2022 16:21:45       T: 04/07/2022 16:25:43     [email or web address]  Job#: [id number]     Doc#: [id number]    CC:

## 2022-04-08 NOTE — PROGRESS NOTES
ENDOSCOPY POSTPROCEDURE REPORT     PT NAME: Marcus Araiza  MEDICAL RECORD NUMBER: [id number]  YOB: 1931    PROCEDURE PERFORMED BY : Lexa Rivera MD    PROCEDURE TYPE:   EGD ______   COLONOSCOPY ___x____   ERCP ________  MEDICATIONS USED :  VERSED _____   FENTANYL_____   PROPOFOL ___x___  Patient tolerated procedure well. No apparent complications. Estimated blood loss:  Nil  Specimens removed:  Yes_____  No___x___  Disposition of Specimens:  To Lab      BRIEF  FINDINGS:  1. very poor prep limited visualization. No red blood or active bleeding. Some old blood noted within sigmoid tics. 2. Blood tinged stool left colon. Brownish soupy liquid/particulate stool right colon, without blood. 3. O/w neg to cecum within limits of poor visualization. PRELIMINARY PLAN:  1. Resume meds  2. Start diet, soft bland  3. Follow clinically. If doing well could likely be discharged tomorrow. For complete findings and plan, see dictated report.      Lexa Rivera MD, MD  4/8/2022  10:06 AM

## 2022-04-08 NOTE — PROGRESS NOTES
Cristobal Hendrix 60  INPATIENT OCCUPATIONAL THERAPY  [facility] ONC MED 5K  EVALUATION    Time:    Time In: 1528  Time Out: 1550  Timed Code Treatment Minutes: 10 Minutes  Minutes: 22          Date: 2022  Patient Name: Tova Dutta,   Gender: male      MRN: [id number]  : 1931  (80 y.o.)  Referring Practitioner: YODIT Huang PAULYSSES  Diagnosis: bright red blood per rectum  Additional Pertinent Hx: 80-year-old male. They have noticed bright red blood in stool and its all over the underwear on arrival.  Appears to be a lower bleed. 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 He sees Dr. Fernando Munson in the office    Restrictions/Precautions:  Restrictions/Precautions: General Precautions,Contact Precautions,Fall Risk  Position Activity Restriction  Other position/activity restrictions: dementia    Subjective  Chart Reviewed: Yes,Orders,Progress Notes,History and Physical  Patient assessed for rehabilitation services?: Yes    Subjective: Pt awake in bed with fmaily present. Pt with confusion throughout requiring cues for oreintation. Family assisting with tasks. Pain:  Pain Assessment  Patient Currently in Pain: No    Vitals: Vitals not assessed per clinical judgement, see nursing flowsheet Pt on 2L of O2 with sats at 94% after mobility to chair    Social/Functional History:  Lives With: Spouse  Type of Home: House  Home Layout: One level  Home Access: Stairs to enter with rails  Entrance Stairs - Number of Steps: 2+1  Entrance Stairs - Rails: Both  Home Equipment: Rolling walker   Bathroom Shower/Tub: Tub/Shower unit  Bathroom Toilet: Handicap height  Bathroom Accessibility: Accessible       ADL Assistance: Needs assistance  Ambulation Assistance: Needs assistance  Transfer Assistance: Needs assistance          Additional Comments: Pt has 24 hour supervision with family and private duty aides. Pt assisted with all ADL tasks including ambulation with RW.     VISION:wears glasses    HEARING:  hard ofhearing COGNITION: Slow Processing, Decreased Recall, Decreased Insight, Impaired Memory, Decreased Problem Solving, Decreased Safety Awareness and Difficulty Following Commands    RANGE OF MOTION:  Bilateral Upper Extremity:  WNL    STRENGTH:  Bilateral Upper Extremity:  bilateral UE general weakness and deconditioning thorughout    SENSATION:   WFL    ADL:   Lower Extremity Dressing: Maximum Assistance. socks. BALANCE:  Sitting Balance:  Stand By Assistance. EOB  Standing Balance: Contact Guard Assistance, X 1. with bilatealr UE support on walker    BED MOBILITY:  Supine to Sit: Moderate Assistance      TRANSFERS:  Sit to Stand:  Minimal Assistance. from eOB   Stand to Sit: Minimal Assistance. into bedside chair with max cues for safety d/t pt sitting early. Family reports pt does that at home as well    FUNCTIONAL MOBILITY:  Assistive Device: Rolling Walker  Assist Level:  Minimal Assistance. Distance: x 5 feet from bed to chair  Pt impulsive with movements          Activity Tolerance:  Patient tolerance of  treatment: fair. Assessment:  Assessment: Pt with decreased ability to complete his ADL asks and mobility at [facility]. pt with history of increased confusion thorughout requiring 24 hour supervision and assistance with all ADL tasks. Pt would benefit from [facility] OT services to increase his ability to assist family wit ADL asks and ambulating to bathroom. Performance deficits / Impairments: Decreased safe awareness,Decreased balance,Decreased functional mobility ,Decreased ADL status,Decreased cognition,Decreased endurance,Decreased strength  Prognosis: Fair  REQUIRES OT FOLLOW UP: Yes  Decision Making: Medium Complexity    Treatment Initiated: Treatment and education initiated within context of evaluation.   Evaluation time included review of current medical information, gathering information related to past medical, social and functional history, completion of standardized testing, formal and informal observation of tasks, assessment of data and development of plan of care and goals. Treatment time included skilled education and facilitation of tasks to increase safety and independence with ADL's for improved functional independence and quality of life. Discharge Recommendations:  24 hour supervision or assist,Home with nursing aide (private duty nurses and family providing 24 hour assist)    Patient Education:  OT Education: [facility]  Patient Education: safety with ADL tasks and tranfser  Barriers to Learning: cognition    Equipment Recommendations:  Equipment Needed: No    Plan:  Times per week: -5x  Current Treatment Recommendations: Strengthening,Endurance Training,Patient/Caregiver Education & Training,Self-Care / [employer] [employer] Mobility Training,Safety Education & Training. See long-term goal time frame for expected duration of plan of care. If no long-term goals established, a short length of stay is anticipated. Goals:  Patient goals : family wants pt to return home  Short term goals  Time Frame for Short term goals: byd ischarge  Short term goal 1: Pt to navigate to/from bathroom using AD with CGA and mincues for safety to complete toileting tasks  Short term goal 2: Pt to complete ismple grooming tasks with min A and min cues  Short term goal 3: Pt to assist with completion of clothing managemnet after dressing and toileting with min A         Following session, patient left in safe position with all fall risk precautions in place.

## 2022-04-08 NOTE — PLAN OF CARE
Problem: Falls - Risk of:  Goal: Will remain free from falls  Description: Will remain free from falls  4/8/2022 1639 by Emperatriz Mojica RN  Outcome: Met This Shift  Fall assessment completed. Patient using call light appropriately to call for assistance with ambulation to bathroom. Personal items within reach. Patient is also compliant with use of non-skid slippers. Problem: Falls - Risk of:  Goal: Absence of physical injury  Description: Absence of physical injury  4/8/2022 1639 by Emperatriz Mojica RN  Outcome: Met This Shift  No falls noted this shift. Patient ambulates with x1 staff assistance without difficulty. Family member at bedside, spent the day. Bed kept in low position. Safe environment maintained. Bedside table & call light in reach. Uses call light appropriately when needing assistance. Problem: Safety:  Goal: Free from accidental physical injury  Description: Free from accidental physical injury  4/8/2022 1639 by Emperatriz Mojica RN  Outcome: Met This Shift  Patient free from accidental injuries. Problem: Safety:  Goal: Free from intentional harm  Description: Free from intentional harm  4/8/2022 1639 by Emperatriz Mojica RN  Outcome: Met This Shift  Patient free from intentional harm. Problem: Pain:  Goal: Patient's pain/discomfort is manageable  Description: Patient's pain/discomfort is manageable  4/8/2022 1639 by Emperatriz Mojica RN  Outcome: Met This Shift  Patient states pain relief from PRN pain medications. Pain reassessed one hour post PRN pain medication given. Patient rates pain 0 on PREMA 0-10 scale. Problem: Skin Integrity:  Goal: Skin integrity will stabilize  Description: Skin integrity will stabilize  4/8/2022 1639 by Emperatriz Mojica RN  Outcome: Met This Shift  No skin breakdown this shift. Patient being assisted with turning. Patients states understanding of repositioning every two hours.       Problem: Daily Care:  Goal: Daily care needs are met  Description: Daily care needs are met  4/8/2022 1639 by Emperatriz Mojica RN  Outcome: Ongoing  Daily care needs are met. Problem: Discharge Planning:  Goal: Patients continuum of care needs are met  Description: Patients continuum of care needs are met  4/8/2022 1639 by Emperatriz Mojica RN  Outcome: Ongoing     Discharge plan is in process. Plan discharge home with family. Problem: Bleeding:  Goal: Will show no signs and symptoms of excessive bleeding  Description: Will show no signs and symptoms of excessive bleeding  Outcome: Ongoing   No s/s bleeding. Patient had colonoscopy today. Care plan reviewed with patient and family. Patient and family verbalize understanding of the plan of care and contribute to goal setting.

## 2022-04-09 NOTE — PROGRESS NOTES
[facility]  INPATIENT PHYSICAL THERAPY  EVALUATION  [facility] ONC MED 5K - 5K-22/022-A    Time In: 8547  Time Out: 3738  Timed Code Treatment Minutes: 45 Minutes  Minutes: 46          Date: 2022  Patient Name: Ankita Weiss,  Gender:  male        MRN: [id number]  : 1931  (80 y.o.)      Referring Practitioner: Anastasiia Garcia PA-C  Diagnosis: bright red blood per rectum  Additional Pertinent Hx: Per H&P : Shannon Carpenter is a 79 y/o  male with a PMHx of CAD, COPD, HTN, T2DM who presents to [address] [place] [address] today for the evaluation of bright red blood per rectum x 3 episodes since yesterday. The patient states he felt as if he had to pass gas, but realized that he was actually bleeding. Patient is overall poor historian, but daughter and wife at bedside provided additional history. Daughter states that the blood saturated his pants and chucks each time. Shannon Carpenter is a 79 y/o  male with a PMHx of CAD, COPD, HTN, T2DM who presents to [address] [place] [address] today for the evaluation of bright red blood per rectum x 3 episodes since yesterday. The patient states he felt as if he had to pass gas, but realized that he was actually bleeding. Patient is overall poor historian, but daughter and wife at bedside provided additional history. Daughter states that the blood saturated his pants and chucks each time. Restrictions/Precautions:  Restrictions/Precautions: General Precautions,Contact Precautions,Fall Risk  Position Activity Restriction  Other position/activity restrictions: dementia    Subjective:  Chart Reviewed: Yes  Patient assessed for rehabilitation services?: Yes  Family / Caregiver Present: Yes (wife, daughter, son in law)  Subjective: RN approved session, pt pleasantly agrees for PT eval. Pt's family present, asking questions during session regarding pt's care and safety at home. Time during sesion to answer these.  PT recommending 24/7 hands on assistance, continued PT services, and use of RW. Pt's family states they feel okay regarding pt maneuvering stairs, do not wish to assess this today. PT recommending use of gait belt and hands on assist with this. Education on stair safety provided. Pt's family state understanding. General:  Overall Orientation Status: Impaired (not formally assessed)  Orientation Level: Oriented to person  Follows Commands: Within Functional Limits    Vision: Impaired  Vision Exceptions: Wears glasses at all times    Hearing: Exceptions to [facility]  Hearing Exceptions: Hard of hearing/hearing concerns       Pain: 0/10: denies    Vitals: Oxygen: 98% baseline, x2 drops to 88-89%, quickly recovered with seated rest and cues for pused lip breathing. Pt's daughter did administer rescue inhaler x1 time durign session, RN aware. Pt WFL remainder of session   Heart Rate: 78 basline, kept WFL   *Pt on 2L O2    Social/Functional History:    Lives With: Spouse  Type of Home: House  Home Layout: One level  Home Access: Stairs to enter with rails  Entrance Stairs - Number of Steps: 2+1  Entrance Stairs - Rails: Right  Home Equipment: Rolling walker,Oxygen (transport chair)     Bathroom Shower/Tub: Tub/Shower unit  Bathroom Toilet: Handicap height  Bathroom Accessibility: Accessible    Receives Help From: Family  ADL Assistance: Needs assistance     Ambulation Assistance: Needs assistance  Transfer Assistance: Needs assistance        Additional Comments: Pt has 24 hour supervision with family and private duty aides. Pt's daughter stays the night, pt with his wife during the day.  Pt assisted with all ADL tasks including ambulation with RW.    OBJECTIVE:  Range of Motion:  Bilateral Lower Extremity: WFL    Strength:  Right Lower Extremity: Hip flexion 4+/5, knee extension 4+/5, knee flexion 4+/5, DF 4/5, PF 4+/5    Left Lower Extremity: Hip flexion 4+/5, knee extension 4+/5, knee flexion 4+/5, DF 4+/5, PF 4+/5      Balance:  Static Sitting Balance:  Stand By Assistance, Contact Guard Assistance  Dynamic Sitting Balance: Stand By Assistance, Contact Guard Assistance  Static Standing Balance: Contact Guard Assistance, Minimal Assistance  Dynamic Standing Balance: Minimal Assistance   Min assist provided with the Tinetti. Bed Mobility:  Rolling to Left: Contact Guard Assistance, with rail   Rolling to Right: Contact Guard Assistance, with rail   Supine to Sit: [employer], with head of bed raised, with rail, with increased time for completion  Sit to Supine: Minimal Assistance, with rail, with increased time for completion    *Pt required max cues and increased time to orient to task     Transfers:  Sit to Stand: [employer], Minimal Assistance, with increased time for completion, cues for hand placement  Stand to [place] [address], Minimal Assistance, with increased time for completion, cues for hand placement   *Pt required repetitive cue for safe hand placement with 90% of transfers and min assist to support unsafe attempts to complete. Practiced x5 repeated sit to stand transfers for improved repetition with safe technique, pt continued to require cues, and hand over hand assist    Ambulation:  Minimal Assistance, with cues for safety, with increased time for completion  Distance: ~12'x2  Surface: Level Tile  Device:Rolling Walker  Gait Deviations: Forward Flexed Posture, Decreased Step Length Bilaterally, Decreased Heel Strike Bilaterally, Moderate Path Deviations, Unsteady Gait and Decreased Terminal Knee Extension  *Pt noted to ambulate quickly with little attention to safety. Cues and min assist to bring RW closer to pt's body, cues to maintain steps within the RW, and to slow deisy with increased attention to safety.       *X2 trials with repetitive cues for improved safety with little improvement     Exercise:  None completed this session     Functional Outcome Measures: Completed  Balance Score: 5  Gait Score: 6  Tinetti Total Score: 11/28 with high fall risk  AM-PAC Inpatient Mobility without Stair Climbing Raw Score : 15  AM-PAC Inpatient without Stair Climbing T-Scale Score : 43.03   Risk Indicators:  Less than/equal to 18 = high risk  19-23 Moderate risk  Greater than/equal to 24 = low risk    ASSESSMENT:  Activity Tolerance:  Patient tolerance of  treatment: fair. Pt required increased seated rest breaks and cues for pursed lip breathing to main SpO2. Pt required MAX cues and min assist for safe mobility with repetitive cues for safety. Pt with little carryover. Treatment Initiated: Treatment and education initiated within context of evaluation. Evaluation time included review of current medical information, gathering information related to past medical, social and functional history, completion of standardized testing, formal and informal observation of tasks, assessment of data and development of plan of care and goals. Treatment time included skilled education and facilitation of tasks to increase safety and independence with functional mobility for improved independence and quality of life. Assessment: Body structures, Functions, Activity limitations: Decreased functional mobility ,Decreased balance,Decreased posture,Decreased safe awareness,Decreased endurance  Assessment: This patient is a 80 y.o. who presents with rectal bleeding. This is a decline from the patient's baseline status of having 24/7 hands on assist from family at home with RW for mobility. Pt scored a 11/28 on the Tinetti indicating a high fall risk. The patient is observed to have deficits in strength, balance, activity tolerance, and safety awareness and would benefit from skilled PT services to progress functional mobility, safety awareness, and to decrease overall risk of falls. Pt requires 24/7 hands on assistance, use of gait belt, continued therapy for improved safety and functional mobility. Pt's family supportive and aware of this responsibility.  [facility] if discharging home with 24/7 hands on assistance from physically able family members. Prognosis: Fair    REQUIRES PT FOLLOW UP: Yes    Discharge Recommendations:  Discharge Recommendations: 24 hour supervision or assist,Patient would benefit from continued therapy after discharge    Patient Education:  PT Education: Juanito Marrufo [facility] Mobility Training    Equipment Recommendations:  Equipment Needed: No (continue to [employer])    Plan:  Times per week: 5x GM  Current Treatment Recommendations: Strengthening,Neuromuscular Re-education,Home Exercise Program,Safety Education & Training,Balance Training,Endurance Training,Patient/Caregiver Education & Training,Functional Mobility Training,Equipment Evaluation, Education, & procurement,Transfer Training,Gait Training,Stair training    Goals:  Patient goals : \"to go home\"  Short term goals  Time Frame for Short term goals: by hospital d.c  Short term goal 1: Pt to demo supine <->sit with S for safety with getting out of bed  Short term goal 2: Pt to demo sit <->stand with LRAD and CGA with 0 verbal cues for safe hand placement  Short term goal 3: Pt to ambulate >=50' with CGA and LRAD for ability to move in his environment safely  Short term goal 4: Pt to ascend/descend 3 steps with uni rail with CGA for safety with home entry  Short term goal 5: Pt to improved Tinetti score to >=19/28 to progress to the moderate fall risk category  Long term goals  Time Frame for Long term goals : NA due to short ELOS    Following session, patient left in safe position with all fall risk precautions in place.

## 2022-04-09 NOTE — PROGRESS NOTES
Discharge instructions given to patient and family. Verbalized understanding. Home health  called and paperwork was faxed. Left with family via wheelchair.

## 2022-04-09 NOTE — FLOWSHEET NOTE
Twila Tony is a pleasant just -turned 79 yo old gentleman. He was watching the [employer] play the [employer]. His wife is still with him though he can't recall how long they have been . He is a [employer]  and told some of his stories of being in the service. He appreciated the acknowledgement of his birthday and expressed surprise \"that he is still here. \"     Talked about visiting family in [facility] and living in [place] many years ago. He appreciated the visit.

## 2022-04-09 NOTE — PLAN OF CARE
Problem: RESPIRATORY  Goal: Clear lung sounds  Description: Clear lung sounds  4/8/2022 2054 by Nehal Pastor RCP  Outcome: Ongoing  Note: Patient receiving Spiriva daily, Brovana nebulizer BID, and Pulmicort nebulizer BID for maintenance of COPD. Patient also has Albuterol nebulizer PRN. Will continue with therapies as ordered.

## 2022-04-09 NOTE — DISCHARGE SUMMARY
Hospital Medicine Discharge Summary      Patient Identification:   Celina Cristobal   : 1931  MRN: [id number]   Account: [de-identified]      Patient's PCP: DANTE Thomas CNP    Admit Date: 2022     Discharge Date:   2022      Admitting Physician: Enriqueta Menon MD     Discharge Physician: Roger Sullivan MD     Discharge Diagnoses: Active Hospital Problems    Diagnosis Date Noted    Bright red blood per rectum [K62.5] 2022    Hematochezia [K92.1] 2022       The patient was seen and examined on day of discharge and this discharge summary is in conjunction with any daily progress note from day of discharge. Hospital Course:   Celina Cristobal is a 80 y.o. male admitted to [address] [place] [address] on 2022 for evaluation and management of hematochezia. GI service also followod up along. I took over care on 2022, day of planned discharge. Pt responded well to medical management, remained clinically stable and was discharged in stable conditions after cleared by consulting services. Assessment/Plan:    1. Hematochezia:              likely 2/2 diverticular hemorrhage. HD stable; Hb remains stable. No episodes of hematochezia since admission. Low-residu diet; encouraged hydration; consider repeat C-scope Op given suboptimal visualization d/t incomplete bowel prep; repea CBC in 2 days w/ PCP F/U      2. COPD, without acute exacerbation:               Stable. Pt requires 2L chronically- at baseline. Continue home inhalers and prednisone.     3. CKD, stage 3a              stable at baseline.        4. Essential HTN:           controlled on home meds. CCM. Pt and PCP need to monitor BP with antihypertensive regimen adjustment as needed      5. Hx of CAD:               Stable. Resumed home ASA given#1      6. Query Hx of T2DM:   7.             A1C from 2021 at 5.3%. BS well-controlled not on any OHAs/insulin. PCP to monitor w/ repeat Hb A1C.  Avoid tight glycemic control given pt's age even if pt happened to have true DM   8. Hx of AAA:               UL abdomen pelvis reveals distal aorta measuring 3 cm in diameter. PCP to monitor  9. Dementia: at baseline  10. Dispo: from home w/ 2-hour care; Ift[facility]har Michelle arranged. Pt/family wishes pt to return home.            Exam:     Vitals:  Vitals:    04/08/22 1615 04/08/22 2033 04/08/22 2043 04/09/22 0312   BP: 138/61 (!) 157/71  (!) 144/73   Pulse: 75 79  68   Resp: 20 20 18 20   Temp: 98.1 °F (36.7 °C) 98.3 °F (36.8 °C)  98 °F (36.7 °C)   TempSrc: Oral Oral  Oral   SpO2: 95% 95% 96% 97%   Weight:       Height:         Weight: Weight: 146 lb 12.8 oz (66.6 kg)     24 hour intake/output:    Intake/Output Summary (Last 24 hours) at 4/9/2022 0818  Last data filed at 4/9/2022 0815  Gross per 24 hour   Intake 985.67 ml   Output 1900 ml   Net -914.33 ml           General appearance: frail. Pleasant and calm; mildly confused at baseline. Not ill or toxic, in no apparent distress  HEENT:  LESIA  EOM intact. Neck: Supple, with full range of motion. No jugular venous distention. Trachea midline. Respiratory:   NL A/E bilat with no adventitious sounds   Cardiovascular:  normal S1/S2 with no murmurs/gallops  Abdomen: Soft, non-tender, non-distended, no rigidity or peritoneal signs  Musculoskeletal: NL symmetrical A/PROM bilat U/L extremities   Skin: No rashes. No edema  Neurologic:  CN II-XII intact. NL symmetrical reflexes. NL gait and stance. NL Cerebellar exam. Power 5/5 all muscle groups U/L extremities. Toes downgoing  Capillary Refill: Brisk,< 3 seconds   Peripheral Pulses: +2 palpable, equal bilaterally              Labs:  For convenience and continuity at follow-up the following most recent labs are provided:      CBC:    Lab Results   Component Value Date    WBC 10.6 04/08/2022    HGB 12.1 04/08/2022    HCT 38.1 04/08/2022     04/08/2022       Renal:    Lab Results   Component Value Date     04/08/2022    K 4.2 04/08/2022 K 4.2 04/07/2022     04/08/2022    CO2 26 04/08/2022    BUN 27 04/08/2022    CREATININE 1.6 04/08/2022    CALCIUM 9.3 04/08/2022         Significant Diagnostic Studies    Radiology:   XR CHEST PORTABLE   Final Result   1. No interval change since previous study dated 27 July 2021, no acute cardiopulmonary disease. .. **This report has been created using voice recognition software. It may contain minor errors which are inherent in voice recognition technology. **      Final report electronically signed by DR Chani Villa on 4/7/2022 10:54 AM      CT ABDOMEN PELVIS WO CONTRAST Additional Contrast? None   Final Result   1. Right posterior lateral eccentric mass in the distal colon/rectum measuring approximately 3.0 x 3.6 cm (series 2, image 70). Direct visualization is again recommended. 2. Colonic diverticulosis without diverticulitis. Normal appendix. No bowel obstruction. Multiple stable chronic findings are discussed. **This report has been created using voice recognition software. It may contain minor errors which are inherent in voice recognition technology. **      Final report electronically signed by Dr Luisa Timmons on 4/7/2022 9:22 AM             Consults:     IP CONSULT TO GI  IP CONSULT TO SOCIAL WORK    Disposition:    [facility]       [] TCU       [] Rehab       [] Psych       [] SNF       [] [facility]       [] Other-    Condition at Discharge: Stable    Code Status:  Full Code     Patient Instructions:    Discharge lab work: CBC, BMP in 2 days  Activity: activity as tolerated  Diet: ADULT DIET; Easy to Maralee Linen; GI [place] (GERD/Peptic Ulcer)      Follow-up visits:   No follow-up provider specified. Discharge Medications:        Medication List      CONTINUE taking these medications    Nebulizer Air Tube/Plugs Misc  Please provide nebulizer kit and supplies with mask interface.         ASK your doctor about these medications    acetaminophen 500 MG tablet  Commonly known as: TYLENOL  Ask about: Which instructions should I use? * albuterol (2.5 MG/3ML) 0.083% nebulizer solution  Commonly known as: PROVENTIL  Take 3 mLs by nebulization every 6 hours as needed for Wheezing or Shortness of Breath     * albuterol sulfate  (90 Base) MCG/ACT inhaler  Commonly known as: Ventolin HFA  Inhale 2 puffs into the lungs every 6 hours as needed for Wheezing or Shortness of Breath     aspirin 81 MG chewable tablet  Commonly known as: Aspirin Childrens  Take 1 tablet by mouth daily     budesonide 0.5 MG/2ML nebulizer suspension  Commonly known as: PULMICORT  USE 1 VIAL  IN  NEBULIZER TWICE  DAILY - rinse mouth after treatment     Colace 100 MG capsule  Generic drug: docusate sodium     fish oil-omega-3 fatty acids 1000 MG capsule     formoterol 20 MCG/2ML nebulizer solution  Commonly known as: Perforomist  Take 2 mLs by nebulization every 12 hours     gabapentin 100 MG capsule  Commonly known as: NEURONTIN     Incruse Ellipta 62.5 MCG/INH Aepb  Generic drug: Umeclidinium Bromide  USE 1 INHALATION DAILY     metoprolol succinate 25 MG extended release tablet  Commonly known as: TOPROL XL  Take 1 tablet by mouth daily     pravastatin 80 MG tablet  Commonly known as: PRAVACHOL     predniSONE 5 MG tablet  Commonly known as: DELTASONE  Take 1 tablet by mouth daily Prednisone 5mg po daily to take after food. tamsulosin 0.4 MG capsule  Commonly known as: FLOMAX  Take 1 capsule by mouth daily     vitamin D-3 125 MCG (5000 UT) Tabs  Commonly known as: cholecalciferol  Take 1 tablet by mouth daily         * This list has 2 medication(s) that are the same as other medications prescribed for you. Read the directions carefully, and ask your doctor or other care provider to review them with you. Time Spent on discharge is more than 45 minutes in the examination, evaluation, counseling and review of medications and discharge plan.       With RN in room, patient, wife and daughter were updated about the treatment plan, all the questions and concerns were addressed. Alarming signs and symptoms to return to ED were explained in length. Signed: Thank you DANTE Albert CNP for the opportunity to be involved in this patient's care.     Electronically signed by Mansi Spann MD on 4/9/2022 at 8:18 AM

## 2022-04-11 NOTE — CARE COORDINATION
Patient discharged 4/09/2022 with [facility].   4/11/22, 7:46 AM EDT    Patient goals/plan/ treatment preferences discussed by  and . Patient goals/plan/ treatment preferences reviewed with patient/ family. Patient/ family verbalize understanding of discharge plan and are in agreement with goal/plan/treatment preferences. Understanding was demonstrated using the teach back method. AVS provided by RN at time of discharge, which includes all necessary medical information pertaining to the patients current course of illness, treatment, post-discharge goals of care, and treatment preferences. IMM Letter  IMM Letter given to Patient/Family/Significant other/Guardian/POA/by[de-identified] Copy delivered to patient by mgr. Bone  IMM Letter date given[de-identified] 04/08/22  IMM Letter time given[de-identified] [id number]

## 2022-04-11 NOTE — TELEPHONE ENCOUNTER
Patient daughter called and pt just got out of [facility] for GI Bleed but he is having a lot of difficulty breathing and is wondering if you can prescribe an antibiotic and prednisone. She states he is too weak to come into the office.

## 2022-05-09 NOTE — TELEPHONE ENCOUNTER
Received refill request for Performist/budesonide/proventil. Medication was last ordered by [facility]. Medication was last ordered on 8/12/21 with 11 refills. Patient was last seen in the office 11/17/21. Patient has a scheduled follow up 11/15/22. Proventil was last filled on 7/7/21 with 3 refills .  Last note does not mention

## 2022-06-07 NOTE — PROGRESS NOTES
[facility]    [facility], [address]   [place], [place] [facility]  [phone]  Progress Note  6/7/2022 1:00 PM      Pt Name:    Marcus Araiza  YOB: 1931  Primary Care Physician:  Kylie Avalos, APRN - CNP     Chief Complaint:   Chief Complaint   Patient presents with    Follow-up     CKD III        History of Present Illness: This is a follow-up visit for CKD 3. 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 Hospitalized in April for GI Bleed. Felt to be diverticular bleed. BP controlled. Edema has improved, he is really limiting his sodium intake. Not on any diuretics. Pertinent items are noted in HPI.          Past History:  Past Medical History:   Diagnosis Date    [facility]     followed by Dr. Mariam Starkey CAD (coronary artery disease)     Chronic kidney disease     [facility]     Hypercholesteremia     Hypertension     Other disorders of kidney and ureter in diseases classified elsewhere     Pneumonia     Prostate cancer ([facility]     with radiation in 2006    SOB (shortness of breath)     Type II or unspecified type diabetes mellitus without mention of complication, not stated as uncontrolled      Past Surgical History:   Procedure Laterality Date    ABDOMEN SURGERY      ABDOMINAL AORTIC ANEURYSM REPAIR  8/21/14    ABDOMINAL AORTIC ANEURYSM REPAIR, ENDOVASCULAR Bilateral 08/21/2014    WITH LT FEMORAL ENDARTERECTOMY     CARDIAC SURGERY      COLONOSCOPY  unsure    COLONOSCOPY N/A 4/8/2022    COLONOSCOPY performed by Lexa Rivera MD at [address] [place] [address] Bilateral 07/28/2015    Dr Yo Ordonez Left 4/7/2022    Flexible Sigmoidoscopy performed by Lexa Rivera MD at [facility]    VASCULAR SURGERY          VITALS: BP (!) 152/62 (Site: Right Upper Arm, Position: Sitting, Cuff Size: Small Adult)   Pulse 56   Wt 140 lb (63.5 kg)   SpO2 98%   BMI 22.60 kg/m²   Wt Readings from Last 3 Encounters:   06/07/22 140 lb (63.5 kg)   04/06/22 146 lb 12.8 oz (66.6 kg)   11/17/21 127 lb (57.6 kg)     Body mass index is 22.6 kg/m². General Appearance: alert and cooperative with exam, appears comfortable, no distress  HEENT: EOMI, moist oral mucus membranes  Neck: No jugular venous distention,  Lungs: diminished breath sounds, end expiratory wheezes  Heart: S1, S2 heard, no rub  GI: soft, non-tender, no guarding  Extremities:1+ edema right leg, trace edema left leg  Skin: warm, dry  Neurologic: no tremor, no asterixis, no focal neurologic deficits     Medications:  Current Outpatient Medications   Medication Sig Dispense Refill    formoterol (PERFOROMIST) 20 MCG/2ML nebulizer solution Take 2 mLs by nebulization every 12 hours 120 mL 11    budesonide (PULMICORT) 0.5 MG/2ML nebulizer suspension Take 2 mLs by nebulization 2 times daily 120 mL 11    albuterol (PROVENTIL) (2.5 MG/3ML) 0.083% nebulizer solution Take 3 mLs by nebulization every 6 hours as needed for Wheezing or Shortness of Breath 360 mL 11    acetaminophen (TYLENOL) 500 MG tablet Take 500 mg by mouth every 6 hours as needed for Pain      INCRUSE ELLIPTA 62.5 MCG/INH AEPB USE 1 INHALATION DAILY (Patient taking differently: Takes at 6 PM) 90 each 3    Respiratory Therapy Supplies (NEBULIZER AIR TUBE/PLUGS) MISC Please provide nebulizer kit and supplies with mask interface.  1 each 0    metoprolol succinate (TOPROL XL) 25 MG extended release tablet Take 1 tablet by mouth daily 90 tablet 3    tamsulosin (FLOMAX) 0.4 MG capsule Take 1 capsule by mouth daily 90 capsule 3    albuterol sulfate HFA (VENTOLIN HFA) 108 (90 Base) MCG/ACT inhaler Inhale 2 puffs into the lungs every 6 hours as needed for Wheezing or Shortness of Breath 3 each 4    predniSONE (DELTASONE) 5 MG tablet Take 1 tablet by mouth daily Prednisone 5mg po daily to take after food. 90 tablet 3    aspirin (ASPIRIN CHILDRENS) 81 MG chewable tablet Take 1 tablet by mouth daily 30 tablet 0    docusate sodium (COLACE) 100 MG capsule Take 100 mg by mouth 2 times daily as needed for Constipation      vitamin D-3 (CHOLECALCIFEROL) 125 MCG (5000 UT) TABS Take 1 tablet by mouth daily 90 tablet 1    pravastatin (PRAVACHOL) 80 MG tablet Take 80 mg by mouth nightly.  gabapentin (NEURONTIN) 100 MG capsule Take 100 mg by mouth 2 times daily.  fish oil-omega-3 fatty acids 1000 MG capsule Take 1 g by mouth 2 times daily. No current facility-administered medications for this visit.         Laboratory & Diagnostics:  CBC:   Lab Results   Component Value Date    WBC 10.6 04/08/2022    HGB 12.1 (L) 04/08/2022    HCT 38.1 (L) 04/08/2022    MCV 93.2 04/08/2022     04/08/2022     BMP:    Lab Results   Component Value Date     06/03/2022     04/08/2022     04/07/2022    K 4.3 06/03/2022    K 4.2 04/08/2022    K 4.2 04/07/2022    CL 99 06/03/2022     04/08/2022     04/07/2022    CO2 29 06/03/2022    CO2 26 04/08/2022    CO2 26 04/07/2022    BUN 22 06/03/2022    BUN 27 (H) 04/08/2022    BUN 28 (H) 04/07/2022    CREATININE 1.6 (H) 06/03/2022    CREATININE 1.6 (H) 04/08/2022    CREATININE 1.6 (H) 04/07/2022    GLUCOSE 104 06/03/2022    GLUCOSE 98 04/08/2022    GLUCOSE 105 04/07/2022      Hepatic:   Lab Results   Component Value Date    AST 17 04/06/2022    AST 14 07/27/2021    AST 19 06/05/2021    ALT 11 04/06/2022    ALT 10 (L) 07/27/2021    ALT 14 06/05/2021    BILITOT 0.4 04/06/2022    BILITOT 0.3 07/27/2021    BILITOT 0.6 06/05/2021    ALKPHOS 61 04/06/2022    ALKPHOS 74 07/27/2021    ALKPHOS 64 06/05/2021     BNP: No results found for: BNP  Lipids:   Lab Results   Component Value Date    HDL 46 01/13/2021     INR:   Lab Results   Component Value Date    INR 1.01 04/06/2022    INR 1.06 04/08/2016    INR 0.97 08/19/2014     URINE: No results found for: Dicky Kayser  Lab Results   Component Value Date    NITRU NEGATIVE 07/27/2021    COLORU YELLOW 07/27/2021    PHUR 6.0 07/27/2021    LABCAST NONE SEEN 08/23/2014    LABCAST NONE SEEN 08/23/2014    WBCUA 0 08/23/2014    RBCUA 0 08/23/2014    YEAST NONE SEEN 08/23/2014    BACTERIA NONE 08/23/2014    CLARITYU Clear 03/12/2018    SPECGRAV 1.015 08/23/2014    LEUKOCYTESUR NEGATIVE 07/27/2021    UROBILINOGEN 0.2 07/27/2021    BILIRUBINUR NEGATIVE 07/27/2021    BLOODU NEGATIVE 07/27/2021    GLUCOSEU NEGATIVE 07/27/2021    KETUA NEGATIVE 07/27/2021      Microalbumen/Creatinine ratio:  No components found for: RUCREAT        Impression/Plan:   1. 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 Goals of care include slowing rate of progression by controlling blood pressure and blood glucoses and by avoiding nephrotoxins such as NSAIDs and IV contrast.  2. Systolic CHF: slightly hypervolemic but is following strict low sodium diet and doing ok. Has allergy to lasix  3. HTN: stable  4. Hx prostate Ca s/p RT. + urinary retention:on flomax will reduce to daily due to rash/itching  5. DM with neuropathy  6. Hyperlipidemia          Bloodwork and medications were reviewed and plan of care discussed with the patient. Return to clinic in 1 year or sooner if the need arises.       Aman Cordon,   [facility]

## 2022-07-11 NOTE — PROGRESS NOTES
[address]  [address]  [facility] [address]  Dept: [phone]  Dept Fax: [phone]  Loc: [phone]    Visit Date: 2022    Jose Toure is a 80 y.o. male who presents todayfor:  Chief Complaint   Patient presents with    Follow-up    COPD    Hypertension    Hyperlipidemia   known COPD and home  Limited patient   No chest pain   No changes in breathing  BP is stable   Some memory issues        HPI:  HPI  Past Medical History:   Diagnosis Date    AAA [facility]abdominal [facility]) [facility]     followed by Dr. Sathish Rojas CAD (coronary artery disease)     Chronic kidney disease     COPD (chronic obstructive pulmonary disease) ([facility] [address]     Hypercholesteremia     Hypertension     Other disorders of kidney and ureter in diseases classified elsewhere     Pneumonia     Prostate cancer ([facility] [address]     with radiation in     SOB (shortness of breath)     Type II or unspecified type diabetes mellitus without mention of complication, not stated as uncontrolled       Past Surgical History:   Procedure Laterality Date    ABDOMEN SURGERY      ABDOMINAL AORTIC ANEURYSM REPAIR  14    ABDOMINAL AORTIC ANEURYSM REPAIR, ENDOVASCULAR Bilateral 2014    WITH LT FEMORAL ENDARTERECTOMY     CARDIAC SURGERY      COLONOSCOPY  unsure    COLONOSCOPY N/A 2022    COLONOSCOPY performed by Beverly Howard MD at [address] [place] [address] Bilateral 2015    Dr Elvin Finch Left 2022    Flexible Sigmoidoscopy performed by Beverly Howard MD at [facility]    VASCULAR SURGERY       Family History   Problem Relation Age of Onset    Rheum Arthritis Mother          due to complications related to    Heart Disease Mother     Coronary Art Dis Father     Heart Disease Father     Coronary Art Dis Brother     Heart Disease Brother      Social History     Tobacco Use    Smoking status: Former Smoker     Packs/day: 2.00     Years: 40.00     Pack years: 80.00     Types: Cigarettes     Quit date: 10/5/1989     Years since quittin.7    Smokeless tobacco: Never Used   Substance Use Topics    Alcohol use: Yes     Alcohol/week: 1.0 standard drink     Types: 1 Cans of beer per week     Comment: now and then      Current Outpatient Medications   Medication Sig Dispense Refill    formoterol (PERFOROMIST) 20 MCG/2ML nebulizer solution Take 2 mLs by nebulization every 12 hours 120 mL 11    budesonide (PULMICORT) 0.5 MG/2ML nebulizer suspension Take 2 mLs by nebulization 2 times daily 120 mL 11    albuterol (PROVENTIL) (2.5 MG/3ML) 0.083% nebulizer solution Take 3 mLs by nebulization every 6 hours as needed for Wheezing or Shortness of Breath 360 mL 11    acetaminophen (TYLENOL) 500 MG tablet Take 500 mg by mouth every 6 hours as needed for Pain      INCRUSE ELLIPTA 62.5 MCG/INH AEPB USE 1 INHALATION DAILY (Patient taking differently: Takes at 6 PM) 90 each 3    Respiratory Therapy Supplies (NEBULIZER AIR TUBE/PLUGS) MISC Please provide nebulizer kit and supplies with mask interface. 1 each 0    metoprolol succinate (TOPROL XL) 25 MG extended release tablet Take 1 tablet by mouth daily 90 tablet 3    tamsulosin (FLOMAX) 0.4 MG capsule Take 1 capsule by mouth daily 90 capsule 3    albuterol sulfate HFA (VENTOLIN HFA) 108 (90 Base) MCG/ACT inhaler Inhale 2 puffs into the lungs every 6 hours as needed for Wheezing or Shortness of Breath 3 each 4    predniSONE (DELTASONE) 5 MG tablet Take 1 tablet by mouth daily Prednisone 5mg po daily to take after food.  90 tablet 3    aspirin (ASPIRIN CHILDRENS) 81 MG chewable tablet Take 1 tablet by mouth daily 30 tablet 0    docusate sodium (COLACE) 100 MG capsule Take 100 mg by mouth 2 times daily as needed for Constipation      vitamin D-3 (CHOLECALCIFEROL) 125 MCG (5000 UT) TABS Take 1 tablet by mouth daily 90 tablet 1    pravastatin (PRAVACHOL) 80 MG tablet Take 80 mg by mouth nightly.  gabapentin (NEURONTIN) 100 MG capsule Take 100 mg by mouth 2 times daily.  fish oil-omega-3 fatty acids 1000 MG capsule Take 1 g by mouth 2 times daily. No current facility-administered medications for this visit. Allergies   Allergen Reactions    Lasix [Furosemide] Hives and Rash    Lisinopril Hives and Rash    Daliresp [Roflumilast] Other (See Comments)     Anorexia    Keflex [Cephalexin]      hallucinations    Sulfa Antibiotics Hives    Percocet [Oxycodone-Acetaminophen] Nausea And Vomiting     Health Maintenance   Topic Date Due    Depression Screen  Never done    DTaP/Tdap/Td vaccine (1 - Tdap) Never done    Shingles vaccine (1 of 2) Never done    Lipids  01/13/2022    Prostate Specific Antigen (PSA) Screening or Monitoring  01/13/2022    Flu vaccine (1) 09/01/2022    Pneumococcal 65+ years Vaccine  Completed    COVID-19 Vaccine  Completed    Hepatitis A vaccine  Aged Out    Hepatitis B vaccine  Aged Out    Hib vaccine  Aged Out    Meningococcal (ACWY) vaccine  Aged Out       Subjective:  Review of Systems  General:   No fever, no chills, No fatigue or weight loss  Pulmonary:    No dyspnea, no wheezing  Cardiac:    Denies recent chest pain,   GI:     No nausea or vomiting, no abdominal pain  Neuro:    No dizziness or light headedness,   Musculoskeletal:  No recent active issues  Extremities:   No edema, no obvious claudication       Objective:  Physical Exam  /66   Pulse 54   Ht 5' 6\" (1.676 m)   BMI 22.60 kg/m²   General:   Well developed, well nourished  Lungs:   Clear to auscultation  Heart:    Normal S1 S2, Slight murmur. no rubs, no gallops  Abdomen:   Soft, non tender, no organomegalies, positive bowel sounds  Extremities:   No edema, no cyanosis, good peripheral pulses  Neurological:   Awake, alert, oriented. No obvious focal deficits  Musculoskelatal:  No obvious deformities    Assessment:      Diagnosis Orders   1.  Essential hypertension  EKG 12 lead   2. Familial hypercholesterolemia  EKG 12 lead   3. SOB (shortness of breath)  EKG 12 lead   as above  Relatively lower HR  ECG in office was done today. I reviewed the ECG. No acute findings      Plan:  No follow-ups on file. As above  Continue risk factor modification and medical management  Thank you for allowing me to participate in the care of your patient. Please don't hesitate to contact me regarding any further issues related to the patient care    Orders Placed:  Orders Placed This Encounter   Procedures    EKG 12 lead     Order Specific Question:   Reason for Exam?     Answer: Other       Medications Prescribed:  No orders of the defined types were placed in this encounter. Discussed use, benefit, and side effects of prescribed medications. All patient questions answered. Pt voicedunderstanding. Instructed to continue current medications, diet and exercise. Continue risk factor modification and medical management. Patient agreed with treatment plan. Follow up as directed.     Electronically signedby Manan Resendez MD on 7/11/2022 at 11:23 AM

## 2022-07-18 PROBLEM — S72.001A CLOSED DISPLACED FRACTURE OF RIGHT FEMORAL NECK (HCC): Status: ACTIVE | Noted: 2022-01-01

## 2022-07-18 PROBLEM — J96.02 ACUTE HYPERCAPNIC RESPIRATORY FAILURE (HCC): Status: ACTIVE | Noted: 2021-06-02
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
## 2022-07-18 NOTE — CONSULTS
ICU Consult Note      Patient: Alicia Garzon  YOB: 1931    MRN: [id number]     Acct: [de-identified]    PCP: DANTE Valdovinos CNP    Date of Admission: 7/18/2022    Date of Service: 7/18/2022      ASSESSMENT / PLAN:    Traumatic R intertrochanteric hip fracture  Admitted to orthopedic service. Cleared for surgery by hospitalist team, however pulmonology service recommended deferring the procedure given the patient's decreased responsiveness and history of severe COPD, with moderate-high risk based off of the ARISCAT Score. Chronic steroid use likely increased susceptibility to fracture. Continue primary management by orthopedic service. Patient does not meet criteria for perioperative stress-dosing of steroids. Would recommend limiting ongoing steroid use as respiratory status tolerates. Check vitamin D level. Consider outpatient DEXA screening if not previously completed. Acute encephalopathy  Hypercapnia resolved on repeat ABG. CT head negative. Suspect side effect of dilaudid. Responsiveness did improve following additional dose of narcan upon arrival to ICU. No indication for intubation at this time. Will continue to monitor closely. Gabapentin held. GOLD Stage III COPD, with acute exacerbation  FEV1 35% predicted in 2017. Pulmonology consulted by hospitalist service on admission; initiated BiPAP. Wean as tolerated. Continue bronchodilators, steroids for now. Pulmonary hygiene. Acute on chronic hypoxic / acute hypercapnic respiratory failure  Baseline 2-2.5L NC. Secondary to COPD exacerbation and decreased responsiveness s/p dilaudid administration. Currently on BiPAP. Wean as tolerated. Will maintain sitter at bedside. Leukocytosis  Mild. Suspect reactive (fracture, recent steroid use). Afebrile. CBC daily. Metabolic acidosis  Mild. Present on repeat ABG. Check LA. Repeat metabolic panel, anion gap. CKD stage 3  Stable. Avoid nephrotoxins. Renally dose medications. BMP daily. Primary hypertension  Controlled. Metoprolol resumed with parameters. Monitor BP. CAD  Statin resumed. ASA held for anticipated orthopedic surgery. Hypercholesteremia  Statin resumed. NIDDM2, with associated diabetic neuropathy  No active therapies. Receiving IV steroids. POCT checks. Initiate SSI to target IP -180. Hypoglycemia protocol. Gabapentin held for acute encephalopathy. Elevated TSH  Mild. No former diagnosis of hypothyroidism. Recommend repeat outpatient thyroid testing in approximately 6 weeks. History of prostate cancer  S/p radiation in 2006. Flomax resumed. AAA  By history. Followed by Dr. Edith Chacon. Dementia  Good baseline functional status. No active therapies. Chief Complaint: R hip pain    History of Present Illness:  81 y/o M reformed smoker PMHx GOLD 3 COPD (FEV1 35% predicted 11/2017), chronic hypoxic respiratory failure (on baseline 2-2.5L NC), HTN, HLD, CAD, NIDDM2 and dementia with good baseline functional status -- presented to [facility] with a chief complaint of R hip pain. History obtained from the patient's wife and daughter, who were present at bedside. Patient was admitted to the orthopedic surgery service 7/18 for traumatic R intertrochanteric hip fracture. The hospitalist service was consulted for pre-operative clearance, and subsequently consulted pulmonology for additional recommendations. Unfortunately, the patient fell while sleepwalking on the night prior to arrival. Family members report good baseline functional status at home -- state patient ambulates small distances with a walker and one assist, and answers questions appropriately. Patient received dilaudid in the ED and was subsequently noted to be less responsive. Narcan was administered without improvement. CT head showed NAP. ABG was obtained, demonstrating acute hypercapnic respiratory failure (pCO2 50, pH 7.28).  He was diagnosed with acute COPD exacerbation, initiated on steroids/bronchodilators, and placed on BiPAP for acute on chronic hypoxic/acute hypercapnic respiratory failure. Pulmonology recommended deferring surgery and transferring to the ICU for possible intubation due to the patient's decreased responsiveness and history of severe COPD, along with moderate-high risk based off of the ARISCAT Score. __      Upon arrival to the ICU, the patient remained minimally responsive to painful stimuli. Hemodynamics were stable; oxygenation and ventilation were appropriate on BiPAP. Repeat blood gas demonstrated resolution of hypercapnia, and new metabolic acidosis. An additional dose of narcan was administered, after which the patient started moving his extremities, responding to painful stimuli and spontaneously opening his eyes. The decision was made to defer intubation and continue monitoring. Non-narcotic analgesics were ordered for pain control. Patient's wife and daughter confirmed that the patient would be transitioned FULL CODE status now, and remain so until the post-operative period. All questions were answered. Please refer to the assessment and plan for additional details.     Past Medical History:    Past Medical History:   Diagnosis Date    AAA (abdominal aortic aneurysm) ([facility] [address]     followed by Dr. Baldomero Razo    CAD (coronary artery disease)     Chronic kidney disease     COPD (chronic obstructive pulmonary disease) ([facility] [address]     Hypercholesteremia     Hypertension     Prostate cancer ([facility] [address]     with radiation in 2006    Type 2 DM      Past Surgical History:    Past Surgical History:   Procedure Laterality Date    ABDOMEN SURGERY      ABDOMINAL AORTIC ANEURYSM REPAIR  8/21/14    ABDOMINAL AORTIC ANEURYSM REPAIR, ENDOVASCULAR Bilateral 08/21/2014    WITH LT FEMORAL ENDARTERECTOMY     CARDIAC SURGERY      COLONOSCOPY  unsure    COLONOSCOPY N/A 4/8/2022    COLONOSCOPY performed by Danuta Morley MD at [id number] [facility] Bilateral 07/28/2015     Esvin    SIGMOIDOSCOPY Left 4/7/2022    Flexible Sigmoidoscopy performed by Shalonda Raymond MD at [facility]    VASCULAR SURGERY        Medications Prior to Admission:   No current facility-administered medications on file prior to encounter. Current Outpatient Medications on File Prior to Encounter   Medication Sig Dispense Refill    formoterol (PERFOROMIST) 20 MCG/2ML nebulizer solution Take 2 mLs by nebulization every 12 hours 120 mL 11    budesonide (PULMICORT) 0.5 MG/2ML nebulizer suspension Take 2 mLs by nebulization 2 times daily 120 mL 11    albuterol (PROVENTIL) (2.5 MG/3ML) 0.083% nebulizer solution Take 3 mLs by nebulization every 6 hours as needed for Wheezing or Shortness of Breath 360 mL 11    acetaminophen (TYLENOL) 500 MG tablet Take 500 mg by mouth every 6 hours as needed for Pain      INCRUSE ELLIPTA 62.5 MCG/INH AEPB USE 1 INHALATION DAILY (Patient taking differently: Takes at 6 PM) 90 each 3    Respiratory Therapy Supplies (NEBULIZER AIR TUBE/PLUGS) MISC Please provide nebulizer kit and supplies with mask interface. 1 each 0    metoprolol succinate (TOPROL XL) 25 MG extended release tablet Take 1 tablet by mouth daily 90 tablet 3    tamsulosin (FLOMAX) 0.4 MG capsule Take 1 capsule by mouth daily 90 capsule 3    albuterol sulfate HFA (VENTOLIN HFA) 108 (90 Base) MCG/ACT inhaler Inhale 2 puffs into the lungs every 6 hours as needed for Wheezing or Shortness of Breath 3 each 4    predniSONE (DELTASONE) 5 MG tablet Take 1 tablet by mouth daily Prednisone 5mg po daily to take after food.  90 tablet 3    aspirin (ASPIRIN CHILDRENS) 81 MG chewable tablet Take 1 tablet by mouth daily 30 tablet 0    docusate sodium (COLACE) 100 MG capsule Take 100 mg by mouth 2 times daily as needed for Constipation      vitamin D-3 (CHOLECALCIFEROL) 125 MCG (5000 UT) TABS Take 1 tablet by mouth daily 90 tablet 1    pravastatin (PRAVACHOL) 80 MG tablet Take 80 mg by mouth nightly.      gabapentin (NEURONTIN) 100 MG capsule Take 100 mg by mouth 2 times daily. fish oil-omega-3 fatty acids 1000 MG capsule Take 1 g by mouth 2 times daily. Allergies:   Lasix [furosemide], Lisinopril, Daliresp [roflumilast], Keflex [cephalexin], Sulfa antibiotics, and Percocet [oxycodone-acetaminophen]    Social History:   Social History     Socioeconomic History    Marital status:      Spouse name: Zara    Number of children: Not on file    Years of education: Not on file    Highest education level: Not on file   Occupational History    Not on file   Tobacco Use    Smoking status: Former     Packs/day: 2.00     Years: 40.00     Pack years: 80.00     Types: Cigarettes     Quit date: 10/5/1989     Years since quittin.8    Smokeless tobacco: Never   Vaping Use    Vaping Use: Never used   Substance and Sexual Activity    Alcohol use: Yes     Alcohol/week: 1.0 standard drink     Types: 1 Cans of beer per week     Comment: now and then    Drug use: No    Sexual activity: Not on file   Other Topics Concern    Not on file   Social History Narrative    Not on file     Social Determinants of Health     Financial Resource Strain: Not on file   Food Insecurity: Not on file   Transportation Needs: Not on file   Physical Activity: Not on file   Stress: Not on file   Social Connections: Not on file   Intimate Partner Violence: Not on file   Housing Stability: Not on file     Family History:  Family History   Problem Relation Age of Onset    Rheum Arthritis Mother          due to complications related to    Heart Disease Mother     Coronary Art Dis Father     Heart Disease Father     Coronary Art Dis Brother     Heart Disease Brother      REVIEW OF SYSTEMS:  Unable to obtain (decreased responsiveness).     PHYSICAL EXAM:  Vitals:    22 1700 22 1752 22 1758 22 1810   BP: (!) 110/94      Pulse: 97 87 86    Resp: (!) 31 30 (!) 31 30   Temp:       SpO2:  99% 100%    Weight: 142 lb 3.2 oz (64.5 kg)      Height: Absolute 0.3 0.0 - 0.4 thou/mm3    Basophils Absolute 0.1 0.0 - 0.1 thou/mm3    Immature Grans (Abs) 0.18 (H) 0.00 - 0.07 thou/mm3    nRBC 0 /100 wbc   Brain Natriuretic Peptide   Result Value Ref Range    Pro-.7 0.0 - 1800.0 pg/mL   Basic Metabolic Panel   Result Value Ref Range    Sodium 139 135 - 145 meq/L    Potassium 4.9 3.5 - 5.2 meq/L    Chloride 102 98 - 111 meq/L    CO2 25 23 - 33 meq/L    Glucose 128 (H) 70 - 108 mg/dL    BUN 27 (H) 7 - 22 mg/dL    CREATININE 1.6 (H) 0.4 - 1.2 mg/dL    Calcium 9.3 8.5 - 10.5 mg/dL   Hepatic Function Panel   Result Value Ref Range    Albumin 3.9 3.5 - 5.1 g/dL    Total Bilirubin 0.2 (L) 0.3 - 1.2 mg/dL    Bilirubin, Direct <0.2 0.0 - 0.3 mg/dL    Alkaline Phosphatase 77 38 - 126 U/L    AST 19 5 - 40 U/L    ALT 15 11 - 66 U/L    Total Protein 6.3 6.1 - 8.0 g/dL   Lipase   Result Value Ref Range    Lipase 67.8 (H) 5.6 - 51.3 U/L   Troponin   Result Value Ref Range    Troponin T < 0.010 ng/ml   Magnesium   Result Value Ref Range    Magnesium 2.3 1.6 - 2.4 mg/dL   TSH   Result Value Ref Range    TSH 4.320 (H) 0.400 - 4.200 uIU/mL   Ethanol   Result Value Ref Range    ETHYL ALCOHOL, SERUM < 0.01 0.00 %   Urinalysis with Reflex to Culture    Specimen: Urine   Result Value Ref Range    Glucose, Ur NEGATIVE NEGATIVE mg/dl    Bilirubin Urine NEGATIVE NEGATIVE    Ketones, Urine NEGATIVE NEGATIVE    Specific Gravity, Urine 1.015 1.002 - 1.030    Blood, Urine NEGATIVE NEGATIVE    pH, UA 6.0 5.0 - 9.0    Protein, UA NEGATIVE NEGATIVE    Urobilinogen, Urine 0.2 0.0 - 1.0 eu/dl    Nitrite, Urine NEGATIVE NEGATIVE    Leukocyte Esterase, Urine NEGATIVE NEGATIVE    Color, UA YELLOW STRAW-YELLOW    Character, Urine CLEAR CLEAR-SL CLOUD   Anion Gap   Result Value Ref Range    Anion Gap 12.0 8.0 - 16.0 meq/L   Osmolality   Result Value Ref Range    Osmolality Calc 284.3 275.0 - 300.0 mOsmol/kg   Glomerular Filtration Rate, Estimated   Result Value Ref Range    Est, Glom Filt Rate 41 (A) ml/min/1.73m2   Rapid drug screen, urine   Result Value Ref Range    AMPHETAMINE+METHAMPHETAMINE URINE SCREEN negative NEGATIVE    Barbiturate Quant, Ur negative NEGATIVE    Benzodiazepine Quant, Ur negative NEGATIVE    Cannabinoid Quant, Ur negative NEGATIVE    Cocaine Metab Quant, Ur negative NEGATIVE    Opiates, Urine negative NEGATIVE    PCP Quant, Ur negative NEGATIVE    Oxycodone negative NEGATIVE   Blood Gas, Arterial   Result Value Ref Range    pH, Blood Gas 7.28 (L) 7.35 - 7.45    PCO2 50 (H) 35 - 45 mmhg    PO2 182 (H) 71 - 104 mmhg    HCO3 24 23 - 28 mmol/l    Base Excess (Calculated) -3.7 (L) -2.5 - 2.5 mmol/l    O2 Sat 99 %    IFIO2 15     DEVICE NRB     Manuel Test Positive     Source: R Radial     COLLECTED BY: [id number]    Blood Gas, Arterial   Result Value Ref Range    pH, Blood Gas 7.32 (L) 7.35 - 7.45    PCO2 42 35 - 45 mmhg    PO2 315 (H) 71 - 104 mmhg    HCO3 22 (L) 23 - 28 mmol/l    Base Excess (Calculated) -4.4 (L) -2.5 - 2.5 mmol/l    O2 Sat 100 %    IFIO2 85     DEVICE BiPAP     SET RESPIRATORY RATE 14 bpm    SET PEEP 6.0 mmhg    PIP 14 cmH2O    Manuel Test Positive     Source: R Radial     COLLECTED BY: [id number]    EKG 12 Lead   Result Value Ref Range    Ventricular Rate 79 BPM    Atrial Rate 79 BPM    P-R Interval 202 ms    QRS Duration 108 ms    Q-T Interval 398 ms    QTc Calculation (Bazett) 456 ms    P Axis 93 degrees    R Axis -89 degrees    T Axis 72 degrees     EKG:  Narrative & Impression  Poor data quality, interpretation may be adversely affected  Normal sinus rhythm with sinus arrhythmia  Left axis deviation  Right bundle branch block  Abnormal ECG  When compared with ECG of 28-JUL-2021 09:29,  No significant change was found  Confirmed by Marleni Lundberg MD, Pablito Barahona ([id number]) on 7/18/2022 6:16:06 PM     Radiology:     CT HEAD WO CONTRAST  Result Date: 7/18/2022  CT of the head without intravenous contrast Comparison: CT,SR - CT HEAD WO CONTRAST - 07/27/2021 02:42 PM EDT Findings: The ventricles and prevertebral soft tissue swelling. There is degenerative disc disease throughout the cervical spine. There are also facet degenerative changes. There is severe spinal canal stenosis at the C4-5 level. This is worse on the right than the left. There is also severe spinal canal stenosis at C5-6 level. No suspicious osseous lesions are present. There are no suspicious findings in the cervical soft tissues. There are vascular calcifications. There are no suspicious findings the lung apices. 1. No fracture. 2. Degenerative changes in the cervical spine with severe spinal canal stenosis at the C4-5 and C5-6 levels. **This report has been created using voice recognition software. It may contain minor errors which are inherent in voice recognition technology. ** Final report electronically signed by Dr. Cici Bartlett on 7/18/2022 7:54 AM    CT CERVICAL SPINE WO CONTRAST  Result Date: 7/18/2022  CT cervical spine without intravenous contrast Comparison: None Findings: This exam was significantly compromised due to motion repeat advised. No gross compression fractures or dislocations. Spur disc complexes C4-C5 and C5-C6 causing central spinal canal stenosis. Impression: 1. This exam is significantly compromised by motion and needs to be repeated. No gross compression fractures or dislocations. This document has been electronically signed by: Cynthia Mandujano MD on 07/18/2022 07:07 AM All CTs at this facility use dose modulation techniques and iterative reconstructions, and/or weight-based dosing when appropriate to reduce radiation to a low as reasonably achievable. XR CHEST PORTABLE  Result Date: 7/18/2022  PROCEDURE: XR CHEST PORTABLE CLINICAL INFORMATION: chest congestion . TECHNIQUE: Portable upright COMPARISON: 7/20/2022 at 526 FINDINGS: The heart, mediastinal and hilar contours are within normal limits. Lungs are hyperinflated with flattening in hemidiaphragms consistent with COPD.  There is chronic left lower lobe parenchymal scarring. There is no airspace infiltrate or pleural effusion EKG leads overlie the chest.     COPD. Left lower lobe parenchymal scarring. No acute process **This report has been created using voice recognition software. It may contain minor errors which are inherent in voice recognition technology. ** Final report electronically signed by Dr. Sonia Kovacs on 7/18/2022 3:03 PM    XR CHEST 1 VIEW  Result Date: 7/18/2022  1 view chest x-ray. Comparison: CR,SR - XR CHEST PORTABLE - 04/07/2022 10:23 AM EDT Findings: Normal lung volumes. Lungs are clear. No pneumothorax or pleural effusion. Heart size normal. No passive venous congestion. No midline shift or tracheal deviation. No acute fracture. Partially imaged abdominal aortic stent. Impression: 1. No acute cardiopulmonary disease. This document has been electronically signed by: Daniel Sanchez MD on 07/18/2022 06:51 AM    CT HIP RIGHT WO CONTRAST  Result Date: 7/18/2022  PROCEDURE: CT HIP RIGHT WO CONTRAST CLINICAL INFORMATION: suspected fracture  . Kannan Pear. Right hip pain. COMPARISON: Right hip x-ray 7/18/2022. TECHNIQUE: 3 mm axial images were obtained through the right hip. Sagittal coronal reconstructions were obtained. No contrast was given. All CT scans at this facility use dose modulation, iterative reconstruction, and/or weight-based dosing when appropriate to reduce radiation dose to as low as reasonably achievable. FINDINGS: There is an acute vertically oriented fracture through the distal right femoral diaphysis. This fracture does extend to the intertrochanteric region and femoral neck. This is nondisplaced. There is no dislocation of the femoral head. There are mild-moderate degenerative changes in the right hip joint. The superior and inferior pubic rami are intact. There are degenerative changes in both sacroiliac joints. On soft tissue windows, there is some mild fat stranding surrounding the acute fracture.  There is no joint effusion. Acute nondisplaced vertically oriented fracture through the proximal right femoral diaphysis which extends to the femoral neck. **This report has been created using voice recognition software. It may contain minor errors which are inherent in voice recognition technology. ** Final report electronically signed by Dr. Nadira Mercer on 7/18/2022 7:57 AM    XR HIP 2-3 VW W PELVIS RIGHT  Result Date: 7/18/2022  ADDENDUM #1: This report was discussed with La Nena Lay on Jul 18, 2022 07:10:00 EDT. This document has been electronically signed by: Karissa Owens on 07/18/2022 07:11 AM. ORIGINAL REPORT 2 views right hip. Single AP pelvis. Comparison: None Findings: Cortical lucency through the lesser trochanter on the right correlate with thin section CT. Hip joints remain congruent. SI joints pubic rami and symphysis pubis intact. Mild axial narrowing both hip joints. Osteopenia. Calcified atherosclerotic disease. Aortoiliac stent graft partially imaged. No radiopaque foreign body. Impression: 1. Suspect intertrochanteric fracture on the right thin section CT recommended. This document has been electronically signed by: Siva Chaudhary MD on 07/18/2022 07:04 AM     CODE STATUS (discussed with family): FULL -- plan to transition to Limited x 4 post-operatively    Thank you DANTE Edwards - GABRIEL for the opportunity to be involved in this patient's care.     Critical Care Time: 35 minutes    Electronically signed by Navin Brown MD on 7/18/2022

## 2022-07-18 NOTE — ED NOTES
ED to inpatient nurses report    Chief Complaint   Patient presents with    Fall      Present to ED from home  LOC: alert to only name  Vital signs   Vitals:    07/18/22 0943 07/18/22 1037 07/18/22 1115 07/18/22 1317   BP: 102/62 (!) 99/56 (!) 116/57 104/69   Pulse: 97 89 100 99   Resp: 26 19 22 25   Temp:       SpO2: 92% 92% 94% 95%   Weight:       Height:          Oxygen Baseline RA    Current needs required 6L NC Bipap/Cpap No  LDAs:   Peripheral IV 07/18/22 Left Forearm (Active)   Site Assessment Clean, dry & intact 07/18/22 0622   Line Status Normal saline locked 07/18/22 0622   Phlebitis Assessment No symptoms 07/18/22 0622   Infiltration Assessment 0 07/18/22 0622   Dressing Status Clean, dry & intact 07/18/22 0622     Mobility: Fully dependent  Pending ED orders: none  Present condition: pt appears agitated on cot respirations labored.       C-SSRS Risk of Suicide: No Risk  Swallow Screening      Electronically signed by Jorge Kan RN on 7/18/2022 at 1:18 PM     Anthony Arambula RN  07/18/22 [id number]

## 2022-07-18 NOTE — PROGRESS NOTES
Spouse in at bedside and gave verbal ok to change code status to [facility].  Stated she wants the \"middle one, no drastic measures\"

## 2022-07-18 NOTE — PROGRESS NOTES
Report called to 4K RN at this time. Awaiting room to be blazed.  Will return call to this nurse when room is ready

## 2022-07-18 NOTE — ED NOTES
ED nurse-to-nurse bedside report    Chief Complaint   Patient presents with    Fall      LOC: alert and orientated to name and place  Vital signs   Vitals:    07/18/22 0820 07/18/22 0842 07/18/22 0943 07/18/22 1037   BP:   102/62 (!) 99/56   Pulse: 97 (!) 104 97 89   Resp: 26 24 26 19   Temp:       SpO2: 92% 91% 92% 92%   Weight:       Height:          Pain:    Pain Interventions: see MAR  Pain Goal: 2/10  Oxygen: Yes    Current needs required 8L   Telemetry: Yes  LDAs:   Peripheral IV 07/18/22 Left Forearm (Active)   Site Assessment Clean, dry & intact 07/18/22 0622   Line Status Normal saline locked 07/18/22 0622   Phlebitis Assessment No symptoms 07/18/22 0622   Infiltration Assessment 0 07/18/22 0622   Dressing Status Clean, dry & intact 07/18/22 0622     Continuous Infusions:    sodium chloride      sodium chloride       Mobility: Requires assistance * 1  Randall Fall Risk Score: No flowsheet data found.   Fall Interventions: call light in reach, bed in low position with wheels locked, side rails up x2  Report given to: Aimee Mackay RN  07/18/22 1113

## 2022-07-18 NOTE — ED NOTES
Pt appears to be resting on cot. Call light in reach.  Family at bedside     Luis Antonio Cruz RN  07/18/22 [id number]

## 2022-07-18 NOTE — ED NOTES
Provider notified of Pt O2 saturations of 86% on 8L NC. No orders given at this time.       Cosntantino Azul RN  07/18/22 6905

## 2022-07-18 NOTE — ED NOTES
This RN called to pt bedside. Pt is nauseous and states he feels like he is going to vomit. Pt SPO2 is 86%. Pt is mouth breathing. Pt given Zofran per Dr. Vadim Meredith. Pt instructed top breath through his nose.       Eliot Ca RN  07/18/22 8470

## 2022-07-18 NOTE — ED PROVIDER NOTES
Transfer of Care Note:   Physician Signing out: Taina Bueno DO  Receiving Physician: Nick Do M.D. Sign out time: 0700      Brief history:  Fall yesterday, imaging obtained showing right hip fracture. Per radiology suggestion CT scan obtained which is pending at the moment. Patient should be admitted to orthopedic    Items pending that need to be checked:  Right hip CT scan      Tentative Impression of patient:  Fall  Hip fracture    Expected disposition of patient:  Pending results, admitted. Additional Assessment and results: The patient's initial evaluation and plan have been discussed with the prior physician who initially evaluated the patient. Nursing Notes, Past Medical Hx, Past Surgical Hx, Social Hx, Allergies, vital signs and Family Hx were all reviewed. Vitals:    07/18/22 0722   BP: 137/86   Pulse: 92   Resp: 15   Temp:    SpO2:            Labs Reviewed   CBC WITH AUTO DIFFERENTIAL - Abnormal; Notable for the following components:       Result Value    WBC 13.3 (*)     MCHC 31.3 (*)     MPV 9.3 (*)     Segs Absolute 10.2 (*)     Immature Grans (Abs) 0.18 (*)     All other components within normal limits   BASIC METABOLIC PANEL - Abnormal; Notable for the following components:    Glucose 128 (*)     BUN 27 (*)     CREATININE 1.6 (*)     All other components within normal limits   HEPATIC FUNCTION PANEL - Abnormal; Notable for the following components:     Total Bilirubin 0.2 (*)     All other components within normal limits   LIPASE - Abnormal; Notable for the following components:    Lipase 67.8 (*)     All other components within normal limits   TSH - Abnormal; Notable for the following components:    TSH 4.320 (*)     All other components within normal limits   GLOMERULAR FILTRATION RATE, ESTIMATED - Abnormal; Notable for the following components:    Est, Glom Filt Rate 41 (*)     All other components within normal limits   COVID-19, RAPID   PROTIME-INR   APTT   BRAIN NATRIURETIC PEPTIDE   TROPONIN   MAGNESIUM   ETHANOL   ANION GAP   OSMOLALITY   RAPID DRUG SCREEN, URINE   URINALYSIS WITH REFLEX TO CULTURE         Medications   HYDROmorphone (DILAUDID) injection 1 mg (has no administration in time range)   HYDROmorphone (DILAUDID) injection 1 mg (1 mg IntraVENous Given 7/18/22 5990)   ondansetron (ZOFRAN) injection 4 mg (4 mg IntraVENous Given 7/18/22 8214)         CT CERVICAL SPINE WO CONTRAST   Final Result       1. No fracture. 2. Degenerative changes in the cervical spine with severe spinal canal stenosis at the C4-5 and C5-6 levels. **This report has been created using voice recognition software. It may contain minor errors which are inherent in voice recognition technology. **      Final report electronically signed by Dr. Max Elizalde on 7/18/2022 7:54 AM      CT CERVICAL SPINE WO CONTRAST   Final Result      CT HEAD WO CONTRAST   Final Result   Impression:   1. No CT evidence of acute intracranial abnormality. Motion artifact    compromised the exam particularly the skull base   2. Cerebral volume loss, intracranial atherosclerotic disease and mild    sequela of chronic small vessel ischemic disease. This document has been electronically signed by: Leslie Lawrence MD on    07/18/2022 07:10 AM      All CTs at this facility use dose modulation techniques and iterative    reconstructions, and/or weight-based dosing   when appropriate to reduce radiation to a low as reasonably achievable. XR HIP 2-3 VW W PELVIS RIGHT   ED Interpretation   Hip fracture      Final Result   Impression:   1. Suspect intertrochanteric fracture on the right thin section CT    recommended. This document has been electronically signed by: Leslie Lawrence MD on    07/18/2022 07:04 AM         XR CHEST 1 VIEW   Final Result   Impression:   1. No acute cardiopulmonary disease.       This document has been electronically signed by: Leslie Lawrence MD on    07/18/2022 06:51 AM      CT HIP

## 2022-07-18 NOTE — PROGRESS NOTES
Pt arrived to room at this time, labored breathing. Audible gurgling noted.  Hospitalist notified of condition and in route to bedside

## 2022-07-18 NOTE — ED NOTES
Pt awake at this time and appears anxious. Oxygen saturation at 88% on 8L. Family at bedside. Family is able to calm the patient down to increase oxygen to 90%.       Dillon Alva RN  07/18/22 3015

## 2022-07-18 NOTE — PROGRESS NOTES
services/palliation & relieve suffering:  Goals of care discussed with:  [] Patient independently  [] Patient and Family  [x] Family or Healthcare DPOA independently  [] Unable to discuss with patient, family/DPOA not present         Family/Patient Discussion:  Patient is currently on bipap and minimally responsive. Patient's respirations are labored on the bipap. Patient's wife Carol Ellis and daughter Teetee Mercado are at the bedside. Palliative care introduced. Discussion about code status levels and what each level entails. Discussed complications of rib fractures, brain and organ damage associated with resuscitative measures. Did discuss the concerns of good pain management as the dose of dilaudid from this AM is thought to have contributed to the patient's current respiratory failure. Teetee Mercado called her sister Michael Leger on the phone and again discussed the different code status levels. Family is aware that the patient is a full code and if a change in code status is desired, they must alert staff. Many questions answered and much emotional support provided. Plan/Follow-Up:  Updated primary RN, Lucina Le. Please call palliative care if further needs arise.           Electronically signed by Louis Freitas RN on 7/18/2022 at 3:44 PM           Palliative Care Office: [phone]

## 2022-07-18 NOTE — CONSULTS
[place] [facility]    Patient - Abi Cottrell   MRN -  [id number]   [facility] # - [de-identified]   - 1931      Date of Admission -  2022  4:35 AM  Date of evaluation -  2022  Room - -15/015-A   Hospital Day - [address] [facility],[address], [place] Primary Care Physician - DANTE Harris - CNP   Reason for consult   Acute respiratory distress, COPD  Active Hospital Problem List      Active Hospital Problems    Diagnosis Date Noted    Closed displaced fracture of right femoral neck [facility]) [S72.001A] 2022     Priority: Medium     HPI   Abi Cottrell is a 80 y.o. male who presented to [facility] emergency department for right hip pain after a fall early morning of 2022. CT of right hip without contrast was obtained which showed acute nondisplaced vertically oriented fracture through the proximal right femoral diaphysis. Patient was initially scheduled for ORIF of right hip on 2022 surgery has been canceled, until a later time. Per nurse, Mariia Pod was given Dilaudid prior to him getting admitted. When he got to the floor nursing found he was unresponsive. Patient was then given Narcan at 13:53 with minimal change in his mental status. Patient does have a history of dementia but per daughter he is not close to his baseline. In the room Mariia Pod was started on nonrebreather on 15 L and he was satting at 99%. Patient is currently tachypneic and tachycardic with a heart rate ranging between 102-108. He has increased WOB and rhonchorus cough non-productive. Patient is not currently following commands but will follow the sound of his name. Stat ABG PH 7.28, Pco2-50, PO2-182, HCO3-24. Patient is now placed on BiPAP, currently in the room is 8/6 with a backup rate ordered for 14. He is currently on FiO2 of 85%. Patient's been started on DuoNeb's, Pulmicort and Solu-Medrol.   Patient's WOB and rhonchorous cough have improved and is minimal at this time. Howie Mcfarlane has a history significant for COPD pain and lobular emphysema, he is on 2-2.5 L of oxygen at home. Patient's daughter says this is his baseline. She adds that patient needs to increase his oxygen when he is doing activities with exertion. Patient's home medications include Incruse formoterol and budesonide he has a history of good compliance with his medications. Based on Gold classification Howie Mcfarlane has stage III severe COPD needs and respiratory therapy with nebulizer. Past Medical History         Diagnosis Date    AAA (abdominal aortic aneurysm) ([facility] [address]     followed by Dr. Umer De Luna    CAD (coronary artery disease)     Chronic kidney disease     COPD (chronic obstructive pulmonary disease) ([facility] [address]     Hypercholesteremia     Hypertension     Other disorders of kidney and ureter in diseases classified elsewhere     Pneumonia     Prostate cancer ([facility] [address]     with radiation in 2006    SOB (shortness of breath)     Type II or unspecified type diabetes mellitus without mention of complication, not stated as uncontrolled       Past Surgical History           Procedure Laterality Date    ABDOMEN SURGERY      ABDOMINAL AORTIC ANEURYSM REPAIR  8/21/14    ABDOMINAL AORTIC ANEURYSM REPAIR, ENDOVASCULAR Bilateral 08/21/2014    WITH LT FEMORAL ENDARTERECTOMY     CARDIAC SURGERY      COLONOSCOPY  unsure    COLONOSCOPY N/A 4/8/2022    COLONOSCOPY performed by Ronak Galeas MD at [address] [facility] Bilateral 07/28/2015    Dr Tierney Rebolledo Left 4/7/2022    Flexible Sigmoidoscopy performed by Ronak Galeas MD at [address] [facility] NPO  ADULT DIET;  Dysphagia - Soft and Bite Sized; 3 carb choices (45 gm/meal)  Allergies    Lasix [furosemide], Lisinopril, Daliresp [roflumilast], Keflex [cephalexin], Sulfa antibiotics, and Percocet [oxycodone-acetaminophen]  Social History     Social History     Socioeconomic History    Marital status:  Spouse name: Zara    Number of children: Not on file    Years of education: Not on file    Highest education level: Not on file   Occupational History    Not on file   Tobacco Use    Smoking status: Former     Packs/day: 2.00     Years: 40.00     Pack years: 80.00     Types: Cigarettes     Quit date: 10/5/1989     Years since quittin.8    Smokeless tobacco: Never   Vaping Use    Vaping Use: Never used   Substance and Sexual Activity    Alcohol use: Yes     Alcohol/week: 1.0 standard drink     Types: 1 Cans of beer per week     Comment: now and then    Drug use: No    Sexual activity: Not on file   Other Topics Concern    Not on file   Social History Narrative    Not on file     Social Determinants of Health     Financial Resource Strain: Not on file   Food Insecurity: Not on file   Transportation Needs: Not on file   Physical Activity: Not on file   Stress: Not on file   Social Connections: Not on file   Intimate Partner Violence: Not on file   Housing Stability: Not on file     Family History          Problem Relation Age of Onset    Rheum Arthritis Mother          due to complications related to    Heart Disease Mother     Coronary Art Dis Father     Heart Disease Father     Coronary Art Dis Brother     Heart Disease Brother      ROS, per family members    General/Constitutional: No fever or chills. HENT: Negative. Eyes: Negative. Upper respiratory tract: No nasal stuffiness or post nasal drip. Lower respiratory tract/ lungs: + cough. No sputum production. No hemoptysis. Hard time clearing his secretions. Cardiovascular: No palpitations, chest pain or edema. Gastrointestinal: No nausea or vomiting. Neurological: Altered mental status  Extremities: No tenderness. Musculoskeletal: no complaints. Right intertrochanteric fracture. Hematological: Negative.  Denies easy buising  Meds    Current Medications    HYDROmorphone  1 mg IntraVENous Once    sodium chloride flush  5-40 mL IntraVENous 2 times per day    acetaminophen  650 mg Oral Q6H    budesonide  500 mcg Nebulization BID    gabapentin  100 mg Oral BID    ipratropium-albuterol  1 ampule Inhalation 4x daily    metoprolol succinate  25 mg Oral Daily    tamsulosin  0.4 mg Oral Daily    pravastatin  80 mg Oral Nightly    [Held by provider] aspirin  81 mg Oral Daily    methylPREDNISolone  40 mg IntraVENous Q12H     sodium chloride flush, sodium chloride, morphine **OR** morphine, ondansetron **OR** ondansetron, polyethylene glycol, traMADol, albuterol sulfate HFA, docusate sodium, naloxone  IV Drips/Infusions   sodium chloride Stopped (07/18/22 1407)    sodium chloride       Vitals    Vitals    height is 5' 6\" (1.676 m) and weight is 143 lb (64.9 kg). His temperature is 98.2 °F (36.8 °C). His blood pressure is 94/48 (abnormal) and his pulse is 93. His respiration is 36 (abnormal) and oxygen saturation is 95%. O2 Flow Rate (L/min): 15 L/min  I/O  No intake or output data in the 24 hours ending 07/18/22 1643  Patient Vitals for the past 96 hrs (Last 3 readings):   Weight   07/18/22 0457 143 lb (64.9 kg)     Exam   Constitutional: Patient appears elderly, frail and chronically ill. Patient is on BiPAP currently  Head: Normocephalic and atraumatic. Mouth/Throat: Oropharynx is clear and moist.  Eyes: Conjunctivae are normal. Pupils are equal, round. No scleral icterus. Neck: Neck supple. No JVD or tracheal deviation present. Cardiovascular: Tachycardic regular rhythm, S1 and S2 with no murmur. No peripheral edema  Pulmonary/Chest: Tachypneic, normal effort with clear breath sounds. No stridor. Mild respiratory distress. Previous rhonchi have resolved. Bilateral wheezing, diffuse. Improved airflow. Abdominal: Soft. Bowel sounds audible. No distension or tenderness to palp  Musculoskeletal: Moves all extremities minimally while in hospital but  Lymphadenopathy:  No cervical adenopathy. Neurological: Patient is awake.  He is noted to have an altered mental status. He is not following commands. Makes unintelligible vocalizations. Skin: Skin is warm and dry. Labs   ABG  Lab Results   Component Value Date/Time    PH 7.28 07/18/2022 02:27 PM    PO2 182 07/18/2022 02:27 PM    PCO2 50 07/18/2022 02:27 PM    HCO3 24 07/18/2022 02:27 PM    O2SAT 99 07/18/2022 02:27 PM     Lab Results   Component Value Date/Time    IFIO2 15 07/18/2022 02:27 PM     CBC  Recent Labs     07/18/22  0500   WBC 13.3*   RBC 5.63   HGB 16.0   HCT 51.1   MCV 90.8   MCH 28.4   MCHC 31.3*      MPV 9.3*      BMP  Recent Labs     07/18/22  0500      K 4.9      CO2 25   BUN 27*   CREATININE 1.6*   GLUCOSE 128*   MG 2.3   CALCIUM 9.3     LFT  Recent Labs     07/18/22  0500   AST 19   ALT 15   BILITOT 0.2*   ALKPHOS 77   LIPASE 67.8*     TROP  Lab Results   Component Value Date/Time    TROPONINT < 0.010 07/18/2022 05:00 AM    TROPONINT 0.019 07/28/2021 09:38 AM    TROPONINT 0.024 07/28/2021 05:58 AM     BNP  Lab Results   Component Value Date/Time    PROBNP 458.7 07/18/2022 05:00 AM    PROBNP 730.8 06/02/2021 10:20 AM    PROBNP 220.4 09/13/2016 10:41 AM     D-Dimer  No results found for: DDIMER  Lactic Acid  No results for input(s): LACTA in the last 72 hours. INR  Recent Labs     07/18/22  0500   INR 0.92     PTT  Recent Labs     07/18/22  0500   APTT 28.8     Glucose  No results for input(s): POCGLU in the last 72 hours. UA   Recent Labs     07/18/22  0645   PHUR 6.0   COLORU YELLOW   PROTEINU NEGATIVE   BLOODU NEGATIVE   NITRU NEGATIVE   GLUCOSEU NEGATIVE   BILIRUBINUR NEGATIVE   UROBILINOGEN 0.2   KETUA NEGATIVE   . PFTs          Cultures    Procalcitonin  Lab Results   Component Value Date/Time    PROCAL 0.08 07/27/2021 01:03 PM         Radiology    CXR  7/18/2022 5:05 AM  Findings:   Normal lung volumes. Lungs are clear. No pneumothorax or pleural effusion. Heart size normal. No passive venous congestion. No midline shift or tracheal deviation.    No acute fracture. Partially imaged abdominal aortic stent. Impression:   1. No acute cardiopulmonary disease. CXR, 7/18/2022 ,14:30  FINDINGS: The heart, mediastinal and hilar contours are within normal limits. Lungs are hyperinflated with flattening in hemidiaphragms consistent with COPD. There is chronic left lower lobe parenchymal scarring. There is no airspace infiltrate or pleural effusion   EKG leads overlie the chest.        COPD. Left lower lobe parenchymal scarring. No acute process       CT Scans  Last, chest CT scan without contrast, 10/21/2020  Impression:Parenchymal scarring left lung. No acute posttraumatic process identified. FINDINGS: Low-density lesion left thyroid lobe. Macrocalcification right thyroid lobe. No axillary lymphadenopathy. No mediastinal or hilar adenopathy by size criteria. Marked aortic calcifications beginning at the ascending aorta involving the arch and descending thoracic aorta. Calcified mediastinal lymph nodes calcified coronary arteries. Interval increase in the anterior pericardial effusion now measuring 11 mm compared to 8. Lungs are emphysematous. There is a atelectasis along the major fissure left lung. Dissection may represent chronic parenchymal scarring versus less noticeable on the prior exam.   No airspace consolidations or pleural effusions no pneumothorax. Unfortunately is a great deal motion artifact in the upper images. No acute or suspicious bone lesion is identified. There is progression of marginal osteophytes at T12 and L1 somewhat simulating a fracture but there is no distinct acute fracture. Upper abdomen   Endoluminal stent graft is present. (See actual reports for details)    Assessment   - COPD  - Respiratory distress  - Respiratory acidosis  - Right hip intertrochanteric fracture  Recommendations   - Continue BiPAP, settings can be switched to 14/6 as previously ordered with a backup rate of 14.   Patient's respiratory acidosis is being followed. A second ABG will be required 3 hours after BiPAP was initiated. - No sedating medications at this time, this will be held. Can consider return to nasal cannula after respiratory acidosis has resolved. - Continue Pulmicort nebs twice daily  - Continue Solu-Medrol 40 mg IV twice daily  - Continue albuterol sulfate HFA as needed every 6 hours      - Surgery is not recommended at this time with patient's current assessment. As well with patient's history of COPD he has a moderate-high risk based off of the ARISCAT score. This has been discussed with orthopedics and they agree and understand. Juan Miguel Eckert can be reevaluated for surgery at a later time    Thank you for the consult and allowing us to participate in the care of your patient. Case discussed with nurse and patient/family. Questions and concerns addressed. Meds and Orders reviewed. Electronically signed by     Adonay Gross DO on 7/18/2022 at 4:43 PM    Addendum by Dr. Lisa Park MD:  Patient seen by me independently including key components of medical care. Face to face evaluation and examination was performed. [facility] [address] [place] [address] physician. Italicized font, if present,  represents changes to the note made by me. More than 50% of the encounter time involved with patient care by the Pulmonary & Critical care service team spent by me. Please see my modifications mentioned below:  He is a 80year-old pleasant gentleman was diagnosed with very severe chronic obstructive pulm disease with a FEV1 of 0.71 which is 32% predicted in 2013. He used follow-up with Dr. Kylie Ttutle MD and Mr. Araseli Hernandez CNP at the [facility] pulmonary clinic in the past.  Patient had a history of sleepwalking. He sustained acute nondisplaced vertically oriented fracture through the proximal right femoral diaphysis which extended to the femoral neck while he is trying to run away during sleep.   He is currently waiting for surgery on his right hip by Dr. Corona Clemens MD from orthopedic service. Patient received Dilaudid 1 mg at 5:40 AM IV. He also received Neurontin 100 mg p.o. patient subsequently became unresponsive. He was given Narcan 2 mg IV x1. Due to his abnormal ABG suggestive of acute hypercapnic respiratory failure, he was placed on BiPAP with a full facemask. Patient remained unresponsive at the time of my initial evaluation. Patient blood pressure is at borderline. I spoke with Dr. Ruddy Alfred MD in [address] [place] [address] ICU. He graciously accepted the patient under his service for further management of ongoing hypercapnic respiratory failure and hypotension. I spoke with patient's wife Ms. Zuñiga and and Ms. Vargas-patient daughter at bedside and informed about my impression plan. All questions were answered. Patient current CODE STATUS is DNR Comfort Care arrest with a limited CODE STATUS. okay for intubation and mechanical ventilation.       Electronically signed by   Rolando Tee MD on 7/18/2022 at 6:44 PM

## 2022-07-18 NOTE — ED NOTES
Singh Catheter placed at this time per Verbal order from Dr. Carla Rose.      Kirk Mixon, RN  07/18/22 1226

## 2022-07-18 NOTE — PROGRESS NOTES
Stat consult called to Pulmonary service Full Consult to follow     Patient in respiratory distress noted increased WOB and rhonchorus cough non-productive. Noted wheezing. On NRB 15 LPM  Narcan given at  without significant improvement in mentation status per Rapid response RN Tala    Physical Exam   Constitutional: Mild distress on O2 per NRB. Patient appears elderly and chronically ill  Head: Normocephalic and atraumatic. Mouth/Throat: Oropharynx is clear and moist.  No oral thrush. Eyes: Conjunctivae are normal. Pupils are equal, round. No scleral icterus. Neck: Neck supple. No tracheal deviation present. Cardiovascular: S1 and S2 with no murmur. No peripheral edema  Pulmonary/Chest: Increased effort with bilateral air entry, rhonchorus wheezing with diminished aeration noted. Abdominal: Soft. Bowel sounds audible. No distension or tenderness to palp. Neurological: Patient is awake, noted AMS not following commands, makes unintelligible vocalizations    -Stat ABG PH 7.28, Pco2-50, PO2-182, HCO3-24,   -order placed for BiPAP 14/6 BUR 14.   -Transfer to step down unit discussed with Mathieu Walton from Primary service Orthopedics    Discussed with RT Dolores Talbert initiate nasotracheal suctioning prior to BiPAP. -BD's Duonebs QID   -Pulmicort nebs BID   -Start solumedrol 40 mg IV BID   -Stat CXR already ordered  -Discussed with family regarding current condition and history of COPD potential for decompensation including risk of intubation and need for mechanical ventilation, chest compressions, shock, life saving medications. Explained risk if placed on mechanical ventilator may be unable to wean and possibly requiring tracheostomy. At this time time they wish for patient to remain full code. Informed me that they wish to discuss further and will let me know if they wish to change     XR CHEST PORTABLE     7/18/2022     FINDINGS: The heart, mediastinal and hilar contours are within normal limits. Lungs are hyperinflated with flattening in hemidiaphragms consistent with COPD. There is chronic left lower lobe parenchymal scarring. There is no airspace infiltrate or pleural effusion EKG leads overlie the chest.   Impression:  COPD. Left lower lobe parenchymal scarring.  No acute process         Full Consult to follow     Electronically signed by DANTE Garcia CNP on 7/18/2022 at 3:01 PM

## 2022-07-18 NOTE — PROGRESS NOTES
Patient arrived to unit from [address] [facility] via [address] [place] Patient transferred to ICU bed and placed on continuous ICU bedside monitor. Patient admitted for Fall, initial encounter [W19. XXXA]  Closed fracture of right hip, initial encounter ([facility] [address] [S72.001A]  Hip pain, acute, right [M25.551]  Closed displaced fracture of right femoral neck ([address] [S72.001A]. Vitals obtained. See flowsheets. Patient's IV access includes 20G left FA. Current infusions and rates of infusion include MIV 20ml/hr. Assessment completed by Mary Ellen Fierro. Two nurse skin assessment completed by Mary Ellen Fierro and Sanjana Ballard RN. See flowsheets for assessment details. Policies and procedures of ICU unable to be explained to patient at this time. Family member(s)/representative(s) present at time of admission include wife, daughter. Patient rights explained to family member(s)/representatives and patient, as able. Patient/patient's family member(s)/representative(s) Requested to have physician notified of their admission. All questions posed by patient's family member(s)/representative(s) and patient answered at this time.

## 2022-07-18 NOTE — H&P
SIGMOIDOSCOPY Left 4/7/2022    Flexible Sigmoidoscopy performed by Abbie Proctor MD at [facility] Medications:   Prior to Admission medications    Medication Sig Start Date End Date Taking? Authorizing Provider   formoterol (PERFOROMIST) 20 MCG/2ML nebulizer solution Take 2 mLs by nebulization every 12 hours 5/9/22   ADNTE Rogers CNP   budesonide (PULMICORT) 0.5 MG/2ML nebulizer suspension Take 2 mLs by nebulization 2 times daily 5/9/22   DANTE Rogers CNP   albuterol (PROVENTIL) (2.5 MG/3ML) 0.083% nebulizer solution Take 3 mLs by nebulization every 6 hours as needed for Wheezing or Shortness of Breath 5/9/22   DANTE Rogers CNP   acetaminophen (TYLENOL) 500 MG tablet Take 500 mg by mouth every 6 hours as needed for Pain    Historical Provider, MD JONES ELLIPTA 62.5 MCG/INH AEPB USE 1 INHALATION DAILY  Patient taking differently: Takes at 6 PM 11/23/21   DANTE Rogers CNP   Respiratory Therapy Supplies (NEBULIZER AIR TUBE/PLUGS) MISC Please provide nebulizer kit and supplies with mask interface. 11/17/21   DANTE Rogers CNP   metoprolol succinate (TOPROL XL) 25 MG extended release tablet Take 1 tablet by mouth daily 11/8/21   Patricia Loyd MD   tamsulosin (FLOMAX) 0.4 MG capsule Take 1 capsule by mouth daily 11/1/21   Sunil Winn DO   albuterol sulfate HFA (VENTOLIN HFA) 108 (90 Base) MCG/ACT inhaler Inhale 2 puffs into the lungs every 6 hours as needed for Wheezing or Shortness of Breath 9/29/21 9/29/22  DANTE Matos CNP   predniSONE (DELTASONE) 5 MG tablet Take 1 tablet by mouth daily Prednisone 5mg po daily to take after food.  9/21/21 9/21/22  DANTE Matos CNP   aspirin (ASPIRIN CHILDRENS) 81 MG chewable tablet Take 1 tablet by mouth daily 7/28/21   Jasmin Weeks MD   docusate sodium (COLACE) 100 MG capsule Take 100 mg by mouth 2 times daily as needed for Constipation Historical Provider, MD   vitamin D-3 (CHOLECALCIFEROL) 125 MCG (5000 UT) TABS Take 1 tablet by mouth daily 10/26/20 7/11/22  Barb Winn,    pravastatin (PRAVACHOL) 80 MG tablet Take 80 mg by mouth nightly. Historical Provider, MD   gabapentin (NEURONTIN) 100 MG capsule Take 100 mg by mouth 2 times daily. Historical Provider, MD   fish oil-omega-3 fatty acids 1000 MG capsule Take 1 g by mouth 2 times daily. Historical Provider, MD       Current Hospital Medications:    Current Facility-Administered Medications:     HYDROmorphone (DILAUDID) injection 1 mg, 1 mg, IntraVENous, Once, Richard Sorto DO    Current Outpatient Medications:     formoterol (PERFOROMIST) 20 MCG/2ML nebulizer solution, Take 2 mLs by nebulization every 12 hours, Disp: 120 mL, Rfl: 11    budesonide (PULMICORT) 0.5 MG/2ML nebulizer suspension, Take 2 mLs by nebulization 2 times daily, Disp: 120 mL, Rfl: 11    albuterol (PROVENTIL) (2.5 MG/3ML) 0.083% nebulizer solution, Take 3 mLs by nebulization every 6 hours as needed for Wheezing or Shortness of Breath, Disp: 360 mL, Rfl: 11    acetaminophen (TYLENOL) 500 MG tablet, Take 500 mg by mouth every 6 hours as needed for Pain, Disp: , Rfl:     INCRUSE ELLIPTA 62.5 MCG/INH AEPB, USE 1 INHALATION DAILY (Patient taking differently: Takes at 6 PM), Disp: 90 each, Rfl: 3    Respiratory Therapy Supplies (NEBULIZER AIR TUBE/PLUGS) MISC, Please provide nebulizer kit and supplies with mask interface. , Disp: 1 each, Rfl: 0    metoprolol succinate (TOPROL XL) 25 MG extended release tablet, Take 1 tablet by mouth daily, Disp: 90 tablet, Rfl: 3    tamsulosin (FLOMAX) 0.4 MG capsule, Take 1 capsule by mouth daily, Disp: 90 capsule, Rfl: 3    albuterol sulfate HFA (VENTOLIN HFA) 108 (90 Base) MCG/ACT inhaler, Inhale 2 puffs into the lungs every 6 hours as needed for Wheezing or Shortness of Breath, Disp: 3 each, Rfl: 4    predniSONE (DELTASONE) 5 MG tablet, Take 1 tablet by mouth daily Prednisone 5mg po daily to take after food. , Disp: 90 tablet, Rfl: 3    aspirin (ASPIRIN CHILDRENS) 81 MG chewable tablet, Take 1 tablet by mouth daily, Disp: 30 tablet, Rfl: 0    docusate sodium (COLACE) 100 MG capsule, Take 100 mg by mouth 2 times daily as needed for Constipation, Disp: , Rfl:     vitamin D-3 (CHOLECALCIFEROL) 125 MCG (5000 UT) TABS, Take 1 tablet by mouth daily, Disp: 90 tablet, Rfl: 1    pravastatin (PRAVACHOL) 80 MG tablet, Take 80 mg by mouth nightly., Disp: , Rfl:     gabapentin (NEURONTIN) 100 MG capsule, Take 100 mg by mouth 2 times daily. , Disp: , Rfl:     fish oil-omega-3 fatty acids 1000 MG capsule, Take 1 g by mouth 2 times daily. , Disp: , Rfl:      Allergies:  Lasix [furosemide], Lisinopril, Daliresp [roflumilast], Keflex [cephalexin], Sulfa antibiotics, and Percocet [oxycodone-acetaminophen]  Social History:    Social History     Socioeconomic History    Marital status:      Spouse name: Zara    Number of children: Not on file    Years of education: Not on file    Highest education level: Not on file   Occupational History    Not on file   Tobacco Use    Smoking status: Former     Packs/day: 2.00     Years: 40.00     Pack years: 80.00     Types: Cigarettes     Quit date: 10/5/1989     Years since quittin.8    Smokeless tobacco: Never   Vaping Use    Vaping Use: Never used   Substance and Sexual Activity    Alcohol use:  Yes     Alcohol/week: 1.0 standard drink     Types: 1 Cans of beer per week     Comment: now and then    Drug use: No    Sexual activity: Not on file   Other Topics Concern    Not on file   Social History Narrative    Not on file     Social Determinants of Health     Financial Resource Strain: Not on file   Food Insecurity: Not on file   Transportation Needs: Not on file   Physical Activity: Not on file   Stress: Not on file   Social Connections: Not on file   Intimate Partner Violence: Not on file   Housing Stability: Not on file     Family History:        Problem Relation Age of Onset    Rheum Arthritis Mother          due to complications related to    Heart Disease Mother     Coronary Art Dis Father     Heart Disease Father     Coronary Art Dis Brother     Heart Disease Brother      Further Family History is noncontributory to this injury. REVIEW OF SYSTEMS:    Review of Systems - General ROS: negative for - chills, fatigue, fever, malaise or night sweats  Psychological ROS: negative  Ophthalmic ROS: negative   ENT ROS: negative for - headaches or sore throat  Hematological and Lymphatic ROS: negative for - bleeding problems or blood clots  Respiratory ROS: sob, wheezing  Cardiovascular ROS: no chest pain or dyspnea on exertion  Gastrointestinal ROS: negative  Musculoskeletal ROS: See HPI  Neurological ROS: negative for - bowel and bladder control changes, gait disturbance or numbness/tingling  All other systems reviewed and are negative      PHYSICAL EXAM:  /62   Pulse 97   Temp 98.2 °F (36.8 °C)   Resp 26   Ht 5' 6\" (1.676 m)   Wt 143 lb (64.9 kg)   SpO2 92%   BMI 23.08 kg/m²   GENERAL APPEARANCE: Awake and oriented x1, arousable but with difficulty, 4L nasal cannula, breathing labored   MOOD AND AFFECT: confusion but able to re direct and answer questions appropriately  HEAD: normocephalic, atraumatic   EYES: equal and reactive to light, Extraocular movements are normal. Pupils are equal in size. Hematologic/Lymphatic: no lymphadenopathy to upper or lower extremity. MSK: R hip  No erythema edema ecchymosis, no lacerations or prior surgical scars, sitting in ER  Non tender to palpation of ASIS iliac crest PSIS   TTP at greater trochanter IT band femoral shaft  + log roll  Calf supple non tender to palpation  Distal pulses palpable, sensation to light touch intact distally  TA EHL intact.      Labs:   CBC:   Lab Results   Component Value Date/Time    WBC 13.3 2022 05:00 AM    RBC 5.63 2022 05:00 AM     BMP:  Lab Results   Component Value Date/Time    GLUCOSE 128 07/18/2022 05:00 AM    GLUCOSE 97 03/06/2018 08:30 AM    CO2 25 07/18/2022 05:00 AM    BUN 27 07/18/2022 05:00 AM    CREATININE 1.6 07/18/2022 05:00 AM    CALCIUM 9.3 07/18/2022 05:00 AM     PT/INR:   Lab Results   Component Value Date/Time    INR 0.92 07/18/2022 05:00 AM    APTT 28.8 07/18/2022 05:00 AM     Type and Screen: No results found for: LABABO, RH, LABANTI  CRP: No results found for: CRP  ESR: No results found for: SEDRATE  HgBA1c:    Lab Results   Component Value Date/Time    LABA1C 5.3 01/13/2021 10:11 AM           Radiology:     CT:   FINDINGS: There is an acute vertically oriented fracture through the distal right femoral diaphysis. This fracture does extend to the intertrochanteric region and femoral neck. This is nondisplaced. There is no dislocation of the femoral head. There are mild-moderate degenerative changes in the right hip joint. The superior and inferior pubic rami are intact. There are degenerative changes in both sacroiliac joints. On soft tissue windows, there is some mild fat stranding surrounding the acute fracture. There is no joint effusion. Impression   Acute nondisplaced vertically oriented fracture through the proximal right femoral diaphysis which extends to the femoral neck. **This report has been created using voice recognition software. It may contain minor errors which are inherent in voice recognition technology. **       Final report electronically signed by Dr. Judd Archer on 7/18/2022 7:57 AM         ASSESSMENT:  80 y.o. male with R femoral neck fracture with some extension distally after a GLF at 0300 7/18/22. The patient imaging and history were reviewed with the attending, Dr Nicolas Olson. The necessity of surgical intervention was discussed at length with the patient's daughter and wife. Surgery recommended for anatomic restoration and increased functional outcomes post operatively.  The risks, benefits, and alternatives to surgical intervention of R hip IMN were discussed at length. Specifically, nonunion, malunion, fixation failure, joint instability, soreness, infection, damage to surrounding neurovascular structures, necessity of return to the OR, post traumatic arthritis, DVT, blood loss requiring transfusion, and death. Post- operative expectations were addressed as well. The patient expressed understanding, all questions were answered. Patient, daughter, and wife were agreeable to move forward with R hip IMN, pending medical optimization and clearance. Patient posted for OR with Dr Mai Javier on 7/19/22, Dr Blayne Payton also available for fixation. PLAN:  -Plan for OR 7/19/22  -NPO 7/19/22 at [address] [place] [address]  -NWB BLE, bedrest   -Admit to ortho, medicine and pulmonology consulted for perioperative risk stratification and medical management of COPD, CKD, HTN, appreciate documented recommendations   -Multimodal pain control   -maintain acevedo  -Ice/Elevate  -DVT PPX: SCDs   -Please hold DVT prophylaxis in anticipation of OR  -Patient history physical and plan were reviewed with Dr Mai Javier, he was in agreement with the plan.

## 2022-07-18 NOTE — ED PROVIDER NOTES
[facility]  eMERGENCY dEPARTMENT eNCOUnter          CHIEF COMPLAINT       Chief Complaint   Patient presents with    Fall       Nurses Notes reviewed and I agree except as noted in the HPI. HISTORY OF PRESENT ILLNESS    Everlyn Cabot is a 80 y.o. male who presents a fall. Patient apparently has dementia. He is also a sleepwalker. Wife at bedside states that he called out got up out of bed she attempted to stop him he got up and started running down the skinner tripped and fell and went down. Daughter states that she heard him came outside and watched him fall. He did not hit his head. He is not on blood thinners. Here today the patient is currently complaining of right-sided hip pain. Patient has COPD. He is currently supposed to be on 2 L of oxygen. He is slightly hypoxic when he gets here. Patient is complaining of mild right hip pain 7 out of 10 when he moves it. Not much pain when he is sitting still. Patient is unable to move the leg by himself. Has a lot of pain when it is lifted. It appears to be externally rotated and shortened. Patient is not complaining of any chest pain or abdominal pain. No other physical complaints at this time per patient. REVIEW OF SYSTEMS     Review of Systems   Constitutional:  Negative for activity change, appetite change, diaphoresis, fatigue and unexpected weight change. HENT:  Negative for congestion, ear discharge, ear pain, hearing loss, rhinorrhea, sinus pressure, sore throat, trouble swallowing and voice change. Eyes:  Negative for photophobia, pain, discharge, redness and itching. Respiratory:  Negative for cough, choking, chest tightness, shortness of breath and wheezing. Cardiovascular:  Negative for chest pain, palpitations and leg swelling. Gastrointestinal:  Negative for abdominal distention, abdominal pain, blood in stool, constipation, diarrhea, nausea and vomiting.    Endocrine: Negative for polydipsia, polyphagia and polyuria. Genitourinary:  Negative for decreased urine volume, difficulty urinating, dysuria, enuresis, frequency, hematuria and urgency. Musculoskeletal:  Positive for arthralgias, gait problem and myalgias. Negative for back pain, neck pain and neck stiffness. Skin:  Negative for pallor and rash. Allergic/Immunologic: Negative for immunocompromised state. Neurological:  Negative for dizziness, tremors, seizures, syncope, facial asymmetry, weakness, light-headedness, numbness and headaches. Hematological:  Negative for adenopathy. Does not bruise/bleed easily. Psychiatric/Behavioral:  Negative for agitation, hallucinations and suicidal ideas. The patient is not nervous/anxious. PAST MEDICAL HISTORY    has a past medical history of AAA (abdominal aortic aneurysm) ([facility] [address] CAD (coronary artery disease), Chronic kidney disease, COPD (chronic obstructive pulmonary disease) ([facility] [address] Hypercholesteremia, Hypertension, Other disorders of kidney and ureter in diseases classified elsewhere, Pneumonia, Prostate cancer ([facility] [address] SOB (shortness of breath), and Type II or unspecified type diabetes mellitus without mention of complication, not stated as uncontrolled. SURGICAL HISTORY      has a past surgical history that includes Colonoscopy (unsure); AAA repair, endovascular (Bilateral, 08/21/2014); Abdominal aortic aneurysm repair (8/21/14); Inguinal hernia repair (Bilateral, 07/28/2015); Abdomen surgery; vascular surgery; Cardiac surgery; sigmoidoscopy (Left, 4/7/2022); and Colonoscopy (N/A, 4/8/2022).     CURRENT MEDICATIONS       Previous Medications    ACETAMINOPHEN (TYLENOL) 500 MG TABLET    Take 500 mg by mouth every 6 hours as needed for Pain    ALBUTEROL (PROVENTIL) (2.5 MG/3ML) 0.083% NEBULIZER SOLUTION    Take 3 mLs by nebulization every 6 hours as needed for Wheezing or Shortness of Breath    ALBUTEROL SULFATE HFA (VENTOLIN HFA) 108 (90 BASE) MCG/ACT INHALER    Inhale 2 puffs into the lungs every 6 hours as needed for Wheezing or Shortness of Breath    ASPIRIN (ASPIRIN CHILDRENS) 81 MG CHEWABLE TABLET    Take 1 tablet by mouth daily    BUDESONIDE (PULMICORT) 0.5 MG/2ML NEBULIZER SUSPENSION    Take 2 mLs by nebulization 2 times daily    DOCUSATE SODIUM (COLACE) 100 MG CAPSULE    Take 100 mg by mouth 2 times daily as needed for Constipation    FISH OIL-OMEGA-3 FATTY ACIDS 1000 MG CAPSULE    Take 1 g by mouth 2 times daily. FORMOTEROL (PERFOROMIST) 20 MCG/2ML NEBULIZER SOLUTION    Take 2 mLs by nebulization every 12 hours    GABAPENTIN (NEURONTIN) 100 MG CAPSULE    Take 100 mg by mouth 2 times daily. INCRUSE ELLIPTA 62.5 MCG/INH AEPB    USE 1 INHALATION DAILY    METOPROLOL SUCCINATE (TOPROL XL) 25 MG EXTENDED RELEASE TABLET    Take 1 tablet by mouth daily    PRAVASTATIN (PRAVACHOL) 80 MG TABLET    Take 80 mg by mouth nightly. PREDNISONE (DELTASONE) 5 MG TABLET    Take 1 tablet by mouth daily Prednisone 5mg po daily to take after food. RESPIRATORY THERAPY SUPPLIES (NEBULIZER AIR TUBE/PLUGS) MISC    Please provide nebulizer kit and supplies with mask interface. TAMSULOSIN (FLOMAX) 0.4 MG CAPSULE    Take 1 capsule by mouth daily    VITAMIN D-3 (CHOLECALCIFEROL) 125 MCG (5000 UT) TABS    Take 1 tablet by mouth daily       ALLERGIES     is allergic to lasix [furosemide], lisinopril, daliresp [roflumilast], keflex [cephalexin], sulfa antibiotics, and percocet [oxycodone-acetaminophen]. FAMILY HISTORY     He indicated that his mother is . He indicated that his father is . He indicated that his brother is . family history includes Coronary Art Dis in his brother and father; Heart Disease in his brother, father, and mother; Rheum Arthritis in his mother. SOCIAL HISTORY      reports that he quit smoking about 32 years ago. His smoking use included cigarettes. He has a 80.00 pack-year smoking history.  He has never used smokeless tobacco. He reports current alcohol use of about 1.0 standard drink per week. He reports that he does not use drugs. PHYSICAL EXAM     INITIAL VITALS:  height is 5' 6\" (1.676 m) and weight is 143 lb (64.9 kg). His temperature is 98.2 °F (36.8 °C). His blood pressure is 137/86 and his pulse is 92. His respiration is 15 and oxygen saturation is 97%. Physical Exam  Vitals and nursing note reviewed. Constitutional:       General: He is not in acute distress. Appearance: He is well-developed. He is not diaphoretic. HENT:      Head: Normocephalic and atraumatic. Right Ear: External ear normal.      Left Ear: External ear normal.      Nose: Nose normal.   Eyes:      General: Lids are normal. No scleral icterus. Right eye: No discharge. Left eye: No discharge. Conjunctiva/sclera: Conjunctivae normal.      Right eye: No exudate. Left eye: No exudate. Pupils: Pupils are equal, round, and reactive to light. Neck:      Thyroid: No thyromegaly. Vascular: No JVD. Trachea: No tracheal deviation. Cardiovascular:      Rate and Rhythm: Normal rate and regular rhythm. Pulses: Normal pulses. Carotid pulses are 2+ on the right side and 2+ on the left side. Radial pulses are 2+ on the right side and 2+ on the left side. Femoral pulses are 2+ on the right side and 2+ on the left side. Popliteal pulses are 2+ on the right side and 2+ on the left side. Dorsalis pedis pulses are 2+ on the right side and 2+ on the left side. Posterior tibial pulses are 2+ on the right side and 2+ on the left side. Heart sounds: Normal heart sounds, S1 normal and S2 normal. No murmur heard. No friction rub. No gallop. Pulmonary:      Effort: Pulmonary effort is normal. No respiratory distress. Breath sounds: No stridor. Examination of the right-upper field reveals wheezing. Examination of the left-upper field reveals wheezing.  Examination of the right-middle field reveals wheezing. Examination of the left-middle field reveals wheezing. Examination of the right-lower field reveals decreased breath sounds. Examination of the left-lower field reveals decreased breath sounds. Decreased breath sounds and wheezing present. No rhonchi or rales. Chest:      Chest wall: No tenderness. Abdominal:      General: Bowel sounds are normal. There is no distension. Palpations: Abdomen is soft. There is no mass. Tenderness: no abdominal tenderness There is no guarding or rebound. Musculoskeletal:      Cervical back: Normal, normal range of motion and neck supple. Normal range of motion. Thoracic back: Normal.      Lumbar back: Normal.      Right hip: Deformity, tenderness and bony tenderness present. No crepitus. Decreased range of motion. Normal strength. Left hip: Normal.      Right lower leg: No edema. Left lower leg: No edema. Comments: No step-off deformities noted in cervical thoracic or lumbar spine. Lymphadenopathy:      Cervical: No cervical adenopathy. Skin:     General: Skin is warm and dry. Findings: No bruising, ecchymosis, lesion or rash. Neurological:      Mental Status: He is alert and oriented to person, place, and time. Cranial Nerves: No cranial nerve deficit. Sensory: No sensory deficit. Coordination: Coordination normal.      Deep Tendon Reflexes: Reflexes are normal and symmetric. Psychiatric:         Speech: Speech normal.         Behavior: Behavior normal.         Thought Content:  Thought content normal.         Judgment: Judgment normal.         DIFFERENTIAL DIAGNOSIS:   Fall, closed head injury, intracranial injury, skull fracture, cervical fracture, intrathoracic injury, rib fracture, pelvic fracture, hip fracture    DIAGNOSTIC RESULTS     EKG: All EKG's are interpreted by the Emergency Department Physician who either signs or Co-signs this chart in the absence of a cardiologist.  EKG reveals normal sinus rhythm, left axis deviation, right bundle branch block, ventricular rate of 79 MD interval 202 QRS duration 108 QT interval 398 QTC of 456. RADIOLOGY: non-plain film images(s) such as CT, Ultrasound and MRI are read by the radiologist.  CT CERVICAL SPINE WO CONTRAST   Final Result      CT HEAD WO CONTRAST   Final Result   Impression:   1. No CT evidence of acute intracranial abnormality. Motion artifact    compromised the exam particularly the skull base   2. Cerebral volume loss, intracranial atherosclerotic disease and mild    sequela of chronic small vessel ischemic disease. This document has been electronically signed by: Abby Graff MD on    07/18/2022 07:10 AM      All CTs at this facility use dose modulation techniques and iterative    reconstructions, and/or weight-based dosing   when appropriate to reduce radiation to a low as reasonably achievable. XR HIP 2-3 VW W PELVIS RIGHT   ED Interpretation   Hip fracture      Final Result   Impression:   1. Suspect intertrochanteric fracture on the right thin section CT    recommended. This document has been electronically signed by: Abby Graff MD on    07/18/2022 07:04 AM         XR CHEST 1 VIEW   Final Result   Impression:   1. No acute cardiopulmonary disease.       This document has been electronically signed by: Abby Graff MD on    07/18/2022 06:51 AM      CT CERVICAL SPINE WO CONTRAST    (Results Pending)   CT HIP RIGHT WO CONTRAST    (Results Pending)         LABS:   Labs Reviewed   CBC WITH AUTO DIFFERENTIAL - Abnormal; Notable for the following components:       Result Value    WBC 13.3 (*)     MCHC 31.3 (*)     MPV 9.3 (*)     Segs Absolute 10.2 (*)     Immature Grans (Abs) 0.18 (*)     All other components within normal limits   BASIC METABOLIC PANEL - Abnormal; Notable for the following components:    Glucose 128 (*)     BUN 27 (*)     CREATININE 1.6 (*)     All other components within normal limits   HEPATIC FUNCTION PANEL - Abnormal; Notable for the following components: Total Bilirubin 0.2 (*)     All other components within normal limits   LIPASE - Abnormal; Notable for the following components:    Lipase 67.8 (*)     All other components within normal limits   TSH - Abnormal; Notable for the following components:    TSH 4.320 (*)     All other components within normal limits   GLOMERULAR FILTRATION RATE, ESTIMATED - Abnormal; Notable for the following components:    Est, Glom Filt Rate 41 (*)     All other components within normal limits   COVID-19, RAPID   PROTIME-INR   APTT   BRAIN NATRIURETIC PEPTIDE   TROPONIN   MAGNESIUM   ETHANOL   ANION GAP   OSMOLALITY   URINE DRUG SCREEN   URINALYSIS WITH REFLEX TO CULTURE       EMERGENCY DEPARTMENT COURSE:   Vitals:    Vitals:    07/18/22 0457 07/18/22 0620 07/18/22 0722   BP: (!) 185/97 (!) 143/81 137/86   Pulse: 85 80 92   Resp: (!) 35 16 15   Temp: 98.2 °F (36.8 °C)     SpO2: 100% 97%    Weight: 143 lb (64.9 kg)     Height: 5' 6\" (1.676 m)       Patient was assessed at bedside labs and imaging were ordered. Here today reviewed labs and imaging. Patient has what I think is a right intertrochanteric hip fracture. X-ray was read as suspected. They suggested a CT scan. CT scan was ordered. I also repeated the CT scan of the neck because there was motion artifact. Patient was given additional pain medication. Patient is signed out to my morning colleague in stable condition. CRITICAL CARE:   None    CONSULTS:  None    PROCEDURES:  None    FINAL IMPRESSION      1. Fall, initial encounter    2. Hip pain, acute, right          DISPOSITION/PLAN   ED observation/signout    PATIENT REFERRED TO:  No follow-up provider specified.     DISCHARGE MEDICATIONS:  New Prescriptions    No medications on file       (Please note that portions of this note were completed with a voice recognition program.  Efforts were made to edit the dictations but occasionally words are mis-transcribed.)    [facility] Nadeem Challenger, [address] [place] [address],   07/18/22 3226

## 2022-07-18 NOTE — ED NOTES
ED nurse-to-nurse bedside report    Chief Complaint   Patient presents with    Fall      LOC: alert and orientated to name and place  Vital signs   Vitals:    07/18/22 0457 07/18/22 0620   BP: (!) 185/97 (!) 143/81   Pulse: 85 80   Resp: (!) 35 16   Temp: 98.2 °F (36.8 °C)    SpO2: 100% 97%   Weight: 143 lb (64.9 kg)    Height: 5' 6\" (1.676 m)       Pain:    Pain Interventions: Dilaudid 1 mg  Pain Goal: 5  Oxygen: Yes    Current needs required 6L NC   Telemetry: Yes  LDAs:   Peripheral IV 07/18/22 Left Forearm (Active)   Site Assessment Clean, dry & intact 07/18/22 0622   Line Status Normal saline locked 07/18/22 0622   Phlebitis Assessment No symptoms 07/18/22 0622   Infiltration Assessment 0 07/18/22 0622   Dressing Status Clean, dry & intact 07/18/22 0622     Continuous Infusions:   Mobility: Fully dependent  Randall Fall Risk Score: No flowsheet data found.   Fall Interventions: Wheels locked, call light, Side rails x2  Report given to: Reed Dhillon RN  07/18/22 5275

## 2022-07-18 NOTE — CONSULTS
Hospitalist Consult Note      Patient:  Jose Toure    Unit/Bed:7K-15/015-A  YOB: 1931  MRN: [id number]   Acct: [de-identified]   PCP: DANTE Stephen CNP  Code Status: Full Code  Date of Admission: 7/18/2022  Date of Service: Pt seen/examined in consultation on 7/18/2022      Chief Complaint:  R hip pain  Reason for consult:  pre operative clearance and optimization and medical management of COPD, CKD, HTN    Assessment and Plan:    R femoral neck fracture: management per primary, plan for OR 7/19. Pre-operative Risk Assessment: RCRI 0, Class I Risk. This correlates to a 3.9% 30-day risk of death, MI, or cardiac arrest. Discussed with the family that from a cardiac standpoint, the patient is average risk. However, given his age and severe COPD, he is higher risk for decompensation after surgery. Recommend clearance by Pulmonary prior to surgery. CKD stage III: Cr stable with baseline. Acute encephalopathy: Possibly secondary to pain medication vs hypercapnia. CT Head negative. Pt was given Narcan without response. Will continue BiPAP, repeat ABG. If no improvement, will consider MRI Brain. COPD with acute exacerbation, respiratory acidosis: ABG shows pH 7.28, CO2 50, HCO3 24. Pulmonary following - started patient on BiPAP, Solu-Medrol. Resume home inhalers. Will repeat ABG in 3 hrs and assess response. Appreciate pulm assistance. Chronic hypoxic resp failure: Pt wears 2-2.5L NC continuous at baseline. Currently on BiPAP for above. Essential HTN: holding po meds given patient's encephalopathy. BP currently stable. History Of Present Illness:    Jose Toure 80 y.o. male who we are asked to see/evaluate by Linda Monae MD for medical management of COPD, CKD, HTN and preoperative clearance. History comes from wife and daughter at bedside. Patient currently wearing BiPAP, not alert or oriented. Patient lives at home with family who are his primary caretakers. He has dementia at baseline but typically answers questions appropriately. He is able to ambulate small distances with a walker and one assist. Family has a bed alarm on. Pts family states that he sleeps walks on occasion. They state he got out of bed, sleep walking last night and fell before they were able to get to him. Code status was discussed, the family is discussing further what their wishes are. RCRI is 0, which was discussed with the patient's family. We discussed that although he is average risk from a cardiac standpoint, he is high risk for general anesthsia given his respiratory status. Family would like to see how the patient responds to BiPAP and will further discuss code status and surgery. Hosptialist will continue to follow. Review of systems:   Pertinent positives as noted in the HPI. All other systems reviewed and negative.     Past Medical History:        Diagnosis Date    AAA (abdominal aortic aneurysm) ([facility])     followed by Dr. Denise An    CAD (coronary artery disease)     Chronic kidney disease     COPD (chronic obstructive pulmonary disease) ([facility] [address]     Hypercholesteremia     Hypertension     Other disorders of kidney and ureter in diseases classified elsewhere     Pneumonia     Prostate cancer ([facility] [address]     with radiation in 2006    SOB (shortness of breath)     Type II or unspecified type diabetes mellitus without mention of complication, not stated as uncontrolled        Past Surgical History:        Procedure Laterality Date    ABDOMEN SURGERY      ABDOMINAL AORTIC ANEURYSM REPAIR  8/21/14    ABDOMINAL AORTIC ANEURYSM REPAIR, ENDOVASCULAR Bilateral 08/21/2014    WITH LT FEMORAL ENDARTERECTOMY     CARDIAC SURGERY      COLONOSCOPY  unsure    COLONOSCOPY N/A 4/8/2022    COLONOSCOPY performed by France Mccabe MD at [address] [place] [address] Bilateral 07/28/2015    Dr Shelley Rothman Left 4/7/2022    Flexible Sigmoidoscopy performed by France Mccabe MD at [facility] [place] 16.0   HCT 51.1        Recent Labs     07/18/22  0500      K 4.9      CO2 25   BUN 27*   CREATININE 1.6*   CALCIUM 9.3     Recent Labs     07/18/22  0500   AST 19   ALT 15   BILIDIR <0.2   BILITOT 0.2*   ALKPHOS 77     Recent Labs     07/18/22  0500   INR 0.92     No results for input(s): [place] in the last 72 hours. Urinalysis:    Lab Results   Component Value Date/Time    NITRU NEGATIVE 07/18/2022 06:45 AM    WBCUA 0 08/23/2014 11:07 PM    BACTERIA NONE 08/23/2014 11:07 PM    RBCUA 0 08/23/2014 11:07 PM    BLOODU NEGATIVE 07/18/2022 06:45 AM    SPECGRAV 1.015 08/23/2014 11:07 PM    GLUCOSEU NEGATIVE 07/18/2022 06:45 AM       Radiology:   CT CERVICAL SPINE WO CONTRAST   Final Result       1. No fracture. 2. Degenerative changes in the cervical spine with severe spinal canal stenosis at the C4-5 and C5-6 levels. **This report has been created using voice recognition software. It may contain minor errors which are inherent in voice recognition technology. **      Final report electronically signed by Dr. Mariama Piedra on 7/18/2022 7:54 AM      CT HIP RIGHT WO CONTRAST   Final Result   Acute nondisplaced vertically oriented fracture through the proximal right femoral diaphysis which extends to the femoral neck. **This report has been created using voice recognition software. It may contain minor errors which are inherent in voice recognition technology. **      Final report electronically signed by Dr. Mariama Piedra on 7/18/2022 7:57 AM      CT CERVICAL SPINE WO CONTRAST   Final Result      CT HEAD WO CONTRAST   Final Result   Impression:   1. No CT evidence of acute intracranial abnormality. Motion artifact    compromised the exam particularly the skull base   2. Cerebral volume loss, intracranial atherosclerotic disease and mild    sequela of chronic small vessel ischemic disease.       This document has been electronically signed by: Sy Conde MD on 07/18/2022 07:10 AM      All CTs at this facility use dose modulation techniques and iterative    reconstructions, and/or weight-based dosing   when appropriate to reduce radiation to a low as reasonably achievable. XR HIP 2-3 VW W PELVIS RIGHT   ED Interpretation   Hip fracture      Final Result   Impression:   1. Suspect intertrochanteric fracture on the right thin section CT    recommended. This document has been electronically signed by: Abby Graff MD on    07/18/2022 07:04 AM         XR CHEST 1 VIEW   Final Result   Impression:   1. No acute cardiopulmonary disease. This document has been electronically signed by: Abby Graff MD on    07/18/2022 06:51 AM      XR CHEST PORTABLE    (Results Pending)     CT HEAD WO CONTRAST    Result Date: 7/18/2022  CT of the head without intravenous contrast Comparison: CT,SR - CT HEAD WO CONTRAST - 07/27/2021 02:42 PM EDT Findings: The ventricles and sulci are prominent, consistent with mild generalized cerebral parenchymal volume loss. The ventricles are symmetric and the basilar cisterns are intact. Mild periventricular, deep and subcortical white matter hypodensities are nonspecific but statistically reflect the sequela of chronic small vessel ischemic change. No intracranial hemorrhage, extra-axial fluid collection, midline shift or mass-effect is evident. No evidence of acute large vessel or territorial ischemia. Brainstem and cerebellum unremarkable. Vascular calcifications indicate intracranial atherosclerosis. The imaged portion of the paranasal sinuses are clear. No mastoid effusions are demonstrated. The orbital contents are unremarkable. Calvarium grossly intact. Limited assessment of the skull base due to motion. Impression: 1. No CT evidence of acute intracranial abnormality. Motion artifact compromised the exam particularly the skull base 2.  Cerebral volume loss, intracranial atherosclerotic disease and mild sequela of chronic small vessel ischemic disease. This document has been electronically signed by: Troy Chew MD on 07/18/2022 07:10 AM All CTs at this facility use dose modulation techniques and iterative reconstructions, and/or weight-based dosing when appropriate to reduce radiation to a low as reasonably achievable. CT CERVICAL SPINE WO CONTRAST    Result Date: 7/18/2022  PROCEDURE: CT CERVICAL SPINE WO CONTRAST CLINICAL INFORMATION: fall pain, repeat. COMPARISON: CT abdomen pelvis 7/18/2022. TECHNIQUE: 3 mm noncontrast axial images were obtained through the cervical spine with sagittal and coronal reconstructions. All CT scans at this facility use dose modulation, iterative reconstruction, and/or weight-based dosing when appropriate to reduce radiation dose to as low as reasonably achievable. FINDINGS: This examination is of improved quality. The cervical vertebral bodies are normally aligned. There is no fracture. There is no prevertebral soft tissue swelling. There is degenerative disc disease throughout the cervical spine. There are also facet degenerative changes. There is severe spinal canal stenosis at the C4-5 level. This is worse on the right than the left. There is also severe spinal canal stenosis at C5-6 level. No suspicious osseous lesions are present. There are no suspicious findings in the cervical soft tissues. There are vascular calcifications. There are no suspicious findings the lung apices. 1. No fracture. 2. Degenerative changes in the cervical spine with severe spinal canal stenosis at the C4-5 and C5-6 levels. **This report has been created using voice recognition software. It may contain minor errors which are inherent in voice recognition technology. ** Final report electronically signed by Dr. Monae Riddle on 7/18/2022 7:54 AM    CT CERVICAL SPINE WO CONTRAST    Result Date: 7/18/2022  CT cervical spine without intravenous contrast Comparison: None Findings:  This exam was significantly compromised due to motion repeat advised. No gross compression fractures or dislocations. Spur disc complexes C4-C5 and C5-C6 causing central spinal canal stenosis. Impression: 1. This exam is significantly compromised by motion and needs to be repeated. No gross compression fractures or dislocations. This document has been electronically signed by: Sancho Krueger MD on 07/18/2022 07:07 AM All CTs at this facility use dose modulation techniques and iterative reconstructions, and/or weight-based dosing when appropriate to reduce radiation to a low as reasonably achievable. XR CHEST 1 VIEW    Result Date: 7/18/2022  1 view chest x-ray. Comparison: CR,SR - XR CHEST PORTABLE - 04/07/2022 10:23 AM EDT Findings: Normal lung volumes. Lungs are clear. No pneumothorax or pleural effusion. Heart size normal. No passive venous congestion. No midline shift or tracheal deviation. No acute fracture. Partially imaged abdominal aortic stent. Impression: 1. No acute cardiopulmonary disease. This document has been electronically signed by: Sancho Krueger MD on 07/18/2022 06:51 AM    CT HIP RIGHT WO CONTRAST    Result Date: 7/18/2022  PROCEDURE: CT HIP RIGHT WO CONTRAST CLINICAL INFORMATION: suspected fracture  . Laural Horn. Right hip pain. COMPARISON: Right hip x-ray 7/18/2022. TECHNIQUE: 3 mm axial images were obtained through the right hip. Sagittal coronal reconstructions were obtained. No contrast was given. All CT scans at this facility use dose modulation, iterative reconstruction, and/or weight-based dosing when appropriate to reduce radiation dose to as low as reasonably achievable. FINDINGS: There is an acute vertically oriented fracture through the distal right femoral diaphysis. This fracture does extend to the intertrochanteric region and femoral neck. This is nondisplaced. There is no dislocation of the femoral head. There are mild-moderate degenerative changes in the right hip joint.  The superior and inferior pubic rami are intact. There are degenerative changes in both sacroiliac joints. On soft tissue windows, there is some mild fat stranding surrounding the acute fracture. There is no joint effusion. Acute nondisplaced vertically oriented fracture through the proximal right femoral diaphysis which extends to the femoral neck. **This report has been created using voice recognition software. It may contain minor errors which are inherent in voice recognition technology. ** Final report electronically signed by Dr. Jodie Prieto on 7/18/2022 7:57 AM    XR HIP 2-3 VW W PELVIS RIGHT    Result Date: 7/18/2022   ADDENDUM #1  This report was discussed with Junito Mayorga on Jul 18, 2022 07:10:00 EDT. This document has been electronically signed by: Dre Lambert on 07/18/2022 07:11 AM  ORIGINAL REPORT  2 views right hip. Single AP pelvis. Comparison: None Findings: Cortical lucency through the lesser trochanter on the right correlate with thin section CT. Hip joints remain congruent. SI joints pubic rami and symphysis pubis intact. Mild axial narrowing both hip joints. Osteopenia. Calcified atherosclerotic disease. Aortoiliac stent graft partially imaged. No radiopaque foreign body. Impression: 1. Suspect intertrochanteric fracture on the right thin section CT recommended. This document has been electronically signed by: Mary Ellen Birmingham MD on 07/18/2022 07:04 AM         EKG:  NSR with sinus arrythmmia, RBBB . Thank you for allowing us to participate in this patient's care. Please do not hesitate to call us directly with further questions.      Electronically signed by Magali Corado PA-C on 7/18/2022 at 2:36 PM

## 2022-07-18 NOTE — ED TRIAGE NOTES
Pt presents to the ED with complaints of a fall tonight. Pt family and EMS states that the pt sleep walks and thought that there was snakes and took off running and fell. Pt denies hitting his head and loss of consciousness. Pt states that the pain is a 9/10 with movement. Upon arrival pt appears to be sob with increased effort with breathing. Pt normally on 2L NC. EMS places pt on 6L due to low saturation but was able to decrease to 4L. Upon arrival pt 85% on 4L NC and increased to 8L NC and saturating at 87%. Pt switch to 10L nonrebreather and pt saturating 99%. Pt alert and oriented x4.

## 2022-07-19 NOTE — SIGNIFICANT EVENT
Spoke with daughter and patient's spouse, PoA. We spoke about patient's current condition as well as potential trajectory. We discussed potential need for reintubation. We discussed CODE STATUS. We discussed orthopedic plans. We discussed potential need for tracheostomy. POA and family member stated that they have another family member coming to see the patient around 6 PM and they will talk with her regarding patient's CODE STATUS. They stated that until they speak with her they would like him to receive all care, including intubation, shock, and emergency medications up until his heart stops, they would like him to be DNR CCA. They stated they will call back regarding CODE STATUS after discussion with family.

## 2022-07-19 NOTE — PROGRESS NOTES
Orthopaedic Progress Note      SUBJECTIVE   Mr. Arjun Bone is hospital day 2, sustained R femoral neck fracture early 7/18/22 AM from a fall     Seen at bedside this morning, intubated overnight, apparent plans for extubation and BiPAP if notable improvement  Singh maintained  Orientation overall improved since initial evaluation in ED per chart review, sedated on exam today      OBJECTIVE      Physical    VITALS:  BP (!) 155/74   Pulse 78   Temp 97.9 °F (36.6 °C) (Bladder)   Resp 19   Ht 5' 6\" (1.676 m)   Wt 147 lb 7.8 oz (66.9 kg)   SpO2 98%   BMI 23.81 kg/m²   I/O last 3 completed shifts:   In: 3278.6 [I.V.:1684.3; NG/GT:250; IV Piggyback:1344.4]  Out: 625 [Urine:625]    Exam limited due to patient status  RLE sitting frog leg, wiggles toes, calf supple  No skin breakdown or lacerations    Data  CBC:   Lab Results   Component Value Date/Time    WBC 13.3 07/18/2022 05:00 AM    HGB 14.1 07/18/2022 11:24 PM     07/18/2022 05:00 AM     BMP:    Lab Results   Component Value Date/Time     07/18/2022 11:24 PM    K 6.2 07/18/2022 11:24 PM    K 4.2 04/07/2022 04:44 AM     07/18/2022 11:24 PM    CO2 14 07/18/2022 11:24 PM    BUN 38 07/18/2022 11:24 PM    CREATININE 3.0 07/18/2022 11:24 PM    CALCIUM 9.0 07/18/2022 11:24 PM    GLUCOSE 231 07/18/2022 11:24 PM    GLUCOSE 97 03/06/2018 08:30 AM     Uric Acid:  No components found for: URIC  PT/INR:    Lab Results   Component Value Date/Time    INR 0.92 07/18/2022 05:00 AM     Troponin:  No results found for: TROPONINI  Urine Culture:  No components found for: CURINE      Current Inpatient Medications    Current Facility-Administered Medications: azithromycin (ZITHROMAX) 500 mg in sodium chloride 0.9 % 250 mL IVPB (Bcco2Ohu), 500 mg, IntraVENous, Q24H  glucose chewable tablet 16 g, 4 tablet, Oral, PRN  dextrose bolus 10% 125 mL, 125 mL, IntraVENous, PRN **OR** dextrose bolus 10% 250 mL, 250 mL, IntraVENous, PRN  glucagon (rDNA) injection 1 mg, 1 mg, IntraMUSCular, PRN  dextrose 5 % solution, 100 mL/hr, IntraVENous, PRN  piperacillin-tazobactam (ZOSYN) 3,375 mg in dextrose 5 % 50 mL IVPB extended infusion (mini-bag), 3,375 mg, IntraVENous, Q12H  0.9 % sodium chloride infusion, , IntraVENous, Continuous  sodium bicarbonate 100 mEq in dextrose 5 % 1,000 mL infusion, , IntraVENous, Continuous  insulin glargine (LANTUS) injection vial 10 Units, 0.15 Units/kg, SubCUTAneous, Nightly  insulin lispro (HUMALOG) injection vial 0-12 Units, 0-12 Units, SubCUTAneous, Q4H  tiotropium-olodaterol (STIOLTO) 2.5-2.5 MCG/ACT inhaler 2 puff, 2 puff, Inhalation, Daily  mometasone-formoterol (DULERA) 100-5 MCG/ACT inhaler 2 puff, 2 puff, Inhalation, Q4H PRN  dexmedetomidine (PRECEDEX) 400 mcg in sodium chloride 0.9 % 100 mL infusion, 0.1-1.5 mcg/kg/hr, IntraVENous, Continuous  acetaminophen (TYLENOL) tablet 650 mg, 650 mg, Oral, Q6H PRN  HYDROcodone-acetaminophen (NORCO) 5-325 MG per tablet 1 tablet, 1 tablet, Oral, Q6H PRN **OR** HYDROcodone-acetaminophen (NORCO) 5-325 MG per tablet 2 tablet, 2 tablet, Oral, Q6H PRN  morphine (PF) injection 2 mg, 2 mg, IntraVENous, Q2H PRN  dexamethasone (DECADRON) injection 4 mg, 4 mg, IntraVENous, Q24H  cyclobenzaprine (FLEXERIL) tablet 10 mg, 10 mg, Oral, TID PRN  sodium chloride flush 0.9 % injection 5-40 mL, 5-40 mL, IntraVENous, 2 times per day  sodium chloride flush 0.9 % injection 5-40 mL, 5-40 mL, IntraVENous, PRN  0.9 % sodium chloride infusion, , IntraVENous, PRN  [DISCONTINUED] ondansetron (ZOFRAN-ODT) disintegrating tablet 4 mg, 4 mg, Oral, Q8H PRN **OR** ondansetron (ZOFRAN) injection 4 mg, 4 mg, IntraVENous, Q6H PRN  polyethylene glycol (GLYCOLAX) packet 17 g, 17 g, Oral, Daily PRN  docusate sodium (COLACE) capsule 100 mg, 100 mg, Oral, BID PRN  gabapentin (NEURONTIN) capsule 100 mg, 100 mg, Oral, BID  [Held by provider] metoprolol succinate (TOPROL XL) extended release tablet 25 mg, 25 mg, Oral, Daily  tamsulosin (FLOMAX) capsule 0.4 mg, 0.4 mg, Oral, Daily  pravastatin (PRAVACHOL) tablet 80 mg, 80 mg, Oral, Nightly  aspirin chewable tablet 81 mg, 81 mg, Oral, Daily        PLAN  Mr. Gina Davila is hospital day 2, sustained R femoral neck fracture early 7/18/22 AM from a fall   -will wait until patient is stable prior to moving forward with any surgical intervention, however he will need fixed upon this admission  -multimodal pain control, minimize narcotic use  -dvt ppx: SCDs, prefer short acting AC for potential surgical intervention if improves within week  -maintain NWB RLE, no indication for traction  -continue to pad bony prominences  -will continue to monitor optimization status, not currently scheduled for OR pending improvement and resuscitation

## 2022-07-19 NOTE — PROGRESS NOTES
Physician Progress Note      PATIENT:               Lance Lucero  CSN #:                  [id number]  :                       1931  ADMIT DATE:       2022 4:35 AM  DISCH DATE:  Sondra Ellis  PROVIDER #:        Benny Beavers MD          QUERY TEXT:    Dr Raina Red,    Pt admitted with R intertrochanteric hip fracture. Pt noted to have   Osteopenia. If possible, please document in progress notes and discharge   summary if you are evaluating and/or treating any of the following: The medical record reflects the following:  Risk Factors: Elderly  Clinical Indicators: XRAY:  Osteopenia. Treatment: R hip IMN, pending medical optimization and clearance. Options provided:  -- Pathological R intertrochanteric hip fracture fracture due to osteopenia  -- Osteoporotic R intertrochanteric hip fracture following fall which would   not usually break a normal, healthy bone  -- Traumatic R intertrochanteric hip fracture  -- Other - I will add my own diagnosis  -- Disagree - Not applicable / Not valid  -- Disagree - Clinically unable to determine / Unknown  -- Refer to Clinical Documentation Reviewer    PROVIDER RESPONSE TEXT:    This patient has a pathological R intertrochanteric hip fracture due to   osteopenia.     Query created by: Nissa Gomez on 2022 8:52 AM      Electronically signed by:  Benny Beavers MD 2022 8:54 AM

## 2022-07-19 NOTE — PROGRESS NOTES
Physician Progress Note      PATIENT:               Kailee Linda  CSN #:                  [id number]  :                       1931  ADMIT DATE:       2022 4:35 AM  [address] [place] DATE:  RESPONDING  PROVIDER #:        Vania Contreras MD          QUERY TEXT:    Dr Nicolasa Last,    Pt admitted with R intertrochanteric hip fracture.  Pt had unresponsive   episode, he was given Narcan 2 mg IV x1. Due to his abnormal ABG suggestive   of acute hypercapnic respiratory failure, Concern for aspiration. If possible,   please document in progress notes and discharge summary the present on   admission status of Sepsis:    The medical record reflects the following:  Risk Factors: elderly, COPD, CA  Clinical Indicators: Per  significant event note: Will treat empirically   for sepsis. Source unclear. Concern for aspiration. LA elevated at 7.2. Repeat   procalcitonin increased from 0.07 to 4.34. Treatment: Levophed and IVF. Initiate empiric antibiotics -- vancomycin, zosyn   (concern for possible aspiration with poor dentition; intraabdominal source)   and azithromycin (atypical coverage). Options provided:  -- Yes, Sepsis was present at the time of the order to admit to the hospital  -- No, Sepsis was not present on admission and developed during the inpatient   stay  -- Other - I will add my own diagnosis  -- Disagree - Not applicable / Not valid  -- Disagree - Clinically unable to determine / Unknown  -- Refer to Clinical Documentation Reviewer    PROVIDER RESPONSE TEXT:    Provider is clinically unable to determine a response to this query.   No source of infection at this time    Query created by: Serjio Rob on 2022 10:43 AM      Electronically signed by:  Vania Contreras MD 2022 10:50 AM

## 2022-07-19 NOTE — FLOWSHEET NOTE
Pt was unresponsive but his wife and daughter were there at the time of the visit. Family needed support as they were having hard time with this illness. Prayer was appreciated. 07/19/22 4801   Encounter Summary   Service Provided For: Patient and family together   Referral/Consult From: Family;Nurse   Support System Spouse; Children   Last Encounter  07/19/22   Complexity of Encounter Low   Spiritual/Emotional needs   Type Spiritual Support

## 2022-07-19 NOTE — FLOWSHEET NOTE
Pt was unresponsive but was blessed.     07/19/22 0914   Encounter Summary   Service Provided For: Patient and family together   Referral/Consult From: Nurse   Support System Family members   Last Encounter  07/19/22  (NR)   Complexity of Encounter Low   Begin Time 0755   End Time  0801   Total Time Calculated 6 min   Spiritual/Emotional needs   Type Spiritual Support

## 2022-07-19 NOTE — PROGRESS NOTES
Intubated by Dr Alin Orozco. 3.5 Blade, 7.5 ETT, 23@ lip. Bilateral breath sounds, positive color change, Fog noted in ETT.      RWT#[id number]

## 2022-07-19 NOTE — PROCEDURES
ICU PROCEDURE - ENDOTRACHEAL INTUBATION    Lion Shaffer     MRN#: [id number]  22      Acct [email or web address]     : 1931      INDICATION: Increased work of breathing/persistent tachypnea    TIME OUT: taken    Permission obtained, risks/benefits reviewed:    ANESTHESIA:   []Ketamine  []Ativan  [] Morphine  []Propofol  [x]Other medications: Versed, Etomidate      ESTIMATED BLOOD LOSS:  None. COMPLICATIONS:  [x]N/A  [] Other:    LARYNGOSCOPIC AIRWAY GRADE (CORMACK-LEHANE):[]1  [x]2a  []2b []3  []4        INTUBATION EQUIPMENT USED:  [x] Direct laryngoscope only    OUTCOME: Successful placement of #  7.5 Taperguard Evac endotracheal via   [x]Oral route    INSERTION DEPTH:  23  cm from   [x]lip           CONFIRMATION OF TUBE POSITION:   [x]Capnography - Strong & repeatable exhaled CO2 detection   [x]Multiple point auscultation   [x]SpO2 response   [x]STAT X-ray   []Bronchoscopic assessment    UNUSUAL FINDINGS:    PROCEDURE:     Using direct laryngoscopy, the vocal cords were visualized and the endotracheal tube was placed through the cords under direct vision. Good breath sounds were auscultated bilaterally without sounds over abdomen. Appropriate strong & repeatable exhaled CO2 detection was confirmed. Procedure was done under direct supervision of Dr. Aretha Anderson.       Electronically signed by Tish De La Fuente MD on 2022 at 11:46 PM

## 2022-07-19 NOTE — PROCEDURES
Bladder scan completed at 2250 and results told to [facility]. Scan showed 0 mL in the patients bladder. Patent has a catheter.  Trina Johnson CET

## 2022-07-19 NOTE — PLAN OF CARE
Problem: Respiratory - Adult  Goal: Maintain Patent Airway and Adequate Oxygenation  Outcome: Progressing Towards Goal  Goal: Spontaneous ventilation  Outcome: Progressing Towards Goal

## 2022-07-19 NOTE — PROGRESS NOTES
Patient:  Brent Burr    Unit/Bed:4D-14/014-A  MRN: [id number]   PCP: DANTE Zamudio CNP  Date of Admission: 7/18/2022    Assessment and Plan(All pulmonary edema, renal failure, PE, and respiratory failure diagnoses are acute in nature unless otherwise specified):        Acute Hypoxic Respiratory Failure: On 2-2.5 L NC baseline. Intubated 7/18/2022 secondary to BiPAP failure. Maintain lung protective strategies with a peak pressure of 35 or less and a plateau pressure of 30 or less. Wean as tolerated. Right Intertrochanteric Hip Fracture:Orthopedic surgery following. Fever:  UA negative. PNA PCR 7/19/2022 negative; NGTD resp ctx. Trending lactic acid. S/p 30 cc/kg fluid bolus. Zoysn & Azithromycin started 7/19/2022. Day #1. Likely secondary to aspiration. ASHLI on CKD 3: Cr 1.6 on arrival; baseline appears to be 1.4-1.7. FEUrea 7/19/2022 19.3%, suggesting pre-renal etiology. Continue IV fluids. Non-oliguric at this point; will need to trend. HCO3 gtt. Hyperkalemia is concerning for potential need for dialysis. Hyperkalemia:  Received insulin/calcium/lokelmia. HCO3 gtt started. Acute Encephalopathy: Secondary to hypercapnia and sedation and infection. CTH negative. Hypotension: Likely related to positive pressure and sedation. Vasopressor support as needed to maintain a MAP of 65 or greater. Titrate as tolerated. COPD: Delford Freiberg. Hypertension: Home medications held due to hypotension  DM: Strict glucose control. CC:  fall  Chief Complaint   Patient presents with    Fall       HPI:   Per Chart Review:    \"79 y/o M reformed smoker PMHx GOLD 3 COPD (FEV1 35% predicted 11/2017), chronic hypoxic respiratory failure (on baseline 2-2.5L NC), HTN, HLD, CAD, NIDDM2 and dementia with good baseline functional status -- presented to [facility] with a chief complaint of R hip pain. History obtained from the patient's wife and daughter, who were present at bedside.      Patient was admitted to the orthopedic surgery service 7/18 for traumatic R intertrochanteric hip fracture. The hospitalist service was consulted for pre-operative clearance, and subsequently consulted pulmonology for additional recommendations. Unfortunately, the patient fell while sleepwalking on the night prior to arrival. Family members report good baseline functional status at home -- state patient ambulates small distances with a walker and one assist, and answers questions appropriately. Patient received dilaudid in the ED and was subsequently noted to be less responsive. Narcan was administered without improvement. CT head showed NAP. ABG was obtained, demonstrating acute hypercapnic respiratory failure (pCO2 50, pH 7.28). He was diagnosed with acute COPD exacerbation, initiated on steroids/bronchodilators, and placed on BiPAP for acute on chronic hypoxic/acute hypercapnic respiratory failure. Pulmonology recommended deferring surgery and transferring to the ICU for possible intubation due to the patient's decreased responsiveness and history of severe COPD, along with moderate-high risk based off of the ARISCAT Score. __        Upon arrival to the ICU, the patient remained minimally responsive to painful stimuli. Hemodynamics were stable; oxygenation and ventilation were appropriate on BiPAP. Repeat blood gas demonstrated resolution of hypercapnia, and new metabolic acidosis. An additional dose of narcan was administered, after which the patient started moving his extremities, responding to painful stimuli and spontaneously opening his eyes. The decision was made to defer intubation and continue monitoring. Non-narcotic analgesics were ordered for pain control. Patient's wife and daughter confirmed that the patient would be transitioned FULL CODE status now, and remain so until the post-operative period. All questions were answered.  \"     Please refer to the assessment and plan for additional Oral Daily    tamsulosin  0.4 mg Oral Daily    pravastatin  80 mg Oral Nightly    aspirin  81 mg Oral Daily       Vital Signs:   Vitals:    07/19/22 0500 07/19/22 0600 07/19/22 0700 07/19/22 0821   BP: (!) 114/58 108/63 (!) 155/74    Pulse: 80 80 78 77   Resp: 21 22 19 18   Temp:       TempSrc:       SpO2: 96% 97% 98% 97%   Weight:       Height:               T: 97.9   P: 77   RR: 18   B/P: 107/56  O2 Sat:97  FiO2: 40      PCV  RR set 16  Psupport 12  PEEP 6  FiO2 40      I/O:   24 hrs: Intake:   3278   Output:  625   Total:    +8215     I/O last 3 completed shifts: In: 3278.6 [I.V.:1684.3; NG/GT:250; IV Piggyback:1344.4]  Out: 625 [Urine:625]  No intake/output data recorded. GCS: 3T      Body mass index is 23.81 kg/m². Physical Exam:    General:   Elderly appearing white male  HEENT:  normocephalic and atraumatic. No scleral icterus. PERR  Neck: supple. No Thyromegaly. Lungs: clear to auscultation. No retractions  Cardiac: RRR. No JVD. Abdomen: soft. Nontender. Extremities:  No clubbing, cyanosis, or edema x 4. Vasculature: capillary refill < 3 seconds. Right lower extremity externally rotated. Skin:  warm and dry. Psych: Intubated and sedated on mechanical ventilator  Lymph:  No supraclavicular adenopathy. Neurologic:  No focal deficit. No seizures. Debated and sedated on mechanical ventilator. Data: (All radiographs, tracings, PFTs, and imaging are personally viewed and interpreted unless otherwise noted). Patient appears to be in sinus rhythm on telemetry. Sodium 139 potassium 5.5 chloride 104 CO2 18  BUN 48 creatinine 2.9 anion gap 17  Magnesium 2.1  Lactic acid 7.4  Glucose 306  Albumin 3.1 AST 57  WBC 21.3 hemoglobin 13.5 platelets 669  CXR 1/35/9348: Worsening right middle lobe infiltrate. No pleural effusion. No pneumothorax.     Electronically signed by Jazzmine Bonner MD, MPH              Electronically signed by Deshawn Cross MD on 7/19/2022 at 9:21 AM Patient seen by me including key components of medical care. Case discussed with resident physician. Pre-renal renal failure. Undergoing volume replacement. See significant event. Italicized font, if present,  represents changes to the note made by me. CC time 35 minutes. Time was discontiguous. Time does not include procedure. Time does include my direct assessment of the patient and coordination of care. Time represents more than 50% of the time involved with patient care by the [address] [place] [address] team.  Electronically signed by Caron Jeffrey.  Nubia Blakely MD.

## 2022-07-19 NOTE — PROGRESS NOTES
[facility] THERAPY MISSED TREATMENT NOTE  STR[facility] ICU 4D      Date: 2022  Patient Name: Mylene Sky        MRN: [id number]    : 1931  (80 y.o.)    REASON FOR MISSED TREATMENT: Patient currently not medically apporpriate for CSE. Per ST discussion with SUSAN Carter, pt currently resting on BIPAP with recent extubation (22-22). ST to complete CSE once patient is able to tolerate being off BIPAP for 1 hour or greater. Ashwin Narayanan MA., [facility] / [phone]

## 2022-07-19 NOTE — PROGRESS NOTES
Follow Up / Progress Note        Patient:   Everlyn Cabot  YOB: 1931  Age:  80 y.o. Room:  [address] [place] [address]-  MRN:  [id number]            Family/Patient Discussion: Patient is intubated at this time. Wife at the bedside. Palliative care contact information provided. Emotional support provided. Plan/Follow-Up:  Palliative care will remain available if further needs arise. Please do not hesitate to call prn.       Electronically signed by Pk Samson RN on 7/19/2022 at 9:34 AM             Palliative Care Office: [phone]

## 2022-07-19 NOTE — FLOWSHEET NOTE
Consult received for emotional distress- support requested for family. No family present when  entered room. Patient on BiPAP and did open eyes slightly. Nodded slightly when asked if he wanted prayer. Offered prayer with him at bedside. Attempted to contact patient's daughter due to patient ( and presumably his wife's) age as I didn't want to upset wife, however there was no answer. Handing on to morning chaplains for follow up with family.

## 2022-07-19 NOTE — PROGRESS NOTES
Physician Progress Note      PATIENT:               Dontrell Aldrich  CSN #:                  [id number]  :                       1931  ADMIT DATE:       2022 4:35 AM  [address] DATE:  RESPONDING  PROVIDER #:        Emelina Bowman MD          QUERY TEXT:    Dr Balderas President,    Pt admitted with fracture and has Acute encephalopathy: Possibly secondary to   pain medication vs hypercapnia documented. Patient received Dilaudid 1 mg at   5:40 AM IV. He also received Neurontin 100 mg p.o. patient subsequently   became unresponsive. If possible, please document in progress notes and   discharge summary further specificity regarding the type of encephalopathy:    The medical record reflects the following:  Risk Factors: elderly, COPD, CKD, CAD  Clinical Indicators: Patient received Dilaudid 1 mg at 5:40 AM IV. He also   received Neurontin 100 mg p.o. patient subsequently became unresponsive. Treatment: Narcan & intubated  Options provided:  -- Metabolic encephalopathy  -- Septic encephalopathy  -- Toxic encephalopathy  -- Drug-induced encephalopathy due to Dilaudid. -- Toxic metabolic encephalopathy  -- Other - I will add my own diagnosis  -- Disagree - Not applicable / Not valid  -- Disagree - Clinically unable to determine / Unknown  -- Refer to Clinical Documentation Reviewer    PROVIDER RESPONSE TEXT:    This patient has drug-induced encephalopathy due to Dilaudid.     Query created by: Kevin Sloan on 2022 10:33 AM      Electronically signed by:  Emelina Bowman MD 2022 10:37 AM

## 2022-07-19 NOTE — SIGNIFICANT EVENT
Update to plan of care:    Despite improvement in the patient's mentation, his respiratory status declined on BiPAP. Repeat lung exam revealed coarse breath sounds bilaterally, most pronounced in the bilateral upper lung fields. He became tachypnic in the 30s with accessory muscle use, and hypotensive, requiring initiation of pressor support. New low-grade fever to 100.4 degrees. The decision was made to intubate. Levophed and IVF were started. Labs were ordered. __      LA elevated at 7.2. Repeat procalcitonin increased from 0.07 to 4.34. Additional labs pending. __      Assessment:  Will treat empirically for sepsis. Source unclear. Concern for aspiration, however no evidence of infiltrate on repeat portable CXR. UA negative. Lipase 67 on arrival. New R-sided TTP noted to RUQ. MRSA screen positive. COVID testing negative. Plan:  Volume resuscitate. Initiate empiric antibiotics -- vancomycin, zosyn (concern for possible aspiration with poor dentition; intraabdominal source) and azithromycin (atypical coverage). Follow blood and respiratory cultures, molecular pneumonia panel and rapid influenza testing. Trend LA. Repeat lipase. Obtain RUQ US; CT A/P if indicated. Wean pressor support as tolerated following volume resuscitation.     Electronically signed by Italo Gonzalez MD on 7/19/2022 at 12:26 AM

## 2022-07-19 NOTE — CARE COORDINATION
7/19/22, 1:05 PM EDT  DISCHARGE PLANNING EVALUATION:    Asmita Nelson       Admitted: 7/18/2022/ Doc 42 day: 1   Location: [facility] Reason for admit: Fall, initial encounter [W19. XXXA]  Closed fracture of right hip, initial encounter ([facility] [S72.001A]  Hip pain, acute, right [M25.551]  Closed displaced fracture of right femoral neck ([address] [S72.001A]   PMH:  has a past medical history of AAA (abdominal aortic aneurysm) ([address] CAD (coronary artery disease), Chronic kidney disease, COPD (chronic obstructive pulmonary disease) ([address] Hypercholesteremia, Hypertension, Other disorders of kidney and ureter in diseases classified elsewhere, Pneumonia, Prostate cancer ([facility] [address] SOB (shortness of breath), and Type II or unspecified type diabetes mellitus without mention of complication, not stated as uncontrolled. Procedure:   7/18 XR Right hip/pelvis: Suspect intertrochanteric fracture on the right thin section CT recommended  7/18 CXR: No acute findings  7/18 CT Cervical spine: No acute fracture  7/18 CT Head: No acute findings  7/18 CT Right hip: Acute nondisplaced vertically oriented fracture through the proximal right femoral diaphysis which extends to the femoral neck  7/18 Repeat CT Head: No acute findings  7/18 XR Right humerus: Negative for acute osseous abnormality  7/18 Intubated  7/19 Extubated    Barriers to Discharge: Presented to ED after fall at home. Family states patient sleepwalks on occasion and got out of bed sleepwalking and fell before they (family) were able to get to him. Family has bed alarm on him at home. Found to have right femoral neck fracture. Admitted to [address] [place] [address]. Hospitalist consulted. Pulmonlogy consulted for perioperative optimization, COPD on home O2. Received dilaudid, became less responsive, narcan given without improvement. ABG with Co2 50, PH 7.28 and placed on bipap. Transferred to ICU in early evening. Required intubation late last night d/t respiratory distress.  Plan for surgery for IM Nail when more stable. Extubated this morning to bipap. On bipap 20/6, 30% FIO2, sats 100%. Tmax 100.4. NSR. Ortho and Intensivist following. SLP. Palliative Care consulted. +MRSA nares. IVF, precedex @ 0.2 mcg/kg/hr, bicarb drip, asa, IV zithromax, IV decadron daily, neurontin, sq heparin, lantus, SSI, prn zofran, IV zosyn, pravastatin, flomax, inhaler. Received 2L in fld bolus today and 1L yesterday. Received lokelma 5g x1 today. K+ 4.9 - 6.2 - now 5.1, BUN 27 - now 39, Creat 1.6 -3 - now 2.8, LA 2.8 - now 5.7, procal 0.07 - now 4.34, , trop neg, alb 3.1, ast 19 - now 57, lipase 67.8 - now 77.5, tsh 4.32, wbc 13.3 - now 21.3, hgb 16 - now 13.5. Rapid flu and COVID 19 was negative. Respiratory and urine cultures sent. PCP: DANTE Pérez - CNP  Readmission Risk Score: 18.3%  Patient's Healthcare Decision Maker: Legal Next of Kin Family states he has completed HCPOA and we should have this on file already; Lula Gibbs is wife. Patient Goals/Plan/Treatment Preferences: Spoke with Stevie's wife, Nancy Obrien and daughter Kathi Gomez; states Mahesh Nicholson lives at home with his wife and uses a walker and 1 assist ever since getting out of rehab 1 year ago. States needs 1 assist d/t balance issues. He wears 2-2 1/2 L O2 ATC thru [employer] [employer] and has nebulizer thru [employer]. He has elevated toilets, grab bars in the bathroom, rail into the house, uses a transfer chair when going out of the home. They state they have bed and chair alarms d/t patient being a sleepwalker. He did not have any HH services PTA. He is current with a private duty aide that comes in and bathes him 2x/wk. Wife and daughter transport patient to doctor using transfer chair; they state that is really the only place they take him. PCP was verified. Plan is for [facility] for rehab at discharge if possible. SW consulted. No precert. Will need PT/OT post-op. Transportation/Food Security/Housekeeping Addressed:  No issues identified.

## 2022-07-19 NOTE — SIGNIFICANT EVENT
Patient seen by me including key components of medical care. Case discussed with resident physician. Patient doing well with SBT. Extubate to BiPAP. Precedex for anxiety. Sliding scale pain medications. dextrose      sodium chloride Stopped (07/19/22 0624)    sodium bicarbonate infusion 100 mL/hr at 07/19/22 0700    dexmedetomidine      sodium chloride      norepinephrine Stopped (07/19/22 0225)      azithromycin  500 mg IntraVENous Q24H    piperacillin-tazobactam  3,375 mg IntraVENous Q12H    insulin glargine  0.15 Units/kg SubCUTAneous Nightly    insulin lispro  0-12 Units SubCUTAneous Q4H    tiotropium-olodaterol  2 puff Inhalation Daily    dexamethasone  4 mg IntraVENous Q24H    sodium chloride flush  5-40 mL IntraVENous 2 times per day    gabapentin  100 mg Oral BID    [Held by provider] metoprolol succinate  25 mg Oral Daily    tamsulosin  0.4 mg Oral Daily    pravastatin  80 mg Oral Nightly    aspirin  81 mg Oral Daily      Italicized font, if present,  represents changes to the note made by me. CC time 35 minutes. Time was discontiguous. Time does not include procedure. Time does include my direct assessment of the patient and coordination of care. Time represents more than 50% of the time involved with patient care by the [address] [place] [address] team.  Electronically signed by Marie Roach.  Michael Mon MD.

## 2022-07-19 NOTE — PROGRESS NOTES
Patient has been successfully weaned from Mechanical Ventilation. RSBI before extubation was 47 with SpO2 of 99 on 30% FiO2. Patient extubated and placed on 30% O2 via  BiPAP . Post extubation SpO2 is 100% with HR  73 bpm and RR 18 breaths/min. Patient had weak cough that was productive of tan sputum. Extubation Well tolerated by patient. Geetha Richter

## 2022-07-19 NOTE — CARE COORDINATION
DISCHARGE/PLANNING EVALUATION  7/19/22, 2:33 PM EDT    Reason for Referral: Plan for SNF  Mental Status: Patient on bipap spoke with wife and daughter. Decision Making: has family support to make decisions  Family/Social/Home Environment:  Patient resides at home with his wife and daughter. They stated that he remains on one floor. He uses a walker to get around at home. Daughter stated that they have bed alarms and chair alarms and assist the patient when he is up and walking. Current Services including food security, transportation and housekeeping: family assists  Current Equipment:walker  Payment Source:Medicare  Concerns or Barriers to Discharge: NA  Post acute provider list with quality measures, geographic area and applicable managed care information provided. Questions regarding selection process answered:Prefer [facility]     Teach Back Method used with family regarding care plan and options for discharge. Family verbalize understanding of the plan of care and contribute to goal setting. Patient goals, treatment preferences and discharge plan: Family wants [facility] for rehab as patient has been there before. Referral made to Dawna Baca at [facility].      Electronically signed by ANTONIO Phillips on 7/19/2022 at 2:33 PM

## 2022-07-20 PROBLEM — J96.02 ACUTE RESPIRATORY FAILURE WITH HYPERCAPNIA (HCC): Status: ACTIVE | Noted: 2022-01-01

## 2022-07-20 NOTE — CARE COORDINATION
7/20/22, 12:15 PM EDT    DISCHARGE PLANNING EVALUATION     Family requesting Comfort Care, Palliative Care consulted, pt on morphine drip, ? Inpt Hospice. SW will defer assessment at this time.

## 2022-07-20 NOTE — PLAN OF CARE
Problem: Respiratory - Adult  Goal: Maintain Patent Airway and Adequate Oxygenation  Outcome: Progressing     Problem: Respiratory - Adult  Goal: Spontaneous ventilation  Outcome: Progressing     Problem: Respiratory - Adult  Goal: Achieves optimal ventilation and oxygenation  Outcome: Progressing   Was able to wean FiO2 throughout the night per O2 protocol. Pt's O2 saturation at this time is 100% on 60lpm, and 65% on HFNC.

## 2022-07-20 NOTE — PROGRESS NOTES
Orthopaedic Progress Note      SUBJECTIVE   Mr. Martha Mulligan is hospital day 3, sustained R femoral neck fracture early 7/18/22 AM from a fall     Seen at bedside this morning, transitioned to Bipap yesterday, tolerated extubation well  Singh maintained  Ability to arouse overall improved since initial evaluation in ED per chart review, sedated on exam today but with notable response to verbal que and palpation to RLE      OBJECTIVE      Physical    VITALS:  BP (!) 144/90   Pulse 94   Temp 99.5 °F (37.5 °C) (Bladder)   Resp 14   Ht 5' 6\" (1.676 m)   Wt 154 lb 5.2 oz (70 kg)   SpO2 100%   BMI 24.91 kg/m²   I/O last 3 completed shifts:   In: 9260.6 [I.V.:5781.7; NG/GT:250; IV Piggyback:3228.9]  Out: 750 [Urine:750]    Exam limited due to bipap status  RLE sitting frog leg, wiggles toes, calf supple, does not withdraw with palpation  No skin breakdown or lacerations    Data  CBC:   Lab Results   Component Value Date/Time    WBC 21.1 07/20/2022 04:21 AM    HGB 11.6 07/20/2022 04:21 AM     07/20/2022 04:21 AM     BMP:    Lab Results   Component Value Date/Time     07/20/2022 04:21 AM    K 4.6 07/20/2022 04:21 AM     07/20/2022 04:21 AM    CO2 21 07/20/2022 04:21 AM    BUN 39 07/20/2022 04:21 AM    CREATININE 2.0 07/20/2022 04:21 AM    CALCIUM 7.9 07/20/2022 04:21 AM    GLUCOSE 135 07/20/2022 04:21 AM    GLUCOSE 97 03/06/2018 08:30 AM     Uric Acid:  No components found for: URIC  PT/INR:    Lab Results   Component Value Date/Time    INR 0.92 07/18/2022 05:00 AM     Troponin:  No results found for: TROPONINI  Urine Culture:  No components found for: CURINE      Current Inpatient Medications    Current Facility-Administered Medications: mometasone-formoterol (DULERA) 100-5 MCG/ACT inhaler 2 puff, 2 puff, Inhalation, BID  albumin human 25 % IV solution 25 g, 25 g, IntraVENous, Once  azithromycin (ZITHROMAX) 500 mg in sodium chloride 0.9 % 250 mL IVPB ([id number]), 500 mg, IntraVENous, Q24H  glucose chewable tablet 16 g, 4 tablet, Oral, PRN  dextrose bolus 10% 125 mL, 125 mL, IntraVENous, PRN **OR** dextrose bolus 10% 250 mL, 250 mL, IntraVENous, PRN  glucagon (rDNA) injection 1 mg, 1 mg, IntraMUSCular, PRN  dextrose 5 % solution, 100 mL/hr, IntraVENous, PRN  piperacillin-tazobactam (ZOSYN) 3,375 mg in dextrose 5 % 50 mL IVPB extended infusion (mini-bag), 3,375 mg, IntraVENous, Q12H  0.9 % sodium chloride infusion, , IntraVENous, Continuous  insulin glargine (LANTUS) injection vial 10 Units, 0.15 Units/kg, SubCUTAneous, Nightly  insulin lispro (HUMALOG) injection vial 0-12 Units, 0-12 Units, SubCUTAneous, Q4H  tiotropium-olodaterol (STIOLTO) 2.5-2.5 MCG/ACT inhaler 2 puff, 2 puff, Inhalation, Daily  acetaminophen (TYLENOL) tablet 650 mg, 650 mg, Oral, Q6H PRN  HYDROcodone-acetaminophen (NORCO) 5-325 MG per tablet 1 tablet, 1 tablet, Oral, Q6H PRN **OR** HYDROcodone-acetaminophen (NORCO) 5-325 MG per tablet 2 tablet, 2 tablet, Oral, Q6H PRN  morphine (PF) injection 2 mg, 2 mg, IntraVENous, Q2H PRN  dexamethasone (DECADRON) injection 4 mg, 4 mg, IntraVENous, Q24H  cyclobenzaprine (FLEXERIL) tablet 10 mg, 10 mg, Oral, TID PRN  heparin (porcine) injection 5,000 Units, 5,000 Units, SubCUTAneous, 3 times per day  albuterol (PROVENTIL) nebulizer solution 2.5 mg, 2.5 mg, Nebulization, Q6H PRN  fentaNYL (SUBLIMAZE) injection 25 mcg, 25 mcg, IntraVENous, Once  acetaminophen (TYLENOL) suppository 650 mg, 650 mg, Rectal, Q4H PRN  fentaNYL (SUBLIMAZE) injection 25 mcg, 25 mcg, IntraVENous, Q1H PRN  sodium chloride flush 0.9 % injection 5-40 mL, 5-40 mL, IntraVENous, 2 times per day  sodium chloride flush 0.9 % injection 5-40 mL, 5-40 mL, IntraVENous, PRN  0.9 % sodium chloride infusion, , IntraVENous, PRN  [DISCONTINUED] ondansetron (ZOFRAN-ODT) disintegrating tablet 4 mg, 4 mg, Oral, Q8H PRN **OR** ondansetron (ZOFRAN) injection 4 mg, 4 mg, IntraVENous, Q6H PRN  polyethylene glycol (GLYCOLAX) packet 17 g, 17 g, Oral, Daily PRN  docusate sodium (COLACE) capsule 100 mg, 100 mg, Oral, BID PRN  gabapentin (NEURONTIN) capsule 100 mg, 100 mg, Oral, BID  [Held by provider] metoprolol succinate (TOPROL XL) extended release tablet 25 mg, 25 mg, Oral, Daily  tamsulosin (FLOMAX) capsule 0.4 mg, 0.4 mg, Oral, Daily  pravastatin (PRAVACHOL) tablet 80 mg, 80 mg, Oral, Nightly  aspirin chewable tablet 81 mg, 81 mg, Oral, Daily        PLAN  Mr. Rivera [facility] is hospital day 3, sustained R femoral neck fracture early 7/18/22 AM from a fall   -overall improvement since seen yesterday, tolerated extubation well, bipap, potential for transfer to step down today  -multimodal pain control, minimize narcotic use  -dvt ppx: SCDs, prefer short acting AC for potential surgical intervention if improves within week  -maintain NWB RLE, no indication for traction  -continue to pad bony prominences  -will continue to monitor optimization status, potential for OR tomorrow for R hip IMN, will wait to post until able to discuss overall goals of care with family later today.

## 2022-07-20 NOTE — FLOWSHEET NOTE
Called by spiritual care director, Brother Alber to be with family at the death of Catrina Olivera. I met his wife of 77 years, his two daughters and some other family members. His son is on his way as are others. 07/20/22 1532   Encounter Summary   Encounter Overview/Reason  Crisis   Service Provided For: Patient and family together   Referral/Consult From: Nursing Supervisor/Manager   Support System Spouse; Children;Family members   Last Encounter  07/20/22   Complexity of Encounter High   Spiritual/Emotional needs   Type Spiritual Support   Grief, Loss, and Adjustments   Type Death   Family is grieving the loss. This  read the 23rd Psa and had prayer for them as a family for the [facility] to bring them comfort and strength.

## 2022-07-20 NOTE — FLOWSHEET NOTE
Patient's heart beat stopped at 1500. This RN and Emily Schumacher RN listened with no heart tones to be heard. Notified David Montelongo resident about time of death. Alanna Sarabia RN house supervisor notified of time of death.  called to pray with the family.

## 2022-07-20 NOTE — PROGRESS NOTES
[facility] THERAPY MISSED TREATMENT NOTE  STR[facility] ICU 4D      Date: 2022  Patient Name: Alba Pedersen        MRN: [id number]    : 1931  (80 y.o.)    REASON FOR MISSED TREATMENT:  Order received to complete clinical swallow evaluation. SUSAN Palacio reporting patient maintaining need for continuous  BiPAP with plans to transition to comfort care measures. No further ST services indicated at this time; please re-consult should further needs arise.        Portillo Perez M.A., [address] [place] [address]

## 2022-07-20 NOTE — SIGNIFICANT EVENT
Patient deteriorated with progressive hypoxemia gurgling respirations and required BiPAP administration. Family at bedside. I discussed with them the parameters of care. I related that the renal failure was improving and that I anticipated for renal recovery. However, if patient required intubation, he would require tracheostomy tube placement. Family was very adamant the patient would not benefit from tracheostomy tube. This would not be what he would tolerate or except in order to live. Patient has issues with swallowing and has an impaired cough. He is unable to adequately protect his airway and would require tracheostomy tube. As a result, family has opted to proceed with comfort measures only. Patient will start on morphine drip secondary to dyspnea. We will transition to 4B and complete his hospice on my service. Total time 45 minutes  Electronically signed by Georgie Mcleod.  Beulah Becerra MD.

## 2022-07-20 NOTE — PROGRESS NOTES
Patient:  Shelbi Carlson    Unit/Bed:4D-14/014-A  MRN: [id number]   PCP: DANTE Thomas CNP  Date of Admission: 7/18/2022    Assessment and Plan(All pulmonary edema, renal failure, PE, and respiratory failure diagnoses are acute in nature unless otherwise specified):        Acute Hypoxic Respiratory Failure: On 2-2.5 L NC baseline. Intubated 7/18/2022 secondary to BiPAP failure. Extubated 7/18/2022. On HFNC. Wean as tolerated. Right Intertrochanteric Hip Fracture:Orthopedic surgery following. OR scheduled for 7/21/2022 tentatively. Fever:  UA negative. PNA PCR 7/19/2022 negative; G+ cocci resp ctx. S/p 30 cc/kg fluid bolus. Zoysn & Azithromycin started 7/19/2022. Day #2. Likely secondary to aspiration. ASHLI on CKD 3: Cr 1.6 on arrival; baseline appears to be 1.4-1.7. FEUrea 7/19/2022 19.3%, suggesting pre-renal etiology. Continue IV fluids. Non-oliguric at this point; will need to trend. Thrombocytopenia: Platelets downtrending since admission, likely from critical illness versus infection. RUE Swelling:  Dup US pending 7/20/2022. Acute Encephalopathy: Secondary to hypercapnia and sedation and infection. CTH negative. COPD: Syed Zamarripa. Hypertension: Home medications held due to hypotension  DM: Strict glucose control. Hypotension: Likely related to positive pressure and sedation. Vasopressor support as needed to maintain a MAP of 65 or greater. Titrate as tolerated. Resolved. Hyperkalemia:  Received insulin/calcium/lokelmia. Resolved. CC:  fall  Chief Complaint   Patient presents with    Fall       HPI:   Per Chart Review:    \"79 y/o M reformed smoker PMHx GOLD 3 COPD (FEV1 35% predicted 11/2017), chronic hypoxic respiratory failure (on baseline 2-2.5L NC), HTN, HLD, CAD, NIDDM2 and dementia with good baseline functional status -- presented to [facility] with a chief complaint of R hip pain. History obtained from the patient's wife and daughter, who were present at bedside. Patient was admitted to the orthopedic surgery service 7/18 for traumatic R intertrochanteric hip fracture. The hospitalist service was consulted for pre-operative clearance, and subsequently consulted pulmonology for additional recommendations. Unfortunately, the patient fell while sleepwalking on the night prior to arrival. Family members report good baseline functional status at home -- state patient ambulates small distances with a walker and one assist, and answers questions appropriately. Patient received dilaudid in the ED and was subsequently noted to be less responsive. Narcan was administered without improvement. CT head showed NAP. ABG was obtained, demonstrating acute hypercapnic respiratory failure (pCO2 50, pH 7.28). He was diagnosed with acute COPD exacerbation, initiated on steroids/bronchodilators, and placed on BiPAP for acute on chronic hypoxic/acute hypercapnic respiratory failure. Pulmonology recommended deferring surgery and transferring to the ICU for possible intubation due to the patient's decreased responsiveness and history of severe COPD, along with moderate-high risk based off of the ARISCAT Score. __        Upon arrival to the ICU, the patient remained minimally responsive to painful stimuli. Hemodynamics were stable; oxygenation and ventilation were appropriate on BiPAP. Repeat blood gas demonstrated resolution of hypercapnia, and new metabolic acidosis. An additional dose of narcan was administered, after which the patient started moving his extremities, responding to painful stimuli and spontaneously opening his eyes. The decision was made to defer intubation and continue monitoring. Non-narcotic analgesics were ordered for pain control. Patient's wife and daughter confirmed that the patient would be transitioned FULL CODE status now, and remain so until the post-operative period. All questions were answered.  \"     Please refer to the assessment and plan for additional details. ROS:     RoS limited due to mental status. Patient denies any complaints currently. PMH:  Per HPI  SHX:    Per Chart Review:  Social History       Tobacco History       Smoking Status  Former Quit Date  10/5/1989 Smoking Frequency  2.00 packs/day for 40.00 years (80.00 pk-yrs) Smoking Tobacco Type  Cigarettes quit in 10/5/1989      Smokeless Tobacco Use  Never              Alcohol History       Alcohol Use Status  Yes Drinks/Week  1 Cans of beer per week Amount  1.0 standard drink of alcohol/wk Comment  now and then              Drug Use       Drug Use Status  No              Sexual Activity       Sexually Active  Not Asked                    FHX:   Per Chart Review:  Family History   Problem Relation Age of Onset    Rheum Arthritis Mother          due to complications related to    Heart Disease Mother     Coronary Art Dis Father     Heart Disease Father     Coronary Art Dis Brother     Heart Disease Brother        Allergies:   Per Chart Review:   Allergies   Allergen Reactions    Lasix [Furosemide] Hives and Rash    Lisinopril Hives and Rash    Daliresp [Roflumilast] Other (See Comments)     Anorexia    Keflex [Cephalexin]      hallucinations    Sulfa Antibiotics Hives    Percocet [Oxycodone-Acetaminophen] Nausea And Vomiting       Medications:     dextrose      sodium chloride 100 mL/hr at 22 0645    sodium chloride        mometasone-formoterol  2 puff Inhalation BID    albumin human  25 g IntraVENous Once    azithromycin  500 mg IntraVENous Q24H    piperacillin-tazobactam  3,375 mg IntraVENous Q12H    insulin glargine  0.15 Units/kg SubCUTAneous Nightly    insulin lispro  0-12 Units SubCUTAneous Q4H    tiotropium-olodaterol  2 puff Inhalation Daily    dexamethasone  4 mg IntraVENous Q24H    heparin (porcine)  5,000 Units SubCUTAneous 3 times per day    fentanNYL  25 mcg IntraVENous Once    sodium chloride flush  5-40 mL IntraVENous 2 times per day    gabapentin  100 mg Oral BID [Held by provider] metoprolol succinate  25 mg Oral Daily    tamsulosin  0.4 mg Oral Daily    pravastatin  80 mg Oral Nightly    aspirin  81 mg Oral Daily       Vital Signs:   Vitals:    07/20/22 0414 07/20/22 0500 07/20/22 0600 07/20/22 0631   BP: (!) 155/72 134/65 111/75    Pulse: (!) 113 96 (!) 103 (!) 111   Resp: 16 16 22 20   Temp:       TempSrc:       SpO2: 100% 100% 90% 100%   Weight:       Height:               T: 99.5   P: 111   RR: 20   B/P: 111/75  O2 Sat: 100  FiO2: 60      HFNC   60 LPM  60%      I/O:   24 hrs: Intake:   5981   Output:  625   Total:    +5356  UOP:   0.4 cc/kg /24hrs    I/O last 3 completed shifts: In: 9260.6 [I.V.:5781.7; NG/GT:250; IV Piggyback:3228.9]  Out: 750 [Urine:750]  No intake/output data recorded. GCS: 14      Body mass index is 24.91 kg/m². Physical Exam:    General:   Elderly appearing white male  HEENT:  normocephalic and atraumatic. No scleral icterus. PERR  Neck: supple. No Thyromegaly. Lungs: clear to auscultation. No retractions  Cardiac: RRR. No JVD. Abdomen: soft. Nontender. Extremities:  No clubbing, cyanosis . Bilateral pitting edema. Edema to right upper extremity. Right lower extremity externally rotated. Vasculature: capillary refill < 3 seconds. Bilateral radial pulses. Bilateral DP pulses. Skin:  warm and dry. Psych: GCS 14. Alert slightly agitated. Lymph:  No supraclavicular adenopathy. Neurologic:  No focal deficit. No seizures. GCS 14. Moves all 4 extremities. Follows commands. Data: (All radiographs, tracings, PFTs, and imaging are personally viewed and interpreted unless otherwise noted). Patient appears to be in sinus tachycardia on telemetry. Sodium 135 potassium 4.6 chloride 103 CO2 21  BUN 39 creatinine 2.0 anion gap 11  Magnesium 2.0  Lactic acid 1.4  Glucose 135  WBC 21.1 hemoglobin 11.6 platelets 451  CXR 9/08/1300: No pleural effusion, no pneumothorax. Right lower lobe infiltrate.   Left middle atelectasis. Increased vascular markings. Electronically signed by Gigi Holt MD, MPH              Electronically signed by Janet Ryan MD on 7/20/2022 at 7:05 AM                      Patient seen by me including key components of medical care. Case discussed with resident physician. Renal function improved with volume expansion. Thrombocytopenia due to fracture with subsequent consumption from bleeding. Hemoglobin and platelets are trending downward. May require transfusion if continues to drop. Patient is awake off mechanical ventilator. Transfer to medical floor. Telemetry shows sinus rhythm. Italicized font, if present,  represents changes to the note made by me. CC time 25 minutes. Time was discontiguous. Time does not include procedure. Time does include my direct assessment of the patient and coordination of care. Time represents more than 50% of the time involved with patient care by the [address] [place] [address] team.  Electronically signed by Zamzam Kwon.  Luther Reynolds MD.

## 2022-07-20 NOTE — PROGRESS NOTES
Attempted to complete sbirt per trauma protocol. Pt currently on low stim order, blake to reattempt.

## 2022-07-20 NOTE — DEATH NOTES
[address] [address]  Notice of Patient Passing      Patient Name- Africa Shankar Number- [de-identified]   Attending Physician- Minerva Dutta MD    Admitted on-7/18/2022  4:35 AM     On 7/20/2022 at 1500 patient was found in [phone] with:   Absence of vital signs. Absence of neurological response. Confirmed time of death at 56. Physician or On-call Physician notified of time of death- yes    Family present at time of death- yes, multiple   Spiritual care present at time of death- no    Physician was notified and orders were obtained to release the body. Post-Mortem documentation completed; form printed, signed, and given to admitting.     Steven Rai RN RN Nursing Supervisor/ Manager  7/20/22   3:42 PM        Name of [facility]: [facility] Phone Number: [phone]    Who Will Sign Death Certificate:     [x] Dr Susanne Landis

## 2022-07-20 NOTE — PLAN OF CARE
Mariia Jarvis is a 79 y/o male with Hx of AAA, CAAD, CKD, COPD, HTN, T2DM, dementia who presented to [facility] on 7/18 for the evaluation or R hip pain after a fall. Patient was given narcotic drugs in ED and became obtunded. Pt was transferred to ICU for further monitoring and intubation (7/18) and extubation (7/18). Pt being treated for Acute hypoxic respiratory failure and is weaned down to baseline O2 requirements of 2-2.5L O2 NC, Right Hip fracture. Dr. Herb Tai attending. Pt planned to be transferred to step down unit today from ICU. To note, patient code status changed to [facility] today. Labs, notes, orders, vitals reviewed from last 24 hours. Hospitalist service will plan to see patient tomorrow.          Electronically signed by Alice Swain PA-C on 7/20/2022 at 3:04 PM

## 2022-07-20 NOTE — DISCHARGE SUMMARY
Hospital Medicine Discharge Summary      Patient Identification:   Abi Cottrell   : 1931  MRN: [id number]   Account: [de-identified]      Patient's PCP: DANTE Harris CNP    Admit Date: 2022     Discharge Date:   2022    Admitting Physician: Tayler Garcia MD     Discharge Physician: Marlin Ashley MD     Discharge Diagnoses: Active Hospital Problems    Diagnosis Date Noted    Acute respiratory failure with hypercapnia ([address] [J96.02] 2022     Priority: Medium    Closed displaced fracture of right femoral neck ([address] [S72.001A] 2022     Priority: Medium    Fall [W19. XXXA] 2022     Priority: Medium    Acute hypercapnic respiratory failure ([address] [J96.02] 2021       The patient was seen and examined on day of discharge and this discharge summary is in conjunction with any daily progress note from day of discharge. Hospital Course:   Abi Cottrell is a 80 y.o. male admitted to [facility] on 2022 for a right hip fracture after a mechanical fall. He initially was admitted to the medical floor but then required BiPAP and subsequently failed. He was intubated. He was extubated on 2022. Patient's course was complicated by ASHLI and possible pneumonia. He tolerated this until 2022 when he decompensated and after discussion with family the decision was to make him comfortable. Comfort measures were performed. Patient  at 1500 2022 secondary to respiratory failure.           Exam:     Vitals:  Vitals:    22 0918 22 1000 22 1100 22 1500   BP:  132/82 (!) 148/80    Pulse: (!) 106 (!) 103 (!) 122 (!) 0   Resp:     Temp:       TempSrc:       SpO2: 94% 98% 99%    Weight:       Height:         Weight: Weight: 154 lb 5.2 oz (70 kg)     24 hour intake/output:  Intake/Output Summary (Last 24 hours) at 2022 1630  Last data filed at 2022 1439  Gross per 24 hour   Intake 3996.36 ml   Output 425 ml   Net 3571.36 ml         Per Progress note:  Physical Exam:     General:   Elderly appearing white male  HEENT:  normocephalic and atraumatic. No scleral icterus. PERR  Neck: supple. No Thyromegaly. Lungs: clear to auscultation. No retractions  Cardiac: RRR. No JVD. Abdomen: soft. Nontender. Extremities:  No clubbing, cyanosis . Bilateral pitting edema. Edema to right upper extremity. Right lower extremity externally rotated. Vasculature: capillary refill < 3 seconds. Bilateral radial pulses. Bilateral DP pulses. Skin:  warm and dry. Psych: GCS 14. Alert slightly agitated. Lymph:  No supraclavicular adenopathy. Neurologic:  No focal deficit. No seizures. GCS 14. Moves all 4 extremities. Follows commands. Labs: For convenience and continuity at follow-up the following most recent labs are provided:      CBC:    Lab Results   Component Value Date/Time    WBC 21.1 07/20/2022 04:21 AM    HGB 11.6 07/20/2022 04:21 AM    HCT 37.3 07/20/2022 04:21 AM     07/20/2022 04:21 AM       Renal:    Lab Results   Component Value Date/Time     07/20/2022 04:21 AM    K 4.6 07/20/2022 04:21 AM     07/20/2022 04:21 AM    CO2 21 07/20/2022 04:21 AM    BUN 39 07/20/2022 04:21 AM    CREATININE 2.0 07/20/2022 04:21 AM    CALCIUM 7.9 07/20/2022 04:21 AM    PHOS 3.4 07/20/2022 04:21 AM         Significant Diagnostic Studies    Radiology:   VL DUP UPPER EXTREMITY VENOUS RIGHT   Final Result   No evidence of a DVT. **This report has been created using voice recognition software. It may contain minor errors which are inherent in voice recognition technology. **      Final report electronically signed by Dr Shade Caceres on 7/20/2022 10:53 AM      XR CHEST PORTABLE   Final Result   1. Normal heart size. Moderate fibrotic stranding right lung base and left midlung. 2. Chronic cephalization of pulmonary vascular flow, consistent with COPD.    3. No acute infiltrates or effusions are seen. .            **This report has been created using voice recognition software. It may contain minor errors which are inherent in voice recognition technology. **      Final report electronically signed by Dr. Eliot Vega on 7/20/2022 7:57 AM      XR CHEST PORTABLE   Final Result   1. Interval removal of the endotracheal tube and enteric tube. 2. Stable bibasilar predominant emphysema. No acute infiltrate. This document has been electronically signed by: Roni Means MD on    07/20/2022 12:44 AM      XR CHEST PORTABLE   Final Result   Worsening interstitial opacities in the mid and upper lung zones. **This report has been created using voice recognition software. It may contain minor errors which are inherent in voice recognition technology. **      Final report electronically signed by Dr. Nanci Golden on 7/19/2022 7:25 AM      [address]   Final Result    No evidence of gallstones. Gallbladder size is at the upper limits of normal.               **This report has been created using voice recognition software. It may contain minor errors which are inherent in voice recognition technology. **      Final report electronically signed by Dr. Nnaci Golden on 7/19/2022 7:38 AM      XR CHEST PORTABLE   Final Result   Impression:   Tube positions as described above. This document has been electronically signed by: July Yeager MD on    07/19/2022 12:22 AM      XR CHEST PORTABLE   Final Result   Impression:   No acute cardiopulmonary disease. COPD and scarring. This document has been electronically signed by: July Yeager MD on    07/18/2022 11:27 PM      XR HUMERUS RIGHT (MIN 2 VIEWS)   Final Result   Impression:   Negative for acute osseous abnormality. This document has been electronically signed by: July Yeager MD on    07/18/2022 10:49 PM      CT HEAD WO CONTRAST   Final Result   Impression:   Negative for acute intracranial abnormality.       This document has been electronically signed by: Araseli Davalos MD on    07/18/2022 10:26 PM      All CTs at this facility use dose modulation techniques and iterative    reconstructions, and/or weight-based dosing   when appropriate to reduce radiation to a low as reasonably achievable. XR CHEST PORTABLE   Final Result   COPD. Left lower lobe parenchymal scarring. No acute process            **This report has been created using voice recognition software. It may contain minor errors which are inherent in voice recognition technology. **      Final report electronically signed by Dr. Gabino Cobb on 7/18/2022 3:03 PM      CT CERVICAL SPINE WO CONTRAST   Final Result       1. No fracture. 2. Degenerative changes in the cervical spine with severe spinal canal stenosis at the C4-5 and C5-6 levels. **This report has been created using voice recognition software. It may contain minor errors which are inherent in voice recognition technology. **      Final report electronically signed by Dr. Tapia Been on 7/18/2022 7:54 AM      CT HIP RIGHT WO CONTRAST   Final Result   Acute nondisplaced vertically oriented fracture through the proximal right femoral diaphysis which extends to the femoral neck. **This report has been created using voice recognition software. It may contain minor errors which are inherent in voice recognition technology. **      Final report electronically signed by Dr. Tapia Been on 7/18/2022 7:57 AM      CT CERVICAL SPINE WO CONTRAST   Final Result      CT HEAD WO CONTRAST   Final Result   Impression:   1. No CT evidence of acute intracranial abnormality. Motion artifact    compromised the exam particularly the skull base   2. Cerebral volume loss, intracranial atherosclerotic disease and mild    sequela of chronic small vessel ischemic disease.       This document has been electronically signed by: Lori Francisco MD on    07/18/2022 07:10 AM      All CTs at this facility use dose modulation techniques and iterative    reconstructions, and/or weight-based dosing   when appropriate to reduce radiation to a low as reasonably achievable. XR HIP 2-3 VW W PELVIS RIGHT   ED Interpretation   Hip fracture      Final Result   Impression:   1. Suspect intertrochanteric fracture on the right thin section CT    recommended. This document has been electronically signed by: Sabiha Thomas MD on    2022 07:04 AM         XR CHEST 1 VIEW   Final Result   Impression:   1. No acute cardiopulmonary disease. This document has been electronically signed by: Sabiha Thomas MD on    2022 06:51 AM             Consults:     IP CONSULT TO HOSPITALIST  IP CONSULT TO PULMONOLOGY  IP CONSULT TO SPIRITUAL SERVICES  IP CONSULT TO SOCIAL WORK  PALLIATIVE CARE EVAL    Disposition:    [] Home       [] TCU       [] Rehab       [] Psych       [] SNF       [] [place]       [] Other-    Condition at Discharge:      Code Status:  DNR-CC         Follow-up visits:   No follow-up provider specified. Discharge Medications:        Medication List        CONTINUE taking these medications      Nebulizer Air Tube/Plugs Misc  Please provide nebulizer kit and supplies with mask interface.             ASK your doctor about these medications      acetaminophen 500 MG tablet  Commonly known as: TYLENOL     * albuterol sulfate  (90 Base) MCG/ACT inhaler  Commonly known as: Ventolin HFA  Inhale 2 puffs into the lungs every 6 hours as needed for Wheezing or Shortness of Breath     * albuterol (2.5 MG/3ML) 0.083% nebulizer solution  Commonly known as: PROVENTIL  Take 3 mLs by nebulization every 6 hours as needed for Wheezing or Shortness of Breath     aspirin 81 MG chewable tablet  Commonly known as: Aspirin Childrens  Take 1 tablet by mouth daily     budesonide 0.5 MG/2ML nebulizer suspension  Commonly known as: PULMICORT  Take 2 mLs by nebulization 2 times daily     docusate sodium 100 MG capsule  Commonly known as: COLACE     fish oil-omega-3 fatty acids 1000 MG capsule     formoterol 20 MCG/2ML nebulizer solution  Commonly known as: Perforomist  Take 2 mLs by nebulization every 12 hours     gabapentin 100 MG capsule  Commonly known as: NEURONTIN     Incruse Ellipta 62.5 MCG/INH Aepb  Generic drug: Umeclidinium Bromide  USE 1 INHALATION DAILY     metoprolol succinate 25 MG extended release tablet  Commonly known as: TOPROL XL  Take 1 tablet by mouth daily     pravastatin 80 MG tablet  Commonly known as: PRAVACHOL     predniSONE 5 MG tablet  Commonly known as: DELTASONE  Take 1 tablet by mouth daily Prednisone 5mg po daily to take after food. tamsulosin 0.4 MG capsule  Commonly known as: FLOMAX  Take 1 capsule by mouth daily     vitamin D-3 125 MCG (5000 UT) Tabs  Commonly known as: cholecalciferol  Take 1 tablet by mouth daily           * This list has 2 medication(s) that are the same as other medications prescribed for you. Read the directions carefully, and ask your doctor or other care provider to review them with you. Time Spent on discharge is more than 15 minutes in the examination, evaluation, counseling and review of medications and discharge plan. Signed: Thank you DANTE Go CNP for the opportunity to be involved in this patient's care.     Electronically signed by Adriana Neil MD on 7/20/2022 at 4:30 PM

## 2022-07-20 NOTE — PROGRESS NOTES
Follow Up / Progress Note        Patient:   Delroy Mazariegos  YOB: 1931  Age:  80 y.o. Room:  [address] [place] [address]  MRN:  [id number]            Family/Patient Discussion:  Primary RN, Isak Graf called this RN as comfort care has been decided upon. Patient's wife and daughter are at the bedside. Daughter Kathi Gomez shares the decision that was made and that they are grateful that the patient will remain in the hospital for comfort measures. Much emotional support provided. Plan/Follow-Up:  Please call palliative care if needs arise.       Electronically signed by Donald Wilde RN on 7/20/2022 at 11:58 AM             Palliative Care Office: [phone]

## 2022-07-21 LAB
GRAM STAIN RESULT: NORMAL
RESPIRATORY CULTURE: NORMAL

## 2022-07-22 ENCOUNTER — TELEPHONE (OUTPATIENT)
Dept: NEPHROLOGY | Age: 87
End: 2022-07-22

## 2022-07-25 NOTE — CARE COORDINATION
7/25/22, 8:49 AM EDT    DISCHARGE PLANNING EVALUATION     Sw updated Ranjana Daugherty at [address] [place] [address]

## (undated) DEVICE — GLOVE ORTHO 8   MSG9480

## (undated) DEVICE — BIOGUARD A/W CLEANING ADAPTER